# Patient Record
Sex: FEMALE | Race: BLACK OR AFRICAN AMERICAN | NOT HISPANIC OR LATINO | Employment: OTHER | URBAN - METROPOLITAN AREA
[De-identification: names, ages, dates, MRNs, and addresses within clinical notes are randomized per-mention and may not be internally consistent; named-entity substitution may affect disease eponyms.]

---

## 2017-02-19 ENCOUNTER — HOSPITAL ENCOUNTER (EMERGENCY)
Facility: HOSPITAL | Age: 62
Discharge: HOME | End: 2017-02-19
Attending: EMERGENCY MEDICINE
Payer: COMMERCIAL

## 2017-02-19 VITALS
OXYGEN SATURATION: 98 % | DIASTOLIC BLOOD PRESSURE: 81 MMHG | SYSTOLIC BLOOD PRESSURE: 156 MMHG | BODY MASS INDEX: 36.58 KG/M2 | HEART RATE: 102 BPM | TEMPERATURE: 98 F | WEIGHT: 200 LBS | RESPIRATION RATE: 16 BRPM

## 2017-02-19 DIAGNOSIS — R42 VERTIGO: Primary | ICD-10-CM

## 2017-02-19 PROCEDURE — 99283 EMERGENCY DEPT VISIT LOW MDM: CPT | Mod: ,,, | Performed by: EMERGENCY MEDICINE

## 2017-02-19 PROCEDURE — 99283 EMERGENCY DEPT VISIT LOW MDM: CPT

## 2017-02-19 RX ORDER — FAMOTIDINE 20 MG/1
20 TABLET, FILM COATED ORAL 2 TIMES DAILY
Qty: 30 TABLET | Refills: 0 | Status: ON HOLD | OUTPATIENT
Start: 2017-02-19 | End: 2020-03-11 | Stop reason: HOSPADM

## 2017-02-19 NOTE — ED AVS SNAPSHOT
[facility]  [address]  [facility] [address]               Lo Escalera   2017  1:02 AM   ED    Description:  Female : 1955   Department:  [facility]           Your Care was Coordinated By:     Provider Role From To    Raj Lopez MD Attending Provider 17 0146 --    Magdalena Sierra MD Resident 17 0109 --      Reason for Visit     Dizziness           Diagnoses this Visit        Comments    Vertigo    -  Primary       ED Disposition     None           To Do List           [facility]     [facility] -  Assistance  Registered nurses in the [facility] provide clinical advisement, health education, appointment booking, and other advisory services.  Call for this free service at [phone].             Medications           Message regarding Medications     Verify the changes and/or additions to your medication regime listed below are the same as discussed with your clinician today.  If any of these changes or additions are incorrect, please notify your healthcare provider.             Verify that the below list of medications is an accurate representation of the medications you are currently taking.  If none reported, the list may be blank. If incorrect, please contact your healthcare provider. Carry this list with you in case of emergency.           Current Medications     aluminum & magnesium hydroxide-simethicone (MYLANTA MAX STRENGTH) 400-400-40 mg/5 mL suspension Take 30 mLs by mouth 3 (three) times daily with meals.    calcium carbonate (TUMS) 200 mg calcium (500 mg) chewable tablet Take 1 tablet (500 mg total) by mouth as needed for Heartburn.    diclofenac (VOLTAREN) 50 MG EC tablet TAKE 1 TABLET BY MOUTH TWICE A DAY AFTER MEALS    duloxetine (CYMBALTA) 20 MG capsule Take 1 capsule (20 mg total) by mouth 2 (two) times daily.    famotidine (PEPCID) 20 MG tablet Take  1 tablet (20 mg total) by mouth 2 (two) times daily.    gabapentin (NEURONTIN) 100 MG capsule Take 1 capsule (100 mg total) by mouth 3 (three) times daily.    lisinopril (PRINIVIL,ZESTRIL) 20 MG tablet Take 1 tablet (20 mg total) by mouth once daily.    meclizine (ANTIVERT) 25 mg tablet Take 1 tablet (25 mg total) by mouth 3 (three) times daily as needed.    metoprolol succinate (TOPROL-XL) 25 MG 24 hr tablet Take 25 mg by mouth once daily.    pantoprazole (PROTONIX) 40 MG tablet Take 1 tablet (40 mg total) by mouth 2 (two) times daily.           Clinical Reference Information           Your Vitals Were     BP Pulse Temp Resp Weight Last Period    156/81 (BP Location: Left arm, Patient Position: Standing, BP Method: Automatic) 102 98 °F (36.7 °C) 16 90.7 kg (200 lb) (LMP Unknown)    SpO2 BMI             98% 36.58 kg/m2         Allergies as of 2/19/2017        Reactions    Codeine Itching      Immunizations Administered on Date of Encounter - 2/19/2017     None      ED Micro, Lab, POCT     None      ED Imaging Orders     None        Discharge Instructions             Managing Dizziness (Vertigo) with Medicines    Although medicines can't cure your problem, they can help control symptoms. Your doctor may prescribe medicines for a few weeks and then taper them off. Always take your medicine as prescribed. Never share your medicine with others.  Contact your healthcare provider right away if you have side effects from your medicines.   How medicines can help  · Treat infection or inflammation. If you have an infection caused by bacteria, your doctor can prescribe antibiotics.  · Limit conflicting balance signals. These medicines are often in pill form.  · Ease nausea. Suppositories, pills, or shots can reduce vomiting.  · Reduce pressure in the canals. Diuretics can be used to treat Meniere's disease. These medicines help your body get rid of extra fluid.  · Ease other symptoms. Other medicines can help ease depression  and anxiety caused by living with dizziness or fainting.  Date Last Reviewed: 11/1/2016  © 6545-6355 [employer]. [address] [place] [address], [place] [address]. All rights reserved. This information is not intended as a substitute for professional medical care. Always follow your healthcare professional's instructions.           [facility] complies with applicable Federal civil rights laws and does not discriminate on the basis of race, color, national origin, age, disability, or sex.        Language Assistance Services     ATTENTION: Language assistance services are available, free of charge. Please call [phone].      ATENCIÓN: Si habla español, tiene a triplett disposición servicios gratuitos de asistencia lingüística. Llame al [phone].     CHÚ Ý: N?u b?n nói Ti?ng Vi?t, có các d?ch v? h? tr? ngôn ng? mi?n phí dành cho b?n. G?i s? [phone].

## 2017-02-19 NOTE — DISCHARGE INSTRUCTIONS
Managing Dizziness (Vertigo) with Medicines    Although medicines can't cure your problem, they can help control symptoms. Your doctor may prescribe medicines for a few weeks and then taper them off. Always take your medicine as prescribed. Never share your medicine with others.  Contact your healthcare provider right away if you have side effects from your medicines.   How medicines can help  · Treat infection or inflammation. If you have an infection caused by bacteria, your doctor can prescribe antibiotics.  · Limit conflicting balance signals. These medicines are often in pill form.  · Ease nausea. Suppositories, pills, or shots can reduce vomiting.  · Reduce pressure in the canals. Diuretics can be used to treat Meniere's disease. These medicines help your body get rid of extra fluid.  · Ease other symptoms. Other medicines can help ease depression and anxiety caused by living with dizziness or fainting.  Date Last Reviewed: 11/1/2016  © 8323-1612 [employer]. [address] [place] [address], [place] [address]. All rights reserved. This information is not intended as a substitute for professional medical care. Always follow your healthcare professional's instructions.

## 2017-02-19 NOTE — ED TRIAGE NOTES
Pt presents to ED after having an episode of vertigo earlier today. Pt stated that she takes Meclizine at home for chronic vertigo but has recently been trying to decrease her dosage. Pt was told to take it TID but only took it once today. Pt denies vertigo at this time.     LOC: Patient name and date of birth verified.  The patient is awake, alert and aware of environment with an appropriate affect, the patient is oriented x 3 and speaking appropriately.  Pt in NAD.    APPEARANCE: Patient resting comfortably and in no acute distress, patient is clean and well groomed, patient's clothing is properly fastened.  SKIN: The skin is warm and dry, color consistent with ethnicity, patient has normal skin turgor and moist mucus membranes, skin intact, no breakdown or brusing noted.  MUSCULOSKELETAL: Patient moving all extremities well, no obvious swelling or deformities noted.  RESPIRATORY: Airway is open and patent, respirations are spontaneous, patient has a normal effort and rate, no accessory muscle use noted.  CARDIAC: Patient has a normal rate and rhythm, no periphreal edema noted, capillary refill < 3 seconds.  ABDOMEN: Soft and non tender to palpation, no distention noted. Bowel sounds present in all four quadrants.  NEUROLOGIC: Eyes open spontaneously, behavior appropriate to situation, follows commands, facial expression symmetrical, bilateral hand grasp equal and even, purposeful motor response noted, normal sensation in all extremities when touched with a finger.

## 2017-02-19 NOTE — ED PROVIDER NOTES
Encounter Date: 2/19/2017       History     Chief Complaint   Patient presents with    Dizziness     pt reports hx of vertigo took home meds and now feels better. pt also reports      Review of patient's allergies indicates:   Allergen Reactions    Codeine Itching     HPI Comments: 62 yo female with h/o vertigo, on meclizine, with dizziness, and feeling light headed tonight, she had only taken the meclizine once today, as she was trying to wean herself off of it. She denies any CP, SOB, or other symptoms, no motor or neurological deficits    The history is provided by the patient.     Past Medical History   Diagnosis Date    Diverticulosis     Gastric ulcer      old    Hepatic steatosis 9/12/2016    Hypertension     Hypothyroidism 10/24/2016    Peptic ulcer disease 9/12/2016    SOB (shortness of breath) 10/24/2016     Past Medical History Pertinent Negatives   Diagnosis Date Noted    Asthma 10/18/2016    Diabetes mellitus 10/18/2016     Past Surgical History   Procedure Laterality Date    Hysterectomy      Appendectomy      Cholecystectomy      Tonsillectomy       History reviewed. No pertinent family history.  Social History   Substance Use Topics    Smoking status: Never Smoker    Smokeless tobacco: None    Alcohol use No      Comment: 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  However, no LOC colic and taken 20 years.     Review of Systems   Constitutional: Negative for chills and fever.   HENT: Negative for dental problem, ear pain, facial swelling and hearing loss.    Eyes: Negative for pain and visual disturbance.   Respiratory: Negative for cough and shortness of breath.    Cardiovascular: Negative for chest pain and leg swelling.   Gastrointestinal: Negative for abdominal distention and abdominal pain.   Genitourinary: Negative for dysuria.   Musculoskeletal: Negative for arthralgias.   Neurological: Positive for dizziness. Negative for seizures, syncope, speech difficulty, light-headedness, numbness and  headaches.   Hematological: Negative for adenopathy.   Psychiatric/Behavioral: Negative for agitation.       Physical Exam   Initial Vitals   BP Pulse Resp Temp SpO2   02/19/17 0008 02/19/17 0008 02/19/17 0008 02/19/17 0008 02/19/17 0008   170/75 88 16 98 °F (36.7 °C) 98 %     Physical Exam    Constitutional: She appears well-developed and well-nourished.   HENT:   Head: Normocephalic.   Nystagmus present laterally, no vertical nystagmus, nystagmus improved with repeating test.   Hand- eye coordination intact  Rapid motor movement intact  Rapid head movement consistent with peripheral vertigo   Eyes: Pupils are equal, round, and reactive to light.   Cardiovascular: Normal rate and regular rhythm.   Pulmonary/Chest: Breath sounds normal.   Musculoskeletal: Normal range of motion.   Neurological: She is alert.   Skin: Skin is warm.         ED Course   Procedures  Labs Reviewed - No data to display                APC / Resident Notes:   60 yo female with vertigo, and worsening symptoms with not taking her meclizine which improved after taking her medicaiton           Attending Attestation:   Physician Attestation Statement for Resident:  As the supervising MD   Physician Attestation Statement: I have personally seen and examined this patient.   I agree with the above history. -:   As the supervising MD I agree with the above PE.    As the supervising MD I agree with the above treatment, course, plan, and disposition.            Attending ED Notes:   This is an emergent evaluation of a 61 y.o. female patient with presentation of vertigo.  Based upon my evaluation, including the HINTS exam, I believe the patient has peripheral vertigo.  I do not believe that the patient has any acute CVA or cerebellar process. I do not believe head CT or MRI of the brain is indicated at this time.    The pt is stable for discharge. Clinical impression/plan and rx discussed with patient. Return precautions and after care instructions  given. Pt is to follow up with PCP in 3-5  days or return to the ED for any concerns. The patient expressed understanding.            ED Course     Clinical Impression:   The encounter diagnosis was Vertigo.    Disposition:   Disposition: Discharged  Condition: Stable       Raj Lopez MD  02/20/17 0410

## 2017-05-29 ENCOUNTER — HOSPITAL ENCOUNTER (EMERGENCY)
Facility: HOSPITAL | Age: 62
Discharge: HOME | End: 2017-05-29
Attending: EMERGENCY MEDICINE

## 2017-05-29 VITALS
WEIGHT: 185 LBS | TEMPERATURE: 99 F | OXYGEN SATURATION: 96 % | DIASTOLIC BLOOD PRESSURE: 66 MMHG | BODY MASS INDEX: 36.32 KG/M2 | HEIGHT: 60 IN | SYSTOLIC BLOOD PRESSURE: 152 MMHG | RESPIRATION RATE: 11 BRPM | HEART RATE: 94 BPM

## 2017-05-29 DIAGNOSIS — R10.9 ABDOMINAL PAIN, UNSPECIFIED LOCATION: Primary | ICD-10-CM

## 2017-05-29 DIAGNOSIS — K57.90 DIVERTICULOSIS OF INTESTINE, PART UNSPECIFIED, WITHOUT PERFORATION OR ABSCESS WITHOUT BLEEDING: ICD-10-CM

## 2017-05-29 LAB
ALBUMIN SERPL BCP-MCNC: 3.7 G/DL
ALP SERPL-CCNC: 168 U/L
ALT SERPL W/O P-5'-P-CCNC: 50 U/L
ANION GAP SERPL CALC-SCNC: 15 MMOL/L
AST SERPL-CCNC: 171 U/L
BACTERIA #/AREA URNS AUTO: ABNORMAL /HPF
BASOPHILS # BLD AUTO: 0.02 K/UL
BASOPHILS NFR BLD: 0.3 %
BILIRUB SERPL-MCNC: 1.7 MG/DL
BILIRUB UR QL STRIP: ABNORMAL
BUN SERPL-MCNC: 5 MG/DL
CALCIUM SERPL-MCNC: 9.5 MG/DL
CHLORIDE SERPL-SCNC: 96 MMOL/L
CLARITY UR REFRACT.AUTO: ABNORMAL
CO2 SERPL-SCNC: 28 MMOL/L
COLOR UR AUTO: ABNORMAL
CREAT SERPL-MCNC: 0.7 MG/DL
DIFFERENTIAL METHOD: ABNORMAL
EOSINOPHIL # BLD AUTO: 0 K/UL
EOSINOPHIL NFR BLD: 0.2 %
ERYTHROCYTE [DISTWIDTH] IN BLOOD BY AUTOMATED COUNT: 14.6 %
EST. GFR  (AFRICAN AMERICAN): >60 ML/MIN/1.73 M^2
EST. GFR  (NON AFRICAN AMERICAN): >60 ML/MIN/1.73 M^2
GLUCOSE SERPL-MCNC: 107 MG/DL
GLUCOSE UR QL STRIP: NEGATIVE
HCT VFR BLD AUTO: 42 %
HGB BLD-MCNC: 14.5 G/DL
HGB UR QL STRIP: NEGATIVE
HYALINE CASTS UR QL AUTO: 2 /LPF
KETONES UR QL STRIP: NEGATIVE
LEUKOCYTE ESTERASE UR QL STRIP: ABNORMAL
LIPASE SERPL-CCNC: 9 U/L
LYMPHOCYTES # BLD AUTO: 1.6 K/UL
LYMPHOCYTES NFR BLD: 27.1 %
MCH RBC QN AUTO: 35.5 PG
MCHC RBC AUTO-ENTMCNC: 34.5 %
MCV RBC AUTO: 103 FL
MICROSCOPIC COMMENT: ABNORMAL
MONOCYTES # BLD AUTO: 0.5 K/UL
MONOCYTES NFR BLD: 7.6 %
NEUTROPHILS # BLD AUTO: 3.8 K/UL
NEUTROPHILS NFR BLD: 64.6 %
NITRITE UR QL STRIP: NEGATIVE
PH UR STRIP: 7 [PH] (ref 5–8)
PLATELET # BLD AUTO: 140 K/UL
PMV BLD AUTO: 10.6 FL
POTASSIUM SERPL-SCNC: 3.7 MMOL/L
PROT SERPL-MCNC: 8.2 G/DL
PROT UR QL STRIP: ABNORMAL
RBC # BLD AUTO: 4.09 M/UL
RBC #/AREA URNS AUTO: 0 /HPF (ref 0–4)
SODIUM SERPL-SCNC: 139 MMOL/L
SP GR UR STRIP: 1.01 (ref 1–1.03)
SQUAMOUS #/AREA URNS AUTO: 3 /HPF
URN SPEC COLLECT METH UR: ABNORMAL
UROBILINOGEN UR STRIP-ACNC: 4 EU/DL
WBC # BLD AUTO: 5.9 K/UL
WBC #/AREA URNS AUTO: 12 /HPF (ref 0–5)

## 2017-05-29 PROCEDURE — 80053 COMPREHEN METABOLIC PANEL: CPT

## 2017-05-29 PROCEDURE — 25000003 PHARM REV CODE 250: Performed by: STUDENT IN AN ORGANIZED HEALTH CARE EDUCATION/TRAINING PROGRAM

## 2017-05-29 PROCEDURE — 83690 ASSAY OF LIPASE: CPT

## 2017-05-29 PROCEDURE — 81003 URINALYSIS AUTO W/O SCOPE: CPT

## 2017-05-29 PROCEDURE — 25000003 PHARM REV CODE 250: Performed by: EMERGENCY MEDICINE

## 2017-05-29 PROCEDURE — 87186 SC STD MICRODIL/AGAR DIL: CPT

## 2017-05-29 PROCEDURE — 87086 URINE CULTURE/COLONY COUNT: CPT

## 2017-05-29 PROCEDURE — 96374 THER/PROPH/DIAG INJ IV PUSH: CPT

## 2017-05-29 PROCEDURE — 63600175 PHARM REV CODE 636 W HCPCS: Performed by: STUDENT IN AN ORGANIZED HEALTH CARE EDUCATION/TRAINING PROGRAM

## 2017-05-29 PROCEDURE — 96376 TX/PRO/DX INJ SAME DRUG ADON: CPT

## 2017-05-29 PROCEDURE — 99284 EMERGENCY DEPT VISIT MOD MDM: CPT | Mod: 25

## 2017-05-29 PROCEDURE — 99284 EMERGENCY DEPT VISIT MOD MDM: CPT | Mod: ,,, | Performed by: EMERGENCY MEDICINE

## 2017-05-29 PROCEDURE — 96375 TX/PRO/DX INJ NEW DRUG ADDON: CPT

## 2017-05-29 PROCEDURE — 81001 URINALYSIS AUTO W/SCOPE: CPT

## 2017-05-29 PROCEDURE — 85025 COMPLETE CBC W/AUTO DIFF WBC: CPT

## 2017-05-29 PROCEDURE — 63600175 PHARM REV CODE 636 W HCPCS: Performed by: EMERGENCY MEDICINE

## 2017-05-29 PROCEDURE — 87077 CULTURE AEROBIC IDENTIFY: CPT

## 2017-05-29 PROCEDURE — 87088 URINE BACTERIA CULTURE: CPT

## 2017-05-29 PROCEDURE — 25500020 PHARM REV CODE 255: Performed by: EMERGENCY MEDICINE

## 2017-05-29 RX ORDER — HYDROCODONE BITARTRATE AND ACETAMINOPHEN 5; 325 MG/1; MG/1
1 TABLET ORAL EVERY 8 HOURS PRN
Qty: 10 TABLET | Refills: 0 | Status: SHIPPED | OUTPATIENT
Start: 2017-05-29 | End: 2017-06-08

## 2017-05-29 RX ORDER — DIPHENHYDRAMINE HYDROCHLORIDE 50 MG/ML
25 INJECTION INTRAMUSCULAR; INTRAVENOUS
Status: COMPLETED | OUTPATIENT
Start: 2017-05-29 | End: 2017-05-29

## 2017-05-29 RX ORDER — ONDANSETRON 2 MG/ML
4 INJECTION INTRAMUSCULAR; INTRAVENOUS
Status: COMPLETED | OUTPATIENT
Start: 2017-05-29 | End: 2017-05-29

## 2017-05-29 RX ORDER — METOCLOPRAMIDE HYDROCHLORIDE 5 MG/ML
10 INJECTION INTRAMUSCULAR; INTRAVENOUS
Status: COMPLETED | OUTPATIENT
Start: 2017-05-29 | End: 2017-05-29

## 2017-05-29 RX ORDER — HYDROMORPHONE HYDROCHLORIDE 1 MG/ML
0.5 INJECTION, SOLUTION INTRAMUSCULAR; INTRAVENOUS; SUBCUTANEOUS
Status: COMPLETED | OUTPATIENT
Start: 2017-05-29 | End: 2017-05-29

## 2017-05-29 RX ORDER — HYDROMORPHONE HYDROCHLORIDE 1 MG/ML
1 INJECTION, SOLUTION INTRAMUSCULAR; INTRAVENOUS; SUBCUTANEOUS
Status: COMPLETED | OUTPATIENT
Start: 2017-05-29 | End: 2017-05-29

## 2017-05-29 RX ORDER — METRONIDAZOLE 500 MG/1
500 TABLET ORAL 3 TIMES DAILY
Qty: 21 TABLET | Refills: 0 | Status: SHIPPED | OUTPATIENT
Start: 2017-05-29 | End: 2017-06-05

## 2017-05-29 RX ORDER — CIPROFLOXACIN 500 MG/1
500 TABLET ORAL EVERY 12 HOURS
Status: DISCONTINUED | OUTPATIENT
Start: 2017-05-29 | End: 2017-05-30 | Stop reason: HOSPADM

## 2017-05-29 RX ADMIN — ONDANSETRON 4 MG: 2 INJECTION INTRAMUSCULAR; INTRAVENOUS at 06:05

## 2017-05-29 RX ADMIN — SODIUM CHLORIDE 1000 ML: 0.9 INJECTION, SOLUTION INTRAVENOUS at 06:05

## 2017-05-29 RX ADMIN — DIPHENHYDRAMINE HYDROCHLORIDE 25 MG: 50 INJECTION, SOLUTION INTRAMUSCULAR; INTRAVENOUS at 08:05

## 2017-05-29 RX ADMIN — CIPROFLOXACIN HYDROCHLORIDE 500 MG: 500 TABLET, FILM COATED ORAL at 10:05

## 2017-05-29 RX ADMIN — HYDROMORPHONE HYDROCHLORIDE 0.5 MG: 1 INJECTION, SOLUTION INTRAMUSCULAR; INTRAVENOUS; SUBCUTANEOUS at 10:05

## 2017-05-29 RX ADMIN — IOHEXOL 15 ML: 350 INJECTION, SOLUTION INTRAVENOUS at 06:05

## 2017-05-29 RX ADMIN — METOCLOPRAMIDE 10 MG: 5 INJECTION, SOLUTION INTRAMUSCULAR; INTRAVENOUS at 08:05

## 2017-05-29 RX ADMIN — IOHEXOL 15 ML: 350 INJECTION, SOLUTION INTRAVENOUS at 07:05

## 2017-05-29 RX ADMIN — IOHEXOL 100 ML: 350 INJECTION, SOLUTION INTRAVENOUS at 08:05

## 2017-05-29 RX ADMIN — HYDROMORPHONE HYDROCHLORIDE 1 MG: 1 INJECTION, SOLUTION INTRAMUSCULAR; INTRAVENOUS; SUBCUTANEOUS at 06:05

## 2017-05-29 NOTE — ED PROVIDER NOTES
Encounter Date: 5/29/2017       History     Chief Complaint   Patient presents with    Abdominal Pain    Rectal Bleeding     Review of patient's allergies indicates:   Allergen Reactions    Codeine Itching     Pt is a 26 y/o F with PMHx as per below who presents with concern for sudden onset of sharp and severe abdominal pain localized to the upper abdomen.  Pain began suddenly this afternoon just PTA with no clear inciting factors.  Pain is exaggerated with palpation and minimally improved with lying still.  Pt denies any past episodes of this pain. Pt has only been able to eat three fries today as her pain has caused her to avoid food today.          Past Medical History:   Diagnosis Date    Diverticulosis     Gastric ulcer     old    Hepatic steatosis 9/12/2016    Hypertension     Peptic ulcer disease 9/12/2016    SOB (shortness of breath) 10/24/2016     Past Surgical History:   Procedure Laterality Date    APPENDECTOMY      CHOLECYSTECTOMY      HYSTERECTOMY      TONSILLECTOMY       History reviewed. No pertinent family history.  Social History   Substance Use Topics    Smoking status: Never Smoker    Smokeless tobacco: Not on file    Alcohol use No      Comment: 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  However, no LOC colic and taken 20 years.     Review of Systems   Constitutional: Negative for chills and fever.   HENT: Negative for congestion, postnasal drip, rhinorrhea, sinus pressure, sneezing and sore throat.    Eyes: Negative for discharge and redness.   Respiratory: Negative for cough, shortness of breath, wheezing and stridor.    Cardiovascular: Negative for chest pain.   Gastrointestinal: Positive for abdominal pain, blood in stool (several episodes of blood streaked stool today) and nausea. Negative for anal bleeding, constipation, diarrhea, rectal pain and vomiting.   Endocrine: Negative for cold intolerance and heat intolerance.   Genitourinary: Negative for dysuria, hematuria, pelvic pain, vaginal  bleeding, vaginal discharge and vaginal pain.   Musculoskeletal: Negative for arthralgias and myalgias.   Skin: Negative for rash and wound.   Allergic/Immunologic: Negative for immunocompromised state.   Neurological: Negative for weakness, light-headedness and headaches.   Hematological: Does not bruise/bleed easily.   Psychiatric/Behavioral: Negative for agitation and confusion. The patient is not nervous/anxious.        Physical Exam     Initial Vitals [05/29/17 1627]   BP Pulse Resp Temp SpO2   136/66 (!) 118 18 98.9 °F (37.2 °C) 98 %     Physical Exam    Nursing note and vitals reviewed.  Constitutional: She appears well-developed and well-nourished. She is not diaphoretic. No distress.   HENT:   Head: Normocephalic and atraumatic.   Eyes: Conjunctivae are normal. Right eye exhibits no discharge. No scleral icterus.   Neck: No tracheal deviation present. No JVD present.   Cardiovascular: Normal rate, regular rhythm, normal heart sounds and intact distal pulses. Exam reveals no gallop and no friction rub.    No murmur heard.  Pulmonary/Chest: Breath sounds normal. No stridor. No respiratory distress. She has no wheezes. She has no rhonchi. She has no rales.   Abdominal: Soft. She exhibits no distension and no mass. There is tenderness (moderate TTP of the UQ's bilaterally). There is no rebound and no guarding.   Remote surgical scars noted to the abdomen   Musculoskeletal: Normal range of motion. She exhibits no edema or tenderness.   Neurological: She is alert and oriented to person, place, and time. She has normal strength.   GLENN with NGND's   Skin: Skin is warm and dry. Capillary refill takes less than 2 seconds. No rash noted. No erythema.   Psychiatric: She has a normal mood and affect. Her behavior is normal. Judgment and thought content normal.         ED Course   Procedures  Labs Reviewed - No data to display          Medical Decision Making:   Initial Assessment:   61 year old female presented with  abdominal pain.  Differential Diagnosis:   Diverticulitis versus SBO.   Independently Interpreted Test(s):   I have ordered and independently interpreted X-rays - see prior notes.  Clinical Tests:   Lab Tests: Ordered and Reviewed  Radiological Study: Ordered and Reviewed  Medical Tests: Ordered and Reviewed  ED Management:  - CT abdomen   - Routine labs and UA  Other:   I discussed test(s) with the performing physician.  I have discussed this case with another health care provider.  CT abdomen showed diverticulosis without diverticulitis        APC / Resident Notes:   Pt is A/O x 4, afebrile, non-toxic in appearance, in no acute respiratory distress with initial HR in the 110's and systolic BP in the 180's likely secondary to pain.               Discharged with PO Cipro, and Flagyl. Short supply of Norco. PCP appointment requested.    ED Course     Clinical Impression:   There were no encounter diagnoses.    Disposition:   Disposition: Discharged  Condition: Stable  CT abdomen showed diverticulosis without diverticulitis     ATTENDING PHYSICIAN ATTESTATION  I have repeated the key portions of the resident's history and physical, reviewed and agree with the resident medical documentation, and supervised and managed the medical care of the patient.  Additionally, I was present for the critical portion of any procedure(s) performed.    Kimo Guerrier MD, LUDNI, [facility]  Department of Emergency Medicine    All systems were reviewed/examined and were negative except as noted in the HPI.    Medical Decision Making:  I reviewed radiology personally along with interpretations.  I reviewed and interpreted the laboratory results.    Kimo Guerrier MD, LUDIN, CPE, FACEP  Department of Emergency Medicine  , [employer]           Cheng Guerrier MD  05/31/17 0197

## 2017-05-29 NOTE — ED NOTES
Patient identifiers verified and correct for Lo Bernarda Jw.    LOC: The patient is awake, alert and aware of environment with an appropriate affect, the patient is oriented x 3 and speaking appropriately.  APPEARANCE: Patient resting comfortably and in no acute distress, patient is clean and well groomed, patient's clothing is properly fastened.  SKIN: The skin is warm and dry, color consistent with ethnicity, patient has normal skin turgor and moist mucus membranes, skin intact, no breakdown or bruising noted.  MUSCULOSKELETAL: Patient moving all extremities spontaneously, no obvious swelling or deformities noted.  RESPIRATORY: Airway is open and patent, respirations are spontaneous, patient has a normal effort and rate, no accessory muscle use noted, bilateral breath sounds clear and equal.  CARDIAC: Patient has a normal rate and regular rhythm, no peripheral edema noted, capillary refill < 3 seconds.  ABDOMEN: Soft and tender to palpation in RLQ and epigastric region, no distention noted, normoactive bowel sounds present in all four quadrants.  NEUROLOGIC: Eyes open spontaneously, behavior appropriate to situation, follows commands, facial expression symmetrical, bilateral hand grasp equal and even, purposeful motor response noted, normal sensation in all extremities when touched with a finger.

## 2017-05-29 NOTE — ED TRIAGE NOTES
"Pt presents to the ED c/o abdominal pain x2 days. Pt states "I have severe stomach pain." Pt states she had a bowel movement around 1400 today and saw bright red blood in the toilet. +nausea, diarrhea. Denies vomiting. Hx of ulcers, diverticulosis.   "

## 2017-06-01 LAB — BACTERIA UR CULT: NORMAL

## 2019-06-09 PROBLEM — K57.92 ACUTE DIVERTICULITIS: Status: ACTIVE | Noted: 2019-06-09

## 2019-06-10 PROBLEM — R74.01 TRANSAMINITIS: Status: ACTIVE | Noted: 2019-06-10

## 2019-06-10 PROBLEM — K92.1 MELENA: Status: ACTIVE | Noted: 2019-06-10

## 2020-03-06 ENCOUNTER — HOSPITAL ENCOUNTER (INPATIENT)
Facility: OTHER | Age: 65
LOS: 5 days | Discharge: HOME HEALTH | DRG: 378 | End: 2020-03-11
Attending: HOSPITALIST | Admitting: HOSPITALIST
Payer: MEDICARE

## 2020-03-06 DIAGNOSIS — R07.9 CHEST PAIN: ICD-10-CM

## 2020-03-06 DIAGNOSIS — R53.81 DEBILITY: ICD-10-CM

## 2020-03-06 DIAGNOSIS — E66.01 MORBID OBESITY: ICD-10-CM

## 2020-03-06 DIAGNOSIS — K92.2 UGIB (UPPER GASTROINTESTINAL BLEED): ICD-10-CM

## 2020-03-06 DIAGNOSIS — K92.2 UPPER GI BLEED: ICD-10-CM

## 2020-03-06 DIAGNOSIS — R42 VERTIGO: Primary | ICD-10-CM

## 2020-03-06 PROBLEM — F41.1 GAD (GENERALIZED ANXIETY DISORDER): Status: ACTIVE | Noted: 2020-03-06

## 2020-03-06 PROBLEM — D62 ACUTE BLOOD LOSS ANEMIA: Status: ACTIVE | Noted: 2020-03-06

## 2020-03-06 PROBLEM — K57.92 ACUTE DIVERTICULITIS: Status: RESOLVED | Noted: 2019-06-09 | Resolved: 2020-03-06

## 2020-03-06 PROBLEM — D69.6 THROMBOCYTOPENIA: Status: ACTIVE | Noted: 2020-03-06

## 2020-03-06 PROBLEM — E03.9 HYPOTHYROIDISM: Status: ACTIVE | Noted: 2020-03-06

## 2020-03-06 PROBLEM — K92.1 MELENA: Status: RESOLVED | Noted: 2019-06-10 | Resolved: 2020-03-06

## 2020-03-06 LAB
ABO + RH BLD: NORMAL
AMPHET+METHAMPHET UR QL: NEGATIVE
BARBITURATES UR QL SCN>200 NG/ML: NEGATIVE
BASOPHILS # BLD AUTO: 0.03 K/UL (ref 0–0.2)
BASOPHILS NFR BLD: 0.2 % (ref 0–1.9)
BENZODIAZ UR QL SCN>200 NG/ML: NEGATIVE
BLD GP AB SCN CELLS X3 SERPL QL: NORMAL
BZE UR QL SCN: NEGATIVE
CANNABINOIDS UR QL SCN: NEGATIVE
CREAT UR-MCNC: 94.2 MG/DL (ref 15–325)
DIFFERENTIAL METHOD: ABNORMAL
EOSINOPHIL # BLD AUTO: 0 K/UL (ref 0–0.5)
EOSINOPHIL NFR BLD: 0.1 % (ref 0–8)
ERYTHROCYTE [DISTWIDTH] IN BLOOD BY AUTOMATED COUNT: 15.6 % (ref 11.5–14.5)
ETHANOL SERPL-MCNC: <10 MG/DL
ETHANOL UR-MCNC: <10 MG/DL
HCT VFR BLD AUTO: 24.3 % (ref 37–48.5)
HGB BLD-MCNC: 8.2 G/DL (ref 12–16)
IMM GRANULOCYTES # BLD AUTO: 0.05 K/UL (ref 0–0.04)
IMM GRANULOCYTES NFR BLD AUTO: 0.4 % (ref 0–0.5)
LYMPHOCYTES # BLD AUTO: 3.5 K/UL (ref 1–4.8)
LYMPHOCYTES NFR BLD: 29.1 % (ref 18–48)
MCH RBC QN AUTO: 29.5 PG (ref 27–31)
MCHC RBC AUTO-ENTMCNC: 33.7 G/DL (ref 32–36)
MCV RBC AUTO: 87 FL (ref 82–98)
METHADONE UR QL SCN>300 NG/ML: NEGATIVE
MONOCYTES # BLD AUTO: 0.7 K/UL (ref 0.3–1)
MONOCYTES NFR BLD: 5.9 % (ref 4–15)
NEUTROPHILS # BLD AUTO: 7.7 K/UL (ref 1.8–7.7)
NEUTROPHILS NFR BLD: 64.3 % (ref 38–73)
NRBC BLD-RTO: 0 /100 WBC
OPIATES UR QL SCN: NEGATIVE
PCP UR QL SCN>25 NG/ML: NEGATIVE
PLATELET # BLD AUTO: 91 K/UL (ref 150–350)
PMV BLD AUTO: 11.5 FL (ref 9.2–12.9)
RBC # BLD AUTO: 2.78 M/UL (ref 4–5.4)
TOXICOLOGY INFORMATION: NORMAL
TSH SERPL DL<=0.005 MIU/L-ACNC: 1.46 UIU/ML (ref 0.4–4)
WBC # BLD AUTO: 12.04 K/UL (ref 3.9–12.7)

## 2020-03-06 PROCEDURE — 63600175 PHARM REV CODE 636 W HCPCS: Performed by: HOSPITALIST

## 2020-03-06 PROCEDURE — 99223 PR INITIAL HOSPITAL CARE,LEVL III: ICD-10-PCS | Mod: ,,, | Performed by: PHYSICIAN ASSISTANT

## 2020-03-06 PROCEDURE — C9113 INJ PANTOPRAZOLE SODIUM, VIA: HCPCS | Performed by: HOSPITALIST

## 2020-03-06 PROCEDURE — C1751 CATH, INF, PER/CENT/MIDLINE: HCPCS

## 2020-03-06 PROCEDURE — 85025 COMPLETE CBC W/AUTO DIFF WBC: CPT | Mod: 91

## 2020-03-06 PROCEDURE — 84443 ASSAY THYROID STIM HORMONE: CPT

## 2020-03-06 PROCEDURE — 80307 DRUG TEST PRSMV CHEM ANLYZR: CPT

## 2020-03-06 PROCEDURE — 99223 1ST HOSP IP/OBS HIGH 75: CPT | Mod: ,,, | Performed by: PHYSICIAN ASSISTANT

## 2020-03-06 PROCEDURE — 63600175 PHARM REV CODE 636 W HCPCS: Performed by: PHYSICIAN ASSISTANT

## 2020-03-06 PROCEDURE — 20000000 HC ICU ROOM

## 2020-03-06 PROCEDURE — 86900 BLOOD TYPING SEROLOGIC ABO: CPT | Mod: 91

## 2020-03-06 PROCEDURE — 80320 DRUG SCREEN QUANTALCOHOLS: CPT

## 2020-03-06 PROCEDURE — 76937 US GUIDE VASCULAR ACCESS: CPT

## 2020-03-06 PROCEDURE — 36410 VNPNXR 3YR/> PHY/QHP DX/THER: CPT

## 2020-03-06 RX ORDER — IBUPROFEN 200 MG
24 TABLET ORAL
Status: DISCONTINUED | OUTPATIENT
Start: 2020-03-06 | End: 2020-03-11 | Stop reason: HOSPADM

## 2020-03-06 RX ORDER — SODIUM CHLORIDE 0.9 % (FLUSH) 0.9 %
10 SYRINGE (ML) INJECTION
Status: DISCONTINUED | OUTPATIENT
Start: 2020-03-06 | End: 2020-03-11 | Stop reason: HOSPADM

## 2020-03-06 RX ORDER — MECLIZINE HYDROCHLORIDE 25 MG/1
25 TABLET ORAL 3 TIMES DAILY PRN
Status: DISCONTINUED | OUTPATIENT
Start: 2020-03-06 | End: 2020-03-11 | Stop reason: HOSPADM

## 2020-03-06 RX ORDER — PANTOPRAZOLE SODIUM 40 MG/10ML
40 INJECTION, POWDER, LYOPHILIZED, FOR SOLUTION INTRAVENOUS 2 TIMES DAILY
Status: DISCONTINUED | OUTPATIENT
Start: 2020-03-06 | End: 2020-03-09

## 2020-03-06 RX ORDER — ONDANSETRON 4 MG/1
4 TABLET, ORALLY DISINTEGRATING ORAL EVERY 6 HOURS PRN
Status: DISCONTINUED | OUTPATIENT
Start: 2020-03-06 | End: 2020-03-11 | Stop reason: HOSPADM

## 2020-03-06 RX ORDER — SODIUM CHLORIDE 9 MG/ML
INJECTION, SOLUTION INTRAVENOUS CONTINUOUS
Status: DISCONTINUED | OUTPATIENT
Start: 2020-03-06 | End: 2020-03-07

## 2020-03-06 RX ORDER — ACETAMINOPHEN 325 MG/1
650 TABLET ORAL EVERY 4 HOURS PRN
Status: DISCONTINUED | OUTPATIENT
Start: 2020-03-06 | End: 2020-03-08

## 2020-03-06 RX ORDER — ACETAMINOPHEN 325 MG/1
650 TABLET ORAL EVERY 6 HOURS PRN
Status: DISCONTINUED | OUTPATIENT
Start: 2020-03-06 | End: 2020-03-11 | Stop reason: HOSPADM

## 2020-03-06 RX ORDER — IBUPROFEN 200 MG
16 TABLET ORAL
Status: DISCONTINUED | OUTPATIENT
Start: 2020-03-06 | End: 2020-03-11 | Stop reason: HOSPADM

## 2020-03-06 RX ORDER — DULOXETIN HYDROCHLORIDE 20 MG/1
20 CAPSULE, DELAYED RELEASE ORAL 2 TIMES DAILY
Status: DISCONTINUED | OUTPATIENT
Start: 2020-03-06 | End: 2020-03-11 | Stop reason: HOSPADM

## 2020-03-06 RX ORDER — GLUCAGON 1 MG
1 KIT INJECTION
Status: DISCONTINUED | OUTPATIENT
Start: 2020-03-06 | End: 2020-03-11 | Stop reason: HOSPADM

## 2020-03-06 RX ORDER — TALC
6 POWDER (GRAM) TOPICAL ONCE
Status: DISCONTINUED | OUTPATIENT
Start: 2020-03-06 | End: 2020-03-07

## 2020-03-06 RX ADMIN — PANTOPRAZOLE SODIUM 40 MG: 40 INJECTION, POWDER, LYOPHILIZED, FOR SOLUTION INTRAVENOUS at 06:03

## 2020-03-06 RX ADMIN — SODIUM CHLORIDE: 0.9 INJECTION, SOLUTION INTRAVENOUS at 05:03

## 2020-03-06 RX ADMIN — LORAZEPAM 0.5 MG: 2 INJECTION INTRAMUSCULAR; INTRAVENOUS at 11:03

## 2020-03-06 NOTE — ASSESSMENT & PLAN NOTE
-History noted  -Per home med list she takes Valium for this  -Hold sedating medications for now

## 2020-03-06 NOTE — ASSESSMENT & PLAN NOTE
-Mrs Escalera is admitted to inpatient status in our ICU  -She had hematemesis and melena associated with abdominal pain, light headedness, hypotension and tachycardia.  -Hb was 6.4 in ER and she has been transfused 2 units PRBC.  -Suspect this is due to PUD/gastritis.  -Will monitor h/h q8h, continue protonix drip and consult GI for evaluation.  -Transfuse to keep Hb > 7.0  -Maintain NPO status now and monitor closely in ICU.

## 2020-03-06 NOTE — HPI
Mrs. Escalera is a 64 year old woman with history of peptic ulcer disease, gastritis, hypertension, hypothyroidism, vertigo, anxiety and remote alcohol abuse who presented to [facility] today for evaluation of hematemesis. She noted that she had an episode of red emesis last night and this morning as well as dark black bowel movement.  These were associated with abdominal pain that radiated to her back and light headedness.  She states she has been taking alleve at home for pain but denies alcohol consumption, noting she has abstained from alcohol for many years.    In the ER she was hypotensive, tachycardic and had a hemoglobin of 6.4.  She was given fluids, transfused two units PRBC and started on a proton pump inhibitor drip.  Her blood pressure improved and she has transferred to [facility] for GI evaluation.  Of note, she had an EGD performed in June 2019 which showed gastritis and no varices.

## 2020-03-06 NOTE — PLAN OF CARE
"Outside Transfer Acceptance Note        Patients name/MRN:     Lo Escalera MRN: [id number]     Referring Physician or Mid-Level provider giving report:   Jorge Rivera MD     Referral Facility:    [place], [place]     Date/Time of Acceptance:    3/6/20 at 2:35pm     Accepting Physician for admission to hospital: Jorge Quispe MD ()     Accepting facility:    [facility]     Consulting Physicians from [facility] involved in case:   Shelly Huertas MD    Reason for transfer request:    GI services    64-year-old woman with hypertension, arthritis, and vertigo presents with hematemesis and melena with associated epigastric pain radiating to her back.  States she had 1 episode of hematemesis last night and another this morning.  She notes lightheadedness since this.  She is not on any anticoagulant or anti-platelet agents.  Denies any current alcohol use although with history of alcoholism in distant past. No reported NSAID use.    Of note, she had presentation with epigastric pain in June 2019; at that time, EGD revealed gastritis and reflux esophagitis. No varices.    Initially presented with  and BP 65/43. She was given 1 L normal saline bolus and 80 mg IV Protonix-> gtt. BP has improved to 127/45 with HR in low 100s. Receiving 2u PRBC (2nd unit running). Clinically has not had any further hematemesis since presentation to ED.    To Do List upon arrival:    1) Consult GI  2) Continue supportive care and monitor Hb    Vital signs:   BP (!) 89/36 (BP Location: Left arm, Patient Position: Lying)   Pulse (!) 121   Resp 18   Ht 5' 3" (1.6 m)   Wt 68 kg (150 lb)   LMP  (LMP Unknown)   SpO2 100%   BMI 26.57 kg/m²     Past Medical History:   Diagnosis Date    Abdominal pain     Anxiety     Dehydration     Diverticulosis     Fall     Gastric ulcer     old    GERD (gastroesophageal reflux disease)     Hepatic steatosis 9/12/2016    Hyperbilirubinemia     " Hypertension     Hypothyroidism     Orthostasis     Peptic ulcer disease 9/12/2016    Pyuria     SOB (shortness of breath) 10/24/2016    Tachycardia     UPJ (ureteropelvic junction) obstruction     Vertigo     Weakness      Past Surgical History:   Procedure Laterality Date    APPENDECTOMY      CHOLECYSTECTOMY      ESOPHAGOGASTRODUODENOSCOPY N/A 6/12/2019    Procedure: ESOPHAGOGASTRODUODENOSCOPY (EGD);  Surgeon: Arben Brown MD;  [facility];  Service: Endoscopy;  Laterality: N/A;    HYSTERECTOMY      TONSILLECTOMY       Allergies:   Review of patient's allergies indicates:   Allergen Reactions    Codeine Itching     Current Facility-Administered Medications:   Current Facility-Administered Medications:     0.9%  NaCl infusion (for blood administration), , Intravenous, Q24H PRN, Jorge Rivera MD    ondansetron injection 4 mg, 4 mg, Intravenous, ED 1 Time, Jorge Rivera MD    pantoprazole (PROTONIX) 40 mg in dextrose 5 % 100 mL infusion, 8 mg/hr, Intravenous, Continuous, Jorge Rivera MD    Current Outpatient Medications:     aluminum & magnesium hydroxide-simethicone (MYLANTA MAX STRENGTH) 400-400-40 mg/5 mL suspension, Take 30 mLs by mouth 3 (three) times daily with meals., Disp: , Rfl: 0    calcium carbonate (TUMS) 200 mg calcium (500 mg) chewable tablet, Take 1 tablet (500 mg total) by mouth as needed for Heartburn., Disp: 30 tablet, Rfl: 11    clonazePAM (KLONOPIN) 0.5 MG tablet, Take 1 tablet (0.5 mg total) by mouth 3 (three) times daily as needed for Anxiety., Disp: 10 tablet, Rfl: 0    diazePAM (VALIUM) 5 MG tablet, Take 1 tablet (5 mg total) by mouth every 12 (twelve) hours as needed (vertigo)., Disp: 12 tablet, Rfl: 0    diclofenac (VOLTAREN) 50 MG EC tablet, TAKE 1 TABLET BY MOUTH TWICE A DAY AFTER MEALS, Disp: , Rfl: 2    duloxetine (CYMBALTA) 20 MG capsule, Take 1 capsule (20 mg total) by mouth 2 (two) times daily., Disp: 60 capsule, Rfl: 1     famotidine (PEPCID) 20 MG tablet, Take 1 tablet (20 mg total) by mouth 2 (two) times daily., Disp: 30 tablet, Rfl: 0    gabapentin (NEURONTIN) 100 MG capsule, Take 1 capsule (100 mg total) by mouth 3 (three) times daily., Disp: 90 capsule, Rfl: 1    HYDROcodone-acetaminophen (NORCO) 5-325 mg per tablet, Take 1 tablet by mouth every 6 (six) hours as needed for Pain., Disp: 20 tablet, Rfl: 0    lisinopril (PRINIVIL,ZESTRIL) 20 MG tablet, Take 1 tablet (20 mg total) by mouth once daily., Disp: 30 tablet, Rfl: 1    meclizine (ANTIVERT) 25 mg tablet, Take 1 tablet (25 mg total) by mouth 3 (three) times daily as needed. (Patient taking differently: Take 25 mg by mouth 3 (three) times daily as needed. For dizziness (vertigo)), Disp: 20 tablet, Rfl: 0    metoprolol succinate (TOPROL-XL) 25 MG 24 hr tablet, Take 25 mg by mouth once daily., Disp: , Rfl:     pantoprazole (PROTONIX) 40 MG tablet, Take 1 tablet (40 mg total) by mouth 2 (two) times daily., Disp: 60 tablet, Rfl: 11    potassium chloride (KLOR-CON) 10 MEQ TbSR, Take 1 tablet (10 mEq total) by mouth once daily., Disp: 7 tablet, Rfl: 0    LABS: see Epic    Imaging: see Radha Quispe MD  Department of Hospital Medicine  Patient Flow Center/   [phone]

## 2020-03-06 NOTE — ASSESSMENT & PLAN NOTE
-History noted  -Remote history of alcohol abuse noted  -Noted chronic hyperbilirubinemia and mild transaminitis (AST:ALT > 2:1 suggestive of alcohol)  -INR is elevated.  -Saw Hepatology at [facility] in 2016 but no further follow-ups noted  -Monitor CMP daily.

## 2020-03-06 NOTE — ASSESSMENT & PLAN NOTE
-Noted low blood pressure in ER which improved with PRBC and IV fluids  -Hold home lisinopril and metoprolol  -Monitor closely.

## 2020-03-06 NOTE — ASSESSMENT & PLAN NOTE
-Platelets low at times since at least September 2016  -Platelets today were 136 which is within baseline range.  -Questionably due to chronic liver disease from history alcohol abuse?  -Monitor closely

## 2020-03-07 PROBLEM — E83.42 HYPOMAGNESEMIA: Status: ACTIVE | Noted: 2020-03-07

## 2020-03-07 LAB
ALBUMIN SERPL BCP-MCNC: 2 G/DL (ref 3.5–5.2)
ALP SERPL-CCNC: 68 U/L (ref 55–135)
ALT SERPL W/O P-5'-P-CCNC: 12 U/L (ref 10–44)
ANION GAP SERPL CALC-SCNC: 5 MMOL/L (ref 8–16)
AST SERPL-CCNC: 47 U/L (ref 10–40)
BASOPHILS # BLD AUTO: 0.03 K/UL (ref 0–0.2)
BASOPHILS # BLD AUTO: 0.03 K/UL (ref 0–0.2)
BASOPHILS # BLD AUTO: 0.04 K/UL (ref 0–0.2)
BASOPHILS NFR BLD: 0.3 % (ref 0–1.9)
BASOPHILS NFR BLD: 0.4 % (ref 0–1.9)
BASOPHILS NFR BLD: 0.4 % (ref 0–1.9)
BILIRUB SERPL-MCNC: 2.5 MG/DL (ref 0.1–1)
BUN SERPL-MCNC: 27 MG/DL (ref 8–23)
CALCIUM SERPL-MCNC: 7.7 MG/DL (ref 8.7–10.5)
CHLORIDE SERPL-SCNC: 112 MMOL/L (ref 95–110)
CO2 SERPL-SCNC: 26 MMOL/L (ref 23–29)
CREAT SERPL-MCNC: 0.8 MG/DL (ref 0.5–1.4)
DIFFERENTIAL METHOD: ABNORMAL
EOSINOPHIL # BLD AUTO: 0 K/UL (ref 0–0.5)
EOSINOPHIL # BLD AUTO: 0 K/UL (ref 0–0.5)
EOSINOPHIL # BLD AUTO: 0.1 K/UL (ref 0–0.5)
EOSINOPHIL NFR BLD: 0.4 % (ref 0–8)
EOSINOPHIL NFR BLD: 0.5 % (ref 0–8)
EOSINOPHIL NFR BLD: 0.6 % (ref 0–8)
ERYTHROCYTE [DISTWIDTH] IN BLOOD BY AUTOMATED COUNT: 15.7 % (ref 11.5–14.5)
ERYTHROCYTE [DISTWIDTH] IN BLOOD BY AUTOMATED COUNT: 15.9 % (ref 11.5–14.5)
ERYTHROCYTE [DISTWIDTH] IN BLOOD BY AUTOMATED COUNT: 16.2 % (ref 11.5–14.5)
EST. GFR  (AFRICAN AMERICAN): >60 ML/MIN/1.73 M^2
EST. GFR  (NON AFRICAN AMERICAN): >60 ML/MIN/1.73 M^2
GLUCOSE SERPL-MCNC: 81 MG/DL (ref 70–110)
HCT VFR BLD AUTO: 24.2 % (ref 37–48.5)
HCT VFR BLD AUTO: 26.8 % (ref 37–48.5)
HCT VFR BLD AUTO: 27.1 % (ref 37–48.5)
HGB BLD-MCNC: 8.2 G/DL (ref 12–16)
HGB BLD-MCNC: 9 G/DL (ref 12–16)
HGB BLD-MCNC: 9 G/DL (ref 12–16)
IMM GRANULOCYTES # BLD AUTO: 0.03 K/UL (ref 0–0.04)
IMM GRANULOCYTES # BLD AUTO: 0.03 K/UL (ref 0–0.04)
IMM GRANULOCYTES # BLD AUTO: 0.05 K/UL (ref 0–0.04)
IMM GRANULOCYTES NFR BLD AUTO: 0.3 % (ref 0–0.5)
IMM GRANULOCYTES NFR BLD AUTO: 0.4 % (ref 0–0.5)
IMM GRANULOCYTES NFR BLD AUTO: 0.4 % (ref 0–0.5)
INR PPP: 1.5 (ref 0.8–1.2)
LYMPHOCYTES # BLD AUTO: 2 K/UL (ref 1–4.8)
LYMPHOCYTES # BLD AUTO: 2.1 K/UL (ref 1–4.8)
LYMPHOCYTES # BLD AUTO: 4.6 K/UL (ref 1–4.8)
LYMPHOCYTES NFR BLD: 24 % (ref 18–48)
LYMPHOCYTES NFR BLD: 28.1 % (ref 18–48)
LYMPHOCYTES NFR BLD: 40.9 % (ref 18–48)
MAGNESIUM SERPL-MCNC: 1.1 MG/DL (ref 1.6–2.6)
MCH RBC QN AUTO: 29.3 PG (ref 27–31)
MCH RBC QN AUTO: 29.4 PG (ref 27–31)
MCH RBC QN AUTO: 29.6 PG (ref 27–31)
MCHC RBC AUTO-ENTMCNC: 33.2 G/DL (ref 32–36)
MCHC RBC AUTO-ENTMCNC: 33.6 G/DL (ref 32–36)
MCHC RBC AUTO-ENTMCNC: 33.9 G/DL (ref 32–36)
MCV RBC AUTO: 87 FL (ref 82–98)
MCV RBC AUTO: 88 FL (ref 82–98)
MCV RBC AUTO: 88 FL (ref 82–98)
MONOCYTES # BLD AUTO: 0.3 K/UL (ref 0.3–1)
MONOCYTES # BLD AUTO: 0.3 K/UL (ref 0.3–1)
MONOCYTES # BLD AUTO: 0.4 K/UL (ref 0.3–1)
MONOCYTES NFR BLD: 3.9 % (ref 4–15)
NEUTROPHILS # BLD AUTO: 4.8 K/UL (ref 1.8–7.7)
NEUTROPHILS # BLD AUTO: 6 K/UL (ref 1.8–7.7)
NEUTROPHILS # BLD AUTO: 6.2 K/UL (ref 1.8–7.7)
NEUTROPHILS NFR BLD: 54 % (ref 38–73)
NEUTROPHILS NFR BLD: 66.6 % (ref 38–73)
NEUTROPHILS NFR BLD: 71 % (ref 38–73)
NRBC BLD-RTO: 0 /100 WBC
PLATELET # BLD AUTO: 77 K/UL (ref 150–350)
PLATELET # BLD AUTO: 78 K/UL (ref 150–350)
PLATELET # BLD AUTO: 82 K/UL (ref 150–350)
PMV BLD AUTO: 10.6 FL (ref 9.2–12.9)
PMV BLD AUTO: 10.7 FL (ref 9.2–12.9)
PMV BLD AUTO: 11 FL (ref 9.2–12.9)
POTASSIUM SERPL-SCNC: 3.7 MMOL/L (ref 3.5–5.1)
PROT SERPL-MCNC: 5.2 G/DL (ref 6–8.4)
PROTHROMBIN TIME: 16.5 SEC (ref 9–12.5)
RBC # BLD AUTO: 2.79 M/UL (ref 4–5.4)
RBC # BLD AUTO: 3.04 M/UL (ref 4–5.4)
RBC # BLD AUTO: 3.07 M/UL (ref 4–5.4)
SODIUM SERPL-SCNC: 143 MMOL/L (ref 136–145)
WBC # BLD AUTO: 11.16 K/UL (ref 3.9–12.7)
WBC # BLD AUTO: 7.2 K/UL (ref 3.9–12.7)
WBC # BLD AUTO: 8.67 K/UL (ref 3.9–12.7)

## 2020-03-07 PROCEDURE — 25000003 PHARM REV CODE 250: Performed by: HOSPITALIST

## 2020-03-07 PROCEDURE — 25000003 PHARM REV CODE 250: Performed by: PHYSICIAN ASSISTANT

## 2020-03-07 PROCEDURE — 83735 ASSAY OF MAGNESIUM: CPT

## 2020-03-07 PROCEDURE — 63600175 PHARM REV CODE 636 W HCPCS: Performed by: PHYSICIAN ASSISTANT

## 2020-03-07 PROCEDURE — 63600175 PHARM REV CODE 636 W HCPCS: Performed by: HOSPITALIST

## 2020-03-07 PROCEDURE — C9113 INJ PANTOPRAZOLE SODIUM, VIA: HCPCS | Performed by: HOSPITALIST

## 2020-03-07 PROCEDURE — 85610 PROTHROMBIN TIME: CPT

## 2020-03-07 PROCEDURE — C9113 INJ PANTOPRAZOLE SODIUM, VIA: HCPCS | Performed by: PHYSICIAN ASSISTANT

## 2020-03-07 PROCEDURE — 11000001 HC ACUTE MED/SURG PRIVATE ROOM

## 2020-03-07 PROCEDURE — 85025 COMPLETE CBC W/AUTO DIFF WBC: CPT | Mod: 91

## 2020-03-07 PROCEDURE — 99233 PR SUBSEQUENT HOSPITAL CARE,LEVL III: ICD-10-PCS | Mod: ,,, | Performed by: HOSPITALIST

## 2020-03-07 PROCEDURE — 36415 COLL VENOUS BLD VENIPUNCTURE: CPT

## 2020-03-07 PROCEDURE — 80053 COMPREHEN METABOLIC PANEL: CPT

## 2020-03-07 PROCEDURE — 99233 SBSQ HOSP IP/OBS HIGH 50: CPT | Mod: ,,, | Performed by: HOSPITALIST

## 2020-03-07 RX ORDER — MAGNESIUM SULFATE HEPTAHYDRATE 40 MG/ML
2 INJECTION, SOLUTION INTRAVENOUS ONCE
Status: COMPLETED | OUTPATIENT
Start: 2020-03-07 | End: 2020-03-07

## 2020-03-07 RX ADMIN — ALUMINUM HYDROXIDE, MAGNESIUM HYDROXIDE, AND SIMETHICONE: 200; 200; 20 SUSPENSION ORAL at 09:03

## 2020-03-07 RX ADMIN — PANTOPRAZOLE SODIUM 40 MG: 40 INJECTION, POWDER, LYOPHILIZED, FOR SOLUTION INTRAVENOUS at 08:03

## 2020-03-07 RX ADMIN — ACETAMINOPHEN 650 MG: 325 TABLET, FILM COATED ORAL at 06:03

## 2020-03-07 RX ADMIN — ACETAMINOPHEN 650 MG: 325 TABLET ORAL at 11:03

## 2020-03-07 RX ADMIN — MAGNESIUM SULFATE 2 G: 2 INJECTION INTRAVENOUS at 08:03

## 2020-03-07 RX ADMIN — DULOXETINE HYDROCHLORIDE 20 MG: 20 CAPSULE, DELAYED RELEASE ORAL at 09:03

## 2020-03-07 RX ADMIN — DULOXETINE HYDROCHLORIDE 20 MG: 20 CAPSULE, DELAYED RELEASE ORAL at 08:03

## 2020-03-07 RX ADMIN — PANTOPRAZOLE SODIUM 40 MG: 40 INJECTION, POWDER, LYOPHILIZED, FOR SOLUTION INTRAVENOUS at 09:03

## 2020-03-07 RX ADMIN — SODIUM CHLORIDE: 0.9 INJECTION, SOLUTION INTRAVENOUS at 01:03

## 2020-03-07 NOTE — PROGRESS NOTES
[facility] Hospital Medicine  Progress Note    Patient Name: Lo Escalera  MRN: [id number]  Patient Class: IP- Inpatient   Admission Date: 3/6/2020  Length of Stay: 1 days  Attending Physician: Deion Duque MD  Primary Care Provider: Amilcar Dai MD        Subjective:     Principal Problem:UGIB (upper gastrointestinal bleed)        HPI:  Mrs. Escalera is a 64 year old woman with history of peptic ulcer disease, gastritis, hypertension, hypothyroidism, vertigo, anxiety and remote alcohol abuse who presented to [facility] today for evaluation of hematemesis. She noted that she had an episode of red emesis last night and this morning as well as dark black bowel movement.  These were associated with abdominal pain that radiated to her back and light headedness.  She states she has been taking alleve at home for pain but denies alcohol consumption, noting she has abstained from alcohol for many years.    In the ER she was hypotensive, tachycardic and had a hemoglobin of 6.4.  She was given fluids, transfused two units PRBC and started on a proton pump inhibitor drip.  Her blood pressure improved and she has transferred to [facility] for GI evaluation.  Of note, she had an EGD performed in June 2019 which showed gastritis and no varices.    Overview/Hospital Course:  No notes on file    Interval History: No acute events overnight.  No further emesis.  States she has mild abdominal discomfort and that she is hungry.   at bedside and all questions answered.    Review of Systems   Constitutional: Negative for activity change, chills, diaphoresis and fatigue.   HENT: Negative for congestion, drooling and hearing loss.    Eyes: Negative for discharge.   Respiratory: Negative for apnea, chest tightness, shortness of breath and wheezing.    Cardiovascular: Negative for palpitations and leg swelling.   Gastrointestinal: Positive for abdominal pain and blood in stool. Negative for abdominal distention,  constipation, diarrhea, nausea and vomiting.   Endocrine: Negative for cold intolerance and heat intolerance.   Genitourinary: Negative for difficulty urinating.   Musculoskeletal: Negative for arthralgias and gait problem.   Skin: Negative for rash.   Neurological: Positive for dizziness and weakness. Negative for seizures, light-headedness and numbness.   Hematological: Negative for adenopathy.   Psychiatric/Behavioral: Negative for agitation and behavioral problems.     Objective:     Vital Signs (Most Recent):  Temp: 98.8 °F (37.1 °C) (03/07/20 0730)  Pulse: 101 (03/07/20 1200)  Resp: 20 (03/07/20 1200)  BP: 100/68 (03/07/20 1200)  SpO2: 99 % (03/07/20 1200) Vital Signs (24h Range):  Temp:  [98 °F (36.7 °C)-98.9 °F (37.2 °C)] 98.8 °F (37.1 °C)  Pulse:  [] 101  Resp:  [13-39] 20  SpO2:  [94 %-100 %] 99 %  BP: ()/() 100/68     Weight: 68 kg (149 lb 14.6 oz)  Body mass index is 26.56 kg/m².    Intake/Output Summary (Last 24 hours) at 3/7/2020 1244  Last data filed at 3/7/2020 0844  Gross per 24 hour   Intake 1765.25 ml   Output 300 ml   Net 1465.25 ml      Physical Exam   Constitutional: She is oriented to person, place, and time. She appears well-developed and well-nourished.   HENT:   Head: Normocephalic and atraumatic.   Eyes: Pupils are equal, round, and reactive to light. Conjunctivae and EOM are normal. Right eye exhibits no discharge. Left eye exhibits no discharge. No scleral icterus.   Neck: Normal range of motion. Neck supple.   Cardiovascular: Normal rate, regular rhythm and normal heart sounds. Exam reveals no gallop and no friction rub.   No murmur heard.  Pulmonary/Chest: Effort normal and breath sounds normal. No stridor. No respiratory distress. She has no wheezes. She has no rales.   Abdominal: Soft. Bowel sounds are normal. She exhibits no distension. There is no tenderness (epigastric ). There is no rebound and no guarding.   Musculoskeletal: Normal range of motion. She exhibits  no edema or deformity.   Neurological: She is oriented to person, place, and time. No cranial nerve deficit. She exhibits normal muscle tone.   Skin: Skin is warm and dry. Capillary refill takes less than 2 seconds. No pallor.   Psychiatric: She has a normal mood and affect. Her behavior is normal.   Nursing note and vitals reviewed.      Significant Labs: All pertinent labs within the past 24 hours have been reviewed.    Significant Imaging: I have reviewed and interpreted all pertinent imaging results/findings within the past 24 hours.      Assessment/Plan:      * UGIB (upper gastrointestinal bleed)  -Mrs Escalera was admitted to inpatient status in our ICU  -She had hematemesis and melena associated with abdominal pain, light headedness, hypotension and tachycardia.  -Hb was 6.4 in ER and she has been transfused 2 units PRBC with good improvement of Hb.  -Suspect this is due to PUD/gastritis from using aleve at home.  -Will monitor h/h q8h, continue protonix iv bid  -Transfuse to keep Hb > 7.0  -Give GI cocktail  -Seen by GI and ok for clear liquids.  Plan EGD Monday.  -Transfer out of ICU    Acute blood loss anemia  -Treatment as above.      Thrombocytopenia  -Platelets low at times since at least September 2016  -Platelets today were 136 which is within baseline range.  -Questionably due to chronic liver disease from history alcohol abuse?  -Monitor closely      Hepatic steatosis  -History noted  -Remote history of alcohol abuse noted  -EtOH undetectable on admission.  -Noted chronic hyperbilirubinemia and mild transaminitis (AST:ALT > 2:1 suggestive of alcohol)  -INR is mildly elevated.  -Saw Hepatology at [facility] in 2016 but no further follow-ups noted  -Monitor CMP daily.    Essential hypertension  -Noted low blood pressure in ER which improved with PRBC and IV fluids  -BP is normal at this time.  -Hold home lisinopril and metoprolol  -Stop IV fluids.  -Monitor closely.      History of Hypothyroidism  -History  of hypothyroidism noted in chart but levothyroxine not on home med list.  -TSH is normal, she does not have hypothyroidism.      Hypomagnesemia  -Replace magnesium today.  -Repeat labs in AM.    JHOAN (generalized anxiety disorder)  -Per home med list takes clonopin and cymbalta  -Continue cymbalta but hold clonopin for now.      Vertigo  -History noted  -Per home med list she takes Valium for this  -Hold sedating medications for now        VTE Risk Mitigation (From admission, onward)         Ordered     IP VTE LOW RISK PATIENT  Once      03/06/20 1719     Place KARISSA hose  Until discontinued      03/06/20 1719     Place sequential compression device  Until discontinued      03/06/20 1719                      Deion Duque MD  Department of Hospital Medicine   [facility]

## 2020-03-07 NOTE — NURSING TRANSFER
Nursing Transfer Note      3/7/2020     Transfer To: 306    Transfer via wheelchair    Transfer with cardiac monitoring    Transported by RN and transport staff    Medicines sent: yes    Chart send with patient: Yes    Son and  at bedside.    Patient reassessed at: 3/7/20 at 1452    Upon arrival to floor: cardiac monitor applied, patient oriented to room, call bell in reach and bed in lowest position.  Pt AAOx4, in NAD, c/o mild abdominal pain, denies SOB.    Report given to CIARA Zamora.

## 2020-03-07 NOTE — ASSESSMENT & PLAN NOTE
-Noted low blood pressure in ER which improved with PRBC and IV fluids  -BP is normal at this time.  -Hold home lisinopril and metoprolol  -Stop IV fluids.  -Monitor closely.

## 2020-03-07 NOTE — SUBJECTIVE & OBJECTIVE
Interval History: No acute events overnight.  No further emesis.  States she has mild abdominal discomfort and that she is hungry.   at bedside and all questions answered.    Review of Systems   Constitutional: Negative for activity change, chills, diaphoresis and fatigue.   HENT: Negative for congestion, drooling and hearing loss.    Eyes: Negative for discharge.   Respiratory: Negative for apnea, chest tightness, shortness of breath and wheezing.    Cardiovascular: Negative for palpitations and leg swelling.   Gastrointestinal: Positive for abdominal pain and blood in stool. Negative for abdominal distention, constipation, diarrhea, nausea and vomiting.   Endocrine: Negative for cold intolerance and heat intolerance.   Genitourinary: Negative for difficulty urinating.   Musculoskeletal: Negative for arthralgias and gait problem.   Skin: Negative for rash.   Neurological: Positive for dizziness and weakness. Negative for seizures, light-headedness and numbness.   Hematological: Negative for adenopathy.   Psychiatric/Behavioral: Negative for agitation and behavioral problems.     Objective:     Vital Signs (Most Recent):  Temp: 98.8 °F (37.1 °C) (03/07/20 0730)  Pulse: 101 (03/07/20 1200)  Resp: 20 (03/07/20 1200)  BP: 100/68 (03/07/20 1200)  SpO2: 99 % (03/07/20 1200) Vital Signs (24h Range):  Temp:  [98 °F (36.7 °C)-98.9 °F (37.2 °C)] 98.8 °F (37.1 °C)  Pulse:  [] 101  Resp:  [13-39] 20  SpO2:  [94 %-100 %] 99 %  BP: ()/() 100/68     Weight: 68 kg (149 lb 14.6 oz)  Body mass index is 26.56 kg/m².    Intake/Output Summary (Last 24 hours) at 3/7/2020 1244  Last data filed at 3/7/2020 0844  Gross per 24 hour   Intake 1765.25 ml   Output 300 ml   Net 1465.25 ml      Physical Exam   Constitutional: She is oriented to person, place, and time. She appears well-developed and well-nourished.   HENT:   Head: Normocephalic and atraumatic.   Eyes: Pupils are equal, round, and reactive to light.  Conjunctivae and EOM are normal. Right eye exhibits no discharge. Left eye exhibits no discharge. No scleral icterus.   Neck: Normal range of motion. Neck supple.   Cardiovascular: Normal rate, regular rhythm and normal heart sounds. Exam reveals no gallop and no friction rub.   No murmur heard.  Pulmonary/Chest: Effort normal and breath sounds normal. No stridor. No respiratory distress. She has no wheezes. She has no rales.   Abdominal: Soft. Bowel sounds are normal. She exhibits no distension. There is no tenderness (epigastric ). There is no rebound and no guarding.   Musculoskeletal: Normal range of motion. She exhibits no edema or deformity.   Neurological: She is oriented to person, place, and time. No cranial nerve deficit. She exhibits normal muscle tone.   Skin: Skin is warm and dry. Capillary refill takes less than 2 seconds. No pallor.   Psychiatric: She has a normal mood and affect. Her behavior is normal.   Nursing note and vitals reviewed.      Significant Labs: All pertinent labs within the past 24 hours have been reviewed.    Significant Imaging: I have reviewed and interpreted all pertinent imaging results/findings within the past 24 hours.

## 2020-03-07 NOTE — H&P
[facility] Medicine  History & Physical    Patient Name: Lo Escalera  MRN: [id number]  Admission Date: 3/6/2020  Attending Physician: Deion Duque MD   Primary Care Provider: Amilcar Dai MD         Patient information was obtained from patient, past medical records and ER records.     Subjective:     Principal Problem:UGIB (upper gastrointestinal bleed)    Chief Complaint: No chief complaint on file.       HPI: Mrs. Escalera is a 64 year old woman with history of peptic ulcer disease, gastritis, hypertension, hypothyroidism, vertigo, anxiety and remote alcohol abuse who presented to [facility] today for evaluation of hematemesis. She noted that she had an episode of red emesis last night and this morning as well as dark black bowel movement.  These were associated with abdominal pain that radiated to her back and light headedness.  She denies NSAID usage and alcohol consumption, noting she has abstained from alcohol for many years.    In the ER she was hypotensive, tachycardic and had a hemoglobin of 6.4.  She was given fluids, transfused two units PRBC and started on a proton pump inhibitor drip.  Her blood pressure improved and she has transferred to [facility], [place] for GI evaluation.  Of note, she had an EGD performed in June 2019 which showed gastritis and no varices.    Past Medical History:   Diagnosis Date    Anxiety     Diverticulosis     Gastric ulcer     old    GERD (gastroesophageal reflux disease)     Hepatic steatosis 9/12/2016    Hyperbilirubinemia     Hypertension     Hypothyroidism     Peptic ulcer disease 9/12/2016    UPJ (ureteropelvic junction) obstruction     Vertigo        Past Surgical History:   Procedure Laterality Date    APPENDECTOMY      CHOLECYSTECTOMY      ESOPHAGOGASTRODUODENOSCOPY N/A 6/12/2019    Procedure: ESOPHAGOGASTRODUODENOSCOPY (EGD);  Surgeon: Arben Brown MD;  Location: [facility];  Service: Endoscopy;  Laterality: N/A;    HYSTERECTOMY       PANCREAS SURGERY      TONSILLECTOMY         Review of patient's allergies indicates:   Allergen Reactions    Codeine Itching       Current Facility-Administered Medications on File Prior to Encounter   Medication    0.9%  NaCl infusion (for blood administration)    0.9%  NaCl infusion (for blood administration)    [COMPLETED] famotidine (PF) injection 20 mg    [COMPLETED] ondansetron injection 4 mg    pantoprazole (PROTONIX) 40 mg in dextrose 5 % 100 mL infusion    [COMPLETED] pantoprazole injection 80 mg    [COMPLETED] sodium chloride 0.9% bolus 1,000 mL     Current Outpatient Medications on File Prior to Encounter   Medication Sig    aluminum & magnesium hydroxide-simethicone (MYLANTA MAX STRENGTH) 400-400-40 mg/5 mL suspension Take 30 mLs by mouth 3 (three) times daily with meals.    calcium carbonate (TUMS) 200 mg calcium (500 mg) chewable tablet Take 1 tablet (500 mg total) by mouth as needed for Heartburn.    clonazePAM (KLONOPIN) 0.5 MG tablet Take 1 tablet (0.5 mg total) by mouth 3 (three) times daily as needed for Anxiety.    diazePAM (VALIUM) 5 MG tablet Take 1 tablet (5 mg total) by mouth every 12 (twelve) hours as needed (vertigo).    diclofenac (VOLTAREN) 50 MG EC tablet TAKE 1 TABLET BY MOUTH TWICE A DAY AFTER MEALS    duloxetine (CYMBALTA) 20 MG capsule Take 1 capsule (20 mg total) by mouth 2 (two) times daily.    famotidine (PEPCID) 20 MG tablet Take 1 tablet (20 mg total) by mouth 2 (two) times daily.    gabapentin (NEURONTIN) 100 MG capsule Take 1 capsule (100 mg total) by mouth 3 (three) times daily.    HYDROcodone-acetaminophen (NORCO) 5-325 mg per tablet Take 1 tablet by mouth every 6 (six) hours as needed for Pain.    lisinopril (PRINIVIL,ZESTRIL) 20 MG tablet Take 1 tablet (20 mg total) by mouth once daily.    meclizine (ANTIVERT) 25 mg tablet Take 1 tablet (25 mg total) by mouth 3 (three) times daily as needed. (Patient taking differently: Take 25 mg by mouth 3  (three) times daily as needed. For dizziness (vertigo))    metoprolol succinate (TOPROL-XL) 25 MG 24 hr tablet Take 25 mg by mouth once daily.    pantoprazole (PROTONIX) 40 MG tablet Take 1 tablet (40 mg total) by mouth 2 (two) times daily.    potassium chloride (KLOR-CON) 10 MEQ TbSR Take 1 tablet (10 mEq total) by mouth once daily.     Family History     None        Tobacco Use    Smoking status: Never Smoker   Substance and Sexual Activity    Alcohol use: No     Comment: 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  However, no consumption in 20 years.    Drug use: No    Sexual activity: Not on file     Review of Systems   Constitutional: Negative for activity change, chills, diaphoresis and fatigue.   HENT: Negative for congestion, drooling and hearing loss.    Eyes: Negative for discharge.   Respiratory: Negative for apnea, chest tightness, shortness of breath and wheezing.    Cardiovascular: Negative for palpitations and leg swelling.   Gastrointestinal: Positive for blood in stool, nausea and vomiting. Negative for abdominal distention, abdominal pain, constipation and diarrhea.   Endocrine: Negative for cold intolerance and heat intolerance.   Genitourinary: Negative for difficulty urinating.   Musculoskeletal: Negative for arthralgias and gait problem.   Skin: Negative for rash.   Neurological: Positive for dizziness and weakness. Negative for seizures, light-headedness and numbness.   Hematological: Negative for adenopathy.   Psychiatric/Behavioral: Negative for agitation and behavioral problems.     Objective:     Vital Signs (Most Recent):    Vital Signs (24h Range):  Temp:  [98 °F (36.7 °C)-98.9 °F (37.2 °C)] 98.5 °F (36.9 °C)  Pulse:  [109-127] 118  Resp:  [13-36] 20  SpO2:  [96 %-100 %] 100 %  BP: ()/(36-66) 117/54        There is no height or weight on file to calculate BMI.    Physical Exam   Constitutional: She is oriented to person, place, and time. She appears well-developed and well-nourished.   HENT:    Head: Normocephalic and atraumatic.   Eyes: Pupils are equal, round, and reactive to light. Conjunctivae and EOM are normal. Right eye exhibits no discharge. Left eye exhibits no discharge. No scleral icterus.   Neck: Normal range of motion. Neck supple.   Cardiovascular: Normal rate, regular rhythm and normal heart sounds. Exam reveals no gallop and no friction rub.   No murmur heard.  Pulmonary/Chest: Effort normal and breath sounds normal. No stridor. No respiratory distress. She has no wheezes. She has no rales.   Abdominal: Soft. Bowel sounds are normal. She exhibits no distension. There is tenderness (epigastric ). There is no guarding.   Musculoskeletal: Normal range of motion. She exhibits no edema or deformity.   Neurological: She is oriented to person, place, and time. No cranial nerve deficit. She exhibits normal muscle tone.   Skin: Skin is warm and dry. Capillary refill takes less than 2 seconds. No pallor.   Psychiatric: She has a normal mood and affect. Her behavior is normal.   Nursing note and vitals reviewed.        CRANIAL NERVES     CN III, IV, VI   Pupils are equal, round, and reactive to light.  Extraocular motions are normal.        Significant Labs: All pertinent labs within the past 24 hours have been reviewed.    Significant Imaging: I have reviewed and interpreted all pertinent imaging results/findings within the past 24 hours.          Assessment/Plan:     * UGIB (upper gastrointestinal bleed)  -Mrs Escalera is admitted to inpatient status in our ICU  -She had hematemesis and melena associated with abdominal pain, light headedness, hypotension and tachycardia.  -Hb was 6.4 in ER and she has been transfused 2 units PRBC.  -Suspect this is due to PUD/gastritis.  -Will monitor h/h q8h, continue protonix drip and consult GI for evaluation.  -Transfuse to keep Hb > 7.0  -Maintain NPO status now and monitor closely in ICU.      Acute blood loss anemia  -Treatment as above.      Essential  hypertension  -Noted low blood pressure in ER which improved with PRBC and IV fluids  -Hold home lisinopril and metoprolol  -Monitor closely.      Hypothyroidism  -History of hypothyroidism noted in chart but levothyroxine not on home med list.  -Check TSH      Thrombocytopenia  -Platelets low at times since at least September 2016  -Platelets today were 136 which is within baseline range.  -Questionably due to chronic liver disease from history alcohol abuse?  -Monitor closely      JHOAN (generalized anxiety disorder)  -Per home med list takes clonopin and cymbalta  -Continue cymbalta but hold clonopin for now.      Vertigo  -History noted  -Per home med list she takes Valium for this  -Hold sedating medications for now      Hepatic steatosis  -History noted  -Remote history of alcohol abuse noted  -Noted chronic hyperbilirubinemia and mild transaminitis (AST:ALT > 2:1 suggestive of alcohol)  -INR is elevated.  -Saw Hepatology at [facility] in 2016 but no further follow-ups noted  -Monitor CMP daily.    VTE Risk Mitigation (From admission, onward)         Ordered     IP VTE LOW RISK PATIENT  Once      03/06/20 1719     Place KARISSA hose  Until discontinued      03/06/20 1719     Place sequential compression device  Until discontinued      03/06/20 1719                   Gissel Mack PA-C  Department of Hospital Medicine   [facility]

## 2020-03-07 NOTE — PROGRESS NOTES
Unable to draw labs due to poor venous access. Gissel CASANOVA notified. Order currently in for midline.

## 2020-03-07 NOTE — CONSULTS
Gastroenterology Consult    3/7/2020  8:38 AM    Consulting Physician:  Kenneth Lara MD    Primary Care Provider: Amilcar Dai MD    Reason for consultation: GI bleed    HPI:  Lo Escalera is a 64 y.o. female who presents as a transfer from [facility] where she presented with complaints of several episodes of hematemesis that started the night prior to admission.  These episodes continued into the morning of presentation with an associated melanotic bowel movement.  She also complained of midepigastric abdominal pain with radiation to her back and presyncopal feelings.  She states that she had been taking high-dose Aleve secondary to pain. She reports a prior history peptic ulcer disease many years ago.  In the emergency department, she was hypertensive tachycardic with a hemoglobin of 6.4.  She was given IV fluids and transferred 2 units with appropriate rise in her hemoglobin.  She was transferred to [facility] for GI evaluation.  Review of her medical records showed an EGD from June of 2019 where she was noted to have gastritis.  No evidence of peptic ulcer disease.  Since admission she has felt better with no further episodes of hematemesis.    Review of her medical records show a history of alcohol excess and alcohol related pancreatitis requiring surgery.    Past Medical History:  Past Medical History:   Diagnosis Date    Anxiety     Diverticulosis     Gastric ulcer     old    GERD (gastroesophageal reflux disease)     Hepatic steatosis 9/12/2016    Hyperbilirubinemia     Hypertension     Hypothyroidism     Peptic ulcer disease 9/12/2016    UPJ (ureteropelvic junction) obstruction     Vertigo        Allergies:   Review of patient's allergies indicates:   Allergen Reactions    Codeine Itching       Current Medications:  Medications Prior to Admission   Medication Sig Dispense Refill Last Dose    aluminum & magnesium hydroxide-simethicone (MYLANTA MAX STRENGTH) 400-400-40 mg/5 mL suspension  Take 30 mLs by mouth 3 (three) times daily with meals.  0 2/19/2017    calcium carbonate (TUMS) 200 mg calcium (500 mg) chewable tablet Take 1 tablet (500 mg total) by mouth as needed for Heartburn. 30 tablet 11 Past Week    clonazePAM (KLONOPIN) 0.5 MG tablet Take 1 tablet (0.5 mg total) by mouth 3 (three) times daily as needed for Anxiety. 10 tablet 0 Unknown at Unknown time    diazePAM (VALIUM) 5 MG tablet Take 1 tablet (5 mg total) by mouth every 12 (twelve) hours as needed (vertigo). 12 tablet 0     diclofenac (VOLTAREN) 50 MG EC tablet TAKE 1 TABLET BY MOUTH TWICE A DAY AFTER MEALS  2 5/28/2017    duloxetine (CYMBALTA) 20 MG capsule Take 1 capsule (20 mg total) by mouth 2 (two) times daily. 60 capsule 1 2/19/2017    famotidine (PEPCID) 20 MG tablet Take 1 tablet (20 mg total) by mouth 2 (two) times daily. 30 tablet 0     gabapentin (NEURONTIN) 100 MG capsule Take 1 capsule (100 mg total) by mouth 3 (three) times daily. 90 capsule 1 Past Week    HYDROcodone-acetaminophen (NORCO) 5-325 mg per tablet Take 1 tablet by mouth every 6 (six) hours as needed for Pain. 20 tablet 0     lisinopril (PRINIVIL,ZESTRIL) 20 MG tablet Take 1 tablet (20 mg total) by mouth once daily. 30 tablet 1 Past Week    meclizine (ANTIVERT) 25 mg tablet Take 1 tablet (25 mg total) by mouth 3 (three) times daily as needed. (Patient taking differently: Take 25 mg by mouth 3 (three) times daily as needed. For dizziness (vertigo)) 20 tablet 0 6/9/2019 at Unknown time    metoprolol succinate (TOPROL-XL) 25 MG 24 hr tablet Take 25 mg by mouth once daily.   6/9/2019 at Unknown time    pantoprazole (PROTONIX) 40 MG tablet Take 1 tablet (40 mg total) by mouth 2 (two) times daily. 60 tablet 11 2/19/2017    potassium chloride (KLOR-CON) 10 MEQ TbSR Take 1 tablet (10 mEq total) by mouth once daily. 7 tablet 0          Social History:  Social History     Socioeconomic History    Marital status:      Spouse name: Not on file     Number of children: Not on file    Years of education: Not on file    Highest education level: Not on file   Occupational History    Not on file   Social Needs    Financial resource strain: Not on file    Food insecurity:     Worry: Not on file     Inability: Not on file    Transportation needs:     Medical: Not on file     Non-medical: Not on file   Tobacco Use    Smoking status: Never Smoker   Substance and Sexual Activity    Alcohol use: No     Comment: 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  However, no consumption in 20 years.    Drug use: No    Sexual activity: Not on file   Lifestyle    Physical activity:     Days per week: Not on file     Minutes per session: Not on file    Stress: Not on file   Relationships    Social connections:     Talks on phone: Not on file     Gets together: Not on file     Attends Sikh service: Not on file     Active member of club or organization: Not on file     Attends meetings of clubs or organizations: Not on file     Relationship status: Not on file   Other Topics Concern    Not on file   Social History Narrative    Not on file       Surgical History:  Past Surgical History:   Procedure Laterality Date    APPENDECTOMY      CHOLECYSTECTOMY      ESOPHAGOGASTRODUODENOSCOPY N/A 6/12/2019    Procedure: ESOPHAGOGASTRODUODENOSCOPY (EGD);  Surgeon: Arben Brown MD;  Location: [facility];  Service: Endoscopy;  Laterality: N/A;    HYSTERECTOMY      PANCREAS SURGERY      TONSILLECTOMY           Family History:  History reviewed. No pertinent family history.    Review of systems:     CONSTITUTIONAL: Negative for fever, chills, weakness, weight loss, weight gain.  HEENT: Negative for blurred vision, hearing loss, nasal congestion, dry mouth, sore throat.  CARDIOVASCULAR: Negative for chest pain or palpitations.  RESPIRATORY: Negative for SOB or cough.  GASTROINTESTINAL: See HPI  GENITOURINARY: Negative for dysuria or hematuria.  MUSCULOSKELETAL: Negative for osteoarthritis or  muscle pain.  SKIN: Negative for rashes/lesions.  NEUROLOGIC: Negative for headaches, numbness/tingling.  ENDOCRINE: Negative for diabetes or thyroid abnormalities.  HEMATOLOGIC: Negative for anemia or blood dyscrasias.  Aside from above positives, complete 10 point review of systems negative.    Physical Exam:  Vital Signs (Most Recent):  Temp: 98.6 °F (37 °C) (03/07/20 0400)  Pulse: 104 (03/07/20 0600)  Resp: (!) 30 (03/07/20 0600)  BP: 130/60 (03/07/20 0600)  SpO2: 100 % (03/07/20 0600) Vital Signs (24h Range):  Temp:  [98 °F (36.7 °C)-98.9 °F (37.2 °C)] 98.6 °F (37 °C)  Pulse:  [100-136] 104  Resp:  [13-39] 30  SpO2:  [94 %-100 %] 100 %  BP: ()/() 130/60       General: Well developed, well nourished, female in no acute distress.    Eyes:  Anicteric sclera, PERRLA  ENT:  Moist mucous membranes, no drainage from ears or nose, hearing grossly intact  Lymph:  No cervical, supraclavicular or axillary lymphadenopathy  Neck:  Supple, no nodes or masses felt, no thyromegaly  Cardiovascular:  Regular rate and rhythm without murmur  Lungs:  Clear to auscultation with normal effort; no wheezes or rales noted  GI:  Soft, mid epigastric tenderness without rebound or guarding, bowel sounds present  Musculoskeletal:  5/5 strength bilaterally  Extremities: No clubbing, cyanosis, or edema, 2+ dorsalis pedis bilaterally  Neurologic:  No focal deficits, alert and oriented x 3  Psych:  Appropriate mood and affect  Skin:  No rash, no pallor, no lesions       Labs:  Results for KEHINDE MADDEN (MRN [id number]) as of 3/7/2020 08:41   Ref. Range 3/6/2020 12:11 3/6/2020 12:49 3/6/2020 20:34 3/7/2020 04:17   WBC Latest Ref Range: 3.90 - 12.70 K/uL 10.20  12.04 11.16   RBC Latest Ref Range: 4.00 - 5.40 M/uL 2.05 (L)  2.78 (L) 2.79 (L)   Hemoglobin Latest Ref Range: 12.0 - 16.0 g/dL 6.4 (L)  8.2 (L) 8.2 (L)   Hematocrit Latest Ref Range: 37.0 - 48.5 % 19.1 (LL)  24.3 (L) 24.2 (L)   MCV Latest Ref Range: 82 - 98 fL 93  87 87    MCH Latest Ref Range: 27.0 - 31.0 pg 31.1 (H)  29.5 29.4   MCHC Latest Ref Range: 32.0 - 36.0 g/dL 33.2  33.7 33.9   RDW Latest Ref Range: 11.5 - 14.5 % 15.3 (H)  15.6 (H) 15.7 (H)   Platelets Latest Ref Range: 150 - 350 K/uL 136 (L)  91 (L) 82 (L)   Results for KEHINDE MADDEN (MRN [id number]) as of 3/7/2020 08:41   Ref. Range 3/6/2020 12:49 3/6/2020 20:34 3/7/2020 04:17   Protime Latest Ref Range: 9.0 - 12.5 sec 17.8 (H)  16.5 (H)   INR Latest Ref Range: 0.8 - 1.2  1.8 (H)  1.5 (H)   Results for KEHINDE MADDEN (MRN [id number]) as of 3/7/2020 08:41   Ref. Range 3/6/2020 12:11 3/6/2020 20:34 3/7/2020 04:17   Sodium Latest Ref Range: 136 - 145 mmol/L 138  143   Potassium Latest Ref Range: 3.5 - 5.1 mmol/L 3.9  3.7   Chloride Latest Ref Range: 95 - 110 mmol/L 103  112 (H)   CO2 Latest Ref Range: 23 - 29 mmol/L 20 (L)  26   Anion Gap Latest Ref Range: 8 - 16 mmol/L 15  5 (L)   BUN, Bld Latest Ref Range: 8 - 23 mg/dL 22  27 (H)   Creatinine Latest Ref Range: 0.5 - 1.4 mg/dL 1.1  0.8   eGFR if non African American Latest Ref Range: >60 mL/min/1.73 m^2 53.6 (A)  >60   eGFR if  Latest Ref Range: >60 mL/min/1.73 m^2 >60.0  >60   Glucose Latest Ref Range: 70 - 110 mg/dL 96  81   Calcium Latest Ref Range: 8.7 - 10.5 mg/dL 8.5 (L)  7.7 (L)   Magnesium Latest Ref Range: 1.6 - 2.6 mg/dL   1.1 (L)   Alkaline Phosphatase Latest Ref Range: 55 - 135 U/L 77  68   PROTEIN TOTAL Latest Ref Range: 6.0 - 8.4 g/dL 5.7 (L)  5.2 (L)   Albumin Latest Ref Range: 3.5 - 5.2 g/dL 2.3 (L)  2.0 (L)   BILIRUBIN TOTAL Latest Ref Range: 0.1 - 1.0 mg/dL 1.7 (H)  2.5 (H)   AST Latest Ref Range: 10 - 40 U/L 56 (H)  47 (H)   ALT Latest Ref Range: 10 - 44 U/L 13 (L)  12       Imaging and Other Studies: Personally reviewed  Single frontal portable view of the chest was performed.    COMPARISON:  Chest x-ray July 21, 2018    FINDINGS:  The lungs are clear, with normal appearance of pulmonary vasculature and no pleural effusion or  pneumothorax.    The cardiac silhouette is normal in size. The hilar and mediastinal contours are unremarkable.    Bones are intact.  Cardiac monitors overlie both lung fields.      Impression       No acute abnormality.  No significant change         Assessment:  Patient is a 64-year-old female admitted for upper GI bleed with known history of peptic ulcer disease and removed alcohol abuse.  Labs appear to be consistent with possible cirrhosis.  No evidence of esophageal varices on EGD of June 2019.  Hemoglobin responded well to transfusion.  Suspect NSAID induced peptic ulcer disease given recent Aleve use.    Plan:  1.  Clear liquid diet, advance as tolerated  2.  BID Pantoprazole 40 mg IV  3.  Serial Hb/Hct, transfuse as needed per primary service  4.  Plan EGD on Monday.  Sooner if needed.    Discussed with Dr. Dunia Lara

## 2020-03-07 NOTE — PROGRESS NOTES
Pt set off bed alarm and was discovered to have fallen upon arrival by RIGOBERTO Adams.  Found on floor in bathroom.   at bedside.  Vitals taken.  MD informed.  Pt states did not hit head, but left hip and shoulder hurt.  Pt helped to wheelchair, cleaned up and helped back to bed.  Bed alarm reset.  Per Dr. Duque, strict fall precautions ordered.  Signage placed, red socks placed, fall risk band placed.  No cameras available at this time.

## 2020-03-07 NOTE — ASSESSMENT & PLAN NOTE
-History of hypothyroidism noted in chart but levothyroxine not on home med list.  -TSH is normal, she does not have hypothyroidism.

## 2020-03-07 NOTE — ASSESSMENT & PLAN NOTE
-Mrs Escalera was admitted to inpatient status in our ICU  -She had hematemesis and melena associated with abdominal pain, light headedness, hypotension and tachycardia.  -Hb was 6.4 in ER and she has been transfused 2 units PRBC with good improvement of Hb.  -Suspect this is due to PUD/gastritis from using aleve at home.  -Will monitor h/h q8h, continue protonix iv bid  -Transfuse to keep Hb > 7.0  -Give GI cocktail  -Seen by GI and ok for clear liquids.  Plan EGD Monday.  -Transfer out of ICU

## 2020-03-07 NOTE — ASSESSMENT & PLAN NOTE
-History noted  -Remote history of alcohol abuse noted  -EtOH undetectable on admission.  -Noted chronic hyperbilirubinemia and mild transaminitis (AST:ALT > 2:1 suggestive of alcohol)  -INR is mildly elevated.  -Saw Hepatology at [facility] in 2016 but no further follow-ups noted  -Monitor CMP daily.

## 2020-03-08 ENCOUNTER — ANESTHESIA EVENT (OUTPATIENT)
Dept: ENDOSCOPY | Facility: OTHER | Age: 65
DRG: 378 | End: 2020-03-08
Payer: MEDICARE

## 2020-03-08 PROBLEM — R53.81 DEBILITY: Status: ACTIVE | Noted: 2020-03-08

## 2020-03-08 LAB
ALBUMIN SERPL BCP-MCNC: 2.2 G/DL (ref 3.5–5.2)
ALP SERPL-CCNC: 69 U/L (ref 55–135)
ALT SERPL W/O P-5'-P-CCNC: 13 U/L (ref 10–44)
ANION GAP SERPL CALC-SCNC: 9 MMOL/L (ref 8–16)
AST SERPL-CCNC: 53 U/L (ref 10–40)
BASOPHILS # BLD AUTO: 0.02 K/UL (ref 0–0.2)
BASOPHILS # BLD AUTO: 0.02 K/UL (ref 0–0.2)
BASOPHILS # BLD AUTO: 0.03 K/UL (ref 0–0.2)
BASOPHILS NFR BLD: 0.4 % (ref 0–1.9)
BASOPHILS NFR BLD: 0.4 % (ref 0–1.9)
BASOPHILS NFR BLD: 0.6 % (ref 0–1.9)
BILIRUB SERPL-MCNC: 1.3 MG/DL (ref 0.1–1)
BUN SERPL-MCNC: 21 MG/DL (ref 8–23)
CALCIUM SERPL-MCNC: 7.9 MG/DL (ref 8.7–10.5)
CHLORIDE SERPL-SCNC: 112 MMOL/L (ref 95–110)
CO2 SERPL-SCNC: 20 MMOL/L (ref 23–29)
CREAT SERPL-MCNC: 0.7 MG/DL (ref 0.5–1.4)
DIFFERENTIAL METHOD: ABNORMAL
EOSINOPHIL # BLD AUTO: 0 K/UL (ref 0–0.5)
EOSINOPHIL # BLD AUTO: 0 K/UL (ref 0–0.5)
EOSINOPHIL # BLD AUTO: 0.1 K/UL (ref 0–0.5)
EOSINOPHIL NFR BLD: 0.7 % (ref 0–8)
EOSINOPHIL NFR BLD: 0.8 % (ref 0–8)
EOSINOPHIL NFR BLD: 0.9 % (ref 0–8)
ERYTHROCYTE [DISTWIDTH] IN BLOOD BY AUTOMATED COUNT: 15.8 % (ref 11.5–14.5)
ERYTHROCYTE [DISTWIDTH] IN BLOOD BY AUTOMATED COUNT: 15.9 % (ref 11.5–14.5)
ERYTHROCYTE [DISTWIDTH] IN BLOOD BY AUTOMATED COUNT: 16.3 % (ref 11.5–14.5)
EST. GFR  (AFRICAN AMERICAN): >60 ML/MIN/1.73 M^2
EST. GFR  (NON AFRICAN AMERICAN): >60 ML/MIN/1.73 M^2
GLUCOSE SERPL-MCNC: 73 MG/DL (ref 70–110)
HCT VFR BLD AUTO: 22.4 % (ref 37–48.5)
HCT VFR BLD AUTO: 22.7 % (ref 37–48.5)
HCT VFR BLD AUTO: 23.8 % (ref 37–48.5)
HGB BLD-MCNC: 7.3 G/DL (ref 12–16)
HGB BLD-MCNC: 7.5 G/DL (ref 12–16)
HGB BLD-MCNC: 7.6 G/DL (ref 12–16)
IMM GRANULOCYTES # BLD AUTO: 0.02 K/UL (ref 0–0.04)
IMM GRANULOCYTES NFR BLD AUTO: 0.4 % (ref 0–0.5)
LYMPHOCYTES # BLD AUTO: 1.4 K/UL (ref 1–4.8)
LYMPHOCYTES # BLD AUTO: 1.4 K/UL (ref 1–4.8)
LYMPHOCYTES # BLD AUTO: 1.6 K/UL (ref 1–4.8)
LYMPHOCYTES NFR BLD: 26.3 % (ref 18–48)
LYMPHOCYTES NFR BLD: 26.6 % (ref 18–48)
LYMPHOCYTES NFR BLD: 30.9 % (ref 18–48)
MAGNESIUM SERPL-MCNC: 1.4 MG/DL (ref 1.6–2.6)
MCH RBC QN AUTO: 28.8 PG (ref 27–31)
MCH RBC QN AUTO: 29 PG (ref 27–31)
MCH RBC QN AUTO: 29.4 PG (ref 27–31)
MCHC RBC AUTO-ENTMCNC: 31.9 G/DL (ref 32–36)
MCHC RBC AUTO-ENTMCNC: 32.6 G/DL (ref 32–36)
MCHC RBC AUTO-ENTMCNC: 33 G/DL (ref 32–36)
MCV RBC AUTO: 89 FL (ref 82–98)
MCV RBC AUTO: 89 FL (ref 82–98)
MCV RBC AUTO: 90 FL (ref 82–98)
MONOCYTES # BLD AUTO: 0.2 K/UL (ref 0.3–1)
MONOCYTES # BLD AUTO: 0.3 K/UL (ref 0.3–1)
MONOCYTES # BLD AUTO: 0.3 K/UL (ref 0.3–1)
MONOCYTES NFR BLD: 4.6 % (ref 4–15)
MONOCYTES NFR BLD: 4.6 % (ref 4–15)
MONOCYTES NFR BLD: 4.7 % (ref 4–15)
NEUTROPHILS # BLD AUTO: 3.2 K/UL (ref 1.8–7.7)
NEUTROPHILS # BLD AUTO: 3.6 K/UL (ref 1.8–7.7)
NEUTROPHILS # BLD AUTO: 3.6 K/UL (ref 1.8–7.7)
NEUTROPHILS NFR BLD: 62.9 % (ref 38–73)
NEUTROPHILS NFR BLD: 67.1 % (ref 38–73)
NEUTROPHILS NFR BLD: 67.3 % (ref 38–73)
NRBC BLD-RTO: 0 /100 WBC
PLATELET # BLD AUTO: 63 K/UL (ref 150–350)
PLATELET # BLD AUTO: 67 K/UL (ref 150–350)
PLATELET # BLD AUTO: 69 K/UL (ref 150–350)
PMV BLD AUTO: 10.9 FL (ref 9.2–12.9)
PMV BLD AUTO: 11.2 FL (ref 9.2–12.9)
PMV BLD AUTO: 9.9 FL (ref 9.2–12.9)
POTASSIUM SERPL-SCNC: 4.1 MMOL/L (ref 3.5–5.1)
PROT SERPL-MCNC: 5.5 G/DL (ref 6–8.4)
RBC # BLD AUTO: 2.52 M/UL (ref 4–5.4)
RBC # BLD AUTO: 2.55 M/UL (ref 4–5.4)
RBC # BLD AUTO: 2.64 M/UL (ref 4–5.4)
SODIUM SERPL-SCNC: 141 MMOL/L (ref 136–145)
WBC # BLD AUTO: 5.01 K/UL (ref 3.9–12.7)
WBC # BLD AUTO: 5.37 K/UL (ref 3.9–12.7)
WBC # BLD AUTO: 5.41 K/UL (ref 3.9–12.7)

## 2020-03-08 PROCEDURE — 99233 PR SUBSEQUENT HOSPITAL CARE,LEVL III: ICD-10-PCS | Mod: ,,, | Performed by: HOSPITALIST

## 2020-03-08 PROCEDURE — 25000003 PHARM REV CODE 250: Performed by: PHYSICIAN ASSISTANT

## 2020-03-08 PROCEDURE — 99233 SBSQ HOSP IP/OBS HIGH 50: CPT | Mod: ,,, | Performed by: HOSPITALIST

## 2020-03-08 PROCEDURE — 83735 ASSAY OF MAGNESIUM: CPT

## 2020-03-08 PROCEDURE — 63600175 PHARM REV CODE 636 W HCPCS: Performed by: HOSPITALIST

## 2020-03-08 PROCEDURE — 80053 COMPREHEN METABOLIC PANEL: CPT

## 2020-03-08 PROCEDURE — 36415 COLL VENOUS BLD VENIPUNCTURE: CPT

## 2020-03-08 PROCEDURE — 25000003 PHARM REV CODE 250: Performed by: HOSPITALIST

## 2020-03-08 PROCEDURE — 97162 PT EVAL MOD COMPLEX 30 MIN: CPT

## 2020-03-08 PROCEDURE — 85025 COMPLETE CBC W/AUTO DIFF WBC: CPT | Mod: 91

## 2020-03-08 PROCEDURE — 97116 GAIT TRAINING THERAPY: CPT

## 2020-03-08 PROCEDURE — 11000001 HC ACUTE MED/SURG PRIVATE ROOM

## 2020-03-08 PROCEDURE — C9113 INJ PANTOPRAZOLE SODIUM, VIA: HCPCS | Performed by: HOSPITALIST

## 2020-03-08 RX ORDER — MAGNESIUM SULFATE HEPTAHYDRATE 40 MG/ML
2 INJECTION, SOLUTION INTRAVENOUS ONCE
Status: COMPLETED | OUTPATIENT
Start: 2020-03-08 | End: 2020-03-08

## 2020-03-08 RX ADMIN — MAGNESIUM SULFATE IN WATER 2 G: 40 INJECTION, SOLUTION INTRAVENOUS at 10:03

## 2020-03-08 RX ADMIN — DULOXETINE HYDROCHLORIDE 20 MG: 20 CAPSULE, DELAYED RELEASE ORAL at 08:03

## 2020-03-08 RX ADMIN — PANTOPRAZOLE SODIUM 40 MG: 40 INJECTION, POWDER, LYOPHILIZED, FOR SOLUTION INTRAVENOUS at 08:03

## 2020-03-08 RX ADMIN — ACETAMINOPHEN 650 MG: 325 TABLET, FILM COATED ORAL at 05:03

## 2020-03-08 RX ADMIN — PANTOPRAZOLE SODIUM 40 MG: 40 INJECTION, POWDER, LYOPHILIZED, FOR SOLUTION INTRAVENOUS at 09:03

## 2020-03-08 NOTE — PLAN OF CARE
Pt remained free of falls and injuries throughout shift. AAO x3. Purposeful hourly rounding performed. IV flushed and saline locked. Midline flushed with blood return noted and normal saline locked. Managing pain with PRN medication. Ambulates with assistance to bedside commode. VSS on room air. NPO at midnight for EGD in the morning, 3/9. Bed low and locked, call light within reach, side rails up X2, red socks on, bed alarm on. Will continue to monitor.

## 2020-03-08 NOTE — PROGRESS NOTES
Gastroenterology Progress Note    Active Hospital Problems    Hypomagnesemia      Acute blood loss anemia      Thrombocytopenia      History of Hypothyroidism      JHOAN (generalized anxiety disorder)      *UGIB (upper gastrointestinal bleed)      Vertigo      Essential hypertension      Hepatic steatosis        Subjective:  Patient seen and examined.  The patient is doing well.  No further overt GI blood loss.  Hb trended downward.    Reviews of Systems:  General:  Negative for fever or chills  Cardiovascular:  Negative for chest pain, shortness of breath, palpitations    Physical Exam    Vitals:  Vital Signs (Most Recent):  Temp: 97.9 °F (36.6 °C) (03/08/20 0704)  Pulse: 110 (03/08/20 0800)  Resp: 18 (03/08/20 0704)  BP: 135/79 (03/08/20 0704)  SpO2: 100 % (03/08/20 0704) Vital Signs (24h Range):  Temp:  [96.6 °F (35.9 °C)-98.3 °F (36.8 °C)] 97.9 °F (36.6 °C)  Pulse:  [] 110  Resp:  [16-20] 18  SpO2:  [90 %-100 %] 100 %  BP: (100-142)/(65-95) 135/79     GEN: Well developed, well nourished in no apparent distress   HENT: Normocephalic, anicteric sclera   Cardiovascular: Regular rate and rhythm. No murmurs appreciated.   Chest: Non-labored respirations. Breath sounds equal   Abdomen: soft, NTND, no masses  Psych: Appropriate mood and affect.   Extermities: No C/C/E. 2+ dorsalis pedis pulses bilaterally  Skin: No new visible or palpable lesions.      Medications/Infusions:  Current Facility-Administered Medications   Medication Dose Route Frequency Provider Last Rate Last Dose    acetaminophen tablet 650 mg  650 mg Oral Q6H PRN Deion Duque MD   650 mg at 03/08/20 0517    acetaminophen tablet 650 mg  650 mg Oral Q4H PRN Deion Duque MD   650 mg at 03/07/20 2340    dextrose 50% injection 12.5 g  12.5 g Intravenous PRN Deion Duque MD        dextrose 50% injection 25 g  25 g Intravenous PRN Deion Duque MD        DULoxetine DR capsule 20 mg  20 mg Oral BID Gissel Mack PA-C   20 mg at  03/08/20 0842    glucagon (human recombinant) injection 1 mg  1 mg Intramuscular PRN Deion Duque MD        glucose chewable tablet 16 g  16 g Oral PRN Deion Duque MD        glucose chewable tablet 24 g  24 g Oral PRN Deion Duque MD        influenza (QUADRIVALENT PF) vaccine 0.5 mL  0.5 mL Intramuscular Prior to discharge Deion Duque MD        meclizine tablet 25 mg  25 mg Oral TID PRN Deion Duque MD        ondansetron disintegrating tablet 4 mg  4 mg Oral Q6H PRN Deion Duque MD        pantoprazole injection 40 mg  40 mg Intravenous BID Deion Duque MD   40 mg at 03/07/20 2159    pneumoc 13-magdaleno conj-dip cr(PF) (PREVNAR 13 (PF)) 0.5 mL  0.5 mL Intramuscular Prior to discharge Deion Duque MD        sodium chloride 0.9% flush 10 mL  10 mL Intravenous PRN Deion Duque MD           Intake and Output:    Intake/Output Summary (Last 24 hours) at 3/8/2020 0917  Last data filed at 3/8/2020 0017  Gross per 24 hour   Intake --   Output 120 ml   Net -120 ml       Labs:  Results for KEHINDE MADDEN (MRN [id number]) as of 3/8/2020 09:17   Ref. Range 3/7/2020 19:56 3/8/2020 08:06 3/8/2020 08:06   Hemoglobin Latest Ref Range: 12.0 - 16.0 g/dL 9.0 (L) 7.5 (L) 7.3 (L)   Hematocrit Latest Ref Range: 37.0 - 48.5 % 27.1 (L) 22.7 (L) 22.4 (L)         Imaging and other studies:    No new    Assessment:    Patient is a 64-year-old female admitted for upper GI bleed with known history of peptic ulcer disease and removed alcohol abuse.  Labs appear to be consistent with possible cirrhosis.  No evidence of esophageal varices on EGD of June 2019.  Hemoglobin responded well to transfusion.  Suspect NSAID induced peptic ulcer disease given recent Aleve use    Plan:    1.  NPO after midnight for EGD in the AM  2.  BID Pantoprazole  3.  Follow trend of Hb, transfuse 1 unit if falls below 7

## 2020-03-08 NOTE — ASSESSMENT & PLAN NOTE
-Mrs Escalera was admitted to inpatient status in our ICU and transferred to telemetry on 3/7.  -She had hematemesis and melena associated with abdominal pain, light headedness, hypotension and tachycardia.  -Hb was 6.4 in ER and she has been transfused 2 units PRBC with good improvement of Hb.  -Suspect this is due to PUD/gastritis from using aleve at home.  -Will monitor h/h q8h, continue protonix iv bid  -Transfuse to keep Hb > 7.0  -Seen by GI and ok for clear liquids.  Plan EGD Monday.

## 2020-03-08 NOTE — NURSING
Pt continues to get up to the bathroom without assistance with bed alarming. Educated pt again that due to her vertigo and fall yesterday, it is important for her to call for assistance when getting out of bed. Will continue to monitor.

## 2020-03-08 NOTE — PLAN OF CARE
Pt transferred to unit in afternoon.  Telemetry placed.   at bedside.  Pt fell, setting off bed alarm, around 1600.  Proper protocol followed.  Vital signs WNL.  Safety measures in place.  Purposeful hourly rounding completed.  Will continue to monitor.

## 2020-03-08 NOTE — SUBJECTIVE & OBJECTIVE
Interval History: No acute events overnight.  Did have fall yesterday afternoon.  No loc.  Tripped on floor threshold entering bathroom.  Did not hit head.  Landed on hip and shoulder.  Denies further bleeding.  Looks more comfortable today.   at bedside and all questions answered.    Review of Systems   Constitutional: Negative for activity change, chills, diaphoresis and fatigue.   HENT: Negative for congestion, drooling and hearing loss.    Eyes: Negative for discharge.   Respiratory: Negative for apnea, chest tightness, shortness of breath and wheezing.    Cardiovascular: Negative for palpitations and leg swelling.   Gastrointestinal: Negative for abdominal distention, abdominal pain, blood in stool, constipation, diarrhea, nausea and vomiting.   Endocrine: Negative for cold intolerance and heat intolerance.   Genitourinary: Negative for difficulty urinating.   Musculoskeletal: Negative for arthralgias and gait problem.   Skin: Negative for rash.   Neurological: Positive for dizziness and weakness. Negative for seizures, light-headedness and numbness.   Hematological: Negative for adenopathy.   Psychiatric/Behavioral: Negative for agitation and behavioral problems.     Objective:     Vital Signs (Most Recent):  Temp: 97.8 °F (36.6 °C) (03/08/20 1145)  Pulse: 102 (03/08/20 1200)  Resp: 18 (03/08/20 1145)  BP: 137/77 (03/08/20 1145)  SpO2: 100 % (03/08/20 1145) Vital Signs (24h Range):  Temp:  [96.6 °F (35.9 °C)-98.3 °F (36.8 °C)] 97.8 °F (36.6 °C)  Pulse:  [] 102  Resp:  [16-20] 18  SpO2:  [90 %-100 %] 100 %  BP: (130-142)/(65-95) 137/77     Weight: 68 kg (149 lb 14.6 oz)  Body mass index is 26.56 kg/m².    Intake/Output Summary (Last 24 hours) at 3/8/2020 1337  Last data filed at 3/8/2020 0017  Gross per 24 hour   Intake --   Output 120 ml   Net -120 ml      Physical Exam   Constitutional: She is oriented to person, place, and time. She appears well-developed and well-nourished.   HENT:   Head:  Normocephalic and atraumatic.   Eyes: Pupils are equal, round, and reactive to light. Conjunctivae and EOM are normal. Right eye exhibits no discharge. Left eye exhibits no discharge. No scleral icterus.   Neck: Normal range of motion. Neck supple.   Cardiovascular: Normal rate, regular rhythm and normal heart sounds. Exam reveals no gallop and no friction rub.   No murmur heard.  Pulmonary/Chest: Effort normal and breath sounds normal. No stridor. No respiratory distress. She has no wheezes. She has no rales.   Abdominal: Soft. Bowel sounds are normal. She exhibits no distension. There is no tenderness. There is no rebound and no guarding.   Musculoskeletal: Normal range of motion. She exhibits no edema or deformity.   Neurological: She is oriented to person, place, and time. No cranial nerve deficit. She exhibits normal muscle tone.   Skin: Skin is warm and dry. Capillary refill takes less than 2 seconds. No pallor.   Psychiatric: She has a normal mood and affect. Her behavior is normal.   Nursing note and vitals reviewed.      Significant Labs: All pertinent labs within the past 24 hours have been reviewed.    Significant Imaging: I have reviewed and interpreted all pertinent imaging results/findings within the past 24 hours.

## 2020-03-08 NOTE — ASSESSMENT & PLAN NOTE
-History noted  -Per home med list she takes Valium for this  -Hold sedating medications for now  -Strict fall precautions.

## 2020-03-08 NOTE — PROGRESS NOTES
[facility] Medicine  Progress Note    Patient Name: Lo Escalera  MRN: [id number]  Patient Class: IP- Inpatient   Admission Date: 3/6/2020  Length of Stay: 2 days  Attending Physician: Deion Duque MD  Primary Care Provider: Amilcar Dai MD        Subjective:     Principal Problem:UGIB (upper gastrointestinal bleed)        HPI:  Mrs. Escalera is a 64 year old woman with history of peptic ulcer disease, gastritis, hypertension, hypothyroidism, vertigo, anxiety and remote alcohol abuse who presented to [facility] today for evaluation of hematemesis. She noted that she had an episode of red emesis last night and this morning as well as dark black bowel movement.  These were associated with abdominal pain that radiated to her back and light headedness.  She states she has been taking alleve at home for pain but denies alcohol consumption, noting she has abstained from alcohol for many years.    In the ER she was hypotensive, tachycardic and had a hemoglobin of 6.4.  She was given fluids, transfused two units PRBC and started on a proton pump inhibitor drip.  Her blood pressure improved and she has transferred to [facility] for GI evaluation.  Of note, she had an EGD performed in June 2019 which showed gastritis and no varices.    Overview/Hospital Course:  No notes on file    Interval History: No acute events overnight.  Did have fall yesterday afternoon.  No loc.  Tripped on floor threshold entering bathroom.  Did not hit head.  Landed on hip and shoulder.  Denies further bleeding.  Looks more comfortable today.   at bedside and all questions answered.    Review of Systems   Constitutional: Negative for activity change, chills, diaphoresis and fatigue.   HENT: Negative for congestion, drooling and hearing loss.    Eyes: Negative for discharge.   Respiratory: Negative for apnea, chest tightness, shortness of breath and wheezing.    Cardiovascular: Negative for palpitations and leg  swelling.   Gastrointestinal: Negative for abdominal distention, abdominal pain, blood in stool, constipation, diarrhea, nausea and vomiting.   Endocrine: Negative for cold intolerance and heat intolerance.   Genitourinary: Negative for difficulty urinating.   Musculoskeletal: Negative for arthralgias and gait problem.   Skin: Negative for rash.   Neurological: Positive for dizziness and weakness. Negative for seizures, light-headedness and numbness.   Hematological: Negative for adenopathy.   Psychiatric/Behavioral: Negative for agitation and behavioral problems.     Objective:     Vital Signs (Most Recent):  Temp: 97.8 °F (36.6 °C) (03/08/20 1145)  Pulse: 102 (03/08/20 1200)  Resp: 18 (03/08/20 1145)  BP: 137/77 (03/08/20 1145)  SpO2: 100 % (03/08/20 1145) Vital Signs (24h Range):  Temp:  [96.6 °F (35.9 °C)-98.3 °F (36.8 °C)] 97.8 °F (36.6 °C)  Pulse:  [] 102  Resp:  [16-20] 18  SpO2:  [90 %-100 %] 100 %  BP: (130-142)/(65-95) 137/77     Weight: 68 kg (149 lb 14.6 oz)  Body mass index is 26.56 kg/m².    Intake/Output Summary (Last 24 hours) at 3/8/2020 1337  Last data filed at 3/8/2020 0017  Gross per 24 hour   Intake --   Output 120 ml   Net -120 ml      Physical Exam   Constitutional: She is oriented to person, place, and time. She appears well-developed and well-nourished.   HENT:   Head: Normocephalic and atraumatic.   Eyes: Pupils are equal, round, and reactive to light. Conjunctivae and EOM are normal. Right eye exhibits no discharge. Left eye exhibits no discharge. No scleral icterus.   Neck: Normal range of motion. Neck supple.   Cardiovascular: Normal rate, regular rhythm and normal heart sounds. Exam reveals no gallop and no friction rub.   No murmur heard.  Pulmonary/Chest: Effort normal and breath sounds normal. No stridor. No respiratory distress. She has no wheezes. She has no rales.   Abdominal: Soft. Bowel sounds are normal. She exhibits no distension. There is no tenderness. There is no  rebound and no guarding.   Musculoskeletal: Normal range of motion. She exhibits no edema or deformity.   Neurological: She is oriented to person, place, and time. No cranial nerve deficit. She exhibits normal muscle tone.   Skin: Skin is warm and dry. Capillary refill takes less than 2 seconds. No pallor.   Psychiatric: She has a normal mood and affect. Her behavior is normal.   Nursing note and vitals reviewed.      Significant Labs: All pertinent labs within the past 24 hours have been reviewed.    Significant Imaging: I have reviewed and interpreted all pertinent imaging results/findings within the past 24 hours.      Assessment/Plan:      * UGIB (upper gastrointestinal bleed)  -Mrs Escalera was admitted to inpatient status in our ICU and transferred to telemetry on 3/7.  -She had hematemesis and melena associated with abdominal pain, light headedness, hypotension and tachycardia.  -Hb was 6.4 in ER and she has been transfused 2 units PRBC with good improvement of Hb.  -Suspect this is due to PUD/gastritis from using aleve at home.  -Will monitor h/h q8h, continue protonix iv bid  -Transfuse to keep Hb > 7.0  -Seen by GI and ok for clear liquids.  Plan EGD Monday.      Acute blood loss anemia  -Treatment as above.      Thrombocytopenia  -Platelets low at times since at least September 2016  -Platelets within baseline range on admit, but have fallen since.  -Baseline suspected due to chronic liver disease from history alcohol abuse?  -Acute drop likely due to consumption from bleeding  -Monitor closely      Hepatic steatosis  -History noted  -Remote history of alcohol abuse noted  -EtOH undetectable on admission.  -Noted chronic hyperbilirubinemia and mild transaminitis (AST:ALT > 2:1 suggestive of alcohol)  -INR is mildly elevated.  -Saw Hepatology at [facility] in 2016 but no further follow-ups noted  -Monitor CMP daily.    Essential hypertension  -Noted low blood pressure in ER which improved with PRBC and IV  fluids  -BP is normal at this time.  -Hold home lisinopril and metoprolol  -Monitor closely.      History of Hypothyroidism  -History of hypothyroidism noted in chart but levothyroxine not on home med list.  -TSH is normal, she does not have hypothyroidism.      Debility  -Consult PT/OT      Hypomagnesemia  -Replace magnesium today.  -Repeat labs in AM.    JHOAN (generalized anxiety disorder)  -Per home med list takes clonopin and cymbalta  -Continue cymbalta but hold clonopin for now.      Vertigo  -History noted  -Per home med list she takes Valium for this  -Hold sedating medications for now  -Strict fall precautions.          VTE Risk Mitigation (From admission, onward)         Ordered     IP VTE LOW RISK PATIENT  Once      03/06/20 1719     Place KARISSA hose  Until discontinued      03/06/20 1719     Place sequential compression device  Until discontinued      03/06/20 1719                      Deion Duque MD  Department of Hospital Medicine   [facility]

## 2020-03-08 NOTE — PLAN OF CARE
Problem: Physical Therapy Goal  Goal: Physical Therapy Goal  Description  Goals to be met by: 20    Patient will increase functional independence with mobility by performin. Sit<>stand with supervision with LRAD.  2. Gait x 50 feet with CGA with LRAD.  3. Ascend/descend 4 step(s) with least restrictive assistive device and unilateral HR and CGA.  4. Standing x30 sec in narrow stance with appropriate postural reactions and no UE support.      Outcome: Ongoing, Progressing     Patient evaluated by PT and goals established. Patient's  at bedside and engaged in therapy session. Patient most limited in OOB mobility due to dizziness with standing, possibly d/t hx of vertigo vs low Hgb. Requires CGA for bed mobility and sit<>stand and Alexis for limited gait bouts of <5 ft with RW. Patient unable to tolerate further ambulation bouts. PT will continue to follow and progress as tolerated. Rec for d/c pending progress, may need d/c to SNF. Please see progress note for detailed plan of care and recommendations.

## 2020-03-08 NOTE — ASSESSMENT & PLAN NOTE
-Platelets low at times since at least September 2016  -Platelets within baseline range on admit, but have fallen since.  -Baseline suspected due to chronic liver disease from history alcohol abuse?  -Acute drop likely due to consumption from bleeding  -Monitor closely

## 2020-03-08 NOTE — PT/OT/SLP EVAL
Physical Therapy Evaluation and Treatment    Patient Name:  Lo Escalera   MRN:  [id number]    Recommendations:     Discharge Recommendations:  nursing facility, skilled   Discharge Equipment Recommendations: walker, rolling, 3-in-1 commode(possible need for transport w/c)   Barriers to discharge: Inaccessible home and Decreased caregiver support    Assessment:     Lo Escalera is a 64 y.o. female admitted with a medical diagnosis of UGIB (upper gastrointestinal bleed).  She presents with the following impairments/functional limitations:  weakness, impaired endurance, impaired self care skills, impaired functional mobilty, gait instability, impaired balance, impaired cognition, visual deficits, decreased lower extremity function, decreased safety awareness, pain, decreased ROM.    Patient evaluated by PT and goals established. Due to the above impairments, the patient is limited in her ability to independently ambulate, transfer, and participate in her chosen activities.    Patient's  at bedside and engaged in therapy session. Patient most limited in OOB mobility due to dizziness with standing, possibly d/t hx of vertigo vs low Hgb. Requires CGA for bed mobility and sit<>stand and Alexis for limited gait bouts of <5 ft with RW. Patient unable to tolerate further ambulation bouts. PT will continue to follow and progress as tolerated. Rec for d/c pending progress, may need d/c to SNF.    Rehab Prognosis: Good; patient would benefit from acute skilled PT services to address these deficits and reach maximum level of function.    Recent Surgery: Procedure(s) (LRB):  EGD (ESOPHAGOGASTRODUODENOSCOPY) (N/A)      Plan:     During this hospitalization, patient to be seen 5 x/week to address the identified rehab impairments via gait training, therapeutic activities, therapeutic exercises, neuromuscular re-education and progress toward the following goals:    · Plan of Care Expires:  04/05/20    Subjective  "    Chief Complaint: Feeling dizzy with mobility, pain in hip and back/shoulder  Patient/Family Comments/goals: Pt's  expresses wish for pt to receive rollator, BSC, and eat more. Pt expresses goal to get stronger. Patient agreeable to evaluation.  Pain/Comfort:  · Pain Rating 1: (Some pain in posterior R thigh and L shoulder/trunk, increased with bed mobility)  · Pain Rating Post-Intervention 1: (Unchanged)    Patients cultural, spiritual, Church conflicts given the current situation: no    Living Environment:   · Pt lives with her spouse in a single story home with 4 steps to enter and B handrail(s) too wide to use simultaneously.   · Upon discharge, patient will have assistance from her spouse during day, but he works during the evening.  Prior level of function:  · PLOF: Ambulating (I)-mod(I) at home with occasional use of RW, but pt's  reports it "gets away from her" at times  · Has had limited community mobility due to weakness  · Pt's  expresses concern with recent unintended weight loss  ·  Equipment used at home: none.  DME owned (not currently used): rolling walker and single point cane.     Objective:     Communicated with CIARA Muir prior to session.  Patient found HOB elevated with peripheral IV, bed alarm  upon PT entry to room.    General Precautions: Standard, fall(clear liquids)   Orthopedic Precautions:N/A   Braces: N/A     Patient donned red socks and gait belt for OOB mobility.    Exams:  · Cognition:   · Patient is oriented to person, , place, general situation.  · Pt follows approximately 80% of one and two step commands.    · Mood: Pleasant and cooperative.   · Safety Awareness: Impaired, per Rn has been getting up despite bed alarm and continued reminders to wait for assistance  · Musculoskeletal:  · Posture:     · Rounded shoulders  · Forward head  · LE ROM/Strength:   · R ROM: WFL  · L ROM: WFL  · R Strength:   · Hip flexion: 2/5  · Knee extension: " 4/5  · Dorsiflexion: 5/5   · L Strength:   · Hip flexion: 2/5  · Knee extension: 5/5  · Dorsiflexion: 5/5   · Neuromuscular:  · Tone/Reflexes: No impairments identified with functional mobility. No formal testing performed.   · Gross motor coordination: Slow performance but accurate  · Balance:   · Static sitting: Good  · Static standing: Fair, use of BUE support on RW and CGA  · Dynamic standing: Fair-poor, use of BUE support on RW and Alexis  · Visual-vestibular: Pt reports hx of vertigo, reports most consistently occurs when donning shirt, also occurs when eyes closed when washing face  · Integument: Visible skin intact  · Cardiopulmonary:  · Edema: none noted    Functional Mobility:  · Bed Mobility:     · Bridging: contact guard assistance  · Supine to Sit: contact guard assistance  · Sit to Supine: contact guard assistance  · Transfers:     · Sit to Stand:  contact guard assistance with rolling walker  · Onset of dizziness with standing  · Cues for hand placement on RW  · Gait: x2 ft laterally at EOB with RW and Alexis and x3 ft turning to transfer to bedside commode with RW and Alexis.  · Increasing dizziness with longer standing bouts, pt unable to tolerate further gait distances d/t increasing dizziness  · Requires assistance and cues for RW management including maintaining body within RW support      Therapeutic Activities and Exercises:  · Gait training with RW with cues for safe use   · PT educated patient and spouse re:   · PT plan of care/role of PT  · Safety with OOB mobility  · Use of RW vs rollator  · Pt's spouse insisting on pt's need for rollator  · Pt and spouse verbalized understanding but requires reinforcement      AM-PAC 6 CLICK MOBILITY  Total Score:18     Patient left HOB elevated with all lines intact, call button in reach, bed alarm on, RN reba notified and spouse present. .White board updated with GEMs score of 3.    GOALS:   Multidisciplinary Problems     Physical Therapy Goals         Problem: Physical Therapy Goal    Goal Priority Disciplines Outcome Goal Variances Interventions   Physical Therapy Goal     PT, PT/OT Ongoing, Progressing     Description:  Goals to be met by: 20    Patient will increase functional independence with mobility by performin. Sit<>stand with supervision with LRAD.  2. Gait x 50 feet with CGA with LRAD.  3. Ascend/descend 4 step(s) with least restrictive assistive device and unilateral HR and CGA.  4. Standing x30 sec in narrow stance with appropriate postural reactions and no UE support.                       History:     Past Medical History:   Diagnosis Date    Anxiety     Diverticulosis     Gastric ulcer     old    GERD (gastroesophageal reflux disease)     Hepatic steatosis 2016    Hyperbilirubinemia     Hypertension     Hypothyroidism     Peptic ulcer disease 2016    UPJ (ureteropelvic junction) obstruction     Vertigo        Past Surgical History:   Procedure Laterality Date    APPENDECTOMY      CHOLECYSTECTOMY      ESOPHAGOGASTRODUODENOSCOPY N/A 2019    Procedure: ESOPHAGOGASTRODUODENOSCOPY (EGD);  Surgeon: Arben Brown MD;  [facility];  Service: Endoscopy;  Laterality: N/A;    HYSTERECTOMY      PANCREAS SURGERY      TONSILLECTOMY         Time Tracking:     PT Received On: 20  PT Start Time: 1448     PT Stop Time: 1511  PT Total Time (min): 23 min     Billable Minutes: Evaluation 15 and Gait Training 8      Zonia Lockhart, PT  2020

## 2020-03-08 NOTE — ASSESSMENT & PLAN NOTE
-Noted low blood pressure in ER which improved with PRBC and IV fluids  -BP is normal at this time.  -Hold home lisinopril and metoprolol  -Monitor closely.

## 2020-03-08 NOTE — PLAN OF CARE
No significant events overnight. Remains free from fall, injury, and skin breakdown. Voiding spontaneously. VSS on RA throughout the night. Positions self. Pain mildly controlled with PO. Bed alarms active and audible.  Plan of care reviewed with patient and all questions answered. Bed low, locked w/ bed alarm on. Call light within reach. Purposeful rounding performed. Resting comfortably in bed, no other complaints at this time.   at bedside.

## 2020-03-09 ENCOUNTER — ANESTHESIA (OUTPATIENT)
Dept: ENDOSCOPY | Facility: OTHER | Age: 65
DRG: 378 | End: 2020-03-09
Payer: MEDICARE

## 2020-03-09 PROBLEM — E87.6 HYPOKALEMIA: Status: ACTIVE | Noted: 2020-03-09

## 2020-03-09 LAB
ALBUMIN SERPL BCP-MCNC: 2.2 G/DL (ref 3.5–5.2)
ALP SERPL-CCNC: 83 U/L (ref 55–135)
ALT SERPL W/O P-5'-P-CCNC: 13 U/L (ref 10–44)
ANION GAP SERPL CALC-SCNC: 11 MMOL/L (ref 8–16)
ANISOCYTOSIS BLD QL SMEAR: SLIGHT
AST SERPL-CCNC: 51 U/L (ref 10–40)
BASOPHILS # BLD AUTO: 0.01 K/UL (ref 0–0.2)
BASOPHILS # BLD AUTO: 0.01 K/UL (ref 0–0.2)
BASOPHILS # BLD AUTO: 0.02 K/UL (ref 0–0.2)
BASOPHILS NFR BLD: 0.2 % (ref 0–1.9)
BASOPHILS NFR BLD: 0.2 % (ref 0–1.9)
BASOPHILS NFR BLD: 0.3 % (ref 0–1.9)
BILIRUB SERPL-MCNC: 1.3 MG/DL (ref 0.1–1)
BUN SERPL-MCNC: 14 MG/DL (ref 8–23)
CALCIUM SERPL-MCNC: 7.8 MG/DL (ref 8.7–10.5)
CHLORIDE SERPL-SCNC: 109 MMOL/L (ref 95–110)
CO2 SERPL-SCNC: 20 MMOL/L (ref 23–29)
CREAT SERPL-MCNC: 0.6 MG/DL (ref 0.5–1.4)
DIFFERENTIAL METHOD: ABNORMAL
EOSINOPHIL # BLD AUTO: 0 K/UL (ref 0–0.5)
EOSINOPHIL # BLD AUTO: 0.1 K/UL (ref 0–0.5)
EOSINOPHIL # BLD AUTO: 0.1 K/UL (ref 0–0.5)
EOSINOPHIL NFR BLD: 0.7 % (ref 0–8)
EOSINOPHIL NFR BLD: 0.8 % (ref 0–8)
EOSINOPHIL NFR BLD: 1.1 % (ref 0–8)
ERYTHROCYTE [DISTWIDTH] IN BLOOD BY AUTOMATED COUNT: 15.8 % (ref 11.5–14.5)
ERYTHROCYTE [DISTWIDTH] IN BLOOD BY AUTOMATED COUNT: 16.3 % (ref 11.5–14.5)
ERYTHROCYTE [DISTWIDTH] IN BLOOD BY AUTOMATED COUNT: 16.4 % (ref 11.5–14.5)
EST. GFR  (AFRICAN AMERICAN): >60 ML/MIN/1.73 M^2
EST. GFR  (NON AFRICAN AMERICAN): >60 ML/MIN/1.73 M^2
GLUCOSE SERPL-MCNC: 74 MG/DL (ref 70–110)
HCT VFR BLD AUTO: 22.9 % (ref 37–48.5)
HCT VFR BLD AUTO: 24.5 % (ref 37–48.5)
HCT VFR BLD AUTO: 27.9 % (ref 37–48.5)
HGB BLD-MCNC: 7.4 G/DL (ref 12–16)
HGB BLD-MCNC: 7.8 G/DL (ref 12–16)
HGB BLD-MCNC: 8.9 G/DL (ref 12–16)
HYPOCHROMIA BLD QL SMEAR: ABNORMAL
IMM GRANULOCYTES # BLD AUTO: 0.02 K/UL (ref 0–0.04)
IMM GRANULOCYTES NFR BLD AUTO: 0.3 % (ref 0–0.5)
IMM GRANULOCYTES NFR BLD AUTO: 0.4 % (ref 0–0.5)
IMM GRANULOCYTES NFR BLD AUTO: 0.4 % (ref 0–0.5)
LYMPHOCYTES # BLD AUTO: 1 K/UL (ref 1–4.8)
LYMPHOCYTES # BLD AUTO: 1.4 K/UL (ref 1–4.8)
LYMPHOCYTES # BLD AUTO: 2 K/UL (ref 1–4.8)
LYMPHOCYTES NFR BLD: 20.3 % (ref 18–48)
LYMPHOCYTES NFR BLD: 29.7 % (ref 18–48)
LYMPHOCYTES NFR BLD: 30.4 % (ref 18–48)
MAGNESIUM SERPL-MCNC: 1.5 MG/DL (ref 1.6–2.6)
MCH RBC QN AUTO: 28.9 PG (ref 27–31)
MCH RBC QN AUTO: 29.6 PG (ref 27–31)
MCH RBC QN AUTO: 29.7 PG (ref 27–31)
MCHC RBC AUTO-ENTMCNC: 31.8 G/DL (ref 32–36)
MCHC RBC AUTO-ENTMCNC: 31.9 G/DL (ref 32–36)
MCHC RBC AUTO-ENTMCNC: 32.3 G/DL (ref 32–36)
MCV RBC AUTO: 90 FL (ref 82–98)
MCV RBC AUTO: 93 FL (ref 82–98)
MCV RBC AUTO: 93 FL (ref 82–98)
MONOCYTES # BLD AUTO: 0.2 K/UL (ref 0.3–1)
MONOCYTES # BLD AUTO: 0.3 K/UL (ref 0.3–1)
MONOCYTES # BLD AUTO: 0.4 K/UL (ref 0.3–1)
MONOCYTES NFR BLD: 5.1 % (ref 4–15)
NEUTROPHILS # BLD AUTO: 2.8 K/UL (ref 1.8–7.7)
NEUTROPHILS # BLD AUTO: 3.8 K/UL (ref 1.8–7.7)
NEUTROPHILS # BLD AUTO: 4.4 K/UL (ref 1.8–7.7)
NEUTROPHILS NFR BLD: 62.8 % (ref 38–73)
NEUTROPHILS NFR BLD: 63.9 % (ref 38–73)
NEUTROPHILS NFR BLD: 73.2 % (ref 38–73)
NRBC BLD-RTO: 0 /100 WBC
PLATELET # BLD AUTO: 62 K/UL (ref 150–350)
PLATELET # BLD AUTO: 63 K/UL (ref 150–350)
PLATELET # BLD AUTO: 81 K/UL (ref 150–350)
PLATELET BLD QL SMEAR: ABNORMAL
PMV BLD AUTO: 10.3 FL (ref 9.2–12.9)
PMV BLD AUTO: 10.3 FL (ref 9.2–12.9)
PMV BLD AUTO: 11.3 FL (ref 9.2–12.9)
POTASSIUM SERPL-SCNC: 3.2 MMOL/L (ref 3.5–5.1)
PROT SERPL-MCNC: 5.5 G/DL (ref 6–8.4)
RBC # BLD AUTO: 2.56 M/UL (ref 4–5.4)
RBC # BLD AUTO: 2.63 M/UL (ref 4–5.4)
RBC # BLD AUTO: 3.01 M/UL (ref 4–5.4)
SODIUM SERPL-SCNC: 140 MMOL/L (ref 136–145)
WBC # BLD AUTO: 4.51 K/UL (ref 3.9–12.7)
WBC # BLD AUTO: 5.13 K/UL (ref 3.9–12.7)
WBC # BLD AUTO: 6.83 K/UL (ref 3.9–12.7)

## 2020-03-09 PROCEDURE — 25000003 PHARM REV CODE 250: Performed by: HOSPITALIST

## 2020-03-09 PROCEDURE — 99232 PR SUBSEQUENT HOSPITAL CARE,LEVL II: ICD-10-PCS | Mod: ,,, | Performed by: HOSPITALIST

## 2020-03-09 PROCEDURE — 85025 COMPLETE CBC W/AUTO DIFF WBC: CPT

## 2020-03-09 PROCEDURE — 88305 TISSUE EXAM BY PATHOLOGIST: ICD-10-PCS | Mod: 26,,, | Performed by: PATHOLOGY

## 2020-03-09 PROCEDURE — 97116 GAIT TRAINING THERAPY: CPT | Mod: CQ

## 2020-03-09 PROCEDURE — 97530 THERAPEUTIC ACTIVITIES: CPT | Mod: CQ

## 2020-03-09 PROCEDURE — 63600175 PHARM REV CODE 636 W HCPCS: Performed by: NURSE ANESTHETIST, CERTIFIED REGISTERED

## 2020-03-09 PROCEDURE — C9113 INJ PANTOPRAZOLE SODIUM, VIA: HCPCS | Performed by: HOSPITALIST

## 2020-03-09 PROCEDURE — 97165 OT EVAL LOW COMPLEX 30 MIN: CPT

## 2020-03-09 PROCEDURE — 63600175 PHARM REV CODE 636 W HCPCS: Performed by: HOSPITALIST

## 2020-03-09 PROCEDURE — 83735 ASSAY OF MAGNESIUM: CPT

## 2020-03-09 PROCEDURE — 27201012 HC FORCEPS, HOT/COLD, DISP: Performed by: INTERNAL MEDICINE

## 2020-03-09 PROCEDURE — 37000008 HC ANESTHESIA 1ST 15 MINUTES: Performed by: INTERNAL MEDICINE

## 2020-03-09 PROCEDURE — 25000003 PHARM REV CODE 250: Performed by: PHYSICIAN ASSISTANT

## 2020-03-09 PROCEDURE — 37000009 HC ANESTHESIA EA ADD 15 MINS: Performed by: INTERNAL MEDICINE

## 2020-03-09 PROCEDURE — 99232 SBSQ HOSP IP/OBS MODERATE 35: CPT | Mod: ,,, | Performed by: HOSPITALIST

## 2020-03-09 PROCEDURE — 11000001 HC ACUTE MED/SURG PRIVATE ROOM

## 2020-03-09 PROCEDURE — 36415 COLL VENOUS BLD VENIPUNCTURE: CPT

## 2020-03-09 PROCEDURE — 88305 TISSUE EXAM BY PATHOLOGIST: CPT | Mod: 26,,, | Performed by: PATHOLOGY

## 2020-03-09 PROCEDURE — 80053 COMPREHEN METABOLIC PANEL: CPT

## 2020-03-09 PROCEDURE — 43239 EGD BIOPSY SINGLE/MULTIPLE: CPT | Performed by: INTERNAL MEDICINE

## 2020-03-09 PROCEDURE — 88305 TISSUE EXAM BY PATHOLOGIST: CPT | Performed by: PATHOLOGY

## 2020-03-09 RX ORDER — DIPHENHYDRAMINE HCL 25 MG
25 CAPSULE ORAL NIGHTLY PRN
Status: DISCONTINUED | OUTPATIENT
Start: 2020-03-09 | End: 2020-03-11 | Stop reason: HOSPADM

## 2020-03-09 RX ORDER — MAGNESIUM SULFATE HEPTAHYDRATE 40 MG/ML
2 INJECTION, SOLUTION INTRAVENOUS ONCE
Status: COMPLETED | OUTPATIENT
Start: 2020-03-09 | End: 2020-03-09

## 2020-03-09 RX ORDER — POTASSIUM CHLORIDE 20 MEQ/1
60 TABLET, EXTENDED RELEASE ORAL ONCE
Status: COMPLETED | OUTPATIENT
Start: 2020-03-09 | End: 2020-03-09

## 2020-03-09 RX ORDER — PANTOPRAZOLE SODIUM 40 MG/1
40 TABLET, DELAYED RELEASE ORAL DAILY
Status: DISCONTINUED | OUTPATIENT
Start: 2020-03-09 | End: 2020-03-11 | Stop reason: HOSPADM

## 2020-03-09 RX ORDER — SODIUM CHLORIDE 0.9 % (FLUSH) 0.9 %
3 SYRINGE (ML) INJECTION
Status: DISCONTINUED | OUTPATIENT
Start: 2020-03-09 | End: 2020-03-11 | Stop reason: HOSPADM

## 2020-03-09 RX ORDER — PROPOFOL 10 MG/ML
VIAL (ML) INTRAVENOUS
Status: DISCONTINUED | OUTPATIENT
Start: 2020-03-09 | End: 2020-03-09

## 2020-03-09 RX ORDER — SODIUM CHLORIDE, SODIUM LACTATE, POTASSIUM CHLORIDE, CALCIUM CHLORIDE 600; 310; 30; 20 MG/100ML; MG/100ML; MG/100ML; MG/100ML
INJECTION, SOLUTION INTRAVENOUS CONTINUOUS PRN
Status: DISCONTINUED | OUTPATIENT
Start: 2020-03-09 | End: 2020-03-09

## 2020-03-09 RX ORDER — LIDOCAINE HCL/PF 100 MG/5ML
SYRINGE (ML) INTRAVENOUS
Status: DISCONTINUED | OUTPATIENT
Start: 2020-03-09 | End: 2020-03-09

## 2020-03-09 RX ADMIN — ACETAMINOPHEN 650 MG: 325 TABLET, FILM COATED ORAL at 03:03

## 2020-03-09 RX ADMIN — ALUMINUM HYDROXIDE, MAGNESIUM HYDROXIDE, AND SIMETHICONE: 200; 200; 20 SUSPENSION ORAL at 09:03

## 2020-03-09 RX ADMIN — PANTOPRAZOLE SODIUM 40 MG: 40 INJECTION, POWDER, LYOPHILIZED, FOR SOLUTION INTRAVENOUS at 08:03

## 2020-03-09 RX ADMIN — MECLIZINE HYDROCHLORIDE 25 MG: 25 TABLET ORAL at 04:03

## 2020-03-09 RX ADMIN — LIDOCAINE HYDROCHLORIDE 50 MG: 20 INJECTION, SOLUTION INTRAVENOUS at 07:03

## 2020-03-09 RX ADMIN — POTASSIUM CHLORIDE 60 MEQ: 20 TABLET, EXTENDED RELEASE ORAL at 10:03

## 2020-03-09 RX ADMIN — DULOXETINE HYDROCHLORIDE 20 MG: 20 CAPSULE, DELAYED RELEASE ORAL at 09:03

## 2020-03-09 RX ADMIN — MAGNESIUM SULFATE IN WATER 2 G: 40 INJECTION, SOLUTION INTRAVENOUS at 11:03

## 2020-03-09 RX ADMIN — DULOXETINE HYDROCHLORIDE 20 MG: 20 CAPSULE, DELAYED RELEASE ORAL at 08:03

## 2020-03-09 RX ADMIN — SODIUM CHLORIDE, SODIUM LACTATE, POTASSIUM CHLORIDE, AND CALCIUM CHLORIDE: 600; 310; 30; 20 INJECTION, SOLUTION INTRAVENOUS at 07:03

## 2020-03-09 RX ADMIN — PROPOFOL 100 MG: 10 INJECTION, EMULSION INTRAVENOUS at 07:03

## 2020-03-09 NOTE — ASSESSMENT & PLAN NOTE
-Consulted PT/OT who recommended SNF  -Patient not excited about that idea.  Due to multiple recent falls I feel that is safer.  -Continue PT/OT for now.  Continue to discuss with patient and .

## 2020-03-09 NOTE — PT/OT/SLP EVAL
Occupational Therapy   Evaluation    Name: Lo Escalera  MRN: [id number]  Admitting Diagnosis:  UGIB (upper gastrointestinal bleed) Day of Surgery    Recommendations:     Discharge Recommendations: nursing facility, skilled  Discharge Equipment Recommendations:  walker, rolling, 3-in-1 commode (possible need for transport w/c)  Barriers to discharge:   Inaccessible home environment and decreased caregiver support; 4 REGI and  works during night    Assessment:     Lo Escalera is a 64 y.o. female with a medical diagnosis of UGIB (upper gastrointestinal bleed).  She presents with dizziness. Performance deficits affecting function: weakness, impaired endurance, impaired self care skills, impaired functional mobilty, gait instability, impaired balance, decreased safety awareness, decreased ROM, decreased lower extremity function, impaired cognition, decreased coordination.  Pt demonstrates strength and ROM in (B) UE needed for ADLs that is WFL; however, difficulty holding up against gravity and some trembling observed.  Pt able to perform grooming task while seated and adjust socks with SBA.  CGA and RW required to perform sit <> stand transfer and take steps in place.  Pt's blood pressure remained stable during session, but she was unable to ambulate further distance 2* dizziness.      PTA pt reports being Mod I for ADLs requiring increased time, and was Mod I for mobility utilizing a RW as needed.  Pt is not at PLOF and would benefit from skilled OT services to address problems listed above and maximize independence and safety with ADLs.  SNF is recommended upon d/c from acute care to further address deficits and help pt improve overall functional independence.           Rehab Prognosis: Good; patient would benefit from acute skilled OT services to address these deficits and reach maximum level of function.       Plan:     Patient to be seen  5x/week to address the above listed problems via  self-care/home management, therapeutic activities, therapeutic exercises  · Plan of Care Expires: 04/08/20  · Plan of Care Reviewed with: patient, spouse    Subjective     Chief Complaint: Dizziness; pt reported she has vertigo and takes medication for it  Patient/Family Comments/goals: Be able to do daily routine    Occupational Profile:  Living Environment: Pt lives with spouse in [facility], 4 REGI, B HR too wide to use simultaneously.  Bathroom has walk-in shower and tub/shower.  Pt primarily utilizes bathroom with tub/shower with bath bench and grab bars present.  Previous level of function:   -ADLs: Mod I requiring increased time.  Bath bench used during bathing.  -IADLs: Occasionally requires assist for cooking and cleaning  -Functional mobility:  Mod I with RW; pt states she is limited in community mobility 2* weakness   Roles and Routines: Wife, drives, enjoys doing her daily routine  Equipment Used at Home:  none  Assistance upon Discharge:  can provide some assist during day, but works during night    Pain/Comfort:  · Pain Rating 1: (pt reported pain in R hip and posterior R shoulder/back)  · Pain Rating Post-Intervention 1: (8-9 in R hip, and behind R shoulder/back)    Patients cultural, spiritual, Orthodox conflicts given the current situation: no    Objective:     Communicated with: RN (Aruna) prior to session.  Patient found HOB elevated with peripheral IV, bed alarm upon OT entry to room.    General Precautions: Standard, fall   Orthopedic Precautions:N/A   Braces: N/A     Occupational Performance:    Bed Mobility:    · Patient completed Scooting/Bridging with stand by assistance  · Patient completed Supine to Sit with stand by assistance  · Patient completed Sit to Supine with stand by assistance   · With use of side rail    Functional Mobility/Transfers:  · Sit <> Stand: CGA and RW x 1 trial from EOB  · Functional Mobility: Pt took side step to right and marched in place with CGA and RW.     · Other:  Pt reported feeling dizzy when sitting and standing so further distance deferred during evaluation.    Activities of Daily Living:  · Upper Body Dressing: stand by assistance for doffing gown on backside like robe while seated at EOB.   · Grooming:  SBA for washing face with cloth while seated at EOB.  · Lower Body Dressing:  SBA for reaching down and adjusting socks/pulling up while seated at EOB.    Cognitive/Visual Perceptual:  Cognitive/Psychosocial Skills:    -       Oriented to: Person, Place, Time and Situation   -       Follows Commands/attention:Follows multistep  commands  -       Communication: clear/fluent  -       Memory: No Deficits noted  -       Safety awareness/insight to disability: intact   -       Mood/Affect/Coping skills/emotional control: Cooperative    Physical Exam:  Postural examination/scapula alignment: -       Rounded shoulders  Skin integrity: Visible skin intact  Edema:  None noted  Sensation: -       Intact  Motor Planning: -       WFL  Dominant hand: Right  Upper Extremity Range of Motion:  Some trembling observed  -       Right Upper Extremity: WFL  -       Left Upper Extremity: WFL  Upper Extremity Strength: Difficulty holding up against gravity for increased amount of time  -       Right Upper Extremity: WFL; grossly 4/5 all muscle groups  -       Left Upper Extremity: WFL; grossly 4/5 all muscle groups   Strength: 4/5 both hands  Fine Motor Coordination: Intact  Gross motor coordination: Impaired  Balance:  Sitting- SBA; Standing- CGA  Vision: Wears glasses    AMPAC 6 Click ADL:  AMPAC Total Score: 18    Treatment & Education:  *Pt educated on role of OT and POC discussed    Blood pressure:   -Start of session sitting EOB: 127/58  -End of session supine with HOB elevated: 163/75  Education:    Patient left HOB elevated with call button in reach and RN (Aruna) and CM  (Natasha) notified.  Pt declined sitting up in chair after session.    GOALS:   Multidisciplinary  Problems     Occupational Therapy Goals        Problem: Occupational Therapy Goal    Goal Priority Disciplines Outcome Interventions   Occupational Therapy Goal     OT, PT/OT Ongoing, Progressing    Description:  Goals to be met by: 3/16/2020     Patient will increase functional independence with ADLs by performing:    UE Dressing with Supervision.  LE Dressing with Stand-by Assistance.  Grooming while standing at sink with Contact Guard Assistance.  Toileting from toilet with Contact Guard Assistance for hygiene and clothing management.   Toilet transfer to toilet with Contact Guard Assistance.  Pt will perform functional task in standing with no LOB.                      History:     Past Medical History:   Diagnosis Date    Anxiety     Diverticulosis     Gastric ulcer     old    GERD (gastroesophageal reflux disease)     Hepatic steatosis 9/12/2016    Hyperbilirubinemia     Hypertension     Hypothyroidism     Peptic ulcer disease 9/12/2016    UPJ (ureteropelvic junction) obstruction     Vertigo        Past Surgical History:   Procedure Laterality Date    APPENDECTOMY      CHOLECYSTECTOMY      ESOPHAGOGASTRODUODENOSCOPY N/A 6/12/2019    Procedure: ESOPHAGOGASTRODUODENOSCOPY (EGD);  Surgeon: Arben Brown MD;  [facility]: [facility];  Service: Endoscopy;  Laterality: N/A;    HYSTERECTOMY      PANCREAS SURGERY      TONSILLECTOMY         Time Tracking:     OT Date of Treatment: 03/09/20  OT Start Time: 1018  OT Stop Time: 1052  OT Total Time (min): 34 min    Billable Minutes:Evaluation 34    RADHA Ellis  3/9/2020

## 2020-03-09 NOTE — PT/OT/SLP PROGRESS
Physical Therapy Treatment    Patient Name:  Lo Escalera   MRN:  [id number]    Recommendations:     Discharge Recommendations:  nursing facility, skilled   Discharge Equipment Recommendations: 3-in-1 commode(possible need for transport w/c)   Barriers to discharge: Decreased caregiver support    Assessment:     Lo Escalera is a 64 y.o. female admitted with a medical diagnosis of UGIB (upper gastrointestinal bleed).  She presents with the following impairments/functional limitations:  weakness, impaired endurance, impaired self care skills, impaired functional mobilty, gait instability, impaired balance, decreased coordination, decreased lower extremity function, decreased safety awareness ;pt with improved mobility today, able to amb. To bathroom and back, with assistance, very slow pace, wide STEPHENIE.     Rehab Prognosis: Good; patient would benefit from acute skilled PT services to address these deficits and reach maximum level of function.    Recent Surgery: Procedure(s) (LRB):  EGD (ESOPHAGOGASTRODUODENOSCOPY) (N/A) Day of Surgery    Plan:     During this hospitalization, patient to be seen 5 x/week to address the identified rehab impairments via gait training, therapeutic activities, neuromuscular re-education, therapeutic exercises and progress toward the following goals:    · Plan of Care Expires:  04/05/20    Subjective     Chief Complaint: pt did c/o dizziness and SOB after using restroom and standing up at sink.  Patient/Family Comments/goals: pt reports feeling like she is falling forward when she's walking.  Pain/Comfort:  · Pain Rating 1: 0/10  · Pain Rating Post-Intervention 1: 0/10      Objective:     Communicated with nurse prior to session.  Patient found supine with peripheral IV, bed alarm upon PT entry to room.     General Precautions: Standard, fall   Orthopedic Precautions:N/A   Braces: N/A     Functional Mobility:  · Bed Mobility:     · Supine to Sit: contact guard assistance and  minimum assistance  · Transfers:     · Sit to Stand:  contact guard assistance with rolling walker  · Gait: amb'd 10' x 2 to bathroom and back with RW and CGA/min.A      AM-PAC 6 CLICK MOBILITY  Turning over in bed (including adjusting bedclothes, sheets and blankets)?: 4  Sitting down on and standing up from a chair with arms (e.g., wheelchair, bedside commode, etc.): 3  Moving from lying on back to sitting on the side of the bed?: 3  Moving to and from a bed to a chair (including a wheelchair)?: 3  Need to walk in hospital room?: 3  Climbing 3-5 steps with a railing?: 2  Basic Mobility Total Score: 18       Therapeutic Activities and Exercises:   pt perf'd commode t/f's in bathroom with min.A, pt using railing on wall. Pt had BM, sat to perform hygiene with Supervision only. Pt stood at sink for hand hygiene with CGA for safety.   BP:134/81, HR:112, O2:100% on RA    Patient left up in chair with all lines intact, call button in reach, nurse present and dinner tray present..    GOALS:   Multidisciplinary Problems     Physical Therapy Goals        Problem: Physical Therapy Goal    Goal Priority Disciplines Outcome Goal Variances Interventions   Physical Therapy Goal     PT, PT/OT Ongoing, Progressing     Description:  Goals to be met by: 20    Patient will increase functional independence with mobility by performin. Sit<>stand with supervision with LRAD.  2. Gait x 50 feet with CGA with LRAD.  3. Ascend/descend 4 step(s) with least restrictive assistive device and unilateral HR and CGA.  4. Standing x30 sec in narrow stance with appropriate postural reactions and no UE support.                       Time Tracking:     PT Received On: 20  PT Start Time: 1635     PT Stop Time: 1710  PT Total Time (min): 35 min     Billable Minutes: Gait Training 15 and Therapeutic Activity 20    Treatment Type: Treatment  PT/PTA: PTA     PTA Visit Number: 1     Clover Harrell PTA  2020

## 2020-03-09 NOTE — ANESTHESIA POSTPROCEDURE EVALUATION
Anesthesia Post Evaluation    Patient: Lo Escalera    Procedure(s) Performed: Procedure(s) (LRB):  EGD (ESOPHAGOGASTRODUODENOSCOPY) (N/A)    Final Anesthesia Type: general    Patient location during evaluation: PACU  Patient participation: Yes- Able to Participate  Level of consciousness: awake and alert  Post-procedure vital signs: reviewed and stable  Pain management: adequate  Airway patency: patent    PONV status at discharge: No PONV  Anesthetic complications: no      Cardiovascular status: blood pressure returned to baseline and stable  Respiratory status: unassisted, spontaneous ventilation and room air  Hydration status: euvolemic  Follow-up not needed.          Vitals Value Taken Time   /51 3/9/2020  7:50 AM   Temp 36.8 °C (98.3 °F) 3/9/2020  3:54 AM   Pulse 103 3/9/2020  7:52 AM   Resp 16 3/9/2020  3:54 AM   SpO2 100 % 3/9/2020  7:52 AM   Vitals shown include unvalidated device data.      No case tracking events are documented in the log.      Pain/Brie Score: Pain Rating Prior to Med Admin: 7 (3/9/2020  3:43 AM)  Pain Rating Post Med Admin: 6 (3/9/2020  4:43 AM)

## 2020-03-09 NOTE — ANESTHESIA PREPROCEDURE EVALUATION
03/09/2020  Lo Escalera is a 64 y.o., female.    Anesthesia Evaluation    I have reviewed the Patient Summary Reports.    I have reviewed the Nursing Notes.   I have reviewed the Medications.     Review of Systems  Anesthesia Hx:  Denies Family Hx of Anesthesia complications.   Denies Personal Hx of Anesthesia complications.   Social:  Non-Smoker Former ETOH, none for years   Hematology/Oncology:     Oncology Normal    -- Anemia: Hematology Comments: hct 22.9 this am, rec'd 2 units prbc's  Elevated INR, 1.5, and plt 60's    EENT/Dental:EENT/Dental Normal   Cardiovascular:   Hypertension    Pulmonary:  Pulmonary Normal    Renal/:  Renal/ Normal     Hepatic/GI:   PUD, GERD Liver Disease, (hx fatty liver) Upper GI bleed, no vomiting for 2 days   Musculoskeletal:  Musculoskeletal Normal    Neurological:   Neuromuscular Disease, (vertigo)    Endocrine:   Hypothyroidism    Dermatological:  Skin Normal    Psych:   anxiety          Physical Exam  General:  Well nourished      Dental:  Dental Findings: In tactMany missing rear teeth, none loose          Mental Status:  Mental Status Findings:  Cooperative, Alert and Oriented         Anesthesia Plan  Type of Anesthesia, risks & benefits discussed:  Anesthesia Type:  general  Patient's Preference:   Intra-op Monitoring Plan: standard ASA monitors  Intra-op Monitoring Plan Comments:   Post Op Pain Control Plan: per primary service following discharge from PACU  Post Op Pain Control Plan Comments:   Induction:    Beta Blocker:         Informed Consent: Patient understands risks and agrees with Anesthesia plan.  Questions answered. Anesthesia consent signed with patient.  ASA Score: 3     Day of Surgery Review of History & Physical:    H&P update referred to the surgeon.         Ready For Surgery From Anesthesia Perspective.

## 2020-03-09 NOTE — SUBJECTIVE & OBJECTIVE
Interval History: No acute events overnight.  Taken for EGD this morning.  Feeling better but still with nocturnal abdominal discomfort and significant weakness.  All questions answered.    Review of Systems   Constitutional: Negative for activity change, chills, diaphoresis and fatigue.   HENT: Negative for congestion, drooling and hearing loss.    Eyes: Negative for discharge.   Respiratory: Negative for apnea, chest tightness, shortness of breath and wheezing.    Cardiovascular: Negative for palpitations and leg swelling.   Gastrointestinal: Negative for abdominal distention, abdominal pain, blood in stool, constipation, diarrhea, nausea and vomiting.   Endocrine: Negative for cold intolerance and heat intolerance.   Genitourinary: Negative for difficulty urinating.   Musculoskeletal: Negative for arthralgias and gait problem.   Skin: Negative for rash.   Neurological: Positive for dizziness and weakness. Negative for seizures, light-headedness and numbness.   Hematological: Negative for adenopathy.   Psychiatric/Behavioral: Negative for agitation and behavioral problems.     Objective:     Vital Signs (Most Recent):  Temp: 98.4 °F (36.9 °C) (03/09/20 1222)  Pulse: 102 (03/09/20 1222)  Resp: 20 (03/09/20 1222)  BP: 118/69 (03/09/20 1222)  SpO2: 100 % (03/09/20 1222) Vital Signs (24h Range):  Temp:  [97.4 °F (36.3 °C)-98.8 °F (37.1 °C)] 98.4 °F (36.9 °C)  Pulse:  [] 102  Resp:  [16-20] 20  SpO2:  [94 %-100 %] 100 %  BP: (103-164)/(51-93) 118/69     Weight: 68 kg (149 lb 14.6 oz)  Body mass index is 26.56 kg/m².    Intake/Output Summary (Last 24 hours) at 3/9/2020 1247  Last data filed at 3/9/2020 0740  Gross per 24 hour   Intake 300 ml   Output 180 ml   Net 120 ml      Physical Exam   Constitutional: She is oriented to person, place, and time. She appears well-developed and well-nourished.   HENT:   Head: Normocephalic and atraumatic.   Eyes: Pupils are equal, round, and reactive to light. Conjunctivae and  EOM are normal. Right eye exhibits no discharge. Left eye exhibits no discharge. No scleral icterus.   Neck: Normal range of motion. Neck supple.   Cardiovascular: Normal rate, regular rhythm and normal heart sounds. Exam reveals no gallop and no friction rub.   No murmur heard.  Pulmonary/Chest: Effort normal and breath sounds normal. No stridor. No respiratory distress. She has no wheezes. She has no rales.   Abdominal: Soft. Bowel sounds are normal. She exhibits no distension. There is no tenderness. There is no rebound and no guarding.   Musculoskeletal: Normal range of motion. She exhibits no edema or deformity.   Neurological: She is oriented to person, place, and time. No cranial nerve deficit. She exhibits normal muscle tone.   Skin: Skin is warm and dry. Capillary refill takes less than 2 seconds. No pallor.   Psychiatric: She has a normal mood and affect. Her behavior is normal.   Nursing note and vitals reviewed.      Significant Labs: All pertinent labs within the past 24 hours have been reviewed.    Significant Imaging: I have reviewed and interpreted all pertinent imaging results/findings within the past 24 hours.

## 2020-03-09 NOTE — PLAN OF CARE
Plan of care reviewed with patient.  Telemetry monitor in place.  Patient up to bed-side commode with one person assist.  Purposeful rounding completed, call bell / personal items within reach.  Bed alarm in use.  No needs at this time, will continue to monitor.

## 2020-03-09 NOTE — TRANSFER OF CARE
"Anesthesia Transfer of Care Note    Patient: Lo Escalera    Procedure(s) Performed: Procedure(s) (LRB):  EGD (ESOPHAGOGASTRODUODENOSCOPY) (N/A)    Patient location: PACU    Anesthesia Type: general    Transport from OR: Transported from OR on room air with adequate spontaneous ventilation    Post pain: adequate analgesia    Post assessment: no apparent anesthetic complications    Post vital signs: stable    Level of consciousness: awake and alert    Nausea/Vomiting: no nausea/vomiting    Complications: none    Transfer of care protocol was followed      Last vitals:   Visit Vitals  /60   Pulse 94   Temp 36.8 °C (98.3 °F)   Resp 16   Ht 5' 3" (1.6 m)   Wt 68 kg (149 lb 14.6 oz)   LMP  (LMP Unknown)   SpO2 100%   Breastfeeding? No   BMI 26.56 kg/m²     "

## 2020-03-09 NOTE — [employer] PATIENT INSTRUCTIONS
Discharge Summary/Instructions after an Endoscopic Procedure  Patient Name: Lo Escalera  Patient MRN: [id number]  Patient YOB: 1955 Monday, March 09, 2020  Andrea Salomon MD  RESTRICTIONS:  During your procedure today, you received medications for sedation.  These   medications may affect your judgment, balance and coordination.  Therefore,   for 24 hours, you have the following restrictions:   - DO NOT drive a car, operate machinery, make legal/financial decisions,   sign important papers or drink alcohol.    ACTIVITY:  Today: no heavy lifting, straining or running due to procedural   sedation/anesthesia.  The following day: return to full activity including work.  DIET:  Eat and drink normally unless instructed otherwise.     TREATMENT FOR COMMON SIDE EFFECTS:  - Mild abdominal pain, nausea, belching, bloating or excessive gas:  rest,   eat lightly and use a heating pad.  - Sore Throat: treat with throat lozenges and/or gargle with warm salt   water.  - Because air was used during the procedure, expelling large amounts of air   from your rectum or belching is normal.  - If a bowel prep was taken, you may not have a bowel movement for 1-3 days.    This is normal.  SYMPTOMS TO WATCH FOR AND REPORT TO YOUR PHYSICIAN:  1. Abdominal pain or bloating, other than gas cramps.  2. Chest pain.  3. Back pain.  4. Signs of infection such as: chills or fever occurring within 24 hours   after the procedure.  5. Rectal bleeding, which would show as bright red, maroon, or black stools.   (A tablespoon of blood from the rectum is not serious, especially if   hemorrhoids are present.)  6. Vomiting.  7. Weakness or dizziness.  GO DIRECTLY TO THE NEAREST EMERGENCY ROOM IF YOU HAVE ANY OF THE FOLLOWING:      Difficulty breathing              Chills and/or fever over 101 F   Persistent vomiting and/or vomiting blood   Severe abdominal pain   Severe chest pain   Black, tarry stools   Bleeding- more than one  tablespoon   Any other symptom or condition that you feel may need urgent attention  Your doctor recommends these additional instructions:  If any biopsies were taken, your doctors clinic will contact you in 1 to 2   weeks with any results.  - [facility].   - Resume previous diet indefinitely.   - Await pathology results.   - Use Protonix (pantoprazole) 40 mg PO daily.   - Repeat upper endoscopy in 2 months to check healing.   - Perform a colonoscopy.   - F/U Office in 2- 3 weeks ([phone])  For questions, problems or results please call your physician - Andrea Salomon MD at Work:  [phone].  [facility], EMERGENCY ROOM PHONE NUMBER: [phone], [facility], [place], operated by [facility]   [phone].  IF A COMPLICATION OR EMERGENCY SITUATION ARISES AND YOU ARE UNABLE TO REACH   YOUR PHYSICIAN - GO DIRECTLY TO THE EMERGENCY ROOM.  Andrea Salomon MD  3/9/2020 7:46:25 AM  This report has been verified and signed electronically.  [employer]

## 2020-03-09 NOTE — PLAN OF CARE
CM met with pt to discuss SNF choices. Pt states she will not go to SNF, she wants to go home with HH.     informed of pt's choice.

## 2020-03-09 NOTE — PLAN OF CARE
No significant events overnight. Remains free from fall, injury, and skin breakdown. Voiding spontaneously. VSS on RA throughout the night. Positions self. Pain mildly controlled with PO. Bed alarms active and audible.  Plan of care reviewed with patient and all questions answered. Bed low, locked w/ bed alarm on. Call light within reach. Purposeful rounding performed. Resting comfortably in bed, no other complaints at this time.Pt continues to get up to the bathroom without assistance with bed alarming. Educated multiple times. Pt Npo at midnight.

## 2020-03-09 NOTE — PLAN OF CARE
CM met with pt for initial discharge planning assessment. Pt is alert and oriented at time of assessment.  Pt confirmed all demographic information is correct in Epic. Pt lives with her spouse who will provide transportation home when discharged.    Pt states she is independent with her ADL's does use a RW at home.     Pt's PCP is correct in Epic, Dr Dai, and pharmacy of choice is     Pt's primary insurance is [employer].    CM to follow for plans and arrangemetns.     03/09/20 2976   Discharge Assessment   Assessment Type Discharge Planning Assessment   Confirmed/corrected address and phone number on facesheet? Yes   Assessment information obtained from? Patient;Medical Record   Expected Length of Stay (days) 3   Communicated expected length of stay with patient/caregiver yes   Prior to hospitilization cognitive status: Alert/Oriented   Prior to hospitalization functional status: Assistive Equipment;Independent   Current cognitive status: Alert/Oriented   Current Functional Status: Independent;Assistive Equipment   Lives With spouse   Able to Return to Prior Arrangements yes   Is patient able to care for self after discharge? Yes   Patient's perception of discharge disposition home health   Readmission Within the Last 30 Days no previous admission in last 30 days   Patient currently being followed by outpatient case management? No   Patient currently receives any other outside agency services? No   Equipment Currently Used at Home walker, rolling   Do you have any problems affording any of your prescribed medications? No   Is the patient taking medications as prescribed? yes   Does the patient have transportation home? Yes   Transportation Anticipated family or friend will provide   Does the patient receive services at the [facility]? No   Discharge Plan A Skilled Nursing Facility   Discharge Plan B Home Health   DME Needed Upon Discharge  none   Patient/Family in Agreement with Plan yes

## 2020-03-09 NOTE — PLAN OF CARE
Assessed pt for spiritual distress.  Pt reported mild anxiety regarding her bouts with SOB, but reports doing well at present.  Pt reports being grateful for her care received and for her spiritual care visit.  Pt expressed interest in getting information about advanced directives, which the  promised to follow-up on.  Follow-up spiritual care was offered upon request.

## 2020-03-09 NOTE — PROGRESS NOTES
[facility]- Medicine  Progress Note    Patient Name: Lo Escalera  MRN: [id number]  Patient Class: IP- Inpatient   Admission Date: 3/6/2020  Length of Stay: 3 days  Attending Physician: Deion Duque MD  Primary Care Provider: Amilcar Dai MD        Subjective:     Principal Problem:UGIB (upper gastrointestinal bleed)        HPI:  Mrs. Escalera is a 64 year old woman with history of peptic ulcer disease, gastritis, hypertension, hypothyroidism, vertigo, anxiety and remote alcohol abuse who presented to [facility] today for evaluation of hematemesis. She noted that she had an episode of red emesis last night and this morning as well as dark black bowel movement.  These were associated with abdominal pain that radiated to her back and light headedness.  She states she has been taking alleve at home for pain but denies alcohol consumption, noting she has abstained from alcohol for many years.    In the ER she was hypotensive, tachycardic and had a hemoglobin of 6.4.  She was given fluids, transfused two units PRBC and started on a proton pump inhibitor drip.  Her blood pressure improved and she has transferred to [facility] for GI evaluation.  Of note, she had an EGD performed in June 2019 which showed gastritis and no varices.    Overview/Hospital Course:  No notes on file    Interval History: No acute events overnight.  Taken for EGD this morning.  Feeling better but still with nocturnal abdominal discomfort and significant weakness.  All questions answered.    Review of Systems   Constitutional: Negative for activity change, chills, diaphoresis and fatigue.   HENT: Negative for congestion, drooling and hearing loss.    Eyes: Negative for discharge.   Respiratory: Negative for apnea, chest tightness, shortness of breath and wheezing.    Cardiovascular: Negative for palpitations and leg swelling.   Gastrointestinal: Negative for abdominal distention, abdominal pain, blood in stool,  constipation, diarrhea, nausea and vomiting.   Endocrine: Negative for cold intolerance and heat intolerance.   Genitourinary: Negative for difficulty urinating.   Musculoskeletal: Negative for arthralgias and gait problem.   Skin: Negative for rash.   Neurological: Positive for dizziness and weakness. Negative for seizures, light-headedness and numbness.   Hematological: Negative for adenopathy.   Psychiatric/Behavioral: Negative for agitation and behavioral problems.     Objective:     Vital Signs (Most Recent):  Temp: 98.4 °F (36.9 °C) (03/09/20 1222)  Pulse: 102 (03/09/20 1222)  Resp: 20 (03/09/20 1222)  BP: 118/69 (03/09/20 1222)  SpO2: 100 % (03/09/20 1222) Vital Signs (24h Range):  Temp:  [97.4 °F (36.3 °C)-98.8 °F (37.1 °C)] 98.4 °F (36.9 °C)  Pulse:  [] 102  Resp:  [16-20] 20  SpO2:  [94 %-100 %] 100 %  BP: (103-164)/(51-93) 118/69     Weight: 68 kg (149 lb 14.6 oz)  Body mass index is 26.56 kg/m².    Intake/Output Summary (Last 24 hours) at 3/9/2020 1247  Last data filed at 3/9/2020 0740  Gross per 24 hour   Intake 300 ml   Output 180 ml   Net 120 ml      Physical Exam   Constitutional: She is oriented to person, place, and time. She appears well-developed and well-nourished.   HENT:   Head: Normocephalic and atraumatic.   Eyes: Pupils are equal, round, and reactive to light. Conjunctivae and EOM are normal. Right eye exhibits no discharge. Left eye exhibits no discharge. No scleral icterus.   Neck: Normal range of motion. Neck supple.   Cardiovascular: Normal rate, regular rhythm and normal heart sounds. Exam reveals no gallop and no friction rub.   No murmur heard.  Pulmonary/Chest: Effort normal and breath sounds normal. No stridor. No respiratory distress. She has no wheezes. She has no rales.   Abdominal: Soft. Bowel sounds are normal. She exhibits no distension. There is no tenderness. There is no rebound and no guarding.   Musculoskeletal: Normal range of motion. She exhibits no edema or  deformity.   Neurological: She is oriented to person, place, and time. No cranial nerve deficit. She exhibits normal muscle tone.   Skin: Skin is warm and dry. Capillary refill takes less than 2 seconds. No pallor.   Psychiatric: She has a normal mood and affect. Her behavior is normal.   Nursing note and vitals reviewed.      Significant Labs: All pertinent labs within the past 24 hours have been reviewed.    Significant Imaging: I have reviewed and interpreted all pertinent imaging results/findings within the past 24 hours.      Assessment/Plan:      * UGIB (upper gastrointestinal bleed)  -Mrs Escalera was admitted to inpatient status in our ICU and transferred to telemetry on 3/7.  -She had hematemesis and melena associated with abdominal pain, light headedness, hypotension and tachycardia.  -Hb was 6.4 in ER and she has been transfused 2 units PRBC with good improvement of Hb.  -Suspect this is due to PUD/gastritis from using aleve at home.  -She was taken for EGD today which showed esophagitis and gastritis.  -OK for trial of normal diet.  Hb has trended up  -Continue PPI per GI recs  -Transfuse to keep Hb > 7.0  -Possibly medically stable for discharge tomorrow.  SNF vs     Acute blood loss anemia  -Treatment as above.      Thrombocytopenia  -Platelets low at times since at least September 2016  -Platelets within baseline range on admit, but have fallen since.  -Baseline suspected due to chronic liver disease from history alcohol abuse?  -Acute drop likely due to consumption from bleeding and essentially stable today  -Monitor closely      Hepatic steatosis  -History noted  -Remote history of alcohol abuse noted  -EtOH undetectable on admission.  -Noted chronic hyperbilirubinemia and mild transaminitis (AST:ALT > 2:1 suggestive of alcohol)  -INR is mildly elevated.  -Saw Hepatology at [facility] in 2016 but no further follow-ups noted  -Monitor CMP daily.    Essential hypertension  -Noted low blood pressure in ER  which improved with PRBC and IV fluids  -BP is normal at this time.  -Hold home lisinopril and metoprolol  -Monitor closely.    History of Hypothyroidism  -History of hypothyroidism noted in chart but levothyroxine not on home med list.  -TSH is normal, she does not have hypothyroidism.      Hypokalemia  -Replace potassium today.  -Check BMP and Mag in AM.    Debility  -Consulted PT/OT who recommended SNF  -Patient not excited about that idea.  Due to multiple recent falls I feel that is safer.  -Continue PT/OT for now.  Continue to discuss with patient and .      Hypomagnesemia  -Replace magnesium today.  -Repeat labs in AM.    JHOAN (generalized anxiety disorder)  -Per home med list takes clonopin and cymbalta  -Continue cymbalta but hold clonopin for now.      Vertigo  -History noted  -Per home med list she takes Valium for this  -Hold sedating medications for now  -Strict fall precautions.          VTE Risk Mitigation (From admission, onward)         Ordered     IP VTE LOW RISK PATIENT  Once      03/06/20 1719     Place KARISSA hose  Until discontinued      03/06/20 1719     Place sequential compression device  Until discontinued      03/06/20 1719                      Deion Duque MD  Department of Hospital Medicine   [facility]

## 2020-03-09 NOTE — PLAN OF CARE
Problem: Physical Therapy Goal  Goal: Physical Therapy Goal  Description  Goals to be met by: 20    Patient will increase functional independence with mobility by performin. Sit<>stand with supervision with LRAD.  2. Gait x 50 feet with CGA with LRAD.  3. Ascend/descend 4 step(s) with least restrictive assistive device and unilateral HR and CGA.  4. Standing x30 sec in narrow stance with appropriate postural reactions and no UE support.      Outcome: Ongoing, Progressing   Pt sup to sit min.A, sit to stand CGA/Alexis, amb'd 10' x 2 with RW and CGA/min.A. Pt c/o dizziness and SOB, recv'd vertigo meds prior to session, BP:134/81 , HR:112, O2:100% on RA. Recommend cont PT in SNF.

## 2020-03-09 NOTE — ASSESSMENT & PLAN NOTE
-Platelets low at times since at least September 2016  -Platelets within baseline range on admit, but have fallen since.  -Baseline suspected due to chronic liver disease from history alcohol abuse?  -Acute drop likely due to consumption from bleeding and essentially stable today  -Monitor closely

## 2020-03-09 NOTE — PLAN OF CARE
Problem: Occupational Therapy Goal  Goal: Occupational Therapy Goal  Description  Goals to be met by: 3/16/2020     Patient will increase functional independence with ADLs by performing:    UE Dressing with Supervision.  LE Dressing with Stand-by Assistance.  Grooming while standing at sink with Contact Guard Assistance.  Toileting from toilet with Contact Guard Assistance for hygiene and clothing management.   Toilet transfer to toilet with Contact Guard Assistance.  Pt will perform functional task in standing with no LOB.     Outcome: Ongoing, Progressing     OT evaluation complete and POC established.  During evaluation pt reported feeling dizzy as if the room was swimming so functional mobility limited this date.  Pt able to perform sit <> stand transfer, march in place, and take side step with CGA and RW.  While seated at EOB pt able to perform UB dressing with SBA; however, some weakness observed in (B) UE.    PTA pt reports being Mod I with ADLs requiring increased time and utilizing RW as needed.  She was Mod I for mobility utilizing RW.  Pt is not at PLOF and would benefit from skilled OT services to increase independence with ADLs.  SNF is recommended upon d/c from acute care to further address deficits and help pt improve overall functional independence.     RADHA Ellis  3/9/2020

## 2020-03-09 NOTE — ASSESSMENT & PLAN NOTE
-Mrs Escalera was admitted to inpatient status in our ICU and transferred to telemetry on 3/7.  -She had hematemesis and melena associated with abdominal pain, light headedness, hypotension and tachycardia.  -Hb was 6.4 in ER and she has been transfused 2 units PRBC with good improvement of Hb.  -Suspect this is due to PUD/gastritis from using aleve at home.  -She was taken for EGD today which showed esophagitis and gastritis.  -OK for trial of normal diet.  Hb has trended up  -Continue PPI per GI recs  -Transfuse to keep Hb > 7.0  -Possibly medically stable for discharge tomorrow.  SNF vs

## 2020-03-09 NOTE — HOSPITAL COURSE
64 year old woman with history of peptic ulcer disease, gastritis, hypertension, hypothyroidism, vertigo, anxiety and remote alcohol abuse who presented to [facility] on 3/6 for evaluation of hematemesis, significant anemia and hypotension.  She was transferred here to our ICU after receiving 2 units PRBC and spent about 24 hours in icu prior to stabilizing and stepping down.  She is being treated with bid iv protonix and q8h cbc.  BP has normalized and Hb is mildly trending down.  This was suspected due to nsaid gastritis (she had been taking aleve).  She underwent EGD this morning which showed esophagitis and gastritis.  She will need a colonoscopy as outpatient.  She is also noted to have significant thrombocytopenia that is not new, but is worse than baseline.  Baseline likely due to chronic liver disease presumed from alcohol damage.  Acute deterioration in platelets presumed due to consumption from GI bleeding.  She had a fall in house on Sunday with no serious injuries fortunately.  Overall rather debilitated and PT/OT have recommended SNF placement but she has not consented to that yet.  Should be ready to go, either to SNF or  tomorrow if h/h stable.

## 2020-03-10 LAB
ALBUMIN SERPL BCP-MCNC: 2.6 G/DL (ref 3.5–5.2)
ALP SERPL-CCNC: 91 U/L (ref 55–135)
ALT SERPL W/O P-5'-P-CCNC: 14 U/L (ref 10–44)
ANION GAP SERPL CALC-SCNC: 7 MMOL/L (ref 8–16)
AST SERPL-CCNC: 59 U/L (ref 10–40)
BASOPHILS # BLD AUTO: 0.01 K/UL (ref 0–0.2)
BASOPHILS # BLD AUTO: 0.02 K/UL (ref 0–0.2)
BASOPHILS NFR BLD: 0.2 % (ref 0–1.9)
BASOPHILS NFR BLD: 0.4 % (ref 0–1.9)
BASOPHILS NFR BLD: 0.5 % (ref 0–1.9)
BASOPHILS NFR BLD: 0.5 % (ref 0–1.9)
BILIRUB SERPL-MCNC: 1.2 MG/DL (ref 0.1–1)
BUN SERPL-MCNC: 9 MG/DL (ref 8–23)
CALCIUM SERPL-MCNC: 7.9 MG/DL (ref 8.7–10.5)
CHLORIDE SERPL-SCNC: 107 MMOL/L (ref 95–110)
CO2 SERPL-SCNC: 23 MMOL/L (ref 23–29)
CREAT SERPL-MCNC: 0.7 MG/DL (ref 0.5–1.4)
DIFFERENTIAL METHOD: ABNORMAL
EOSINOPHIL # BLD AUTO: 0 K/UL (ref 0–0.5)
EOSINOPHIL # BLD AUTO: 0.1 K/UL (ref 0–0.5)
EOSINOPHIL NFR BLD: 0.6 % (ref 0–8)
EOSINOPHIL NFR BLD: 1 % (ref 0–8)
EOSINOPHIL NFR BLD: 1.1 % (ref 0–8)
EOSINOPHIL NFR BLD: 1.2 % (ref 0–8)
ERYTHROCYTE [DISTWIDTH] IN BLOOD BY AUTOMATED COUNT: 15.9 % (ref 11.5–14.5)
ERYTHROCYTE [DISTWIDTH] IN BLOOD BY AUTOMATED COUNT: 16.4 % (ref 11.5–14.5)
ERYTHROCYTE [DISTWIDTH] IN BLOOD BY AUTOMATED COUNT: 16.5 % (ref 11.5–14.5)
ERYTHROCYTE [DISTWIDTH] IN BLOOD BY AUTOMATED COUNT: 16.7 % (ref 11.5–14.5)
EST. GFR  (AFRICAN AMERICAN): >60 ML/MIN/1.73 M^2
EST. GFR  (NON AFRICAN AMERICAN): >60 ML/MIN/1.73 M^2
GLUCOSE SERPL-MCNC: 88 MG/DL (ref 70–110)
HCT VFR BLD AUTO: 22.6 % (ref 37–48.5)
HCT VFR BLD AUTO: 24.1 % (ref 37–48.5)
HCT VFR BLD AUTO: 25.6 % (ref 37–48.5)
HCT VFR BLD AUTO: 26 % (ref 37–48.5)
HGB BLD-MCNC: 7.1 G/DL (ref 12–16)
HGB BLD-MCNC: 7.7 G/DL (ref 12–16)
HGB BLD-MCNC: 8.1 G/DL (ref 12–16)
HGB BLD-MCNC: 8.4 G/DL (ref 12–16)
IMM GRANULOCYTES # BLD AUTO: 0.01 K/UL (ref 0–0.04)
IMM GRANULOCYTES # BLD AUTO: 0.02 K/UL (ref 0–0.04)
IMM GRANULOCYTES NFR BLD AUTO: 0.2 % (ref 0–0.5)
IMM GRANULOCYTES NFR BLD AUTO: 0.4 % (ref 0–0.5)
IMM GRANULOCYTES NFR BLD AUTO: 0.5 % (ref 0–0.5)
IMM GRANULOCYTES NFR BLD AUTO: 0.5 % (ref 0–0.5)
LYMPHOCYTES # BLD AUTO: 1.3 K/UL (ref 1–4.8)
LYMPHOCYTES # BLD AUTO: 1.4 K/UL (ref 1–4.8)
LYMPHOCYTES # BLD AUTO: 1.7 K/UL (ref 1–4.8)
LYMPHOCYTES # BLD AUTO: 1.7 K/UL (ref 1–4.8)
LYMPHOCYTES NFR BLD: 30.5 % (ref 18–48)
LYMPHOCYTES NFR BLD: 32.9 % (ref 18–48)
LYMPHOCYTES NFR BLD: 34.6 % (ref 18–48)
LYMPHOCYTES NFR BLD: 35 % (ref 18–48)
MAGNESIUM SERPL-MCNC: 1.8 MG/DL (ref 1.6–2.6)
MCH RBC QN AUTO: 28.7 PG (ref 27–31)
MCH RBC QN AUTO: 28.8 PG (ref 27–31)
MCH RBC QN AUTO: 29.2 PG (ref 27–31)
MCH RBC QN AUTO: 29.4 PG (ref 27–31)
MCHC RBC AUTO-ENTMCNC: 31.4 G/DL (ref 32–36)
MCHC RBC AUTO-ENTMCNC: 31.6 G/DL (ref 32–36)
MCHC RBC AUTO-ENTMCNC: 32 G/DL (ref 32–36)
MCHC RBC AUTO-ENTMCNC: 32.3 G/DL (ref 32–36)
MCV RBC AUTO: 91 FL (ref 82–98)
MCV RBC AUTO: 92 FL (ref 82–98)
MONOCYTES # BLD AUTO: 0.3 K/UL (ref 0.3–1)
MONOCYTES # BLD AUTO: 0.4 K/UL (ref 0.3–1)
MONOCYTES NFR BLD: 5.5 % (ref 4–15)
MONOCYTES NFR BLD: 5.6 % (ref 4–15)
MONOCYTES NFR BLD: 6.5 % (ref 4–15)
MONOCYTES NFR BLD: 8 % (ref 4–15)
NEUTROPHILS # BLD AUTO: 2.5 K/UL (ref 1.8–7.7)
NEUTROPHILS # BLD AUTO: 2.6 K/UL (ref 1.8–7.7)
NEUTROPHILS # BLD AUTO: 2.8 K/UL (ref 1.8–7.7)
NEUTROPHILS # BLD AUTO: 2.9 K/UL (ref 1.8–7.7)
NEUTROPHILS NFR BLD: 57 % (ref 38–73)
NEUTROPHILS NFR BLD: 58.2 % (ref 38–73)
NEUTROPHILS NFR BLD: 58.3 % (ref 38–73)
NEUTROPHILS NFR BLD: 60.8 % (ref 38–73)
NRBC BLD-RTO: 0 /100 WBC
PLATELET # BLD AUTO: 65 K/UL (ref 150–350)
PLATELET # BLD AUTO: 66 K/UL (ref 150–350)
PLATELET # BLD AUTO: 72 K/UL (ref 150–350)
PLATELET # BLD AUTO: 78 K/UL (ref 150–350)
PMV BLD AUTO: 10 FL (ref 9.2–12.9)
PMV BLD AUTO: 10.4 FL (ref 9.2–12.9)
PMV BLD AUTO: 10.6 FL (ref 9.2–12.9)
PMV BLD AUTO: 11.2 FL (ref 9.2–12.9)
POTASSIUM SERPL-SCNC: 3.9 MMOL/L (ref 3.5–5.1)
PROT SERPL-MCNC: 6.6 G/DL (ref 6–8.4)
RBC # BLD AUTO: 2.47 M/UL (ref 4–5.4)
RBC # BLD AUTO: 2.64 M/UL (ref 4–5.4)
RBC # BLD AUTO: 2.81 M/UL (ref 4–5.4)
RBC # BLD AUTO: 2.86 M/UL (ref 4–5.4)
SODIUM SERPL-SCNC: 137 MMOL/L (ref 136–145)
WBC # BLD AUTO: 4.3 K/UL (ref 3.9–12.7)
WBC # BLD AUTO: 4.35 K/UL (ref 3.9–12.7)
WBC # BLD AUTO: 4.8 K/UL (ref 3.9–12.7)
WBC # BLD AUTO: 4.89 K/UL (ref 3.9–12.7)

## 2020-03-10 PROCEDURE — 97116 GAIT TRAINING THERAPY: CPT | Mod: CQ

## 2020-03-10 PROCEDURE — 83735 ASSAY OF MAGNESIUM: CPT

## 2020-03-10 PROCEDURE — 99233 PR SUBSEQUENT HOSPITAL CARE,LEVL III: ICD-10-PCS | Mod: ,,, | Performed by: INTERNAL MEDICINE

## 2020-03-10 PROCEDURE — 11000001 HC ACUTE MED/SURG PRIVATE ROOM

## 2020-03-10 PROCEDURE — 80053 COMPREHEN METABOLIC PANEL: CPT

## 2020-03-10 PROCEDURE — 85025 COMPLETE CBC W/AUTO DIFF WBC: CPT | Mod: 91

## 2020-03-10 PROCEDURE — 25000003 PHARM REV CODE 250: Performed by: PHYSICIAN ASSISTANT

## 2020-03-10 PROCEDURE — 36415 COLL VENOUS BLD VENIPUNCTURE: CPT

## 2020-03-10 PROCEDURE — 25000003 PHARM REV CODE 250: Performed by: HOSPITALIST

## 2020-03-10 PROCEDURE — 99233 SBSQ HOSP IP/OBS HIGH 50: CPT | Mod: ,,, | Performed by: INTERNAL MEDICINE

## 2020-03-10 RX ADMIN — PANTOPRAZOLE SODIUM 40 MG: 40 TABLET, DELAYED RELEASE ORAL at 08:03

## 2020-03-10 RX ADMIN — ALUMINUM HYDROXIDE, MAGNESIUM HYDROXIDE, AND SIMETHICONE: 200; 200; 20 SUSPENSION ORAL at 09:03

## 2020-03-10 RX ADMIN — DULOXETINE HYDROCHLORIDE 20 MG: 20 CAPSULE, DELAYED RELEASE ORAL at 09:03

## 2020-03-10 RX ADMIN — DULOXETINE HYDROCHLORIDE 20 MG: 20 CAPSULE, DELAYED RELEASE ORAL at 08:03

## 2020-03-10 RX ADMIN — ACETAMINOPHEN 650 MG: 325 TABLET, FILM COATED ORAL at 09:03

## 2020-03-10 RX ADMIN — ACETAMINOPHEN 650 MG: 325 TABLET, FILM COATED ORAL at 04:03

## 2020-03-10 NOTE — PT/OT/SLP PROGRESS
"Physical Therapy Treatment    Patient Name:  Lo Escalera   MRN:  [id number]    Recommendations:     Discharge Recommendations:  nursing facility, skilled (however, if pt discharges home, will need 24/7 assistance and recommend PT with a vestibular trained therapist.)  Discharge Equipment Recommendations: 3-in-1 commode(possible need for transport w/c)   Barriers to discharge: Decreased caregiver support    Assessment:     Lo Escalera is a 64 y.o. female admitted with a medical diagnosis of UGIB (upper gastrointestinal bleed).  She presents with the following impairments/functional limitations:  weakness, impaired endurance, impaired self care skills, impaired functional mobilty, gait instability, impaired balance, decreased safety awareness, decreased coordination, decreased lower extremity function, pt with improved mobility today, inc. amb distance into hallway, however, pt needs CGA for safety, ambulates with wide STEPHENIE, and makes frequent standing stops 2/2 dizziness.    Rehab Prognosis: Good; patient would benefit from acute skilled PT services to address these deficits and reach maximum level of function.    Recent Surgery: Procedure(s) (LRB):  EGD (ESOPHAGOGASTRODUODENOSCOPY) (N/A) 1 Day Post-Op    Plan:     During this hospitalization, patient to be seen 5 x/week to address the identified rehab impairments via gait training, therapeutic activities, therapeutic exercises, neuromuscular re-education and progress toward the following goals:    · Plan of Care Expires:  04/05/20    Subjective     Chief Complaint: dizziness  Patient/Family Comments/goals: pt reports seeing "circles" on the floor. Pt reports taking her medication for vertigo today, however, at home has to take 3 times a day, some days.  Pain/Comfort:  · Pain Rating 1: 0/10  · Pain Rating Post-Intervention 1: 0/10      Objective:     Communicated with nurse prior to session.  Patient found supine with PICC line,([employer] monitoring " system) upon PT entry to room.     General Precautions: Standard, fall   Orthopedic Precautions:N/A   Braces: N/A     Functional Mobility:  · Bed Mobility:     · Supine to Sit: contact guard assistance and pt using bedrail  · Transfers:     · Sit to Stand:  contact guard assistance with rolling walker  · Gait: amb'd 60' with RW and CGA, occ Alexis for RW mgmt. , very slow pace, wide STEPHENIE, frequent stops      AM-PAC 6 CLICK MOBILITY  Turning over in bed (including adjusting bedclothes, sheets and blankets)?: 4  Sitting down on and standing up from a chair with arms (e.g., wheelchair, bedside commode, etc.): 3  Moving from lying on back to sitting on the side of the bed?: 3  Moving to and from a bed to a chair (including a wheelchair)?: 3  Need to walk in hospital room?: 3  Climbing 3-5 steps with a railing?: 3  Basic Mobility Total Score: 19       Therapeutic Activities and Exercises:       Patient left up in chair with all lines intact, call button in reach, nruse notified and [employer] monitoring present..    GOALS:   Multidisciplinary Problems     Physical Therapy Goals        Problem: Physical Therapy Goal    Goal Priority Disciplines Outcome Goal Variances Interventions   Physical Therapy Goal     PT, PT/OT Ongoing, Progressing     Description:  Goals to be met by: 20    Patient will increase functional independence with mobility by performin. Sit<>stand with supervision with LRAD.  2. Gait x 50 feet with CGA with LRAD.  3. Ascend/descend 4 step(s) with least restrictive assistive device and unilateral HR and CGA.  4. Standing x30 sec in narrow stance with appropriate postural reactions and no UE support.                       Time Tracking:     PT Received On: 03/10/20  PT Start Time: 1404     PT Stop Time: 1430  PT Total Time (min): 26 min     Billable Minutes: Gait Training 26    Treatment Type: Treatment  PT/PTA: PTA     PTA Visit Number: 2     Clover Harrell PTA  03/10/2020

## 2020-03-10 NOTE — PLAN OF CARE
Problem: Physical Therapy Goal  Goal: Physical Therapy Goal  Description  Goals to be met by: 20    Patient will increase functional independence with mobility by performin. Sit<>stand with supervision with LRAD.  2. Gait x 50 feet with CGA with LRAD.  3. Ascend/descend 4 step(s) with least restrictive assistive device and unilateral HR and CGA.  4. Standing x30 sec in narrow stance with appropriate postural reactions and no UE support.      Outcome: Ongoing, Progressing   Pt sup to sit CGA with pt using bedrail, sit to stand CGA/SBA, amb'd 60' with RW and CGA/occ Alexis for RW mgmt., pt ambulates very slowly, wide STEPHENIE, and frequent stops 2/2 c/o's dizziness. Pt reports she did have her medication today for vertigo, but is taking 1 here, sometimes has to take 3 at home. Recommend cont PT in SNF, however, if pt goes home, will need 24/7 assistance and recommend having PT with a vestibular trained therapist.

## 2020-03-10 NOTE — PLAN OF CARE
visited patient to assess for spiritual distress; {[id number]:22599:::1}      {[id number]:85637:::1} was in progress.      Before closing the session,               Regarding follow-up,

## 2020-03-10 NOTE — PLAN OF CARE
Advance Care Planning   Distributed [facility] Start the Conversation booklet, and discussed the need for and difference between POA, Adv directive, and last will and testament.  Pt will discuss with , and will consider completing her directive(s) before discharge.

## 2020-03-10 NOTE — PLAN OF CARE
No significant events overnight. Remains free from fall, injury, and skin breakdown. Voiding spontaneously. VSS on RA throughout the night. Positions self. Pain mildly controlled with PO meds.Bed alarms active and audible.  Plan of care reviewed with patient and all questions answered. Bed low, locked w/ bed alarm on. Call light within reach. Purposeful rounding performed. Resting comfortably in bed, no other complaints at this time.Pt continues to get up to the bathroom without assistance with bed alarming. Educated multiple times.

## 2020-03-10 NOTE — PLAN OF CARE
Plan of care reviewed with patient.  Cardiac monitoring maintained.  Patient reports back/hip pain but declined tylenol.  Patient up in chair this afternoon.  Purposeful rounding completed, call bell within reach.  Bed alarm in use, no falls or injury noted.  No needs at this time, will continue to monitor.

## 2020-03-10 NOTE — PROGRESS NOTES
[facility] Hospital Medicine  Progress Note    Patient Name: Lo Escalera  MRN: [id number]  Patient Class: IP- Inpatient   Admission Date: 3/6/2020  Length of Stay: 4 days  Attending Physician: Buster Preciado MD  Primary Care Provider: Amilcar Dai MD        Subjective:     Principal Problem:UGIB (upper gastrointestinal bleed)        HPI:  Mrs. Escalera is a 64 year old woman with history of peptic ulcer disease, gastritis, hypertension, hypothyroidism, vertigo, anxiety and remote alcohol abuse who presented to [facility] today for evaluation of hematemesis. She noted that she had an episode of red emesis last night and this morning as well as dark black bowel movement.  These were associated with abdominal pain that radiated to her back and light headedness.  She states she has been taking alleve at home for pain but denies alcohol consumption, noting she has abstained from alcohol for many years.    In the ER she was hypotensive, tachycardic and had a hemoglobin of 6.4.  She was given fluids, transfused two units PRBC and started on a proton pump inhibitor drip.  Her blood pressure improved and she has transferred to [facility] for GI evaluation.  Of note, she had an EGD performed in June 2019 which showed gastritis and no varices.    Overview/Hospital Course:  64 year old woman with history of peptic ulcer disease, gastritis, hypertension, hypothyroidism, vertigo, anxiety and remote alcohol abuse who presented to [facility] on 3/6 for evaluation of hematemesis, significant anemia and hypotension.  She was transferred here to our ICU after receiving 2 units PRBC and spent about 24 hours in icu prior to stabilizing and stepping down.  She is being treated with bid iv protonix and q8h cbc.  BP has normalized and Hb is mildly trending down.  This was suspected due to nsaid gastritis (she had been taking aleve).  She underwent EGD this morning which showed esophagitis and gastritis.  She will need a  colonoscopy as outpatient.  She is also noted to have significant thrombocytopenia that is not new, but is worse than baseline.  Baseline likely due to chronic liver disease presumed from alcohol damage.  Acute deterioration in platelets presumed due to consumption from GI bleeding.  She had a fall in house on Sunday with no serious injuries fortunately.  Overall rather debilitated and PT/OT have recommended SNF placement but she has not consented to that yet.  Should be ready to go, either to SNF or  tomorrow if h/h stable.   See Below.    Interval History: See Below.    Review of Systems   Constitutional: Negative for activity change, chills, diaphoresis and fatigue.   HENT: Negative for congestion, drooling and hearing loss.    Eyes: Negative for discharge.   Respiratory: Negative for apnea, chest tightness, shortness of breath and wheezing.    Cardiovascular: Negative for palpitations and leg swelling.   Gastrointestinal: Negative for abdominal distention, abdominal pain, blood in stool, constipation, diarrhea, nausea and vomiting.   Endocrine: Negative for cold intolerance and heat intolerance.   Genitourinary: Negative for difficulty urinating.   Musculoskeletal: Negative for arthralgias and gait problem.   Skin: Negative for rash.   Neurological: Positive for dizziness and weakness. Negative for seizures, light-headedness and numbness.   Hematological: Negative for adenopathy.   Psychiatric/Behavioral: Negative for agitation and behavioral problems.     Objective:     Vital Signs (Most Recent):  Temp: 96.2 °F (35.7 °C) (03/10/20 0425)  Pulse: 101 (03/10/20 0425)  Resp: 16 (03/10/20 0425)  BP: 136/70 (03/10/20 0425)  SpO2: 100 % (03/10/20 0425) Vital Signs (24h Range):  Temp:  [96.2 °F (35.7 °C)-98.6 °F (37 °C)] 96.2 °F (35.7 °C)  Pulse:  [] 101  Resp:  [16-20] 16  SpO2:  [94 %-100 %] 100 %  BP: (103-164)/(51-81) 136/70     Weight: 68 kg (149 lb 14.6 oz)  Body mass index is 26.56  kg/m².    Intake/Output Summary (Last 24 hours) at 3/10/2020 0517  Last data filed at 3/10/2020 0100  Gross per 24 hour   Intake 530 ml   Output 140 ml   Net 390 ml      Physical Exam   Constitutional: She is oriented to person, place, and time. She appears well-developed and well-nourished.   HENT:   Head: Normocephalic and atraumatic.   Eyes: Pupils are equal, round, and reactive to light. Conjunctivae and EOM are normal. Right eye exhibits no discharge. Left eye exhibits no discharge. No scleral icterus.   Neck: Normal range of motion. Neck supple.   Cardiovascular: Normal rate, regular rhythm and normal heart sounds. Exam reveals no gallop and no friction rub.   No murmur heard.  Pulmonary/Chest: Effort normal and breath sounds normal. No stridor. No respiratory distress. She has no wheezes. She has no rales.   Abdominal: Soft. Bowel sounds are normal. She exhibits no distension. There is no tenderness. There is no rebound and no guarding.   Musculoskeletal: Normal range of motion. She exhibits no edema or deformity.   Neurological: She is oriented to person, place, and time. No cranial nerve deficit. She exhibits normal muscle tone.   Skin: Skin is warm and dry. Capillary refill takes less than 2 seconds. No pallor.   Psychiatric: She has a normal mood and affect. Her behavior is normal.   Nursing note and vitals reviewed.      Significant Labs: All pertinent labs within the past 24 hours have been reviewed.    Significant Imaging: I have reviewed and interpreted all pertinent imaging results/findings within the past 24 hours.      Assessment/Plan:   Patient is a 64 year old woman with history of peptic ulcer disease, gastritis, hypertension, hypothyroidism, vertigo, anxiety and remote alcohol abuse who presented to [facility] today for evaluation of hematemesis.     Today, patient mild mild abdominal discomfort and Loose BMs.  Overall improved from admission.    -GI - UGIB - Mrs Escalera was admitted to inpatient  status in our ICU.  She had hematemesis and melena associated with abdominal pain, light headedness, hypotension and tachycardia.  Hgb was 6.4 in ER and she had been transfused 2 units PRBC with good improvement of Hgb. Suspect this is due to PUD/gastritis from using (NSAIDs) Aleve at home.  Monitor hgb q8h. Transfuse to keep Hb > 7.0.  Plan EGD Monday per GI - done 3/9/2020 and showed Esophagitis and Gastritis.  Transferred out of ICU.  Continue PPI.  Hgb this morning is 7.1 from 8.4 earlier this AM.  If Next Hgb stable, can consider discharge.    Hepatic Steatosis - History noted.  Remote history of alcohol abuse noted.  EtOH undetectable on admission.  Noted chronic hyperbilirubinemia and mild transaminitis (AST:ALT > 2:1 suggestive of alcohol).  INR is mildly elevated.  Saw Hepatology at [facility] in 2016 but no further follow-ups noted.  AST/ALT 59/14 and TBili 1.2 today.    -Heme - Anemia - as above.  Thrombocytopenia - Platelets low at times since at least September 2016.  Platelets 3/7/2020 were 136 which is within baseline range.  Questionably due to chronic liver disease from history alcohol abuse.  Monitor closely.  Plts this AM is 65 from 78 and 81.    -Cards - HTN - Noted low blood pressure in ER which improved with PRBC and IV fluids.  BP is normal at this time.  Hold home Lisinopril and Metoprolol.  Stopped IV fluids.  Monitor closely. BP this am is 137/70.    -Endocrine - Hypothyroidism - History of hypothyroidism noted in chart but levothyroxine not on home med list.  TSH is normal,  she does not have hypothyroidism.     -Renal - Hypomagnesemia and Hypokalemia - Replaced.  Monitor.   Mag 1.8 and K+ normal today.    -Psych - JHOAN - Per home med list takes Klonopin and Cymbalta.  Continue Cymbalta but hold Klonopin for now.     -Neuro/ENT - Vertigo - -History noted.  Per home med list she takes Valium for this.  Hold sedating medications for now.     -Dspo - PT recommends SNF, but patient prefers Home  with .    VTE Risk Mitigation (From admission, onward)         Ordered     IP VTE LOW RISK PATIENT  Once      03/06/20 1719     Place KARISSA hose  Until discontinued      03/06/20 1719     Place sequential compression device  Until discontinued      03/06/20 1719                      Buster Preciado MD  Department of Hospital Medicine   [facility]

## 2020-03-10 NOTE — SUBJECTIVE & OBJECTIVE
Interval History: See Below.    Review of Systems   Constitutional: Negative for activity change, chills, diaphoresis and fatigue.   HENT: Negative for congestion, drooling and hearing loss.    Eyes: Negative for discharge.   Respiratory: Negative for apnea, chest tightness, shortness of breath and wheezing.    Cardiovascular: Negative for palpitations and leg swelling.   Gastrointestinal: Negative for abdominal distention, abdominal pain, blood in stool, constipation, diarrhea, nausea and vomiting.   Endocrine: Negative for cold intolerance and heat intolerance.   Genitourinary: Negative for difficulty urinating.   Musculoskeletal: Negative for arthralgias and gait problem.   Skin: Negative for rash.   Neurological: Positive for dizziness and weakness. Negative for seizures, light-headedness and numbness.   Hematological: Negative for adenopathy.   Psychiatric/Behavioral: Negative for agitation and behavioral problems.     Objective:     Vital Signs (Most Recent):  Temp: 96.2 °F (35.7 °C) (03/10/20 0425)  Pulse: 101 (03/10/20 0425)  Resp: 16 (03/10/20 0425)  BP: 136/70 (03/10/20 0425)  SpO2: 100 % (03/10/20 0425) Vital Signs (24h Range):  Temp:  [96.2 °F (35.7 °C)-98.6 °F (37 °C)] 96.2 °F (35.7 °C)  Pulse:  [] 101  Resp:  [16-20] 16  SpO2:  [94 %-100 %] 100 %  BP: (103-164)/(51-81) 136/70     Weight: 68 kg (149 lb 14.6 oz)  Body mass index is 26.56 kg/m².    Intake/Output Summary (Last 24 hours) at 3/10/2020 0517  Last data filed at 3/10/2020 0100  Gross per 24 hour   Intake 530 ml   Output 140 ml   Net 390 ml      Physical Exam   Constitutional: She is oriented to person, place, and time. She appears well-developed and well-nourished.   HENT:   Head: Normocephalic and atraumatic.   Eyes: Pupils are equal, round, and reactive to light. Conjunctivae and EOM are normal. Right eye exhibits no discharge. Left eye exhibits no discharge. No scleral icterus.   Neck: Normal range of motion. Neck supple.    Cardiovascular: Normal rate, regular rhythm and normal heart sounds. Exam reveals no gallop and no friction rub.   No murmur heard.  Pulmonary/Chest: Effort normal and breath sounds normal. No stridor. No respiratory distress. She has no wheezes. She has no rales.   Abdominal: Soft. Bowel sounds are normal. She exhibits no distension. There is no tenderness. There is no rebound and no guarding.   Musculoskeletal: Normal range of motion. She exhibits no edema or deformity.   Neurological: She is oriented to person, place, and time. No cranial nerve deficit. She exhibits normal muscle tone.   Skin: Skin is warm and dry. Capillary refill takes less than 2 seconds. No pallor.   Psychiatric: She has a normal mood and affect. Her behavior is normal.   Nursing note and vitals reviewed.      Significant Labs: All pertinent labs within the past 24 hours have been reviewed.    Significant Imaging: I have reviewed and interpreted all pertinent imaging results/findings within the past 24 hours.

## 2020-03-10 NOTE — PT/OT/SLP PROGRESS
Occupational Therapy      Patient Name:  Lo Escalera   MRN:  [id number]    Attempted to see pt around 13:15, but phlebotomist present in room drawing blood.  Will follow-up as POC allows.    RADHA Ellis  3/10/2020

## 2020-03-10 NOTE — PROGRESS NOTES
This lady is feeling better that is no nausea vomiting or signs of GI blood loss. No suggestion of visceral ischemia or cholangitis. She had an upper endoscopy yesterday and she has ulcerative esophagitis. Historically, this has been an issue in the past.    T<100 HR 80  Anicteric no temporal wasting  Neck supple no mass  Heart RR no gallop  Chest clear no wheeze  Abdomen soft active sounds no masses tenderness ascites or tympany  Extremities no cyanosis or edema  Neuro alert oriented    Labs: Hb: higher at 7.7    Impression: ulcerative esophagitis with blood loss which clinically has ceased.  Will advise Pantoprazole 40 mg daily for the next 3 months and then OP EGD to check healing.   Please call us if we are needed.

## 2020-03-11 VITALS
HEART RATE: 95 BPM | TEMPERATURE: 98 F | BODY MASS INDEX: 26.57 KG/M2 | SYSTOLIC BLOOD PRESSURE: 125 MMHG | DIASTOLIC BLOOD PRESSURE: 58 MMHG | HEIGHT: 63 IN | RESPIRATION RATE: 16 BRPM | OXYGEN SATURATION: 100 % | WEIGHT: 149.94 LBS

## 2020-03-11 PROBLEM — E83.42 HYPOMAGNESEMIA: Status: RESOLVED | Noted: 2020-03-07 | Resolved: 2020-03-11

## 2020-03-11 PROBLEM — E87.6 HYPOKALEMIA: Status: RESOLVED | Noted: 2020-03-09 | Resolved: 2020-03-11

## 2020-03-11 LAB
ALBUMIN SERPL BCP-MCNC: 2.3 G/DL (ref 3.5–5.2)
ALP SERPL-CCNC: 85 U/L (ref 55–135)
ALT SERPL W/O P-5'-P-CCNC: 13 U/L (ref 10–44)
ANION GAP SERPL CALC-SCNC: 7 MMOL/L (ref 8–16)
AST SERPL-CCNC: 50 U/L (ref 10–40)
BASOPHILS # BLD AUTO: 0.01 K/UL (ref 0–0.2)
BASOPHILS NFR BLD: 0.2 % (ref 0–1.9)
BILIRUB SERPL-MCNC: 0.9 MG/DL (ref 0.1–1)
BUN SERPL-MCNC: 8 MG/DL (ref 8–23)
CALCIUM SERPL-MCNC: 7.4 MG/DL (ref 8.7–10.5)
CHLORIDE SERPL-SCNC: 108 MMOL/L (ref 95–110)
CO2 SERPL-SCNC: 21 MMOL/L (ref 23–29)
CREAT SERPL-MCNC: 0.6 MG/DL (ref 0.5–1.4)
DIFFERENTIAL METHOD: ABNORMAL
EOSINOPHIL # BLD AUTO: 0 K/UL (ref 0–0.5)
EOSINOPHIL NFR BLD: 0.9 % (ref 0–8)
ERYTHROCYTE [DISTWIDTH] IN BLOOD BY AUTOMATED COUNT: 16.5 % (ref 11.5–14.5)
EST. GFR  (AFRICAN AMERICAN): >60 ML/MIN/1.73 M^2
EST. GFR  (NON AFRICAN AMERICAN): >60 ML/MIN/1.73 M^2
FINAL PATHOLOGIC DIAGNOSIS: NORMAL
GLUCOSE SERPL-MCNC: 84 MG/DL (ref 70–110)
GROSS: NORMAL
HCT VFR BLD AUTO: 24.1 % (ref 37–48.5)
HGB BLD-MCNC: 7.5 G/DL (ref 12–16)
IMM GRANULOCYTES # BLD AUTO: 0.01 K/UL (ref 0–0.04)
IMM GRANULOCYTES NFR BLD AUTO: 0.2 % (ref 0–0.5)
LYMPHOCYTES # BLD AUTO: 1.5 K/UL (ref 1–4.8)
LYMPHOCYTES NFR BLD: 36.2 % (ref 18–48)
MAGNESIUM SERPL-MCNC: 1.7 MG/DL (ref 1.6–2.6)
MCH RBC QN AUTO: 29 PG (ref 27–31)
MCHC RBC AUTO-ENTMCNC: 31.1 G/DL (ref 32–36)
MCV RBC AUTO: 93 FL (ref 82–98)
MONOCYTES # BLD AUTO: 0.4 K/UL (ref 0.3–1)
MONOCYTES NFR BLD: 8.3 % (ref 4–15)
NEUTROPHILS # BLD AUTO: 2.3 K/UL (ref 1.8–7.7)
NEUTROPHILS NFR BLD: 54.2 % (ref 38–73)
NRBC BLD-RTO: 0 /100 WBC
PLATELET # BLD AUTO: 71 K/UL (ref 150–350)
PMV BLD AUTO: 9.4 FL (ref 9.2–12.9)
POTASSIUM SERPL-SCNC: 3.9 MMOL/L (ref 3.5–5.1)
PROT SERPL-MCNC: 5.8 G/DL (ref 6–8.4)
RBC # BLD AUTO: 2.59 M/UL (ref 4–5.4)
SODIUM SERPL-SCNC: 136 MMOL/L (ref 136–145)
WBC # BLD AUTO: 4.23 K/UL (ref 3.9–12.7)

## 2020-03-11 PROCEDURE — 83735 ASSAY OF MAGNESIUM: CPT

## 2020-03-11 PROCEDURE — 36415 COLL VENOUS BLD VENIPUNCTURE: CPT

## 2020-03-11 PROCEDURE — 80053 COMPREHEN METABOLIC PANEL: CPT

## 2020-03-11 PROCEDURE — 25000003 PHARM REV CODE 250: Performed by: PHYSICIAN ASSISTANT

## 2020-03-11 PROCEDURE — 25000003 PHARM REV CODE 250: Performed by: HOSPITALIST

## 2020-03-11 PROCEDURE — 99239 HOSP IP/OBS DSCHRG MGMT >30: CPT | Mod: ,,, | Performed by: INTERNAL MEDICINE

## 2020-03-11 PROCEDURE — 97116 GAIT TRAINING THERAPY: CPT | Mod: CQ

## 2020-03-11 PROCEDURE — 25000003 PHARM REV CODE 250: Performed by: INTERNAL MEDICINE

## 2020-03-11 PROCEDURE — 99239 PR HOSPITAL DISCHARGE DAY,>30 MIN: ICD-10-PCS | Mod: ,,, | Performed by: INTERNAL MEDICINE

## 2020-03-11 PROCEDURE — 85025 COMPLETE CBC W/AUTO DIFF WBC: CPT

## 2020-03-11 RX ORDER — ONDANSETRON 4 MG/1
4 TABLET, FILM COATED ORAL ONCE
Status: COMPLETED | OUTPATIENT
Start: 2020-03-11 | End: 2020-03-11

## 2020-03-11 RX ADMIN — PANTOPRAZOLE SODIUM 40 MG: 40 TABLET, DELAYED RELEASE ORAL at 08:03

## 2020-03-11 RX ADMIN — ONDANSETRON HYDROCHLORIDE 4 MG: 4 TABLET, FILM COATED ORAL at 11:03

## 2020-03-11 RX ADMIN — DULOXETINE HYDROCHLORIDE 20 MG: 20 CAPSULE, DELAYED RELEASE ORAL at 08:03

## 2020-03-11 NOTE — PLAN OF CARE
HH referral sent to pt choice, [facility].   03/11/20 3993   Post-Acute Status   Post-Acute Authorization Home Health/Hospice   Home Health/Hospice Status Pending Payor Review

## 2020-03-11 NOTE — PLAN OF CARE
[facility]    HOME HEALTH ORDERS  FACE TO FACE ENCOUNTER    Patient Name: Lo Escalera  YOB: 1955    PCP: Amilcar Dai MD   PCP Address: [address] [place], [place] [address] [address]  PCP Phone Number: [phone]  PCP Fax: [phone]    Encounter Date: 03/11/2020    Admit to Home Health    Diagnoses:  Active Hospital Problems    Diagnosis  POA    *UGIB (upper gastrointestinal bleed) [K92.2]  Yes    Debility [R53.81]  Yes    Acute blood loss anemia [D62]  Yes    Thrombocytopenia [D69.6]  Yes    History of Hypothyroidism [E03.9]  Yes    JHOAN (generalized anxiety disorder) [F41.1]  Yes    Vertigo [R42]  Yes    Essential hypertension [I10]  Yes    Hepatic steatosis [K76.0]  Yes      Resolved Hospital Problems    Diagnosis Date Resolved POA    Hypokalemia [E87.6] 03/11/2020 No    Hypomagnesemia [E83.42] 03/11/2020 No       No future appointments.        I have seen and examined this patient face to face today. My clinical findings that support the need for the home health skilled services and home bound status are the following:  Weakness/numbness causing balance and gait disturbance due to Weakness/Debility and Anemia making it taxing to leave home.    Allergies:  Review of patient's allergies indicates:   Allergen Reactions    Codeine Itching       Diet: cardiac diet    Activities: activity as tolerated    Nursing:   SN to complete comprehensive assessment including routine vital signs. Instruct on disease process and s/s of complications to report to MD. Review/verify medication list sent home with the patient at time of discharge  and instruct patient/caregiver as needed. Frequency may be adjusted depending on start of care date.    Notify MD if SBP > 160 or < 90; DBP > 90 or < 50; HR > 120 or < 50; Temp > 101.      CONSULTS:    Physical Therapy to evaluate and treat. Evaluate for home safety and equipment needs; Establish/upgrade home exercise program.  Perform / instruct on therapeutic exercises, gait training, transfer training, and Range of Motion.  Occupational Therapy to evaluate and treat. Evaluate home environment for safety and equipment needs. Perform/Instruct on transfers, ADL training, ROM, and therapeutic exercises.    MISCELLANEOUS CARE:  Routine Skin for Bedridden Patients: Instruct patient/caregiver to apply moisture barrier cream to all skin folds and wet areas in perineal area daily and after baths and all bowel movements.  monitor blood pressure since off medications at discharge    WOUND CARE ORDERS  n/a      Medications: Review discharge medications with patient and family and provide education.      Current Discharge Medication List      CONTINUE these medications which have NOT CHANGED    Details   aluminum & magnesium hydroxide-simethicone (MYLANTA MAX STRENGTH) 400-400-40 mg/5 mL suspension Take 30 mLs by mouth 3 (three) times daily with meals.  Refills: 0      calcium carbonate (TUMS) 200 mg calcium (500 mg) chewable tablet Take 1 tablet (500 mg total) by mouth as needed for Heartburn.  Qty: 30 tablet, Refills: 11      duloxetine (CYMBALTA) 20 MG capsule Take 1 capsule (20 mg total) by mouth 2 (two) times daily.  Qty: 60 capsule, Refills: 1      gabapentin (NEURONTIN) 100 MG capsule Take 1 capsule (100 mg total) by mouth 3 (three) times daily.  Qty: 90 capsule, Refills: 1      HYDROcodone-acetaminophen (NORCO) 5-325 mg per tablet Take 1 tablet by mouth every 6 (six) hours as needed for Pain.  Qty: 20 tablet, Refills: 0      meclizine (ANTIVERT) 25 mg tablet Take 1 tablet (25 mg total) by mouth 3 (three) times daily as needed.  Qty: 20 tablet, Refills: 0      pantoprazole (PROTONIX) 40 MG tablet Take 1 tablet (40 mg total) by mouth 2 (two) times daily.  Qty: 60 tablet, Refills: 11         STOP taking these medications       clonazePAM (KLONOPIN) 0.5 MG tablet Comments:   Reason for Stopping:         diazePAM (VALIUM) 5 MG tablet Comments:    Reason for Stopping:         diclofenac (VOLTAREN) 50 MG EC tablet Comments:   Reason for Stopping:         famotidine (PEPCID) 20 MG tablet Comments:   Reason for Stopping:         lisinopril (PRINIVIL,ZESTRIL) 20 MG tablet Comments:   Reason for Stopping:         metoprolol succinate (TOPROL-XL) 25 MG 24 hr tablet Comments:   Reason for Stopping:         potassium chloride (KLOR-CON) 10 MEQ TbSR Comments:   Reason for Stopping:               I certify that this patient is confined to her home and needs intermittent skilled nursing care, physical therapy and occupational therapy.

## 2020-03-11 NOTE — PLAN OF CARE
Pt unable to get RW or Bsc as she got both in 2019. Pt and spouse informed and pt is ready for discharge with [facility]

## 2020-03-11 NOTE — PT/OT/SLP DISCHARGE
Occupational Therapy Discharge Summary    Lo Escalera  MRN: [id number]   Principal Problem: UGIB (upper gastrointestinal bleed)      Patient Discharged from acute Occupational Therapy on 3/11/20.  Please refer to prior OT note dated 3/9/20 for functional status.    Assessment:      Patient appropriate for care in another setting. Patient has not met goals.    Objective:     GOALS:   Multidisciplinary Problems     Occupational Therapy Goals        Problem: Occupational Therapy Goal    Goal Priority Disciplines Outcome Interventions   Occupational Therapy Goal     OT, PT/OT Ongoing, Progressing    Description:  Goals to be met by: 3/16/2020     Patient will increase functional independence with ADLs by performing:    UE Dressing with Supervision.  LE Dressing with Stand-by Assistance.  Grooming while standing at sink with Contact Guard Assistance.  Toileting from toilet with Contact Guard Assistance for hygiene and clothing management.   Toilet transfer to toilet with Contact Guard Assistance.  Pt will perform functional task in standing with no LOB.                      Reasons for Discontinuation of Therapy Services  Transfer to alternate level of care.      Plan:     Patient Discharged to: Home with Home Health Service    Sindhu Mcelroy, Zachariah, LOTR  3/11/2020

## 2020-03-11 NOTE — PROGRESS NOTES
[facility] Hospital Medicine  Progress Note    Patient Name: Lo Escalera  MRN: [id number]  Patient Class: IP- Inpatient   Admission Date: 3/6/2020  Length of Stay: 5 days  Attending Physician: Buster Preciado MD  Primary Care Provider: Amilcar Dai MD        Subjective:     Principal Problem:UGIB (upper gastrointestinal bleed)        HPI:  Mrs. Escalera is a 64 year old woman with history of peptic ulcer disease, gastritis, hypertension, hypothyroidism, vertigo, anxiety and remote alcohol abuse who presented to [facility] today for evaluation of hematemesis. She noted that she had an episode of red emesis last night and this morning as well as dark black bowel movement.  These were associated with abdominal pain that radiated to her back and light headedness.  She states she has been taking alleve at home for pain but denies alcohol consumption, noting she has abstained from alcohol for many years.    In the ER she was hypotensive, tachycardic and had a hemoglobin of 6.4.  She was given fluids, transfused two units PRBC and started on a proton pump inhibitor drip.  Her blood pressure improved and she has transferred to [facility] for GI evaluation.  Of note, she had an EGD performed in June 2019 which showed gastritis and no varices.    Overview/Hospital Course:  64 year old woman with history of peptic ulcer disease, gastritis, hypertension, hypothyroidism, vertigo, anxiety and remote alcohol abuse who presented to [facility] on 3/6 for evaluation of hematemesis, significant anemia and hypotension.  She was transferred here to our ICU after receiving 2 units PRBC and spent about 24 hours in icu prior to stabilizing and stepping down.  She is being treated with bid iv protonix and q8h cbc.  BP has normalized and Hb is mildly trending down.  This was suspected due to nsaid gastritis (she had been taking aleve).  She underwent EGD this morning which showed esophagitis and gastritis.  She will need a  colonoscopy as outpatient.  She is also noted to have significant thrombocytopenia that is not new, but is worse than baseline.  Baseline likely due to chronic liver disease presumed from alcohol damage.  Acute deterioration in platelets presumed due to consumption from GI bleeding.  She had a fall in house on Sunday with no serious injuries fortunately.  Overall rather debilitated and PT/OT have recommended SNF placement but she has not consented to that yet.  Should be ready to go, either to SNF or  tomorrow if h/h stable.  See below.    Interval History: Patient continues to improve with less abdominal pain.  Some loose BMs.  Mild chronic lower back pain.    Review of Systems   Constitutional: Negative for activity change, chills, diaphoresis and fatigue.   HENT: Negative for congestion, drooling and hearing loss.    Eyes: Negative for discharge.   Respiratory: Negative for apnea, chest tightness, shortness of breath and wheezing.    Cardiovascular: Negative for palpitations and leg swelling.   Gastrointestinal: Negative for abdominal distention, abdominal pain, blood in stool, constipation, diarrhea, nausea and vomiting.   Endocrine: Negative for cold intolerance and heat intolerance.   Genitourinary: Negative for difficulty urinating.   Musculoskeletal: Negative for arthralgias and gait problem.   Skin: Negative for rash.   Neurological: Positive for weakness. Negative for dizziness, seizures, light-headedness and numbness.   Hematological: Negative for adenopathy.   Psychiatric/Behavioral: Negative for agitation and behavioral problems.     Objective:     Vital Signs (Most Recent):  Temp: 98 °F (36.7 °C) (03/11/20 0443)  Pulse: 89 (03/11/20 0443)  Resp: 20 (03/11/20 0443)  BP: (!) 119/58 (03/11/20 0443)  SpO2: 98 % (03/11/20 0443) Vital Signs (24h Range):  Temp:  [97.8 °F (36.6 °C)-98.3 °F (36.8 °C)] 98 °F (36.7 °C)  Pulse:  [] 89  Resp:  [16-20] 20  SpO2:  [93 %-100 %] 98 %  BP: (100-147)/(53-67)  119/58     Weight: 68 kg (149 lb 14.6 oz)  Body mass index is 26.56 kg/m².    Intake/Output Summary (Last 24 hours) at 3/11/2020 0510  Last data filed at 3/10/2020 1200  Gross per 24 hour   Intake 445 ml   Output 320 ml   Net 125 ml      Physical Exam   Constitutional: She is oriented to person, place, and time. She appears well-developed and well-nourished.   HENT:   Head: Normocephalic and atraumatic.   Eyes: Pupils are equal, round, and reactive to light. Conjunctivae and EOM are normal. Right eye exhibits no discharge. Left eye exhibits no discharge. No scleral icterus.   Neck: Normal range of motion. Neck supple.   Cardiovascular: Normal rate, regular rhythm and normal heart sounds. Exam reveals no gallop and no friction rub.   No murmur heard.  Pulmonary/Chest: Effort normal and breath sounds normal. No stridor. No respiratory distress. She has no wheezes. She has no rales.   Abdominal: Soft. Bowel sounds are normal. She exhibits no distension. There is no tenderness. There is no rebound and no guarding.   Musculoskeletal: Normal range of motion. She exhibits no edema or deformity.   Neurological: She is oriented to person, place, and time. No cranial nerve deficit. She exhibits normal muscle tone.   Skin: Skin is warm and dry. Capillary refill takes less than 2 seconds. No pallor.   Psychiatric: She has a normal mood and affect. Her behavior is normal.   Nursing note and vitals reviewed.      Significant Labs: All pertinent labs within the past 24 hours have been reviewed.    Significant Imaging: I have reviewed and interpreted all pertinent imaging results/findings within the past 24 hours.      Assessment/Plan:   Patient is a 64 year old woman with history of peptic ulcer disease, gastritis, hypertension, hypothyroidism, vertigo, anxiety and remote alcohol abuse who presented to [facility] today for evaluation of hematemesis.       -GI - UGIB - Mrs Escalera was admitted to inpatient status in our ICU.  She had  hematemesis and melena associated with abdominal pain, light headedness, hypotension and tachycardia.  Hgb was 6.4 in ER and she had been transfused 2 units PRBC with good improvement of Hgb. Suspect this is due to PUD/gastritis from using (NSAIDs) Aleve at home.  Monitor hgb q8h. Transfuse to keep Hb > 7.0.  Plan EGD Monday per GI - done 3/9/2020 and showed Esophagitis and Gastritis.  Transferred out of ICU.  Continue PPI.  Hgb yesterday was 7.1 from 8.4 earlier this AM.  Hgb this morning is stable at 7.5.     Hepatic Steatosis - History noted.  Remote history of alcohol abuse noted.  EtOH undetectable on admission.  Noted chronic hyperbilirubinemia and mild transaminitis (AST:ALT > 2:1 suggestive of alcohol).  INR is mildly elevated.  Saw Hepatology at [facility] in 2016 but no further follow-ups noted.  AST/ALT 59/14 and TBili 1.2 yesterday.  AST/ALT today is 50/13 and TBili is 0.9.     -Heme - Anemia - as above.  Thrombocytopenia - Platelets low at times since at least September 2016.  Platelets 3/7/2020 were 136 which is within baseline range.  Questionably due to chronic liver disease from history alcohol abuse.  Monitor closely.  Plts this AM is 65 from 78 and 81.  Plts stable at 7.5 today.     -Cards - HTN - Noted low blood pressure in ER which improved with PRBC and IV fluids.  BP is normal at this time.  Hold home Lisinopril and Metoprolol.  Stopped IV fluids.  Monitor closely. BP this am is 119/58.     -Endocrine - Hypothyroidism - History of hypothyroidism noted in chart but levothyroxine not on home med list.  TSH is normal,  she does not have hypothyroidism.      -Renal - Hypomagnesemia and Hypokalemia - Replaced.  Monitor.   Mag 1.7 and K+ normal today.     -Psych - JHOAN - Per home med list takes Klonopin and Cymbalta.  Continue Cymbalta but hold Klonopin for now.      -Neuro/ENT - Vertigo - -History noted.  Per home med list she takes Valium for this.  Hold sedating medications for now.     -MSK - Fall  on 3/7/2020 - X-ray of right hip with No acute fracture or dislocation is seen.  Degenerative changes of the lumbosacral spine and pubic symphysis are noted.  Mild LBP pain, which is chronic.     -Dspo - PT recommends SNF, but patient prefers Home with HH.  Plan discharge home today.    VTE Risk Mitigation (From admission, onward)         Ordered     IP VTE LOW RISK PATIENT  Once      03/06/20 1719     Place KARISSA hose  Until discontinued      03/06/20 1719     Place sequential compression device  Until discontinued      03/06/20 1719                      Buster Preciado MD  Department of Hospital Medicine   [facility]

## 2020-03-11 NOTE — PT/OT/SLP PROGRESS
"Physical Therapy Treatment    Patient Name:  Lo Escalera   MRN:  [id number]    Recommendations:     Discharge Recommendations:  nursing facility, skilled   Discharge Equipment Recommendations: 3-in-1 commode(possible transport w/c)   Barriers to discharge: Decreased caregiver support (it pt discharges home, will need assistance with amb.)    Assessment:     Lo Escalera is a 64 y.o. female admitted with a medical diagnosis of UGIB (upper gastrointestinal bleed).  She presents with the following impairments/functional limitations:  weakness, impaired endurance, impaired self care skills, impaired functional mobilty, gait instability, impaired balance, decreased coordination, decreased lower extremity function ;pt with fair mobility today, c/o dizziness with amb., however NO LOB noted..    Rehab Prognosis: Good; patient would benefit from acute skilled PT services to address these deficits and reach maximum level of function.    Recent Surgery: Procedure(s) (LRB):  EGD (ESOPHAGOGASTRODUODENOSCOPY) (N/A) 2 Days Post-Op    Plan:     During this hospitalization, patient to be seen 5 x/week to address the identified rehab impairments via gait training, therapeutic activities, therapeutic exercises, neuromuscular re-education and progress toward the following goals:    · Plan of Care Expires:  04/05/20    Subjective     Chief Complaint: dizziness  Patient/Family Comments/goals: "the room is spinning". Pt's  present as well.  Pain/Comfort:  · Pain Rating 1: 0/10(though pt c/o dizziness, room "spinning")  · Pain Rating Post-Intervention 1: 0/10      Objective:     Communicated with nurse prior to session.  Patient found sitting up EOB with peripheral IV([employer] monitoring system) upon PT entry to room.     General Precautions: Standard, fall   Orthopedic Precautions:N/A   Braces: N/A     Functional Mobility:  · Transfers:     · Sit to Stand:  stand by assistance and contact guard assistance with rolling " walker  · Gait: amb'd 60' with RW and CGA/occ Alexis for balance      AM-PAC 6 CLICK MOBILITY  Turning over in bed (including adjusting bedclothes, sheets and blankets)?: 4  Sitting down on and standing up from a chair with arms (e.g., wheelchair, bedside commode, etc.): 3  Moving from lying on back to sitting on the side of the bed?: 3  Moving to and from a bed to a chair (including a wheelchair)?: 3  Need to walk in hospital room?: 3  Climbing 3-5 steps with a railing?: 3  Basic Mobility Total Score: 19       Therapeutic Activities and Exercises:   spoke with pt and  about safety at home, and that pt will need someone to walk with her when vertigo symptoms are present.    Patient left sitting up EOB with nruse notified and  present..    GOALS:   Multidisciplinary Problems     Physical Therapy Goals        Problem: Physical Therapy Goal    Goal Priority Disciplines Outcome Goal Variances Interventions   Physical Therapy Goal     PT, PT/OT Ongoing, Progressing     Description:  Goals to be met by: 20    Patient will increase functional independence with mobility by performin. Sit<>stand with supervision with LRAD.  2. Gait x 50 feet with CGA with LRAD.  3. Ascend/descend 4 step(s) with least restrictive assistive device and unilateral HR and CGA.  4. Standing x30 sec in narrow stance with appropriate postural reactions and no UE support.                       Time Tracking:     PT Received On: 20  PT Start Time: 1258     PT Stop Time: 1310  PT Total Time (min): 12 min     Billable Minutes: Gait Training 12    Treatment Type: Treatment  PT/PTA: PTA     PTA Visit Number: 3     Clover Harrell PTA  2020

## 2020-03-11 NOTE — PLAN OF CARE
Pt discharging home today, family to provide transportation home.    Home health arrangemetns are in progress and will be added to AVS.    Pt states she has all needed DME.    No additional CM needs or barriers for discharge.     03/11/20 0933   Final Note   Assessment Type Final Discharge Note   Anticipated Discharge Disposition Home-Health   What phone number can be called within the next 1-3 days to see how you are doing after discharge? [phone]   Hospital Follow Up  Appt(s) scheduled? Yes   Discharge plans and expectations educations in teach back method with documentation complete? Yes   Right Care Referral Info   Post Acute Recommendation No Care

## 2020-03-11 NOTE — PLAN OF CARE
Problem: Physical Therapy Goal  Goal: Physical Therapy Goal  Description  Goals to be met by: 20    Patient will increase functional independence with mobility by performin. Sit<>stand with supervision with LRAD.  2. Gait x 50 feet with CGA with LRAD.  3. Ascend/descend 4 step(s) with least restrictive assistive device and unilateral HR and CGA.  4. Standing x30 sec in narrow stance with appropriate postural reactions and no UE support.      Outcome: Ongoing, Progressing   Pt sit to stand SBA, amb'd 60' with RW and CGA/occ min.A , recommend cont PT in SNF, however if pt goes home, will need assistance for ambulation.

## 2020-03-11 NOTE — DISCHARGE SUMMARY
[facility] Hospital Medicine  Discharge Summary      Patient Name: Lo Escalera  MRN: [id number]  Admission Date: 3/6/2020  Hospital Length of Stay: 5 days  Discharge Date and Time:  03/11/2020 8:28 AM  Attending Physician: Buster Preciado MD   Discharging Provider: Buster Preciado MD  Primary Care Provider: Amilcar Dai MD      HPI:   Mrs. Escalera is a 64 year old woman with history of peptic ulcer disease, gastritis, hypertension, hypothyroidism, vertigo, anxiety and remote alcohol abuse who presented to [facility] today for evaluation of hematemesis. She noted that she had an episode of red emesis last night and this morning as well as dark black bowel movement.  These were associated with abdominal pain that radiated to her back and light headedness.  She states she has been taking alleve at home for pain but denies alcohol consumption, noting she has abstained from alcohol for many years.    In the ER she was hypotensive, tachycardic and had a hemoglobin of 6.4.  She was given fluids, transfused two units PRBC and started on a proton pump inhibitor drip.  Her blood pressure improved and she has transferred to [facility] for GI evaluation.  Of note, she had an EGD performed in June 2019 which showed gastritis and no varices.    Procedure(s) (LRB):  EGD (ESOPHAGOGASTRODUODENOSCOPY) (N/A)      Hospital Course:   64 year old woman with history of peptic ulcer disease, gastritis, hypertension, hypothyroidism, vertigo, anxiety and remote alcohol abuse who presented to [facility] on 3/6 for evaluation of hematemesis, significant anemia and hypotension.  She was transferred here to our ICU after receiving 2 units PRBC and spent about 24 hours in icu prior to stabilizing and stepping down.  She is being treated with bid iv protonix and q8h cbc.  BP has normalized and Hb is mildly trending down.  This was suspected due to nsaid gastritis (she had been taking aleve).  She underwent EGD this morning which  showed esophagitis and gastritis.  She will need a colonoscopy as outpatient.  She is also noted to have significant thrombocytopenia that is not new, but is worse than baseline.  Baseline likely due to chronic liver disease presumed from alcohol damage.  Acute deterioration in platelets presumed due to consumption from GI bleeding.  She had a fall in house on Sunday with no serious injuries fortunately.  Overall rather debilitated and PT/OT have recommended SNF placement but she has not consented to that yet.  Should be ready to go, either to SNF or  tomorrow if h/h stable.     Consults:   Consults (From admission, onward)        Status Ordering Provider     Inpatient consult to Gastroenterology  Once     Provider:  Kenneth Lara MD    Completed ELVA DYE     Inpatient consult to Midline team  Once     Provider:  (Not yet assigned)    Completed ELVA DYE     Inpatient consult to Spiritual Care  Once     Provider:  (Not yet assigned)    Completed KWAME ALEXANDER        -GI - UGIB - Mrs Escalera was admitted to inpatient status in our ICU.  She had hematemesis and melena associated with abdominal pain, light headedness, hypotension and tachycardia.  Hgb was 6.4 in ER and she had been transfused 2 units PRBC with good improvement of Hgb. Suspect this is due to PUD/gastritis from using (NSAIDs) Aleve at home.  Monitor hgb q8h. Transfuse to keep Hb > 7.0.  Plan EGD Monday per GI - done 3/9/2020 and showed Esophagitis and Gastritis.  Transferred out of ICU.  Continue PPI.  Hgb yesterday was 7.1 from 8.4 earlier this AM.  Hgb this morning is stable at 7.5.     Hepatic Steatosis - History noted.  Remote history of alcohol abuse noted.  EtOH undetectable on admission.  Noted chronic hyperbilirubinemia and mild transaminitis (AST:ALT > 2:1 suggestive of alcohol).  INR is mildly elevated.  Saw Hepatology at [facility] in 2016 but no further follow-ups noted.  AST/ALT 59/14 and TBili 1.2 yesterday.   AST/ALT today is 50/13 and TBili is 0.9.     -Heme - Anemia - as above.  Thrombocytopenia - Platelets low at times since at least September 2016.  Platelets 3/7/2020 were 136 which is within baseline range.  Questionably due to chronic liver disease from history alcohol abuse.  Monitor closely.  Plts this AM is 65 from 78 and 81.  Plts stable at 7.5 today.     -Cards - HTN - Noted low blood pressure in ER which improved with PRBC and IV fluids.  BP is normal at this time.  Hold home Lisinopril and Metoprolol.  Stopped IV fluids.  Monitor closely. BP this am is 119/58.  Will need close follow up with PCP to resume medications if needed.     -Endocrine - Hypothyroidism - History of hypothyroidism noted in chart but levothyroxine not on home med list.  TSH is normal,  she does not have hypothyroidism.      -Renal - Hypomagnesemia and Hypokalemia - Replaced.  Monitor.   Mag 1.7 and K+ normal today.     -Psych - JHOAN - Per home med list takes Klonopin and Cymbalta.  Continue Cymbalta but hold Klonopin for now.      -Neuro/ENT - Vertigo - -History noted.  Per home med list she takes Valium for this.  Hold sedating medications for now.      -MSK - Fall on 3/7/2020 - X-ray of right hip with No acute fracture or dislocation is seen.  Degenerative changes of the lumbosacral spine and pubic symphysis are noted.  Mild LBP pain, which is chronic.     -Dspo - PT recommends SNF, but patient prefers Home with HH.  Plan discharge home today.  Final Active Diagnoses:    Diagnosis Date Noted POA    PRINCIPAL PROBLEM:  UGIB (upper gastrointestinal bleed) [K92.2] 03/06/2020 Yes    Debility [R53.81] 03/08/2020 Yes    Acute blood loss anemia [D62] 03/06/2020 Yes    Thrombocytopenia [D69.6] 03/06/2020 Yes    History of Hypothyroidism [E03.9] 03/06/2020 Yes    JHOAN (generalized anxiety disorder) [F41.1] 03/06/2020 Yes    Vertigo [R42] 03/06/2020 Yes    Essential hypertension [I10] 09/12/2016 Yes    Hepatic steatosis [K76.0]  09/12/2016 Yes      Problems Resolved During this Admission:    Diagnosis Date Noted Date Resolved POA    Hypokalemia [E87.6] 03/09/2020 03/11/2020 No    Hypomagnesemia [E83.42] 03/07/2020 03/11/2020 No       Discharged Condition: fair    Disposition: Home with Services    Follow Up: PCP in one week    Patient Instructions: Hold anti-inflammatory medications given your recent gastritis and esophagitis.  Holding blood pressure medications until your PCP follow up.    Significant Diagnostic Studies: Labs:   BMP:   Recent Labs   Lab 03/10/20  0438 03/11/20  0410   GLU 88 84    136   K 3.9 3.9    108   CO2 23 21*   BUN 9 8   CREATININE 0.7 0.6   CALCIUM 7.9* 7.4*   MG 1.8 1.7    and CBC   Recent Labs   Lab 03/10/20  0800 03/10/20  1320 03/11/20  0410   WBC 4.35 4.30 4.23   HGB 7.1* 7.7* 7.5*   HCT 22.6* 24.1* 24.1*   PLT 65* 66* 71*       Pending Diagnostic Studies:     Procedure Component Value Units Date/Time    Specimen to Pathology, Surgery Gastrointestinal tract [[id number]] Collected:  03/09/20 0756    Order Status:  Sent Lab Status:  In process Updated:  03/09/20 1340         Medications:  Reconciled Home Medications:      Medication List      CHANGE how you take these medications    meclizine 25 mg tablet  Commonly known as:  ANTIVERT  Take 1 tablet (25 mg total) by mouth 3 (three) times daily as needed.  What changed:  additional instructions        CONTINUE taking these medications    aluminum & magnesium hydroxide-simethicone 400-400-40 mg/5 mL suspension  Commonly known as:  MYLANTA MAX STRENGTH  Take 30 mLs by mouth 3 (three) times daily with meals.     calcium carbonate 200 mg calcium (500 mg) chewable tablet  Commonly known as:  TUMS  Take 1 tablet (500 mg total) by mouth as needed for Heartburn.     DULoxetine 20 MG capsule  Commonly known as:  CYMBALTA  Take 1 capsule (20 mg total) by mouth 2 (two) times daily.     gabapentin 100 MG capsule  Commonly known as:  NEURONTIN  Take 1 capsule  (100 mg total) by mouth 3 (three) times daily.     HYDROcodone-acetaminophen 5-325 mg per tablet  Commonly known as:  NORCO  Take 1 tablet by mouth every 6 (six) hours as needed for Pain.     pantoprazole 40 MG tablet  Commonly known as:  PROTONIX  Take 1 tablet (40 mg total) by mouth 2 (two) times daily.        STOP taking these medications    clonazePAM 0.5 MG tablet  Commonly known as:  KLONOPIN     diazePAM 5 MG tablet  Commonly known as:  VALIUM     diclofenac 50 MG EC tablet  Commonly known as:  VOLTAREN     famotidine 20 MG tablet  Commonly known as:  PEPCID     lisinopriL 20 MG tablet  Commonly known as:  PRINIVIL,ZESTRIL     metoprolol succinate 25 MG 24 hr tablet  Commonly known as:  TOPROL-XL     potassium chloride 10 MEQ Tbsr  Commonly known as:  KLOR-CON            Indwelling Lines/Drains at time of discharge:   Lines/Drains/Airways     None                 Time spent on the discharge of patient: 35 minutes  Patient was seen and examined on the date of discharge and determined to be suitable for discharge.         Buster Preciado MD  Department of Hospital Medicine  [facility]

## 2020-03-11 NOTE — PLAN OF CARE
No significant events overnight. Remains free from fall, injury, and skin breakdown. Voiding spontaneously. VSS on RA throughout the night. Positions self. Pain mildly controlled with PO. Bed alarms active and audible.  Plan of care reviewed with patient and all questions answered. Bed low, locked w/ bed alarm on. Call light within reach.  Telesitter in use. Purposeful rounding performed. Resting comfortably in bed, no other complaints at this time.

## 2020-03-11 NOTE — PLAN OF CARE
Pt's pcp has retired. CM called office to attempt to schedule an apt with another MD in the same clinic and was informed that pt will need to call [employer] to see what MD they assigned for this pt. Pt informed and is in agreement with plan.    JAYY to inform MD.    JAYY attempted to schedule apt with  for follow-up and was informed that she is in collections and they are unable to schedule at this time.

## 2020-03-12 NOTE — PHYSICIAN QUERY
PT Name: Lo Escalera  MR #: [id number]     PHYSICIAN QUERY -  ACUITY OF CONDITION CLARIFICATION      CDS Sahara Sanchez RN, BSN        Contact Information:    [email or web address]         This form is a permanent document in the medical record.     Query Date: March 12, 2020    By submitting this query, we are merely seeking further clarification of documentation to reflect the severity of illness of your patient. Please utilize your independent clinical judgment when addressing the question(s) below.    The Medical record reflects the following:     Indicators   Supporting Clinical Findings Location in Medical Record   X   Documentation of condition Principal Problem:   UGIB (upper gastrointestinal bleed)  64 year old woman with history of peptic ulcer disease, gastritis, hypertension, hypothyroidism, vertigo, anxiety and remote alcohol abuse who presented for evaluation of hematemesis. She noted that she had an episode of red emesis last night and this morning as well as dark black bowel movement.  These were associated with abdominal pain that radiated to her back and light headedness. Suspect this is due to PUD/gastritis.     Review of her medical records showed an EGD from June of 2019 where she was noted to have gastritis.  No evidence of peptic ulcer disease;  Suspect this is due to PUD/gastritis from using aleve at home. 3/6 H&P                3/7 GI consult    Lab Value(s)      Radiology Findings     X   Treatment                                 Medication Findings:       LA Grade C (one or more mucosal breaks continuous between tops of 2        or more mucosal folds, less than 75% circumference) esophagitis with        no bleeding was found in the lower third of the esophagus.       A small hiatal hernia was present.       Patchy mild inflammation characterized by erythema was found in the        gastric antrum. Biopsies were taken with a cold forceps for        histology.       The examined  duodenum was normal.  Impression:             - LA Grade C esophagitis.   - Small hiatal hernia.   - Gastritis. Biopsied.   - Normal examined duodenum. 3/9 EGD    X   Other SPECIMEN  Antrum R/O H Pylori  FINAL PATHOLOGIC DIAGNOSIS  Stomach, antrum (biopsy):  Oxyntic type mucosa with minimal reactive/degenerative changes  No active inflammation  No Helicobacter organisms    She is being treated with bid iv protonix and q8h cbc.  BP has normalized and Hb is mildly trending down.  This was suspected due to nsaid gastritis (she had been taking aleve).  She underwent EGD this morning which showed esophagitis and gastritis.  She will need a colonoscopy as outpatient.   Pathology report final result 3/11            Discharge summary     Provider, please specify the acuity/chronicity of    Gastritis     [ x  ] Acute gastritis   [   ] Chronic atrophic gastritis   [   ] Chronic superficial gastritis    [   ] Ruled Out   [   ]  Clinically Undetermined       Please document in your progress notes daily for the duration of treatment until resolved, and include in your discharge summary.

## 2020-03-15 PROCEDURE — G0180 MD CERTIFICATION HHA PATIENT: HCPCS | Mod: ,,, | Performed by: INTERNAL MEDICINE

## 2020-03-15 PROCEDURE — G0180 PR HOME HEALTH MD CERTIFICATION: ICD-10-PCS | Mod: ,,, | Performed by: INTERNAL MEDICINE

## 2020-03-19 ENCOUNTER — OFFICE VISIT (OUTPATIENT)
Dept: PRIMARY CARE CLINIC | Facility: CLINIC | Age: 65
End: 2020-03-19
Payer: MEDICARE

## 2020-03-19 VITALS
SYSTOLIC BLOOD PRESSURE: 86 MMHG | HEIGHT: 60 IN | HEART RATE: 104 BPM | TEMPERATURE: 98 F | OXYGEN SATURATION: 99 % | RESPIRATION RATE: 18 BRPM | DIASTOLIC BLOOD PRESSURE: 60 MMHG | BODY MASS INDEX: 34.63 KG/M2 | WEIGHT: 176.38 LBS

## 2020-03-19 DIAGNOSIS — K20.90 ESOPHAGITIS WITH GASTRITIS: ICD-10-CM

## 2020-03-19 DIAGNOSIS — E86.1 HYPOTENSION DUE TO HYPOVOLEMIA: ICD-10-CM

## 2020-03-19 DIAGNOSIS — K29.70 ESOPHAGITIS WITH GASTRITIS: ICD-10-CM

## 2020-03-19 DIAGNOSIS — D69.6 THROMBOCYTOPENIA: ICD-10-CM

## 2020-03-19 DIAGNOSIS — Z87.898 HISTORY OF HEAVY ALCOHOL CONSUMPTION: ICD-10-CM

## 2020-03-19 DIAGNOSIS — K92.2 UGIB (UPPER GASTROINTESTINAL BLEED): Primary | ICD-10-CM

## 2020-03-19 DIAGNOSIS — D62 ACUTE BLOOD LOSS ANEMIA: ICD-10-CM

## 2020-03-19 PROCEDURE — 3008F PR BODY MASS INDEX (BMI) DOCUMENTED: ICD-10-PCS | Mod: CPTII,S$GLB,, | Performed by: FAMILY MEDICINE

## 2020-03-19 PROCEDURE — 99999 PR PBB SHADOW E&M-EST. PATIENT-LVL IV: CPT | Mod: PBBFAC,,, | Performed by: FAMILY MEDICINE

## 2020-03-19 PROCEDURE — 3008F BODY MASS INDEX DOCD: CPT | Mod: CPTII,S$GLB,, | Performed by: FAMILY MEDICINE

## 2020-03-19 PROCEDURE — 3074F PR MOST RECENT SYSTOLIC BLOOD PRESSURE < 130 MM HG: ICD-10-PCS | Mod: CPTII,S$GLB,, | Performed by: FAMILY MEDICINE

## 2020-03-19 PROCEDURE — 93005 EKG 12-LEAD: ICD-10-PCS | Mod: S$GLB,,, | Performed by: FAMILY MEDICINE

## 2020-03-19 PROCEDURE — 99203 PR OFFICE/OUTPT VISIT, NEW, LEVL III, 30-44 MIN: ICD-10-PCS | Mod: S$GLB,,, | Performed by: FAMILY MEDICINE

## 2020-03-19 PROCEDURE — 93005 ELECTROCARDIOGRAM TRACING: CPT | Mod: S$GLB,,, | Performed by: FAMILY MEDICINE

## 2020-03-19 PROCEDURE — 99999 PR PBB SHADOW E&M-EST. PATIENT-LVL IV: ICD-10-PCS | Mod: PBBFAC,,, | Performed by: FAMILY MEDICINE

## 2020-03-19 PROCEDURE — 3078F PR MOST RECENT DIASTOLIC BLOOD PRESSURE < 80 MM HG: ICD-10-PCS | Mod: CPTII,S$GLB,, | Performed by: FAMILY MEDICINE

## 2020-03-19 PROCEDURE — 3074F SYST BP LT 130 MM HG: CPT | Mod: CPTII,S$GLB,, | Performed by: FAMILY MEDICINE

## 2020-03-19 PROCEDURE — 93010 EKG 12-LEAD: ICD-10-PCS | Mod: S$GLB,,, | Performed by: INTERNAL MEDICINE

## 2020-03-19 PROCEDURE — 3078F DIAST BP <80 MM HG: CPT | Mod: CPTII,S$GLB,, | Performed by: FAMILY MEDICINE

## 2020-03-19 PROCEDURE — 93010 ELECTROCARDIOGRAM REPORT: CPT | Mod: S$GLB,,, | Performed by: INTERNAL MEDICINE

## 2020-03-19 PROCEDURE — 99203 OFFICE O/P NEW LOW 30 MIN: CPT | Mod: S$GLB,,, | Performed by: FAMILY MEDICINE

## 2020-03-19 RX ORDER — LISINOPRIL AND HYDROCHLOROTHIAZIDE 10; 12.5 MG/1; MG/1
1 TABLET ORAL DAILY
COMMUNITY
Start: 2019-12-21 | End: 2020-06-29

## 2020-03-19 RX ORDER — PANTOPRAZOLE SODIUM 40 MG/1
40 TABLET, DELAYED RELEASE ORAL 2 TIMES DAILY
Qty: 60 TABLET | Refills: 5 | Status: SHIPPED | OUTPATIENT
Start: 2020-03-19 | End: 2020-07-01 | Stop reason: SDUPTHER

## 2020-03-19 RX ORDER — FERROUS SULFATE 325(65) MG
325 TABLET ORAL DAILY
Qty: 90 TABLET | Refills: 0 | Status: SHIPPED | OUTPATIENT
Start: 2020-03-19 | End: 2020-06-12

## 2020-03-19 NOTE — PROGRESS NOTES
Subjective:       Patient ID: Lo Escalera is a 64 y.o. female.    Chief Complaint: Hospital Follow Up (here to follow up after being admitted due to a GI bleed ); Establish Care; Dizziness; and Hypertension (was told to hold BP meds for now )    Patient with a history of peptic ulcer disease and a remote history of alcohol abuse presented to the emergency department earlier in the month with hematemesis and melena, generalized weakness.  Did to ICU with hypovolemia and hypotension and severe acute blood loss anemia, transfuse 2 units of packed red blood cells.  Treated with twice daily IV ppi, underwent EGD which showed extensive gastritis and esophagitis, biopsy negative.  Apparently she had been taking over-the-counter NSAIDs regularly. Was prescribed Protonix BID at time of discharge, but hasn't been taking, says she hasn't been able to  because she was waiting on $, but now say she has finances to  her medication.  No further hematemesis since discharge, though she still has occasional melenic stools.  Minimal abdominal discomfort.  Chronic dizziness due to vertigo, but this has been worse since discharge.  Blood pressure consistently low, also being monitored via home health, says her systolic pressure has been as low as the 60s, so she has not been taking her antihypertensive since discharge.  Reports occasional palpitations, says this is a chronic issue going on for over a year, but says that her home health nurse has noted some irregularity with her pulse, as well.  Denies chest pain or shortness of breath.    Review of Systems   Constitutional: Positive for fatigue. Negative for chills and fever.   HENT: Negative for trouble swallowing.    Eyes: Negative for visual disturbance.   Respiratory: Negative for cough, shortness of breath and wheezing.    Cardiovascular: Positive for palpitations. Negative for chest pain.   Gastrointestinal: Positive for abdominal pain and blood in stool.  Negative for vomiting.   Genitourinary: Negative for difficulty urinating.   Musculoskeletal: Negative for joint swelling.   Skin: Negative for rash and wound.   Allergic/Immunologic: Negative for immunocompromised state.   Neurological: Positive for dizziness and light-headedness. Negative for seizures and syncope.   Hematological: Does not bruise/bleed easily.   Psychiatric/Behavioral: Negative for agitation and confusion.       Objective:      Vitals:    03/19/20 1214 03/19/20 1224   BP: (!) 90/54 (!) 86/60   BP Location: Right arm Left arm   Patient Position: Sitting Sitting   BP Method: Medium (Automatic) Medium (Manual)   Pulse: 104    Resp: 18    Temp: 97.9 °F (36.6 °C)    TempSrc: Oral    SpO2: 99%    Weight: 80 kg (176 lb 5.9 oz)    Height: 5' (1.524 m)      Physical Exam   Constitutional: She is oriented to person, place, and time. She appears well-developed and well-nourished.   HENT:   Head: Normocephalic and atraumatic.   Eyes: Pupils are equal, round, and reactive to light. Conjunctivae and EOM are normal.   Cardiovascular: Normal rate, regular rhythm and normal heart sounds.   Pulmonary/Chest: Effort normal and breath sounds normal.   Abdominal: Soft. Bowel sounds are normal. She exhibits no distension. There is tenderness in the epigastric area. There is no rigidity and no guarding.   Musculoskeletal: She exhibits no edema.   Neurological: She is alert and oriented to person, place, and time.   Skin: Skin is warm and dry.   Nursing note and vitals reviewed.      Lab Results   Component Value Date    WBC 4.90 03/12/2020    HGB 8.1 (L) 03/12/2020    HCT 24.0 (L) 03/12/2020     (L) 03/12/2020    CHOL 76 (L) 06/12/2019    TRIG 62 06/12/2019    HDL 29 (L) 06/12/2019    ALT 13 (L) 03/12/2020    AST 51 (H) 03/12/2020     (L) 03/12/2020    K 3.2 (L) 03/12/2020     03/12/2020    CREATININE 0.8 03/12/2020    BUN 10 03/12/2020    CO2 24 03/12/2020    TSH 1.460 03/06/2020    INR 1.3 (H)  03/12/2020      Assessment:       1. UGIB (upper gastrointestinal bleed)    2. Esophagitis with gastritis    3. Acute blood loss anemia    4. Thrombocytopenia    5. History of heavy alcohol consumption    6. Hypotension due to hypovolemia        Plan:       UGIB (upper gastrointestinal bleed)  -     Ambulatory referral/consult to Gastroenterology; Future; Expected date: 03/26/2020  -     pantoprazole (PROTONIX) 40 MG tablet; Take 1 tablet (40 mg total) by mouth 2 (two) times daily.  Dispense: 60 tablet; Refill: 5  -     Comprehensive metabolic panel; Future; Expected date: 03/19/2020    Esophagitis with gastritis  -     Ambulatory referral/consult to Gastroenterology; Future; Expected date: 03/26/2020  -     pantoprazole (PROTONIX) 40 MG tablet; Take 1 tablet (40 mg total) by mouth 2 (two) times daily.  Dispense: 60 tablet; Refill: 5  -     Comprehensive metabolic panel; Future; Expected date: 03/19/2020    Acute blood loss anemia  -     CBC auto differential; Future; Expected date: 03/19/2020  -     ferrous sulfate (FEOSOL) 325 mg (65 mg iron) Tab tablet; Take 1 tablet (325 mg total) by mouth once daily.  Dispense: 90 tablet; Refill: 0    Thrombocytopenia    History of heavy alcohol consumption    Hypotension due to hypovolemia  -     EKG 12-lead    No acute findings on EKG.  Continue to hold blood pressure medications.  Recheck labs today.  Add iron supplement.  Needs to follow-up with GI, will need a colonoscopy at some point in the future.  Continue to monitor blood pressure via home health.  Return to the emergency room for chest pain, shortness of breath, hematemesis, or any other concerns.  Medication List with Changes/Refills   New Medications    FERROUS SULFATE (FEOSOL) 325 MG (65 MG IRON) TAB TABLET    Take 1 tablet (325 mg total) by mouth once daily.    PANTOPRAZOLE (PROTONIX) 40 MG TABLET    Take 1 tablet (40 mg total) by mouth 2 (two) times daily.   Current Medications    LISINOPRIL-HYDROCHLOROTHIAZIDE  (PRINZIDE,ZESTORETIC) 10-12.5 MG PER TABLET    Take 1 tablet by mouth once daily.    MECLIZINE (ANTIVERT) 25 MG TABLET    Take 1 tablet (25 mg total) by mouth 3 (three) times daily as needed.    POTASSIUM 99 MG TAB    Take by mouth once daily.   Discontinued Medications    ALUMINUM & MAGNESIUM HYDROXIDE-SIMETHICONE (MYLANTA MAX STRENGTH) 400-400-40 MG/5 ML SUSPENSION    Take 30 mLs by mouth 3 (three) times daily with meals.    CALCIUM CARBONATE (TUMS) 200 MG CALCIUM (500 MG) CHEWABLE TABLET    Take 1 tablet (500 mg total) by mouth as needed for Heartburn.    DULOXETINE (CYMBALTA) 20 MG CAPSULE    Take 1 capsule (20 mg total) by mouth 2 (two) times daily.    GABAPENTIN (NEURONTIN) 100 MG CAPSULE    Take 1 capsule (100 mg total) by mouth 3 (three) times daily.    HYDROCODONE-ACETAMINOPHEN (NORCO) 5-325 MG PER TABLET    Take 1 tablet by mouth every 6 (six) hours as needed for Pain.    PANTOPRAZOLE (PROTONIX) 40 MG TABLET    Take 1 tablet (40 mg total) by mouth 2 (two) times daily.

## 2020-03-23 DIAGNOSIS — E87.6 HYPOKALEMIA: ICD-10-CM

## 2020-03-23 DIAGNOSIS — D62 ACUTE BLOOD LOSS ANEMIA: Primary | ICD-10-CM

## 2020-03-25 ENCOUNTER — EXTERNAL HOME HEALTH (OUTPATIENT)
Dept: HOME HEALTH SERVICES | Facility: HOSPITAL | Age: 65
End: 2020-03-25
Payer: MEDICARE

## 2020-03-27 ENCOUNTER — TELEPHONE (OUTPATIENT)
Dept: PRIMARY CARE CLINIC | Facility: CLINIC | Age: 65
End: 2020-03-27

## 2020-03-27 NOTE — TELEPHONE ENCOUNTER
Patient called and states that she had Hydrocodone-Acetaminophen rx at home but did know if she should be taking it. I informed her that was pain medication and we do not have this listed in her chart.

## 2020-03-27 NOTE — TELEPHONE ENCOUNTER
----- Message from Opal Craven sent at 3/27/2020  1:38 PM CDT -----  Patient needs to talk with MA about her  meds.

## 2020-04-07 ENCOUNTER — TELEPHONE (OUTPATIENT)
Dept: PRIMARY CARE CLINIC | Facility: CLINIC | Age: 65
End: 2020-04-07

## 2020-04-07 NOTE — TELEPHONE ENCOUNTER
----- Message from Marlee Cooper sent at 4/7/2020  4:41 PM CDT -----  Type:  Patient Returning Call    Who Called Lo  Who Left Message for Patient:pt  Does the patient know what this is regarding?:call from the nurse  Would the patient rather a call back or a response via [facility]? call  Best Call Back Number [phone]  Additional Information:  Call from the nurse

## 2020-04-07 NOTE — TELEPHONE ENCOUNTER
Pt states she has been having a lot of stomach aches. States it is everyday. She wants to know if she can have the colonoscopy. Let her know there are no elective surgeries being done at this time, patient wants to know if she can take something for pain? Please advise

## 2020-04-15 ENCOUNTER — OFFICE VISIT (OUTPATIENT)
Dept: PRIMARY CARE CLINIC | Facility: CLINIC | Age: 65
End: 2020-04-15
Payer: MEDICARE

## 2020-04-15 VITALS
SYSTOLIC BLOOD PRESSURE: 122 MMHG | TEMPERATURE: 98 F | WEIGHT: 191.25 LBS | HEART RATE: 104 BPM | RESPIRATION RATE: 18 BRPM | OXYGEN SATURATION: 98 % | HEIGHT: 60 IN | BODY MASS INDEX: 37.55 KG/M2 | DIASTOLIC BLOOD PRESSURE: 58 MMHG

## 2020-04-15 DIAGNOSIS — K92.2 UGIB (UPPER GASTROINTESTINAL BLEED): Primary | ICD-10-CM

## 2020-04-15 DIAGNOSIS — M25.562 PAIN IN BOTH KNEES, UNSPECIFIED CHRONICITY: ICD-10-CM

## 2020-04-15 DIAGNOSIS — R55 SYNCOPE AND COLLAPSE: ICD-10-CM

## 2020-04-15 DIAGNOSIS — F41.1 GAD (GENERALIZED ANXIETY DISORDER): ICD-10-CM

## 2020-04-15 DIAGNOSIS — M25.561 PAIN IN BOTH KNEES, UNSPECIFIED CHRONICITY: ICD-10-CM

## 2020-04-15 DIAGNOSIS — E03.9 HYPOTHYROIDISM, UNSPECIFIED TYPE: ICD-10-CM

## 2020-04-15 DIAGNOSIS — R42 VERTIGO: ICD-10-CM

## 2020-04-15 DIAGNOSIS — R10.13 EPIGASTRIC PAIN: ICD-10-CM

## 2020-04-15 DIAGNOSIS — I10 ESSENTIAL HYPERTENSION: ICD-10-CM

## 2020-04-15 DIAGNOSIS — E66.01 MORBID OBESITY: ICD-10-CM

## 2020-04-15 PROCEDURE — 3078F PR MOST RECENT DIASTOLIC BLOOD PRESSURE < 80 MM HG: ICD-10-PCS | Mod: CPTII,S$GLB,, | Performed by: FAMILY MEDICINE

## 2020-04-15 PROCEDURE — 3074F PR MOST RECENT SYSTOLIC BLOOD PRESSURE < 130 MM HG: ICD-10-PCS | Mod: CPTII,S$GLB,, | Performed by: FAMILY MEDICINE

## 2020-04-15 PROCEDURE — 99214 PR OFFICE/OUTPT VISIT, EST, LEVL IV, 30-39 MIN: ICD-10-PCS | Mod: S$GLB,,, | Performed by: FAMILY MEDICINE

## 2020-04-15 PROCEDURE — 99999 PR PBB SHADOW E&M-EST. PATIENT-LVL IV: CPT | Mod: PBBFAC,,, | Performed by: FAMILY MEDICINE

## 2020-04-15 PROCEDURE — 3008F PR BODY MASS INDEX (BMI) DOCUMENTED: ICD-10-PCS | Mod: CPTII,S$GLB,, | Performed by: FAMILY MEDICINE

## 2020-04-15 PROCEDURE — 99999 PR PBB SHADOW E&M-EST. PATIENT-LVL IV: ICD-10-PCS | Mod: PBBFAC,,, | Performed by: FAMILY MEDICINE

## 2020-04-15 PROCEDURE — 3078F DIAST BP <80 MM HG: CPT | Mod: CPTII,S$GLB,, | Performed by: FAMILY MEDICINE

## 2020-04-15 PROCEDURE — 3008F BODY MASS INDEX DOCD: CPT | Mod: CPTII,S$GLB,, | Performed by: FAMILY MEDICINE

## 2020-04-15 PROCEDURE — 99214 OFFICE O/P EST MOD 30 MIN: CPT | Mod: S$GLB,,, | Performed by: FAMILY MEDICINE

## 2020-04-15 PROCEDURE — 3074F SYST BP LT 130 MM HG: CPT | Mod: CPTII,S$GLB,, | Performed by: FAMILY MEDICINE

## 2020-04-15 RX ORDER — TRAMADOL HYDROCHLORIDE 50 MG/1
50 TABLET ORAL EVERY 6 HOURS PRN
Qty: 30 TABLET | Refills: 0 | Status: SHIPPED | OUTPATIENT
Start: 2020-04-15 | End: 2020-04-25

## 2020-04-15 RX ORDER — SUCRALFATE 1 G/1
TABLET ORAL
Qty: 21 TABLET | Refills: 0 | Status: SHIPPED | OUTPATIENT
Start: 2020-04-15 | End: 2020-05-29 | Stop reason: SDUPTHER

## 2020-04-15 NOTE — PROGRESS NOTES
Subjective:       Patient ID: Lo Escalera is a 64 y.o. female.    Chief Complaint: Abdominal Pain and Hospital Follow Up    HPI:  64-year-old female in for abdominal pain and vertigo---patient was seen on 03/12/2020 in the emergency room for dizziness orthostatic hypotension anemia patient was seen in the emergency room on 03/06/2020 for vertigo and upper GI bleed.  Patient has had vertigo for over a year--has seen PCP Dr Shipman--patient was having tilt test--and positional chest for vertigo--and was diagnosed with vertigo. --had MRI CT scan --did not see ENT.. Txed in ER with meclizine 25 mg t.i.d.--does help--times where still light headed--mainly has a problem with bending down and then standing up suggestive of orthostatic hypotension.  No carotid ultrasound      Abd pain---epigastric area---history of ulcers in the past---GERD--diverticulosis--history hepatic steatosis--history hyperbilirubinemia, ---pain is now in the epigastric area---quality sharp stabbing--severity 7-a/10---no nausea vomiting diarrhea constipation--cholecystectomy---no hepatitis history of an ulcer---patient threw up 2 glasses of blood so was admitted--did EGD --patient states that not tell patient anything--patient was given 2 units of blood.  One iron pills and Protonix  ROS:  Skin: no psoriasis, eczema, skin cancer  HEENT: No headache, ocular pain, blurred vision, diplopia, epistaxis, hoarseness change in voice, +hypothyroidism  Lung: No pneumonia, asthma, Tb, wheezing, SOB, no smoking  Heart: No chest pain, ankle edema, palpitations, MI, eva murmur, +hypertension, hyperlipidemia--no stent bypass arrhythmia   Abdomen: No nausea, vomiting, diarrhea, constipation, ulcers, hepatitis, status post cholecystectomy, melena, hematochezia, hematemesis  : no UTI, renal disease, stones  MS: no fractures, O/A, lupus, rheumatoid, gout--has 2 sisters with lupus and her father has gout  Neuro: No dizziness, LOC, seizures +vertigo  No  diabetes, + anemia had to be transfused blood had upper GI bleed, + anxiety, + depression upset had to quit working because of vertigo was working at [employer]   3 children unemployed, lives with      Objective:   Physical Exam:  General: Well nourished, well developed, no acute distress+ Obesity   Skin: No lesions  HEENT: Eyes PERRLA, EOM intact, nose patent, throat non-erythematous +arcus senilis no pallor to the conjunctiva  NECK: Supple, no bruits, No JVD, no nodes  Lungs: Clear, no rales, rhonchi, wheezing  Heart: Regular rate and rhythm, no murmurs, gallops, or rubs  Abdomen: flat, bowel sounds positive, no tenderness, or organomegaly  MS: Range of motion and muscle strength intact problem with the knees bilaterally walks extremely stiff leg it states needs to see an orthopedist  Neuro: Alert, CN intact, oriented X 3  Extremities: No cyanosis, clubbing, or edema negative Romberg        Assessment:       1. UGIB (upper gastrointestinal bleed)    2. Epigastric pain    3. Morbid obesity    4. Hypothyroidism, unspecified type    5. Essential hypertension    6. Vertigo    7. JHOAN (generalized anxiety disorder)    8. Syncope and collapse     9. Pain in both knees, unspecified chronicity        Plan:       UGIB (upper gastrointestinal bleed)  -     Ambulatory referral/consult to Gastroenterology; Future; Expected date: 04/22/2020  -     Ambulatory referral/consult to Cardiology; Future; Expected date: 04/22/2020  -     US Carotid Bilateral; Future; Expected date: 04/15/2020  -     Amylase; Future; Expected date: 04/15/2020  -     CT Abdomen Pelvis W Wo Contrast; Future; Expected date: 04/15/2020  -     Ambulatory referral/consult to Gastroenterology; Future; Expected date: 04/22/2020    Epigastric pain  -     Ambulatory referral/consult to Gastroenterology; Future; Expected date: 04/22/2020  -     Ambulatory referral/consult to Cardiology; Future; Expected date: 04/22/2020  -     US Carotid Bilateral;  Future; Expected date: 04/15/2020  -     Amylase; Future; Expected date: 04/15/2020  -     CT Abdomen Pelvis W Wo Contrast; Future; Expected date: 04/15/2020  -     Ambulatory referral/consult to Gastroenterology; Future; Expected date: 04/22/2020    Morbid obesity  -     US Carotid Bilateral; Future; Expected date: 04/15/2020    Hypothyroidism, unspecified type  -     US Carotid Bilateral; Future; Expected date: 04/15/2020    Essential hypertension  -     Echo Color Flow Doppler? Yes  -     US Carotid Bilateral; Future; Expected date: 04/15/2020  -     CBC auto differential; Future; Expected date: 04/15/2020  -     Comprehensive metabolic panel; Future; Expected date: 04/15/2020  -     Fecal Immunochemical Test (iFOBT); Future; Expected date: 04/15/2020  -     Lipid panel; Future; Expected date: 04/15/2020  -     T4, free; Future; Expected date: 04/15/2020  -     TSH; Future; Expected date: 04/15/2020  -     Amylase; Future; Expected date: 04/15/2020  -     Ambulatory referral/consult to Cardiology; Future; Expected date: 04/22/2020    Vertigo  -     Ambulatory referral/consult to Cardiology; Future; Expected date: 04/22/2020  -     Echo Color Flow Doppler? Yes  -     US Carotid Bilateral; Future; Expected date: 04/15/2020  -     Ambulatory referral/consult to Cardiology; Future; Expected date: 04/22/2020    JHOAN (generalized anxiety disorder)  -     US Carotid Bilateral; Future; Expected date: 04/15/2020    Syncope and collapse   -     US Carotid Bilateral; Future; Expected date: 04/15/2020  -     Ambulatory referral/consult to Cardiology; Future; Expected date: 04/22/2020    Pain in both knees, unspecified chronicity  -     Ambulatory referral/consult to Orthopedics; Future; Expected date: 04/22/2020    Other orders  -     sucralfate (CARAFATE) 1 gram tablet; One p.o. 1 hr a.c. breakfast 1 hr a.c. lunch 1 hr a.c. supper by Tums over-the-counter 2 p.o. 1 hr p.c. breakfast 1 hr p.c. lunch 1 hr p.c. supper  Dispense:  21 tablet; Refill: 0  -     traMADoL (ULTRAM) 50 mg tablet; Take 1 tablet (50 mg total) by mouth every 6 (six) hours as needed.  Dispense: 30 tablet; Refill: 0        Abdominal pain epigastric area and right upper quadrant--history GI bleed--transfuse blood 03/12/2020---EGD done but no CT scan of the abdomen and pelvis---on Protonix--will start on Carafate 1 g 1 hr a.c. breakfast lunch supper Tums 2 p.o. 1 hr p.c. breakfast lunch supper take Protonix at bedtime See Dr Brown  To help with gastritis and GERD--avoid alcohol smoking NSAIDs caffeine stress carbonated drinks--can raise the head of the bed on a block of wood to prevent GERD--eat small meals--lay down for 1-2 hours after eating  Needs to redo lab CBCs CMP lipid T4 TSH serum amylase stool guaiac now due CBCs CMP in 6 weeks and in 3 months to monitor hematocrit  Appointment  --for possible colonoscopy and evaluate abdominal pain and recent GI bleed  Appointment Dr. Buchanan---do carotid ultrasound echocardiogram consider stress test---evaluate vertigo and check for orthostatic hypotension  Orthopedic consult knees bilaterally Dr. Stout   May need appointment with neurology--or ENT--needs MRI of the brain with and without gadolinium--did recently have CT scan of the brain some may wait until the corona virus has subsided before MRI done continue Antivert 25 mg t.i.d.  Health maintenance was HIV tetanus pneumococcal vaccine shingles:  Mammogram flu  Patient was able take codeine with Benadryl in did better with hydrocodone thin Ultram  Give patient a dizzy handout

## 2020-04-17 ENCOUNTER — TELEPHONE (OUTPATIENT)
Dept: PRIMARY CARE CLINIC | Facility: CLINIC | Age: 65
End: 2020-04-17

## 2020-04-17 NOTE — TELEPHONE ENCOUNTER
----- Message from Roseanna Chaidez sent at 4/17/2020  9:04 AM CDT -----  Contact: Des/[facility] [phone]  Requesting verbal order to continue PT.    Please call and advise.    Thank You

## 2020-04-22 ENCOUNTER — PATIENT OUTREACH (OUTPATIENT)
Dept: ADMINISTRATIVE | Facility: OTHER | Age: 65
End: 2020-04-22

## 2020-04-23 ENCOUNTER — DOCUMENT SCAN (OUTPATIENT)
Dept: HOME HEALTH SERVICES | Facility: HOSPITAL | Age: 65
End: 2020-04-23
Payer: MEDICARE

## 2020-04-23 ENCOUNTER — OFFICE VISIT (OUTPATIENT)
Dept: CARDIOLOGY | Facility: CLINIC | Age: 65
End: 2020-04-23
Payer: MEDICARE

## 2020-04-23 DIAGNOSIS — R55 SYNCOPE AND COLLAPSE: ICD-10-CM

## 2020-04-23 DIAGNOSIS — I10 ESSENTIAL HYPERTENSION: ICD-10-CM

## 2020-04-23 DIAGNOSIS — R42 VERTIGO: ICD-10-CM

## 2020-04-23 PROCEDURE — 99441 PR PHYSICIAN TELEPHONE EVALUATION 5-10 MIN: ICD-10-PCS | Mod: 95,,, | Performed by: INTERNAL MEDICINE

## 2020-04-23 PROCEDURE — 99441 PR PHYSICIAN TELEPHONE EVALUATION 5-10 MIN: CPT | Mod: 95,,, | Performed by: INTERNAL MEDICINE

## 2020-04-23 NOTE — LETTER
April 23, 2020      Joes Hernández MD  [address] [place] [place] [address]           [facility] - Cardiology  [address]  [place] [address]  Phone: [phone]          Patient: Lo Escalera   MR Number: [id number]   YOB: 1955   Date of Visit: 4/23/2020       Dear Dr. Jose Hernández:    Thank you for referring Lo Escalera to me for evaluation. Attached you will find relevant portions of my assessment and plan of care.    If you have questions, please do not hesitate to call me. I look forward to following Lo Escalera along with you.    Sincerely,    Sherry Mcgarry MD    Enclosure  CC:  No Recipients    If you would like to receive this communication electronically, please contact [email or web address] or [phone] to request more information on [employer] access.    For providers and/or their staff who would like to refer a patient to [facility], please contact us through our one-stop-shop provider referral line, [facility] , at [phone].    If you feel you have received this communication in error or would no longer like to receive these types of communications, please e-mail [email or web address]

## 2020-04-23 NOTE — PROGRESS NOTES
Established Patient - Audio Only Telehealth Visit     The patient location is: home  The chief complaint leading to consultation is: vertigo  Visit type: Virtual visit with audio only (telephone)     The reason for the audio only service rather than synchronous audio and video virtual visit was related to technical difficulties or patient preference/necessity.     Each patient to whom I provide medical services by telemedicine is:  (1) informed of the relationship between the physician and patient and the respective role of any other health care provider with respect to management of the patient; and (2) notified that they may decline to receive medical services by telemedicine and may withdraw from such care at any time. Patient verbally consented to receive this service via voice-only telephone call.       HPI: She has had chronic vertigo symptoms for over a year. She reports feeling lightheaded and dizzy when she closes her eyes or bends her head forward. She needs to hold on to things so she doesn't fall. She did have one fall in the past. She takes meclizine but has not seen ENT. She was seen in the ED last month for the same symptoms following a recent UGIB which required blood transfusion. A chest CTA, head CT and ECG were normal. She had mild hypokalemia and anemia. She remains on iron. Her PCP, Dr. Hernández had ordered an echo and carotid US which have not been scheduled. She denies any symptoms consistent with subclavian steal syndrome. She has no cardiac history other than HTN. Her lisinopril/hctz was held after her recent GI bleed.    ECG 19-MAR-2020 13:20:13: NSR 80 bpm with short MI interval     Assessment and plan:  1. Vertigo: Will follow up results echo and US of vertebral arteries. If normal, recommend evaluation by ENT.                        This service was not originating from a related E/M service provided within the previous 7 days nor will  to an E/M service or procedure within the  next 24 hours or my soonest available appointment.  Prevailing standard of care was able to be met in this audio-only visit.

## 2020-05-01 ENCOUNTER — CLINICAL SUPPORT (OUTPATIENT)
Dept: CARDIOLOGY | Facility: CLINIC | Age: 65
End: 2020-05-01
Attending: INTERNAL MEDICINE
Payer: MEDICARE

## 2020-05-01 ENCOUNTER — HOSPITAL ENCOUNTER (OUTPATIENT)
Dept: CARDIOLOGY | Facility: CLINIC | Age: 65
Discharge: HOME | End: 2020-05-01
Attending: INTERNAL MEDICINE
Payer: MEDICARE

## 2020-05-01 VITALS
DIASTOLIC BLOOD PRESSURE: 65 MMHG | SYSTOLIC BLOOD PRESSURE: 145 MMHG | HEIGHT: 60 IN | HEART RATE: 90 BPM | BODY MASS INDEX: 37.5 KG/M2 | WEIGHT: 191 LBS

## 2020-05-01 DIAGNOSIS — R55 SYNCOPE AND COLLAPSE: ICD-10-CM

## 2020-05-01 DIAGNOSIS — R42 VERTIGO: ICD-10-CM

## 2020-05-01 DIAGNOSIS — I10 ESSENTIAL HYPERTENSION: ICD-10-CM

## 2020-05-01 LAB
ASCENDING AORTA: 2.95 CM
AV INDEX (PROSTH): 0.85
AV MEAN GRADIENT: 5 MMHG
AV PEAK GRADIENT: 9 MMHG
AV VALVE AREA: 2.88 CM2
AV VELOCITY RATIO: 0.79
BSA FOR ECHO PROCEDURE: 1.91 M2
CV ECHO LV RWT: 0.36 CM
DOP CALC AO PEAK VEL: 1.49 M/S
DOP CALC AO VTI: 28.36 CM
DOP CALC LVOT AREA: 3.4 CM2
DOP CALC LVOT DIAMETER: 2.08 CM
DOP CALC LVOT PEAK VEL: 1.18 M/S
DOP CALC LVOT STROKE VOLUME: 81.68 CM3
DOP CALCLVOT PEAK VEL VTI: 24.05 CM
E WAVE DECELERATION TIME: 199.5 MSEC
E/A RATIO: 0.87
E/E' RATIO: 10.47 M/S
ECHO LV POSTERIOR WALL: 0.8 CM (ref 0.6–1.1)
FRACTIONAL SHORTENING: 34 % (ref 28–44)
INTERVENTRICULAR SEPTUM: 1 CM (ref 0.6–1.1)
IVRT: 74.22 MSEC
LA MAJOR: 5.23 CM
LA MINOR: 5.11 CM
LA WIDTH: 4.11 CM
LEFT ARM DIASTOLIC BLOOD PRESSURE: 58 MMHG
LEFT ARM SYSTOLIC BLOOD PRESSURE: 125 MMHG
LEFT ATRIUM SIZE: 3.7 CM
LEFT ATRIUM VOLUME INDEX: 36.5 ML/M2
LEFT ATRIUM VOLUME: 66.82 CM3
LEFT CBA DIAS: 19 CM/S
LEFT CBA SYS: 58 CM/S
LEFT CCA DIST DIAS: 17 CM/S
LEFT CCA DIST SYS: 65 CM/S
LEFT CCA MID DIAS: 21 CM/S
LEFT CCA MID SYS: 74 CM/S
LEFT CCA PROX DIAS: 17 CM/S
LEFT CCA PROX SYS: 81 CM/S
LEFT ECA DIAS: 12 CM/S
LEFT ECA SYS: 84 CM/S
LEFT ICA DIST DIAS: 29 CM/S
LEFT ICA DIST SYS: 94 CM/S
LEFT ICA MID DIAS: 28 CM/S
LEFT ICA MID SYS: 101 CM/S
LEFT ICA PROX DIAS: 22 CM/S
LEFT ICA PROX SYS: 62 CM/S
LEFT INTERNAL DIMENSION IN SYSTOLE: 2.95 CM (ref 2.1–4)
LEFT VENTRICLE DIASTOLIC VOLUME INDEX: 49.39 ML/M2
LEFT VENTRICLE DIASTOLIC VOLUME: 90.4 ML
LEFT VENTRICLE MASS INDEX: 72 G/M2
LEFT VENTRICLE SYSTOLIC VOLUME INDEX: 18.4 ML/M2
LEFT VENTRICLE SYSTOLIC VOLUME: 33.71 ML
LEFT VENTRICULAR INTERNAL DIMENSION IN DIASTOLE: 4.46 CM (ref 3.5–6)
LEFT VENTRICULAR MASS: 130.89 G
LEFT VERTEBRAL DIAS: 17 CM/S
LEFT VERTEBRAL SYS: 74 CM/S
LV LATERAL E/E' RATIO: 9.89 M/S
LV SEPTAL E/E' RATIO: 11.13 M/S
MV PEAK A VEL: 1.02 M/S
MV PEAK E VEL: 0.89 M/S
OHS CV CAROTID RIGHT ICA EDV HIGHEST: 20
OHS CV CAROTID ULTRASOUND LEFT ICA/CCA RATIO: 1.25
OHS CV CAROTID ULTRASOUND RIGHT ICA/CCA RATIO: 0.79
OHS CV PV CAROTID LEFT HIGHEST CCA: 81
OHS CV PV CAROTID LEFT HIGHEST ICA: 101
OHS CV PV CAROTID RIGHT HIGHEST CCA: 94
OHS CV PV CAROTID RIGHT HIGHEST ICA: 74
OHS CV US CAROTID LEFT HIGHEST EDV: 29
PISA TR MAX VEL: 2.59 M/S
PULM VEIN S/D RATIO: 1.7
PV PEAK D VEL: 0.53 M/S
PV PEAK S VEL: 0.9 M/S
RA MAJOR: 4.4 CM
RA PRESSURE: 3 MMHG
RA WIDTH: 3.55 CM
RIGHT ARM DIASTOLIC BLOOD PRESSURE: 58 MMHG
RIGHT ARM SYSTOLIC BLOOD PRESSURE: 122 MMHG
RIGHT CBA DIAS: 14 CM/S
RIGHT CBA SYS: 48 CM/S
RIGHT CCA DIST DIAS: 11 CM/S
RIGHT CCA DIST SYS: 63 CM/S
RIGHT CCA MID DIAS: 18 CM/S
RIGHT CCA MID SYS: 85 CM/S
RIGHT CCA PROX DIAS: 16 CM/S
RIGHT CCA PROX SYS: 94 CM/S
RIGHT ECA DIAS: 10 CM/S
RIGHT ECA SYS: 67 CM/S
RIGHT ICA DIST DIAS: 20 CM/S
RIGHT ICA DIST SYS: 74 CM/S
RIGHT ICA MID DIAS: 20 CM/S
RIGHT ICA MID SYS: 62 CM/S
RIGHT ICA PROX DIAS: 18 CM/S
RIGHT ICA PROX SYS: 54 CM/S
RIGHT VENTRICULAR END-DIASTOLIC DIMENSION: 3.13 CM
RIGHT VERTEBRAL DIAS: 13 CM/S
RIGHT VERTEBRAL SYS: 74 CM/S
RV TISSUE DOPPLER FREE WALL SYSTOLIC VELOCITY 1 (APICAL 4 CHAMBER VIEW): 15.69 CM/S
SINUS: 3.03 CM
STJ: 2.5 CM
TDI LATERAL: 0.09 M/S
TDI SEPTAL: 0.08 M/S
TDI: 0.09 M/S
TR MAX PG: 27 MMHG
TRICUSPID ANNULAR PLANE SYSTOLIC EXCURSION: 1.62 CM
TV REST PULMONARY ARTERY PRESSURE: 30 MMHG

## 2020-05-01 PROCEDURE — 93306 ECHO (CUPID ONLY): ICD-10-PCS | Mod: S$GLB,,, | Performed by: INTERNAL MEDICINE

## 2020-05-01 PROCEDURE — 93880 EXTRACRANIAL BILAT STUDY: CPT | Mod: S$GLB,,, | Performed by: INTERNAL MEDICINE

## 2020-05-01 PROCEDURE — 93880 CV US DOPPLER CAROTID (CUPID ONLY): ICD-10-PCS | Mod: S$GLB,,, | Performed by: INTERNAL MEDICINE

## 2020-05-01 PROCEDURE — 93306 TTE W/DOPPLER COMPLETE: CPT | Mod: S$GLB,,, | Performed by: INTERNAL MEDICINE

## 2020-05-05 ENCOUNTER — TELEPHONE (OUTPATIENT)
Dept: CARDIOLOGY | Facility: CLINIC | Age: 65
End: 2020-05-05

## 2020-05-05 NOTE — TELEPHONE ENCOUNTER
----- Message from Sherry Mcgarry MD sent at 5/5/2020 12:23 PM CDT -----  The carotid US was normal. I suspect her symptoms are due to vertigo. I recommend she see ENT.

## 2020-05-05 NOTE — TELEPHONE ENCOUNTER
----- Message from Sherry Mcgarry MD sent at 5/5/2020 12:23 PM CDT -----  The echo showed the heart function is normal.

## 2020-05-13 ENCOUNTER — TELEPHONE (OUTPATIENT)
Dept: PRIMARY CARE CLINIC | Facility: CLINIC | Age: 65
End: 2020-05-13

## 2020-05-13 NOTE — TELEPHONE ENCOUNTER
----- Message from Lo Ruth sent at 5/13/2020 12:15 PM CDT -----  Contact: patient [phone]  Patient has stomach pains for 2 weeks.

## 2020-05-13 NOTE — TELEPHONE ENCOUNTER
Spoke with patient to schedule an appointment for stomach pain. Requested to see Dr. Hernández on Friday. Scheduled appt for patient as requested.

## 2020-05-15 ENCOUNTER — OFFICE VISIT (OUTPATIENT)
Dept: PRIMARY CARE CLINIC | Facility: CLINIC | Age: 65
End: 2020-05-15
Payer: MEDICARE

## 2020-05-15 VITALS
BODY MASS INDEX: 37.49 KG/M2 | TEMPERATURE: 98 F | DIASTOLIC BLOOD PRESSURE: 62 MMHG | HEIGHT: 60 IN | OXYGEN SATURATION: 96 % | WEIGHT: 190.94 LBS | HEART RATE: 102 BPM | SYSTOLIC BLOOD PRESSURE: 110 MMHG | RESPIRATION RATE: 18 BRPM

## 2020-05-15 DIAGNOSIS — K92.2 UGIB (UPPER GASTROINTESTINAL BLEED): Primary | ICD-10-CM

## 2020-05-15 PROCEDURE — 3078F DIAST BP <80 MM HG: CPT | Mod: CPTII,S$GLB,, | Performed by: FAMILY MEDICINE

## 2020-05-15 PROCEDURE — 3008F BODY MASS INDEX DOCD: CPT | Mod: CPTII,S$GLB,, | Performed by: FAMILY MEDICINE

## 2020-05-15 PROCEDURE — 3074F SYST BP LT 130 MM HG: CPT | Mod: CPTII,S$GLB,, | Performed by: FAMILY MEDICINE

## 2020-05-15 PROCEDURE — 99999 PR PBB SHADOW E&M-EST. PATIENT-LVL III: ICD-10-PCS | Mod: PBBFAC,,, | Performed by: FAMILY MEDICINE

## 2020-05-15 PROCEDURE — 3078F PR MOST RECENT DIASTOLIC BLOOD PRESSURE < 80 MM HG: ICD-10-PCS | Mod: CPTII,S$GLB,, | Performed by: FAMILY MEDICINE

## 2020-05-15 PROCEDURE — 3074F PR MOST RECENT SYSTOLIC BLOOD PRESSURE < 130 MM HG: ICD-10-PCS | Mod: CPTII,S$GLB,, | Performed by: FAMILY MEDICINE

## 2020-05-15 PROCEDURE — 99999 PR PBB SHADOW E&M-EST. PATIENT-LVL III: CPT | Mod: PBBFAC,,, | Performed by: FAMILY MEDICINE

## 2020-05-15 PROCEDURE — 3008F PR BODY MASS INDEX (BMI) DOCUMENTED: ICD-10-PCS | Mod: CPTII,S$GLB,, | Performed by: FAMILY MEDICINE

## 2020-05-15 PROCEDURE — 99214 OFFICE O/P EST MOD 30 MIN: CPT | Mod: S$GLB,,, | Performed by: FAMILY MEDICINE

## 2020-05-15 PROCEDURE — 99214 PR OFFICE/OUTPT VISIT, EST, LEVL IV, 30-39 MIN: ICD-10-PCS | Mod: S$GLB,,, | Performed by: FAMILY MEDICINE

## 2020-05-15 RX ORDER — FAMOTIDINE 40 MG/1
40 TABLET, FILM COATED ORAL DAILY
Qty: 30 TABLET | Refills: 11 | Status: SHIPPED | OUTPATIENT
Start: 2020-05-15 | End: 2020-07-31

## 2020-05-15 RX ORDER — HYDROCODONE BITARTRATE AND ACETAMINOPHEN 5; 325 MG/1; MG/1
1 TABLET ORAL EVERY 6 HOURS PRN
Qty: 30 TABLET | Refills: 0 | Status: SHIPPED | OUTPATIENT
Start: 2020-05-15 | End: 2020-05-29 | Stop reason: SDUPTHER

## 2020-05-15 NOTE — PROGRESS NOTES
Subjective:       Patient ID: Lo Escalera is a 64 y.o. female.    Chief Complaint: Abdominal Pain    HPI:  64-year-old black female complaining of abdominal pain--in the epigastric area---radiates to the left costal angle around to the left flank---does occur in the daytime but much worse at night--gets patient up.  Patient has CT scan of the abdomen schedule today---patient had EGD done at [facility]--states was not told if anything significant was found--patient needs a colonoscopy.  Had some loose bowel moved no vomiting nausea--+history of GERD +history of gastric ulcer +history diverticulosis---admitted 04/15/2020--patient did vomit blood thinks about 16 oz--pt went to the emergency room was transfused 2 units of blood--patient was transferred to [facility] in the EGD was done.       Echocardiogram was evaluated has ejection fraction 65% only minimal valvular changes carotid ultrasound shows stenosis of the right car coronary artery at 2:19 a.m.% left at 20-39% both her not hemodynamically significant       --abdominal pain quality sharp---severity 7-8/10  at night 10/10---last about half an hour.        Patient is on Protonix 40 mg 1 q.h.s.--needs to add Pepcid 40 mg q.a.m.--has Carafate1 gm 1 hr before each meal Tums 2 p.o. after breakfast and supper       Getting CT scan today needs to have colonoscopy--and may need a repeat EGD.       Office visit 04/15/2020  64-year-old female in for abdominal pain and vertigo---patient was seen on 03/12/2020 in the emergency room for dizziness orthostatic hypotension anemia patient was seen in the emergency room on 03/06/2020 for vertigo and upper GI bleed.  Patient has had vertigo for over a year--has seen PCP Dr Shipman--patient was having tilt test--and positional chest for vertigo--and was diagnosed with vertigo. --had MRI CT scan --did not see ENT.. Txed in ER with meclizine 25 mg t.i.d.--does help--times where still light headed--mainly has a problem with  bending down and then standing up suggestive of orthostatic hypotension.  No carotid ultrasound      Abd pain---epigastric area---history of ulcers in the past---GERD--diverticulosis--history hepatic steatosis--history hyperbilirubinemia, ---pain is now in the epigastric area---quality sharp stabbing--severity 7-a/10---no nausea vomiting diarrhea constipation--cholecystectomy---no hepatitis history of an ulcer---patient threw up 2 glasses of blood so was admitted--did EGD --patient states that not tell patient anything--patient was given 2 units of blood.  One iron pills and Protonix  ROS:  No significant to  Skin: no psoriasis, eczema, skin cancer  HEENT: No headache, ocular pain, blurred vision, diplopia, epistaxis, hoarseness change in voice, +hypothyroidism  Lung: No pneumonia, asthma, Tb, wheezing, SOB, no smoking  Heart: No chest pain, ankle edema, palpitations, MI, eva murmur, +hypertension, hyperlipidemia--no stent bypass arrhythmia   Abdomen: No nausea, vomiting, diarrhea, constipation, ulcers, hepatitis, status post cholecystectomy, melena, hematochezia, hematemesis  : no UTI, renal disease, stones  MS: no fractures, O/A, lupus, rheumatoid, gout--has 2 sisters with lupus and her father has gout  Neuro: No dizziness, LOC, seizures +vertigo  No diabetes, + anemia had to be transfused blood had upper GI bleed, + anxiety, + depression upset had to quit working because of vertigo was working at [employer]   3 children unemployed, lives with      Objective:   Physical Exam:  General: Well nourished, well developed, no acute distress+ Obesity   Skin: No lesions  HEENT: Eyes PERRLA, EOM intact, nose patent, throat non-erythematous +arcus senilis no pallor to the conjunctiva  NECK: Supple, no bruits, No JVD, no nodes  Lungs: Clear, no rales, rhonchi, wheezing  Heart: Regular rate and rhythm, no murmurs, gallops, or rubs  Abdomen: flat, bowel sounds positive, no tenderness, or organomegaly  MS:  Range of motion and muscle strength intact problem with the knees bilaterally walks extremely stiff leg it states needs to see an orthopedist  Neuro: Alert, CN intact, oriented X 3  Extremities: No cyanosis, clubbing, or edema negative Romberg        Assessment:       1. UGIB (upper gastrointestinal bleed)        Plan:       UGIB (upper gastrointestinal bleed)  -     GASTRIN; Future; Expected date: 05/15/2020    Other orders  -     famotidine (PEPCID) 40 MG tablet; Take 1 tablet (40 mg total) by mouth once daily.  Dispense: 30 tablet; Refill: 11  -     HYDROcodone-acetaminophen (NORCO) 5-325 mg per tablet; Take 1 tablet by mouth every 6 (six) hours as needed.  Dispense: 30 tablet; Refill: 0        Abdominal pain epigastric area and right upper quadrant--history GI bleed--transfused 2 units of blood 03/12/2020--still with epigastric pain sharp stabbing during the day x7 8/10 at night 10/10---need patient to take Protonix 40 q.h.s.--Pepcid 40 q.a.m.--Carafate 1 g 1 hr prior to breakfast 1 hr prior to lunch--1 hr prior to supper Tums 2 p.o. 1 hr p.c. breakfast 1 hr p.c. lunch 1 hr p.c. Supper---getting CT scan abdomen pelvis today---needs to see Dr. Brown or Dr Elizalde for colonoscopy--plus minus repeat EGD due to persistent pain---okay refill pain medication  To help with gastritis and GERD--avoid alcohol smoking NSAIDs caffeine stress carbonated drinks--can raise the head of the bed on a block of wood to prevent GERD--eat small meals--lay down for 1-2 hours after eating  Do lab as in computer CBCs CMP stool guaiac lipids thyroid today a fasting needs to repeat test in 3 months CBCs CMP mainly to check hematocrit liver function Appointment  --for possible colonoscopy and evaluate abdominal pain and recent GI bleed  Vertigo--sounds like orthostatic hypotension--Dr chris Muller--had echocardiogram showing ejection fraction 65% otherwise norm---carotid ultrasound with no hemodynamically significant lesions  right carotid 0 a 19% blockage left carotid 20-39% blockage--may need tilt-table test  Orthopedic consult knees bilaterally Dr. Stout   May need appointment with neurology--or ENT--needs MRI of the brain with and without gadolinium--did recently have CT scan of the brain some may wait until the corona virus has subsided before MRI done continue Antivert 25 mg t.i.d.  Health maintenance was HIV tetanus pneumococcal vaccine shingles:  Mammogram flu

## 2020-05-22 ENCOUNTER — PATIENT OUTREACH (OUTPATIENT)
Dept: ADMINISTRATIVE | Facility: OTHER | Age: 65
End: 2020-05-22

## 2020-05-23 PROBLEM — K57.90 DIVERTICULOSIS: Status: ACTIVE | Noted: 2020-05-23

## 2020-05-26 ENCOUNTER — TELEPHONE (OUTPATIENT)
Dept: PRIMARY CARE CLINIC | Facility: CLINIC | Age: 65
End: 2020-05-26

## 2020-05-26 ENCOUNTER — TELEPHONE (OUTPATIENT)
Dept: ORTHOPEDICS | Facility: CLINIC | Age: 65
End: 2020-05-26

## 2020-05-26 DIAGNOSIS — K92.2 UGIB (UPPER GASTROINTESTINAL BLEED): Primary | ICD-10-CM

## 2020-05-26 NOTE — TELEPHONE ENCOUNTER
called patient to confirm this mornings appointment, patient requested to have appointment rescheduled, appointment has russel scheduled for 06/01

## 2020-05-26 NOTE — TELEPHONE ENCOUNTER
Needs to f/u with GI as previously advised. I have only seen this patient once at her initial visit, and all of her subsequent clinic visits have been with Dr. Hernández, including an upcoming appointment on 5/29. Please clarify who she identifies as her PCP and update chart.

## 2020-05-26 NOTE — TELEPHONE ENCOUNTER
----- Message from Jose Hernández MD sent at 5/23/2020  9:14 AM CDT -----  Call tell pt lab normal except Hct 25.5 was 29.7 but before that was 24.0 On iron Diagnosis acute bloof loss anemia -- Needs redo Cbc and get stool guaiac and serum lipaise   Rest lab WNL Had CT scan suggestive of portal hypertension mild ascites see report Thus is Dr Felder'spatient tell pt redo CB -- do stool gauaic and seru lipsa and see him see what anemia workup he wants done

## 2020-05-29 ENCOUNTER — TELEPHONE (OUTPATIENT)
Dept: ORTHOPEDICS | Facility: CLINIC | Age: 65
End: 2020-05-29

## 2020-05-29 ENCOUNTER — PATIENT OUTREACH (OUTPATIENT)
Dept: ADMINISTRATIVE | Facility: OTHER | Age: 65
End: 2020-05-29

## 2020-05-29 ENCOUNTER — OFFICE VISIT (OUTPATIENT)
Dept: PRIMARY CARE CLINIC | Facility: CLINIC | Age: 65
End: 2020-05-29
Payer: MEDICARE

## 2020-05-29 VITALS
HEIGHT: 60 IN | RESPIRATION RATE: 18 BRPM | DIASTOLIC BLOOD PRESSURE: 64 MMHG | SYSTOLIC BLOOD PRESSURE: 118 MMHG | TEMPERATURE: 98 F | WEIGHT: 193.69 LBS | BODY MASS INDEX: 38.03 KG/M2

## 2020-05-29 DIAGNOSIS — K76.6 PORTAL HYPERTENSION: ICD-10-CM

## 2020-05-29 DIAGNOSIS — E66.01 MORBID OBESITY: ICD-10-CM

## 2020-05-29 DIAGNOSIS — F41.1 GAD (GENERALIZED ANXIETY DISORDER): ICD-10-CM

## 2020-05-29 DIAGNOSIS — K57.90 DIVERTICULOSIS: ICD-10-CM

## 2020-05-29 DIAGNOSIS — K20.90 ESOPHAGITIS WITH GASTRITIS: Primary | ICD-10-CM

## 2020-05-29 DIAGNOSIS — K29.70 ESOPHAGITIS WITH GASTRITIS: Primary | ICD-10-CM

## 2020-05-29 DIAGNOSIS — E03.9 HYPOTHYROIDISM, UNSPECIFIED TYPE: ICD-10-CM

## 2020-05-29 DIAGNOSIS — I10 ESSENTIAL HYPERTENSION: ICD-10-CM

## 2020-05-29 DIAGNOSIS — R10.13 EPIGASTRIC PAIN: ICD-10-CM

## 2020-05-29 DIAGNOSIS — Z87.898 HISTORY OF HEAVY ALCOHOL CONSUMPTION: ICD-10-CM

## 2020-05-29 DIAGNOSIS — D64.9 ANEMIA, UNSPECIFIED TYPE: ICD-10-CM

## 2020-05-29 DIAGNOSIS — R42 VERTIGO: ICD-10-CM

## 2020-05-29 DIAGNOSIS — K76.0 HEPATIC STEATOSIS: ICD-10-CM

## 2020-05-29 PROCEDURE — 3074F SYST BP LT 130 MM HG: CPT | Mod: CPTII,S$GLB,, | Performed by: FAMILY MEDICINE

## 2020-05-29 PROCEDURE — 99999 PR PBB SHADOW E&M-EST. PATIENT-LVL IV: CPT | Mod: PBBFAC,,, | Performed by: FAMILY MEDICINE

## 2020-05-29 PROCEDURE — 99999 PR PBB SHADOW E&M-EST. PATIENT-LVL IV: ICD-10-PCS | Mod: PBBFAC,,, | Performed by: FAMILY MEDICINE

## 2020-05-29 PROCEDURE — 3008F BODY MASS INDEX DOCD: CPT | Mod: CPTII,S$GLB,, | Performed by: FAMILY MEDICINE

## 2020-05-29 PROCEDURE — 99214 PR OFFICE/OUTPT VISIT, EST, LEVL IV, 30-39 MIN: ICD-10-PCS | Mod: S$GLB,,, | Performed by: FAMILY MEDICINE

## 2020-05-29 PROCEDURE — 3074F PR MOST RECENT SYSTOLIC BLOOD PRESSURE < 130 MM HG: ICD-10-PCS | Mod: CPTII,S$GLB,, | Performed by: FAMILY MEDICINE

## 2020-05-29 PROCEDURE — 99214 OFFICE O/P EST MOD 30 MIN: CPT | Mod: S$GLB,,, | Performed by: FAMILY MEDICINE

## 2020-05-29 PROCEDURE — 3008F PR BODY MASS INDEX (BMI) DOCUMENTED: ICD-10-PCS | Mod: CPTII,S$GLB,, | Performed by: FAMILY MEDICINE

## 2020-05-29 PROCEDURE — 3078F DIAST BP <80 MM HG: CPT | Mod: CPTII,S$GLB,, | Performed by: FAMILY MEDICINE

## 2020-05-29 PROCEDURE — 3078F PR MOST RECENT DIASTOLIC BLOOD PRESSURE < 80 MM HG: ICD-10-PCS | Mod: CPTII,S$GLB,, | Performed by: FAMILY MEDICINE

## 2020-05-29 RX ORDER — HYDROCODONE BITARTRATE AND ACETAMINOPHEN 5; 325 MG/1; MG/1
1 TABLET ORAL EVERY 6 HOURS PRN
Qty: 30 TABLET | Refills: 0 | Status: SHIPPED | OUTPATIENT
Start: 2020-05-29 | End: 2020-06-29

## 2020-05-29 RX ORDER — MECLIZINE HYDROCHLORIDE 25 MG/1
25 TABLET ORAL 3 TIMES DAILY PRN
Qty: 60 TABLET | Refills: 5 | Status: SHIPPED | OUTPATIENT
Start: 2020-05-29 | End: 2020-07-01 | Stop reason: SDUPTHER

## 2020-05-29 RX ORDER — OMEPRAZOLE 40 MG/1
40 CAPSULE, DELAYED RELEASE ORAL DAILY
Qty: 30 CAPSULE | Refills: 5 | Status: SHIPPED | OUTPATIENT
Start: 2020-05-29 | End: 2021-04-13

## 2020-05-29 RX ORDER — SUCRALFATE 1 G/1
TABLET ORAL
Qty: 21 TABLET | Refills: 2 | Status: SHIPPED | OUTPATIENT
Start: 2020-05-29 | End: 2021-07-22

## 2020-05-29 NOTE — PROGRESS NOTES
Subjective:       Patient ID: Lo Escalera is a 64 y.o. female.    Chief Complaint: Results    HPI:  64-year-old black female --patient needs lab results and wants colonoscopy-      Lab hematocrit 25.5on iron pills Stool occas really dark but on iron .  CT scan of the abdomen nodular hepatic contour suggestive of hepatic steatosis-small volume of ascites in spleen in the upper limits of normal suggesting portal hypertension mesenteric stranding jeancarlos hepatocytes area and pancreatic head and central mesenteric soft jeancarlos hepatocytes lymphadenopathy no pathologically enlarged lymph nodes again suggesting of portal hypertension colonic diverticulosis without diverticulitis        1-2 months ago patient was admitted was transfused 2 units of blood--patient had hematemesis at that time.  History esophageal itis and gastritis      ROS:   Skin: no psoriasis, eczema, skin cancer--no bruising  HEENT: No headache, ocular pain, blurred vision, diplopia,  hoarseness change in voice, +hypothyroidism no epistaxis  Lung: No pneumonia, asthma, Tb, wheezing, SOB, no smoking  Heart: No chest pain, ankle edema, palpitations, MI, eva murmur, hypertension, hyperlipidemia--no stent bypass arrhythmia history of hypertension but patient has been off of blood pressure medications and blood pressure has remained normal to low normal   Abdomen: No nausea, vomiting, diarrhea, constipation, ulcers, hepatitis, status post cholecystectomy, melena, hematochezia, hematemesis recent esophagitis gastritis with transfusion 2 units of blood 1-2 months ago  : no UTI, renal disease, stones  MS: no fractures, O/A, lupus, rheumatoid, gout--has 2 sisters with lupus and her father has gout  Neuro: No dizziness, LOC, seizures +vertigo  No diabetes, + anemia had to be transfused blood had upper GI bleed, + anxiety, + depression upset had to quit working because of vertigo was working at [employer]   3 children unemployed, lives with       Objective:   Physical Exam:  General: Well nourished, well developed, no acute distress+ Obesity   Skin: No lesions  HEENT: Eyes PERRLA, EOM intact, nose patent, throat non-erythematous +arcus senilis no pallor to the conjunctiva  NECK: Supple, no bruits, No JVD, no nodes  Lungs: Clear, no rales, rhonchi, wheezing  Heart: Regular rate and rhythm, no murmurs, gallops, or rubs  Abdomen: flat, bowel sounds positive, no tenderness, or organomegaly--tenderness especially in the epigastric area and left upper quadrant--slight guarding no rebound no organomegaly  MS: Range of motion and muscle strength intact problem with the knees bilaterally walks extremely stiff leg it states needs to see an orthopedist  Neuro: Alert, CN intact, oriented X 3  Extremities: No cyanosis, clubbing, or edema negative Romberg        Assessment:       1. Esophagitis with gastritis    2. Portal hypertension    3. Anemia, unspecified type    4. Hepatic steatosis    5. Epigastric pain    6. Diverticulosis    7. Morbid obesity    8. Hypothyroidism, unspecified type    9. Essential hypertension    10. Vertigo    11. History of heavy alcohol consumption    12. JHOAN (generalized anxiety disorder)        Plan:       Esophagitis with gastritis    Portal hypertension    Anemia, unspecified type  -     Ambulatory referral/consult to Hematology / Oncology; Future; Expected date: 06/05/2020  -     CBC auto differential; Future; Expected date: 06/01/2020  -     Ambulatory referral/consult to Colorectal Surgery; Future; Expected date: 06/05/2020    Hepatic steatosis    Epigastric pain    Diverticulosis    Morbid obesity    Hypothyroidism, unspecified type    Essential hypertension    Vertigo    History of heavy alcohol consumption    JHOAN (generalized anxiety disorder)    Other orders  -     HYDROcodone-acetaminophen (NORCO) 5-325 mg per tablet; Take 1 tablet by mouth every 6 (six) hours as needed.  Dispense: 30 tablet; Refill: 0  -     meclizine  (ANTIVERT) 25 mg tablet; Take 1 tablet (25 mg total) by mouth 3 (three) times daily as needed.  Dispense: 60 tablet; Refill: 5  -     sucralfate (CARAFATE) 1 gram tablet; One p.o. 1 hr a.c. breakfast 1 hr a.c. lunch 1 hr a.c. supper by Tums over-the-counter 2 p.o. 1 hr p.c. breakfast 1 hr p.c. lunch 1 hr p.c. supper  Dispense: 21 tablet; Refill: 2  -     omeprazole (PRILOSEC) 40 MG capsule; Take 1 capsule (40 mg total) by mouth once daily.  Dispense: 30 capsule; Refill: 5        Abdominal pain epigastric area and left upper quadrant--history GI bleed--transfused 2 units of blood 03/12/2020--still with epigastric pain sharp stabbing during the day--Needs taks Pepcid 40 mg q AM ?Omeprazole 40 PM Carafate 1 gm 1 hr a.c. breakfast, 1 hr a.c. lunch, 1 hr a.c. Supper--Tums 2 p.o. 1 hr p.c. breakfast 1 hr p.c. lunch 1 hr p.c. Supper----getting CT scan abdomen pelvis today---needs to see Dr. Brown or Dr Elizalde for colonoscopy--plus minus repeat EGD due to persistent pain---okay refill pain medication  --anemia hematocrit 25--history iron deficiency--GI bleed--needs see DR Barrett evaluate IM or IV iron   To help with gastritis esophagitis and GERD--avoid alcohol smoking NSAIDs caffeine stress carbonated drinks--can raise the head of the bed on a block of wood to prevent GERD--eat small meals--lay down for 1-2 hours after eating  CT scan resolved--hepatic steatosis/--portal hypertension/--mild ascites/--diverticulosis  CBC in 2 weeks ---routine lab in 3 months CBCs CMP lipids  Vertigo--sounds like orthostatic hypotension--see Dr Muller--had echocardiogram showing ejection fraction 65% otherwise norm---carotid ultrasound with no hemodynamically significant lesions right carotid 0 a 19% blockage left carotid 20-39% blockage--may need tilt-table test  Orthopedic consult knees bilaterally Dr. Stout   May need appointment with neurology--or ENT--needs MRI of the brain with and without gadolinium--did recently have CT  scan of the brain some may wait until the corona virus has subsided before MRI done continue Antivert 25 mg t.i.d.  Health maintenance was HIV tetanus pneumococcal vaccine shingles:  Mammogram

## 2020-05-29 NOTE — PROGRESS NOTES
LINKS immunization registry triggered, Care Everywhere and Health Maintenance updated.  Chart reviewed for overdue Proactive [facility] health maintenance testing.  [employer] order entered by Dr Hernández.

## 2020-06-01 ENCOUNTER — TELEPHONE (OUTPATIENT)
Dept: SURGERY | Facility: CLINIC | Age: 65
End: 2020-06-01

## 2020-06-01 DIAGNOSIS — Z12.11 SCREENING FOR COLON CANCER: Primary | ICD-10-CM

## 2020-06-01 RX ORDER — SODIUM CHLORIDE 0.9 % (FLUSH) 0.9 %
3 SYRINGE (ML) INJECTION
Status: CANCELLED | OUTPATIENT
Start: 2020-06-01

## 2020-06-01 RX ORDER — SODIUM CHLORIDE, SODIUM LACTATE, POTASSIUM CHLORIDE, CALCIUM CHLORIDE 600; 310; 30; 20 MG/100ML; MG/100ML; MG/100ML; MG/100ML
INJECTION, SOLUTION INTRAVENOUS CONTINUOUS
Status: CANCELLED | OUTPATIENT
Start: 2020-06-01

## 2020-06-01 NOTE — TELEPHONE ENCOUNTER
Called patient in reference to a referral to Colorectal Surgery for colon cancer screening. Patient verbally consented to a Colonoscopy and requested to be scheduled for a Colonoscopy on 07/06/2020. Patient was advised a designated  is required on the day of the Colonoscopy to drive the patient home and the  must be at least. 18 years old.Colonoscopy Prep instructions were thoroughly explained and discussed with the patient. Patient acknowledges understanding Prep instructions. Patient was advised the Colonoscopy Prep instructions discussed and explained on the phone , are being mailed out to the patient's address on file. Patient's address on file was verified with the patient for accuracy of mailing. Patient's medications on file was reviewed with the patient for accuracy of information. Patient denies taking  any other medications other than those listed and verified on medication profile.Patient was explained the Colonoscopy will be performed here at [facility]. Patient was further explained the Pre-Op will call one day prior to the procedure to discuss Pre-Op instructions and what time to report for the Colonoscopy. The patient was given the opportunity to ask any questions about the Colonoscopy. No further issues were discussed.

## 2020-06-01 NOTE — TELEPHONE ENCOUNTER
The patient has been advised the Colonoscopy Prep Kit will be ordered from the patient preferred verified pharmacy on file, and will be picked up by the patient, or the patient's designated representative. The patient was further explained Colonoscopy Prep instructions will be mailed to the patient verified address of accuracy on file, and the patient is to follow the Colonoscopy Prep instructions being mailed as meticulously as possible Patient will receive a follow up phone call to summarize the Colonoscopy Prep instructions, prior to the scheduled Colonoscopy procedure date, at which time the patient will be given an opportunity to ask any questions regarding the Colonoscopy Prep and procedure.

## 2020-06-02 RX ORDER — POLYETHYLENE GLYCOL 3350, SODIUM SULFATE ANHYDROUS, SODIUM BICARBONATE, SODIUM CHLORIDE, POTASSIUM CHLORIDE 236; 22.74; 6.74; 5.86; 2.97 G/4L; G/4L; G/4L; G/4L; G/4L
4 POWDER, FOR SOLUTION ORAL ONCE
Qty: 4000 ML | Refills: 0 | Status: SHIPPED | OUTPATIENT
Start: 2020-06-02 | End: 2020-06-02

## 2020-06-26 ENCOUNTER — TELEPHONE (OUTPATIENT)
Dept: HEMATOLOGY/ONCOLOGY | Facility: CLINIC | Age: 65
End: 2020-06-26

## 2020-06-26 NOTE — TELEPHONE ENCOUNTER
Patient was referred by Dr. Hernández for eval with Dr. Barrett for Anemia, I called the patient of three separate occasions with no success and no call backs. A letter was mailed to the patient as a last attempt to reach her. AE       Patient Called 06/04/2020 15:43 Prema Reyes Left Message: Details  L/V ASKING PT TO CALL THE OFFICE BACK FOR NP APT. AE       Workqueue Transferred 06/15/2020 12:30 Kimberly Gould RN WQ: [facility] CLI..., Transferred to ALL [facility] AN...      Patient Called 06/24/2020 10:32 Prema Reyes Left Message: Details  L/V # 2 TO ASK PT TO GIVE THE OFFICE A CALL BACK FOR NP APT. AE       Patient Called 06/26/2020 14:52 Prema Reyes Left Message: Details  L/V # 3 TO ATTEMPT AND SCHEDULE NP APT FOR THE LAST TIME- LETTER WILL BE SENT IN THE MAIL. AE

## 2020-06-29 ENCOUNTER — TELEPHONE (OUTPATIENT)
Dept: HEMATOLOGY/ONCOLOGY | Facility: CLINIC | Age: 65
End: 2020-06-29

## 2020-06-29 NOTE — TELEPHONE ENCOUNTER
----- Message from Inga Patel, Patient Care Assistant sent at 6/26/2020  3:15 PM CDT -----  Patient called in stating she was returning your call. She can be reached at [phone]

## 2020-07-01 DIAGNOSIS — K20.90 ESOPHAGITIS WITH GASTRITIS: ICD-10-CM

## 2020-07-01 DIAGNOSIS — K29.70 ESOPHAGITIS WITH GASTRITIS: ICD-10-CM

## 2020-07-01 DIAGNOSIS — K92.2 UGIB (UPPER GASTROINTESTINAL BLEED): ICD-10-CM

## 2020-07-01 RX ORDER — PANTOPRAZOLE SODIUM 40 MG/1
40 TABLET, DELAYED RELEASE ORAL 2 TIMES DAILY
Qty: 60 TABLET | Refills: 5 | Status: SHIPPED | OUTPATIENT
Start: 2020-07-01 | End: 2020-11-25 | Stop reason: SDUPTHER

## 2020-07-01 RX ORDER — MECLIZINE HYDROCHLORIDE 25 MG/1
25 TABLET ORAL 3 TIMES DAILY PRN
Qty: 60 TABLET | Refills: 5 | Status: SHIPPED | OUTPATIENT
Start: 2020-07-01 | End: 2021-07-21 | Stop reason: SDUPTHER

## 2020-07-01 RX ORDER — MECLIZINE HYDROCHLORIDE 25 MG/1
25 TABLET ORAL 3 TIMES DAILY PRN
Qty: 60 TABLET | Refills: 5 | Status: SHIPPED | OUTPATIENT
Start: 2020-07-01 | End: 2020-07-01 | Stop reason: SDUPTHER

## 2020-07-01 NOTE — TELEPHONE ENCOUNTER
"----- Message from Roland Royal sent at 7/1/2020 12:17 PM CDT -----  Regarding: Rx refill  Contact: [facility] [phone]  RX request - refill or new RX.  Is this a refill or new RX:  Refill   RX name and strength: meclizine (ANTIVERT) 25 mg tablet  Directions:   Is this a 30 day or 90 day RX:    Pharmacy name and phone # [facility] fax [phone] Contact  [phone]    Comments:  4 Rx's requesting     RX request - refill or new RX.  Is this a refill or new RX:    RX name and strength: Peg [address]  Directions:   Is this a 30 day or 90 day RX:    Pharmacy name and phone # (DON'T enter "on file" or "in chart"):   Comments:      RX request - refill or new RX.  Is this a refill or new RX:  Refill  RX name and strength: pantoprazole (PROTONIX) 40 MG tablet  Directions:   Is this a 30 day or 90 day RX:    Pharmacy name and phone # (DON'T enter "on file" or "in chart"):   Comments:      RX request - refill or new RX.  Is this a refill or new RX:  Refill   RX name and strength:  sucralfate (CARAFATE) 1 gram tablet   Directions:   Is this a 30 day or 90 day RX:    Pharmacy name and phone # (DON'T enter "on file" or "in chart"):   Comments:      Please call an advise  Thank you      "

## 2020-07-09 ENCOUNTER — TELEPHONE (OUTPATIENT)
Dept: PRIMARY CARE CLINIC | Facility: CLINIC | Age: 65
End: 2020-07-09

## 2020-07-09 NOTE — TELEPHONE ENCOUNTER
Patient called in asking for the results of her colonoscopy results and to say she was having abdominal pain since. I instructed her to reach out to her GI, she states understanding

## 2020-07-09 NOTE — TELEPHONE ENCOUNTER
----- Message from Red Banda sent at 7/9/2020  2:06 PM CDT -----  Contact: Self [phone]  Patient would like an call back form the nurse in regards to Test results from Monday and medication, please advise.

## 2020-07-13 ENCOUNTER — TELEPHONE (OUTPATIENT)
Dept: GASTROENTEROLOGY | Facility: CLINIC | Age: 65
End: 2020-07-13

## 2020-07-13 NOTE — TELEPHONE ENCOUNTER
----- Message from Mariluz Bradley sent at 7/13/2020  1:02 PM CDT -----  Type:  Needs Medical Advice    Who Called: self  Reason:concerning procedure she had last Monday and she is having pain in her stomach  Would the patient rather a call back or a response via [facility]? callback  Best Call Back Number: [phone]  Additional Information: none

## 2020-07-13 NOTE — TELEPHONE ENCOUNTER
Patient stated that she has abd pain. She stated that this pain started prior to the colonoscopy. Patient stated that she would like pain medication for it. I informed pt that she would have to follow up with Dr. Cornejo. Appt scheduled on 7/21/20 at 3:00pm. Patient has been advised to proceed to the ED if pain worsens.

## 2020-07-16 ENCOUNTER — PATIENT OUTREACH (OUTPATIENT)
Dept: ADMINISTRATIVE | Facility: OTHER | Age: 65
End: 2020-07-16

## 2020-07-16 NOTE — PROGRESS NOTES
Requested updates within Care Everywhere.  Patient's chart was reviewed for overdue ABHI topics.  Immunizations reconciled.    Orders placed:  Tasked appts:  Labs Linked:

## 2020-07-16 NOTE — PROGRESS NOTES
"[facility] Hematolgy/Oncology  History & Physical    Subjective:      Patient ID:   NAME: Lo Escalera : 1955     64 y.o. female    Referring Doc: Jose Hernández MD  Other Physicians: Clemente (GI); Dunia (IM)        Chief Complaint: anemia      HPI:  64 y.o. female with diagnosis of chronic anemia who has been referred by Jose Hernández MD for evaluation by medical hematology/oncology. She is here by herself. She has diverticulitis and has been having some stomach/abdominal issues for about 2-3 months. She had colonoscopy last week on 2020 with Dr Leander Cornejo with GI. She had three "benign" polyps removed. She has had anemia issues for some time. She is on oral iron and tolerates it ok. She has some occasional dark stools but no BRBPR. She had some hematemesis a couple of months ago. She had an EGD couple months ago on 3/9/2020 under Dr Deion Duque.              ROS:   GEN: normal without any fever, night sweats or weight loss; back pain  HEENT: normal with no HA's, sore throat, stiff neck, changes in vision  CV: normal with no CP, SOB, PND, MAURER or orthopnea  PULM: normal with no SOB, cough, hemoptysis, sputum or pleuritic pain  GI:  Hematemesis couple of months ago and dark stools; abdominal/stomach pains  : normal with no hematuria, dysuria  BREAST: normal with no mass, discharge, pain  SKIN: normal with no rash, erythema, bruising, or swelling       Past Medical/Surgical History:  Past Medical History:   Diagnosis Date    Anxiety     Diverticulosis     Gastric ulcer     old    GERD (gastroesophageal reflux disease)     Hepatic steatosis 2016    Hyperbilirubinemia     Hypertension     Hypothyroidism     Peptic ulcer disease 2016    UPJ (ureteropelvic junction) obstruction     Vertigo      Past Surgical History:   Procedure Laterality Date    APPENDECTOMY      CHOLECYSTECTOMY      COLONOSCOPY N/A 2020    Procedure: COLONOSCOPY;  Surgeon: Leander Cornejo MD;  " Location: [facility];  Service: Colon and Rectal;  Laterality: N/A;    ESOPHAGOGASTRODUODENOSCOPY N/A 6/12/2019    Procedure: ESOPHAGOGASTRODUODENOSCOPY (EGD);  Surgeon: Arben Brown MD;  Location: [facility];  Service: Endoscopy;  Laterality: N/A;    ESOPHAGOGASTRODUODENOSCOPY N/A 3/9/2020    Procedure: EGD (ESOPHAGOGASTRODUODENOSCOPY);  Surgeon: Andrea Salomon MD;  Location: [facility];  Service: Endoscopy;  Laterality: N/A;    HYSTERECTOMY      PANCREAS SURGERY      TONSILLECTOMY           Allergies:  Review of patient's allergies indicates:   Allergen Reactions    Codeine Itching    Iodine and iodide containing products Itching       Social/Family History:  Social History     Socioeconomic History    Marital status:      Spouse name: Not on file    Number of children: Not on file    Years of education: Not on file    Highest education level: Not on file   Occupational History    Not on file   Social Needs    Financial resource strain: Not on file    Food insecurity     Worry: Not on file     Inability: Not on file    Transportation needs     Medical: Not on file     Non-medical: Not on file   Tobacco Use    Smoking status: Never Smoker    Smokeless tobacco: Never Used   Substance and Sexual Activity    Alcohol use: No     Comment: 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  However, no consumption in 20 years.    Drug use: No    Sexual activity: Not on file   Lifestyle    Physical activity     Days per week: Not on file     Minutes per session: Not on file    Stress: Not on file   Relationships    Social connections     Talks on phone: Not on file     Gets together: Not on file     Attends Latter day service: Not on file     Active member of club or organization: Not on file     Attends meetings of clubs or organizations: Not on file     Relationship status: Not on file   Other Topics Concern    Not on file   Social History Narrative    Not on file     History reviewed. No pertinent family  "history.      Medications:    Current Outpatient Medications:     famotidine (PEPCID) 40 MG tablet, Take 1 tablet (40 mg total) by mouth once daily., Disp: 30 tablet, Rfl: 11    ferrous sulfate (FEOSOL) 325 mg (65 mg iron) Tab tablet, TAKE 1 TABLET (325 MG TOTAL) BY MOUTH ONCE DAILY., Disp: 90 tablet, Rfl: 0    meclizine (ANTIVERT) 25 mg tablet, Take 1 tablet (25 mg total) by mouth 3 (three) times daily as needed., Disp: 60 tablet, Rfl: 5    omeprazole (PRILOSEC) 40 MG capsule, Take 1 capsule (40 mg total) by mouth once daily., Disp: 30 capsule, Rfl: 5    pantoprazole (PROTONIX) 40 MG tablet, Take 1 tablet (40 mg total) by mouth 2 (two) times daily., Disp: 60 tablet, Rfl: 5    potassium 99 mg Tab, Take by mouth once daily., Disp: , Rfl:     sucralfate (CARAFATE) 1 gram tablet, One p.o. 1 hr a.c. breakfast 1 hr a.c. lunch 1 hr a.c. supper by Tums over-the-counter 2 p.o. 1 hr p.c. breakfast 1 hr p.c. lunch 1 hr p.c. supper, Disp: 21 tablet, Rfl: 2      Pathology:  Cancer Staging  No matching staging information was found for the patient.      Objective:   Vitals:  Blood pressure (!) 175/75, pulse (!) 116, temperature 97.3 °F (36.3 °C), resp. rate 20, height 5' 1.5" (1.562 m), weight 87.7 kg (193 lb 4.8 oz).    Physical Examination:   GEN: no apparent distress, comfortable; AAOx3  HEAD: atraumatic and normocephalic  EYES: no pallor, no icterus, PERRLA  ENT: OMM, no pharyngeal erythema, external ears WNL; no nasal discharge; no thrush  NECK: no masses, thyroid normal, trachea midline, no LAD/LN's, supple  CV: RRR with no murmur; normal pulse; normal S1 and S2; no pedal edema  CHEST: Normal respiratory effort; CTAB; normal breath sounds; no wheeze or crackles  ABDOM: nontender and nondistended; soft; normal bowel sounds; no rebound/guarding  MUSC/Skeletal: ROM normal; no crepitus; joints normal; no deformities or arthropathy  EXTREM: no clubbing, cyanosis, inflammation or swelling  SKIN: no rashes, lesions, " ulcers, petechiae or subcutaneous nodules  : no snow  NEURO: grossly intact; motor/sensory WNL; AAOx3; no tremors  PSYCH: normal mood, affect and behavior  LYMPH: normal cervical, supraclavicular, axillary and groin LN's      Labs:   Lab Results   Component Value Date    WBC 5.70 06/04/2020    HGB 12.4 06/04/2020    HCT 37.8 06/04/2020    MCV 78 (L) 06/04/2020     06/04/2020    CMP  Sodium   Date Value Ref Range Status   05/15/2020 140 136 - 145 mmol/L Final     Potassium   Date Value Ref Range Status   05/15/2020 3.5 3.5 - 5.1 mmol/L Final     Chloride   Date Value Ref Range Status   05/15/2020 107 101 - 111 mmol/L Final     CO2   Date Value Ref Range Status   05/15/2020 20 (L) 23 - 29 mmol/L Final     Glucose   Date Value Ref Range Status   05/15/2020 94 74 - 118 mg/dL Final     BUN, Bld   Date Value Ref Range Status   05/15/2020 11 8 - 23 mg/dL Final     Creatinine   Date Value Ref Range Status   05/15/2020 0.8 0.5 - 1.4 mg/dL Final     Calcium   Date Value Ref Range Status   05/15/2020 9.4 8.6 - 10.0 mg/dL Final     Total Protein   Date Value Ref Range Status   05/15/2020 7.1 6.0 - 8.4 g/dL Final     Albumin   Date Value Ref Range Status   05/15/2020 3.2 (L) 3.5 - 5.2 g/dL Final     Total Bilirubin   Date Value Ref Range Status   05/15/2020 1.1 0.3 - 1.2 mg/dL Final     Comment:     For infants and newborns, interpretation of results should be based  on gestational age, weight and in agreement with clinical  observations.  Premature Infant recommended reference ranges:  Up to 24 hours.............<8.0 mg/dL  Up to 48 hours............<12.0 mg/dL  3-5 days..................<15.0 mg/dL  6-29 days.................<15.0 mg/dL       Alkaline Phosphatase   Date Value Ref Range Status   05/15/2020 71 38 - 126 U/L Final     AST   Date Value Ref Range Status   05/15/2020 33 15 - 41 U/L Final     ALT   Date Value Ref Range Status   05/15/2020 10 (L) 14 - 54 U/L Final     Anion Gap   Date Value Ref Range Status  "  05/15/2020 13 8 - 16 mmol/L Final     eGFR if    Date Value Ref Range Status   05/15/2020 >60.0 >60 mL/min/1.73 m^2 Final     eGFR if non    Date Value Ref Range Status   05/15/2020 >60.0 >60 mL/min/1.73 m^2 Final     Comment:     Calculation used to obtain the estimated glomerular filtration  rate (eGFR) is the CKD-EPI equation.            Radiology/Diagnostic Studies:    Ct abdom/pelvis  5/15/2020  Impression:     Nodular hepatic contour, suggesting steatosis.  Small volume ascites and spleen at upper limits of normal, suggesting portal hypertension.     Mesenteric stranding in the region of the jeancarlos hepatis and pancreatic head and central mesenteric soft jeancarlos hepatis lymphadenopathy.  No pathologically enlarged lymph nodes.  Findings are nonspecific and may be seen in the setting of portal hypertension.  Recommend correlation with pancreatic enzymes to evaluate for pancreatitis.     Colonic diverticulosis without diverticulitis.      All lab results and imaging results have been reviewed and discussed with the patient    Assessment:   (1) 64 y.o. female with diagnosis of chronic anemia referred by Dr Hernández  - suspect multifactorial anemia with underlying iron defic due to chronic GI blood loss  - she is on iron pills  - latest Hgb is wnl at 12.4  - MCV was low at 78    - diverticulitis and has been having some stomach/abdominal issues for about 2-3 months.   - She had colonoscopy last week on 7/6/2020 with Dr Leander Cornejo with GI. She had three "benign" polyps removed.   - She has had anemia issues for some time.   - She is on oral iron and tolerates it ok. She has some occasional dark stools but no BRBPR. She had some hematemesis a couple of months ago.  -  She had an EGD couple months ago on 3/9/2020 under direction Dr Deion Duque.      (2) GERD and ulcers with prior hx/of upper GIB, hepatic steatosis; portal HTN; diverticulosis; esophagitis  -nodular liver ans mild " splenomegaly  - ? Pancreatic  -     (3) HTN    (4) Hx/of alcoholism - she stopped several years ago    (5) Hypothyroidism    (6) JHOAN        VISIT DIAGNOSES:              Thrombocytopenia    Anemia, unspecified type  -     Ambulatory referral/consult to Hematology / Oncology    History of heavy alcohol consumption            Plan:     PLAN:  1. check iron panel, vitamin panel, retic, stool OBS  2. Check amylase and lipase, /AFP  3. F/u with GI - does she need to see a hepatologist?  4. F/u with PCP  RTC in  3-4 weeks   Fax note to Jose Hernández MD; Dunia Cornejo        I have explained and the patient understands all of  the current recommendation(s). I have answered all of their questions to the best of my ability and to their complete satisfaction.     COVID-19 Discussion:    I had long discussion with patient and any applicable family about the COVID-19 coronavirus epidemic and the recommended precautions with regard to cancer and/or hematology patients. I have re-iterated the CDC recommendations for adequate hand washing, use of hand -like products, and coughing into elbow, etc. In addition, especially for our patients who are on chemotherapy and/or our otherwise immunocompromised patients, I have recommended avoidance of crowds, including movie theaters, restaurants, churches, etc. I have recommended avoidance of any sick or symptomatic family members and/or friends. I have also recommended avoidance of any raw and unwashed food products, and general avoidance of food items that have not been prepared by themselves. The patient has been asked to call us immediately with any symptom developments, issues, questions or other general concerns.           Thank you for allowing me to participate in this patient's care. Please call with any questions or concerns.    Electronically signed Ck Barrett MD

## 2020-07-17 ENCOUNTER — OFFICE VISIT (OUTPATIENT)
Dept: HEMATOLOGY/ONCOLOGY | Facility: CLINIC | Age: 65
End: 2020-07-17
Payer: MEDICARE

## 2020-07-17 VITALS
TEMPERATURE: 97 F | SYSTOLIC BLOOD PRESSURE: 175 MMHG | DIASTOLIC BLOOD PRESSURE: 75 MMHG | WEIGHT: 193.31 LBS | HEART RATE: 116 BPM | BODY MASS INDEX: 35.57 KG/M2 | RESPIRATION RATE: 20 BRPM | HEIGHT: 62 IN

## 2020-07-17 DIAGNOSIS — D69.6 THROMBOCYTOPENIA: Primary | ICD-10-CM

## 2020-07-17 DIAGNOSIS — Z87.898 HISTORY OF HEAVY ALCOHOL CONSUMPTION: ICD-10-CM

## 2020-07-17 DIAGNOSIS — R97.8 OTHER ABNORMAL TUMOR MARKERS: ICD-10-CM

## 2020-07-17 DIAGNOSIS — R10.10 PAIN OF UPPER ABDOMEN: ICD-10-CM

## 2020-07-17 DIAGNOSIS — K70.31 ALCOHOLIC CIRRHOSIS OF LIVER WITH ASCITES: ICD-10-CM

## 2020-07-17 DIAGNOSIS — D64.9 ANEMIA, UNSPECIFIED TYPE: ICD-10-CM

## 2020-07-17 PROCEDURE — 3008F PR BODY MASS INDEX (BMI) DOCUMENTED: ICD-10-PCS | Mod: S$GLB,,, | Performed by: INTERNAL MEDICINE

## 2020-07-17 PROCEDURE — 3077F SYST BP >= 140 MM HG: CPT | Mod: S$GLB,,, | Performed by: INTERNAL MEDICINE

## 2020-07-17 PROCEDURE — 3078F DIAST BP <80 MM HG: CPT | Mod: S$GLB,,, | Performed by: INTERNAL MEDICINE

## 2020-07-17 PROCEDURE — 3077F PR MOST RECENT SYSTOLIC BLOOD PRESSURE >= 140 MM HG: ICD-10-PCS | Mod: S$GLB,,, | Performed by: INTERNAL MEDICINE

## 2020-07-17 PROCEDURE — 99203 PR OFFICE/OUTPT VISIT, NEW, LEVL III, 30-44 MIN: ICD-10-PCS | Mod: S$GLB,,, | Performed by: INTERNAL MEDICINE

## 2020-07-17 PROCEDURE — 3008F BODY MASS INDEX DOCD: CPT | Mod: S$GLB,,, | Performed by: INTERNAL MEDICINE

## 2020-07-17 PROCEDURE — 99203 OFFICE O/P NEW LOW 30 MIN: CPT | Mod: S$GLB,,, | Performed by: INTERNAL MEDICINE

## 2020-07-17 PROCEDURE — 3078F PR MOST RECENT DIASTOLIC BLOOD PRESSURE < 80 MM HG: ICD-10-PCS | Mod: S$GLB,,, | Performed by: INTERNAL MEDICINE

## 2020-07-21 ENCOUNTER — TELEPHONE (OUTPATIENT)
Dept: GASTROENTEROLOGY | Facility: CLINIC | Age: 65
End: 2020-07-21

## 2020-07-21 NOTE — TELEPHONE ENCOUNTER
Patient would like to reschedule appt. Appointment rescheduled to 7/31/20 at 11:30am. Verbal Understanding.

## 2020-07-21 NOTE — TELEPHONE ENCOUNTER
----- Message from Kam Levy sent at 7/21/2020 12:29 PM CDT -----  Contact: Patient  Pt called and would like to reschedule her appt    (system will not allow me to reschedule)    Pt can be reached at [phone]

## 2020-07-25 ENCOUNTER — TELEPHONE ENCOUNTER (OUTPATIENT)
Dept: URBAN - METROPOLITAN AREA CLINIC 13 | Facility: CLINIC | Age: 65
End: 2020-07-25

## 2020-07-26 ENCOUNTER — TELEPHONE ENCOUNTER (OUTPATIENT)
Dept: URBAN - METROPOLITAN AREA CLINIC 13 | Facility: CLINIC | Age: 65
End: 2020-07-26

## 2020-07-29 ENCOUNTER — PATIENT OUTREACH (OUTPATIENT)
Dept: ADMINISTRATIVE | Facility: OTHER | Age: 65
End: 2020-07-29

## 2020-07-31 ENCOUNTER — OFFICE VISIT (OUTPATIENT)
Dept: GASTROENTEROLOGY | Facility: CLINIC | Age: 65
End: 2020-07-31
Payer: MEDICARE

## 2020-07-31 ENCOUNTER — OFFICE VISIT (OUTPATIENT)
Dept: PRIMARY CARE CLINIC | Facility: CLINIC | Age: 65
End: 2020-07-31
Payer: MEDICARE

## 2020-07-31 ENCOUNTER — TELEPHONE (OUTPATIENT)
Dept: PRIMARY CARE CLINIC | Facility: CLINIC | Age: 65
End: 2020-07-31

## 2020-07-31 ENCOUNTER — LAB VISIT (OUTPATIENT)
Dept: LAB | Facility: HOSPITAL | Age: 65
End: 2020-07-31
Attending: INTERNAL MEDICINE
Payer: MEDICARE

## 2020-07-31 DIAGNOSIS — K20.80 LOS ANGELES GRADE C ESOPHAGITIS: Primary | ICD-10-CM

## 2020-07-31 DIAGNOSIS — R10.13 EPIGASTRIC PAIN: ICD-10-CM

## 2020-07-31 DIAGNOSIS — K74.60 HEPATIC CIRRHOSIS, UNSPECIFIED HEPATIC CIRRHOSIS TYPE, UNSPECIFIED WHETHER ASCITES PRESENT: ICD-10-CM

## 2020-07-31 DIAGNOSIS — K70.31 ALCOHOLIC CIRRHOSIS OF LIVER WITH ASCITES: ICD-10-CM

## 2020-07-31 DIAGNOSIS — I10 ESSENTIAL HYPERTENSION: ICD-10-CM

## 2020-07-31 DIAGNOSIS — R97.8 OTHER ABNORMAL TUMOR MARKERS: ICD-10-CM

## 2020-07-31 DIAGNOSIS — F41.1 GAD (GENERALIZED ANXIETY DISORDER): ICD-10-CM

## 2020-07-31 DIAGNOSIS — D64.9 ANEMIA, UNSPECIFIED TYPE: ICD-10-CM

## 2020-07-31 DIAGNOSIS — Z87.898 HISTORY OF HEAVY ALCOHOL CONSUMPTION: ICD-10-CM

## 2020-07-31 DIAGNOSIS — R10.10 PAIN OF UPPER ABDOMEN: ICD-10-CM

## 2020-07-31 DIAGNOSIS — D69.6 THROMBOCYTOPENIA: ICD-10-CM

## 2020-07-31 DIAGNOSIS — J01.01 ACUTE RECURRENT MAXILLARY SINUSITIS: Primary | ICD-10-CM

## 2020-07-31 DIAGNOSIS — H92.01 RIGHT EAR PAIN: ICD-10-CM

## 2020-07-31 DIAGNOSIS — R10.9 ABDOMINAL WALL PAIN: ICD-10-CM

## 2020-07-31 DIAGNOSIS — R42 VERTIGO: ICD-10-CM

## 2020-07-31 LAB
AFP SERPL-MCNC: 5.1 NG/ML (ref 0–8.4)
ALBUMIN SERPL BCP-MCNC: 3.3 G/DL (ref 3.5–5.2)
ALP SERPL-CCNC: 99 U/L (ref 55–135)
ALT SERPL W/O P-5'-P-CCNC: 10 U/L (ref 10–44)
AMYLASE SERPL-CCNC: 31 U/L (ref 20–110)
ANION GAP SERPL CALC-SCNC: 8 MMOL/L (ref 8–16)
ANISOCYTOSIS BLD QL SMEAR: ABNORMAL
AST SERPL-CCNC: 33 U/L (ref 10–40)
BASOPHILS # BLD AUTO: 0.04 K/UL (ref 0–0.2)
BASOPHILS NFR BLD: 0.8 % (ref 0–1.9)
BILIRUB SERPL-MCNC: 1.3 MG/DL (ref 0.1–1)
BUN SERPL-MCNC: 7 MG/DL (ref 8–23)
CALCIUM SERPL-MCNC: 8.7 MG/DL (ref 8.7–10.5)
CANCER AG19-9 SERPL-ACNC: 7 U/ML (ref 2–40)
CHLORIDE SERPL-SCNC: 105 MMOL/L (ref 95–110)
CO2 SERPL-SCNC: 24 MMOL/L (ref 23–29)
CREAT SERPL-MCNC: 0.9 MG/DL (ref 0.5–1.4)
DIFFERENTIAL METHOD: ABNORMAL
EOSINOPHIL # BLD AUTO: 0 K/UL (ref 0–0.5)
EOSINOPHIL NFR BLD: 0.2 % (ref 0–8)
ERYTHROCYTE [DISTWIDTH] IN BLOOD BY AUTOMATED COUNT: 19 % (ref 11.5–14.5)
EST. GFR  (AFRICAN AMERICAN): >60 ML/MIN/1.73 M^2
EST. GFR  (NON AFRICAN AMERICAN): >60 ML/MIN/1.73 M^2
FERRITIN SERPL-MCNC: 10 NG/ML (ref 20–300)
FOLATE SERPL-MCNC: 11.5 NG/ML (ref 4–24)
GIANT PLATELETS BLD QL SMEAR: PRESENT
GLUCOSE SERPL-MCNC: 100 MG/DL (ref 70–110)
HCT VFR BLD AUTO: 21.1 % (ref 37–48.5)
HGB BLD-MCNC: 6.2 G/DL (ref 12–16)
IMM GRANULOCYTES # BLD AUTO: 0.02 K/UL (ref 0–0.04)
IMM GRANULOCYTES NFR BLD AUTO: 0.4 % (ref 0–0.5)
IRON SERPL-MCNC: 11 UG/DL (ref 30–160)
LIPASE SERPL-CCNC: 6 U/L (ref 4–60)
LYMPHOCYTES # BLD AUTO: 0.9 K/UL (ref 1–4.8)
LYMPHOCYTES NFR BLD: 19.7 % (ref 18–48)
MCH RBC QN AUTO: 21.6 PG (ref 27–31)
MCHC RBC AUTO-ENTMCNC: 29.4 G/DL (ref 32–36)
MCV RBC AUTO: 74 FL (ref 82–98)
MONOCYTES # BLD AUTO: 0.4 K/UL (ref 0.3–1)
MONOCYTES NFR BLD: 8.6 % (ref 4–15)
NEUTROPHILS # BLD AUTO: 3.3 K/UL (ref 1.8–7.7)
NEUTROPHILS NFR BLD: 70.3 % (ref 38–73)
NRBC BLD-RTO: 0 /100 WBC
OVALOCYTES BLD QL SMEAR: ABNORMAL
PLATELET # BLD AUTO: 76 K/UL (ref 150–350)
PLATELET BLD QL SMEAR: ABNORMAL
PMV BLD AUTO: ABNORMAL FL (ref 9.2–12.9)
POIKILOCYTOSIS BLD QL SMEAR: SLIGHT
POLYCHROMASIA BLD QL SMEAR: ABNORMAL
POTASSIUM SERPL-SCNC: 2.9 MMOL/L (ref 3.5–5.1)
PROT SERPL-MCNC: 7.4 G/DL (ref 6–8.4)
RBC # BLD AUTO: 2.87 M/UL (ref 4–5.4)
RETICS/RBC NFR AUTO: 1.8 % (ref 0.5–2.5)
SATURATED IRON: 2 % (ref 20–50)
SCHISTOCYTES BLD QL SMEAR: ABNORMAL
SODIUM SERPL-SCNC: 137 MMOL/L (ref 136–145)
TOTAL IRON BINDING CAPACITY: 444 UG/DL (ref 250–450)
TRANSFERRIN SERPL-MCNC: 300 MG/DL (ref 200–375)
VIT B12 SERPL-MCNC: 855 PG/ML (ref 210–950)
WBC # BLD AUTO: 4.76 K/UL (ref 3.9–12.7)

## 2020-07-31 PROCEDURE — 80053 COMPREHEN METABOLIC PANEL: CPT | Mod: HCNC

## 2020-07-31 PROCEDURE — 84165 PROTEIN E-PHORESIS SERUM: CPT | Mod: 26,HCNC,, | Performed by: PATHOLOGY

## 2020-07-31 PROCEDURE — 82105 ALPHA-FETOPROTEIN SERUM: CPT | Mod: HCNC

## 2020-07-31 PROCEDURE — 99999 PR PBB SHADOW E&M-EST. PATIENT-LVL III: CPT | Mod: PBBFAC,HCNC,, | Performed by: INTERNAL MEDICINE

## 2020-07-31 PROCEDURE — 82150 ASSAY OF AMYLASE: CPT | Mod: HCNC

## 2020-07-31 PROCEDURE — 82607 VITAMIN B-12: CPT | Mod: HCNC

## 2020-07-31 PROCEDURE — 83690 ASSAY OF LIPASE: CPT | Mod: HCNC

## 2020-07-31 PROCEDURE — 82728 ASSAY OF FERRITIN: CPT | Mod: HCNC

## 2020-07-31 PROCEDURE — 84165 PROTEIN E-PHORESIS SERUM: CPT | Mod: HCNC

## 2020-07-31 PROCEDURE — 99214 PR OFFICE/OUTPT VISIT, EST, LEVL IV, 30-39 MIN: ICD-10-PCS | Mod: HCNC,S$GLB,, | Performed by: INTERNAL MEDICINE

## 2020-07-31 PROCEDURE — 85025 COMPLETE CBC W/AUTO DIFF WBC: CPT | Mod: HCNC

## 2020-07-31 PROCEDURE — 83540 ASSAY OF IRON: CPT | Mod: HCNC

## 2020-07-31 PROCEDURE — 99442 PR PHYSICIAN TELEPHONE EVALUATION 11-20 MIN: ICD-10-PCS | Mod: HCNC,95,, | Performed by: FAMILY MEDICINE

## 2020-07-31 PROCEDURE — 80321 ALCOHOLS BIOMARKERS 1OR 2: CPT | Mod: HCNC

## 2020-07-31 PROCEDURE — 99499 UNLISTED E&M SERVICE: CPT | Mod: S$GLB,,, | Performed by: INTERNAL MEDICINE

## 2020-07-31 PROCEDURE — 84165 PATHOLOGIST INTERPRETATION SPE: ICD-10-PCS | Mod: 26,HCNC,, | Performed by: PATHOLOGY

## 2020-07-31 PROCEDURE — 99442 PR PHYSICIAN TELEPHONE EVALUATION 11-20 MIN: CPT | Mod: HCNC,95,, | Performed by: FAMILY MEDICINE

## 2020-07-31 PROCEDURE — 83020 HEMOGLOBIN ELECTROPHORESIS: CPT | Mod: HCNC

## 2020-07-31 PROCEDURE — 99214 OFFICE O/P EST MOD 30 MIN: CPT | Mod: HCNC,S$GLB,, | Performed by: INTERNAL MEDICINE

## 2020-07-31 PROCEDURE — 36415 COLL VENOUS BLD VENIPUNCTURE: CPT | Mod: HCNC

## 2020-07-31 PROCEDURE — 82746 ASSAY OF FOLIC ACID SERUM: CPT | Mod: HCNC

## 2020-07-31 PROCEDURE — 99499 RISK ADDL DX/OHS AUDIT: ICD-10-PCS | Mod: S$GLB,,, | Performed by: INTERNAL MEDICINE

## 2020-07-31 PROCEDURE — 99999 PR PBB SHADOW E&M-EST. PATIENT-LVL III: ICD-10-PCS | Mod: PBBFAC,HCNC,, | Performed by: INTERNAL MEDICINE

## 2020-07-31 PROCEDURE — 86301 IMMUNOASSAY TUMOR CA 19-9: CPT | Mod: HCNC

## 2020-07-31 PROCEDURE — 85045 AUTOMATED RETICULOCYTE COUNT: CPT | Mod: HCNC

## 2020-07-31 RX ORDER — METHYLPREDNISOLONE 4 MG/1
TABLET ORAL
Qty: 1 PACKAGE | Refills: 0 | Status: SHIPPED | OUTPATIENT
Start: 2020-07-31 | End: 2021-03-02

## 2020-07-31 RX ORDER — DIPHENHYDRAMINE HCL 50 MG
50 CAPSULE ORAL ONCE
Qty: 1 CAPSULE | Refills: 0 | Status: SHIPPED | OUTPATIENT
Start: 2020-07-31 | End: 2020-07-31

## 2020-07-31 RX ORDER — AZITHROMYCIN 250 MG/1
TABLET, FILM COATED ORAL
Qty: 6 TABLET | Refills: 0 | Status: SHIPPED | OUTPATIENT
Start: 2020-07-31 | End: 2020-08-04

## 2020-07-31 RX ORDER — PREDNISONE 50 MG/1
50 TABLET ORAL 3 TIMES DAILY
Qty: 3 TABLET | Refills: 0 | Status: SHIPPED | OUTPATIENT
Start: 2020-07-31 | End: 2020-07-31

## 2020-07-31 RX ORDER — LORATADINE 10 MG/1
10 TABLET ORAL DAILY
Qty: 30 TABLET | Refills: 5 | Status: SHIPPED | OUTPATIENT
Start: 2020-07-31 | End: 2021-07-28

## 2020-07-31 RX ORDER — TRAMADOL HYDROCHLORIDE 50 MG/1
50 TABLET ORAL EVERY 6 HOURS PRN
Qty: 30 TABLET | Refills: 0 | Status: SHIPPED | OUTPATIENT
Start: 2020-07-31 | End: 2020-08-10

## 2020-07-31 NOTE — PROGRESS NOTES
GASTROENTEROLOGY CLINIC NOTE    Reason for visit: The primary encounter diagnosis was [place] grade C esophagitis. Diagnoses of Epigastric pain and Hepatic cirrhosis, unspecified hepatic cirrhosis type, unspecified whether ascites present were also pertinent to this visit.  Referring provider/PCP: Des Felder MD    HPI:  Lo Escalera is a 65 y.o. female here today for abd pain, hx of reflux as well.    Patient has had longstanding upper abdominal pain.  She has been taking Protonix but has not been taking it as prescribed.  She states she ran out and just recently got a new supply.  She states she just recently started taking it twice a day.  This is somewhat helping the pain.  Although the pain is not completely relieved.  She has tenderness over the epigastric area on light to palpation.  There is no vomiting.  She sometimes has pain over her left flank as well.  She has a very large scar in that area.  She is unsure about a diagnosis of cirrhosis as she has never heard of this before.  She is not sure that she was told about her severe reflux esophagitis either.  We did just go over her colonoscopy that I did about a month ago with some small adenomatous polyps.  She states no further etoh but was prior heavy etoh user.      Prior Endoscopy:  EGD:  3/20 adriel  Severe grade C esophagitis    Colon:  7/20 with me  3 TAs  Repeat 3 years    (Portions of this note were dictated using voice recognition software and may contain dictation related errors in spelling/grammar/syntax not found on text review)    Review of Systems   Constitutional: Negative for fever and weight loss.   HENT: Negative for nosebleeds and sore throat.    Eyes: Negative for double vision and photophobia.   Respiratory: Negative for cough and shortness of breath.    Cardiovascular: Negative for chest pain and leg swelling.   Gastrointestinal: Positive for abdominal pain and heartburn. Negative for blood in stool.    Genitourinary: Negative for dysuria and hematuria.   Musculoskeletal: Negative for joint pain and neck pain.   Skin: Negative for itching and rash.   Neurological: Negative for dizziness and headaches.   Psychiatric/Behavioral: Negative for hallucinations. The patient does not have insomnia.        Past Medical History: has a past medical history of Anxiety, Diverticulosis, Gastric ulcer, GERD (gastroesophageal reflux disease), Hepatic steatosis, Hyperbilirubinemia, Hypertension, Hypothyroidism, Peptic ulcer disease, UPJ (ureteropelvic junction) obstruction, and Vertigo.    Past Surgical History: has a past surgical history that includes Hysterectomy; Appendectomy; Cholecystectomy; Tonsillectomy; Esophagogastroduodenoscopy (N/A, 6/12/2019); Pancreas surgery; Esophagogastroduodenoscopy (N/A, 3/9/2020); and Colonoscopy (N/A, 7/6/2020).    Family History:family history is not on file.    Allergies:   Review of patient's allergies indicates:   Allergen Reactions    Codeine Itching    Iodine and iodide containing products Itching       Social History: reports that she has never smoked. She has never used smokeless tobacco. She reports that she does not drink alcohol or use drugs.    Home medications:   Current Outpatient Medications on File Prior to Visit   Medication Sig Dispense Refill    ferrous sulfate (FEOSOL) 325 mg (65 mg iron) Tab tablet TAKE 1 TABLET (325 MG TOTAL) BY MOUTH ONCE DAILY. 90 tablet 0    meclizine (ANTIVERT) 25 mg tablet Take 1 tablet (25 mg total) by mouth 3 (three) times daily as needed. 60 tablet 5    omeprazole (PRILOSEC) 40 MG capsule Take 1 capsule (40 mg total) by mouth once daily. 30 capsule 5    pantoprazole (PROTONIX) 40 MG tablet Take 1 tablet (40 mg total) by mouth 2 (two) times daily. 60 tablet 5    potassium 99 mg Tab Take by mouth once daily.      sucralfate (CARAFATE) 1 gram tablet One p.o. 1 hr a.c. breakfast 1 hr a.c. lunch 1 hr a.c. supper by Tums over-the-counter 2 p.o.  1 hr p.c. breakfast 1 hr p.c. lunch 1 hr p.c. supper 21 tablet 2    famotidine (PEPCID) 40 MG tablet Take 1 tablet (40 mg total) by mouth once daily. (Patient not taking: Reported on 7/31/2020) 30 tablet 11     No current facility-administered medications on file prior to visit.        Vital signs:  LMP  (LMP Unknown)     Physical Exam  Vitals signs reviewed.   Constitutional:       General: She is not in acute distress.  HENT:      Head: Normocephalic and atraumatic.   Eyes:      General: No scleral icterus.     Conjunctiva/sclera: Conjunctivae normal.   Abdominal:      General: There is no distension.      Palpations: There is no mass.      Tenderness: There is no rebound.      Comments: Large epigatric scar, there is tenderness directly over the large scar with some chronic scar tissue that is tender.     Skin:     General: Skin is warm.      Coloration: Skin is not pale.      Findings: No rash.   Neurological:      Mental Status: She is oriented to person, place, and time.      Gait: Gait normal.   Psychiatric:         Mood and Affect: Mood normal.         Behavior: Behavior normal.         Routine labs:  Lab Results   Component Value Date    WBC 5.70 06/04/2020    HGB 12.4 06/04/2020    HCT 37.8 06/04/2020    MCV 78 (L) 06/04/2020     06/04/2020     Lab Results   Component Value Date    INR 1.3 (H) 03/12/2020     Lab Results   Component Value Date    IRON 216 (H) 09/12/2016    FERRITIN 661 (H) 09/12/2016    TIBC 234 (L) 09/12/2016    FESATURATED 92 (H) 09/12/2016     Lab Results   Component Value Date     05/15/2020    K 3.5 05/15/2020     05/15/2020    CO2 20 (L) 05/15/2020    BUN 11 05/15/2020    CREATININE 0.8 05/15/2020     Lab Results   Component Value Date    ALBUMIN 3.2 (L) 05/15/2020    ALT 10 (L) 05/15/2020    AST 33 05/15/2020    GGT 1195 (H) 11/16/2016    ALKPHOS 71 05/15/2020    BILITOT 1.1 05/15/2020     No results found for: GLUCOSE    I have reviewed prior labs, imaging, notes  from last month      Assessment:  1. [place] grade C esophagitis    2. Epigastric pain    3. Hepatic cirrhosis, unspecified hepatic cirrhosis type, unspecified whether ascites present      Prior CT with indistinctness near jeancarlos hepatis.  I think she would benefit from CT triple phase with contrast.  Will give steroid prep (allergy only itching)    She needs to see hepatology for cirrhosis.    I believe her pain is in part likely related to severe reflux esophagitis and she has not faithfully been taking the PPI BID but she is now back on it.  I also believe her pain is related to abd wall / scar tissue pain as she is strictly tender to light palpation over the scar in that area  She needs to see her PCP to discuss possible trigger injection into the scar tissue or I can send her to pain mngmt for that if desired.    Plan:  Orders Placed This Encounter    CT Abdomen Pelvis With Contrast    Phosphatidylethanol (PETH)    Ambulatory referral/consult to Hepatology    predniSONE (DELTASONE) 50 MG Tab    diphenhydrAMINE (BENADRYL) 50 MG capsule    Case request GI: EGD (ESOPHAGOGASTRODUODENOSCOPY)     CT triple, will give steroid prep  Continue PPI BID    Needs repeat EGD for severe esophagitis back 3/20 and needs to be on PPI BID    Check labs (most already ordered)    Refer to hepatology for cirrhosis.    Return to PCP for consideration of scar tissue injection.      RTC after above      Leander Cornejo MD  [facility] [place]

## 2020-07-31 NOTE — PROGRESS NOTES
Subjective:       Patient ID: Lo Escalera is a 65 y.o. female.    Chief Complaint: Sinusitis and Otalgia    HPI:  65-year-old black female complaining of sinus congestion and right ear pain--pain is been really bad for the past 2 days--no fever--+runny stuffy nose---no sore throat---no cough.  Severe right ear pain---pressure around the eyes and under her nose.  Went to a gastroenterologist today--he said he was not a PCP in could not treat her cold.  No pneumonia asthma TB--no smoking      ROS:   Skin: no psoriasis, eczema, skin cancer--no bruising  HEENT: No headache, ocular pain, blurred vision, diplopia,  hoarseness change in voice, +hypothyroidism no epistaxis  Lung: No pneumonia, asthma, Tb, wheezing, SOB, no smoking  Heart: No chest pain, ankle edema, palpitations, MI, eva murmur, hypertension, hyperlipidemia--no stent bypass arrhythmia history of hypertension but patient has been off of blood pressure medications and blood pressure has remained normal to low normal   Abdomen: No nausea, vomiting, diarrhea, constipation, ulcers, hepatitis, status post cholecystectomy, melena, hematochezia, hematemesis recent esophagitis gastritis with transfusion 2 units of blood   : no UTI, renal disease, stones  MS: no fractures, O/A, lupus, rheumatoid, gout--has 2 sisters with lupus and her father has gout  Neuro: No dizziness, LOC, seizures +vertigo--help with meclizine  No diabetes, + anemia had to be transfused blood had upper GI bleed, + anxiety, + depression upset had to quit working because of vertigo was working at [employer]-doing pretty good with   3 children unemployed, lives with      Objective:   Physical Exam:  No physical exam was done this was a telephone conference  General: Well nourished, well developed, no acute distress+ Obesity   Skin: No lesions  HEENT: Eyes PERRLA, EOM intact, nose patent, throat non-erythematous +arcus senilis no pallor to the conjunctiva  NECK: Supple,  no bruits, No JVD, no nodes  Lungs: Clear, no rales, rhonchi, wheezing  Heart: Regular rate and rhythm, no murmurs, gallops, or rubs  Abdomen: flat, bowel sounds positive, no tenderness, or organomegaly--tenderness especially in the epigastric area and left upper quadrant--slight guarding no rebound no organomegaly  MS: Range of motion and muscle strength intact problem with the knees bilaterally walks extremely stiff leg it states needs to see an orthopedist  Neuro: Alert, CN intact, oriented X 3  Extremities: No cyanosis, clubbing, or edema negative Romberg        Assessment:       1. Acute recurrent maxillary sinusitis    2. Right ear pain    3. Essential hypertension    4. Vertigo    5. JHOAN (generalized anxiety disorder)        Plan:       Acute recurrent maxillary sinusitis    Right ear pain    Essential hypertension    Vertigo    JHOAN (generalized anxiety disorder)    Other orders  -     azithromycin (Z-BIRD) 250 MG tablet; 2 tabs by mouth day 1, then 1 tab by mouth daily x 4 days  Dispense: 6 tablet; Refill: 0  -     methylPREDNISolone (MEDROL DOSEPACK) 4 mg tablet; use as directed  Dispense: 1 Package; Refill: 0  -     traMADoL (ULTRAM) 50 mg tablet; Take 1 tablet (50 mg total) by mouth every 6 (six) hours as needed.  Dispense: 30 tablet; Refill: 0  -     loratadine (CLARITIN) 10 mg tablet; Take 1 tablet (10 mg total) by mouth once daily.  Dispense: 30 tablet; Refill: 5        Abdominal pain epigastric area and left upper quadrant--history GI bleed--transfused 2 units of blood 03/12/2020--still with epigastric pain sharp stabbing during the day--Needs taks Pepcid 40 mg q AM ?Omeprazole 40 PM Carafate 1 gm 1 hr a.c. breakfast, 1 hr a.c. lunch, 1 hr a.c. Supper--Tums 2 p.o. 1 hr p.c. breakfast 1 hr p.c. lunch 1 hr p.c. Supper----getting CT scan abdomen pelvis today---needs to see Dr. Brown or Dr Elizalde for colonoscopy--plus minus repeat EGD due to persistent pain---okay refill pain medication  --anemia  hematocrit 25--history iron deficiency--GI bleed--needs see DR Barrett evaluate IM or IV iron   To help with gastritis esophagitis and GERD--avoid alcohol smoking NSAIDs caffeine stress carbonated drinks--can raise the head of the bed on a block of wood to prevent GERD--eat small meals--lay down for 1-2 hours after eating  CT scan resolved--hepatic steatosis/--portal hypertension/--mild ascites/--diverticulosis  CBC in 2 weeks ---routine lab in 3 months CBCs CMP lipids  Vertigo--sounds like orthostatic hypotension--see Dr Muller--had echocardiogram showing ejection fraction 65% otherwise norm---carotid ultrasound with no hemodynamically significant lesions right carotid 0 a 19% blockage left carotid 20-39% blockage--may need tilt-table test  Orthopedic consult knees bilaterally Dr. Stout   May need appointment with neurology--or ENT--needs MRI of the brain with and without gadolinium--did recently have CT scan of the brain some may wait until the corona virus has subsided before MRI done continue Antivert 25 mg t.i.d.  Health maintenance was HIV tetanus pneumococcal vaccine shingles:  Mammogram   Established Patient - Audio Only Telehealth Visit     The patient location is:  Home  The chief complaint leading to consultation is:  Sinusitis right ear pain  Visit type: Virtual visit with audio only (telephone)  Total time spent with patient:  20 min       The reason for the audio only service rather than synchronous audio and video virtual visit was related to technical difficulties or patient preference/necessity.     Each patient to whom I provide medical services by telemedicine is:  (1) informed of the relationship between the physician and patient and the respective role of any other health care provider with respect to management of the patient; and (2) notified that they may decline to receive medical services by telemedicine and may withdraw from such care at any time. Patient verbally consented to receive  this service via voice-only telephone call.       HPI:  See history of prednisone as above     Assessment and plan:  See plan above                        This service was not originating from a related E/M service provided within the previous 7 days nor will  to an E/M service or procedure within the next 24 hours or my soonest available appointment.  Prevailing standard of care was able to be met in this audio-only visit.

## 2020-07-31 NOTE — PATIENT INSTRUCTIONS
Lets get a CT scan.  These are the instructions before the scan. You need to take these medications before the CT Scan....    Take Prednisone one  50mg by mouth 13 hours, and 6 hours, and 1 hour prior to your CT scan.  2  Take Benadryl 50mg by mouth 1 hour prior to your CT scan.  You should not drive on Benadryl and should have a ride to and home from the CT scan.    Refer to hepatology at [facility]    Schedule upper endoscopy    Do the blood work    Talk to your primary care doctor about injection into scar tissue for the pain.

## 2020-07-31 NOTE — TELEPHONE ENCOUNTER
Spoke with patient, c/o right ear pain, sinus congestion. Scheduled patient for an audio appointment today with PCP. Patient verbalized understanding.

## 2020-07-31 NOTE — Clinical Note
Rickie,  Orders placed for EGD at [facility]. Please schedule with me in 1 -2 mnths or with any of the other guys.

## 2020-07-31 NOTE — TELEPHONE ENCOUNTER
----- Message from Socorro Sharma sent at 7/31/2020  3:23 PM CDT -----  Contact: Patient  Type: Needs Medical Advice    Who Called:  Lo, patient  Symptoms (please be specific):  Bilateral eye pain and right ear pain  How long has patient had these symptoms:  3 to 4 days  Pharmacy name and phone #:    [facility] [place] - [address]  [address] [place] [address]  Phone: [phone] Fax: [phone]  Best Call Back Number:  [phone]  Additional Information: Calling to talk to a nurse. Please call her. Thanks.

## 2020-08-03 LAB
ALBUMIN SERPL ELPH-MCNC: 3.34 G/DL (ref 3.35–5.55)
ALPHA1 GLOB SERPL ELPH-MCNC: 0.32 G/DL (ref 0.17–0.41)
ALPHA2 GLOB SERPL ELPH-MCNC: 0.61 G/DL (ref 0.43–0.99)
B-GLOBULIN SERPL ELPH-MCNC: 0.96 G/DL (ref 0.5–1.1)
GAMMA GLOB SERPL ELPH-MCNC: 1.97 G/DL (ref 0.67–1.58)
HGB A2 MFR BLD HPLC: 1.8 % (ref 2.2–3.2)
HGB FRACT BLD ELPH-IMP: ABNORMAL
HGB FRACT BLD ELPH-IMP: NORMAL
PROT SERPL-MCNC: 7.2 G/DL (ref 6–8.4)

## 2020-08-04 ENCOUNTER — DOCUMENTATION ONLY (OUTPATIENT)
Dept: SURGERY | Facility: CLINIC | Age: 65
End: 2020-08-04

## 2020-08-04 ENCOUNTER — TELEPHONE (OUTPATIENT)
Dept: SURGERY | Facility: CLINIC | Age: 65
End: 2020-08-04

## 2020-08-04 DIAGNOSIS — R19.8 ABNORMAL FINDINGS ON ESOPHAGOGASTRODUODENOSCOPY (EGD): Primary | ICD-10-CM

## 2020-08-04 LAB — PATHOLOGIST INTERPRETATION SPE: NORMAL

## 2020-08-04 RX ORDER — SODIUM CHLORIDE, SODIUM LACTATE, POTASSIUM CHLORIDE, CALCIUM CHLORIDE 600; 310; 30; 20 MG/100ML; MG/100ML; MG/100ML; MG/100ML
INJECTION, SOLUTION INTRAVENOUS CONTINUOUS
Status: CANCELLED | OUTPATIENT
Start: 2020-08-04

## 2020-08-04 RX ORDER — SODIUM CHLORIDE 0.9 % (FLUSH) 0.9 %
3 SYRINGE (ML) INJECTION
Status: CANCELLED | OUTPATIENT
Start: 2020-08-04

## 2020-08-04 NOTE — PROGRESS NOTES
Message  Received: 4 days ago  Message Contents   MD Rickie Grover, RIGOBERTO Damian,   Orders placed for EGD at [facility]. Please schedule with me in 1 -2 mnths or with any of the other guys

## 2020-08-04 NOTE — TELEPHONE ENCOUNTER
Called patient in reference to an Ambulatory referral to Colorectal Surgery for an EGD. The patient verbally consented to be scheduled for an EGD on 09/21/2020 It was explained to the patient an EGD( Esophagogastroduodenoscopy) is the procedure that uses a thin scope with a light and camera at its tip to examine the inside the upper digestive track.- the esophagus, stomach , ,and the first part of the small intestine called the duodenum,idiopathic thrombocytopenic purpura. It was further explained the EGD is an outpatient procedure, meaning you can go home the same day.Patient was advised to stop eating at least eight hours prior to the procedure, and to remain NPO after midnight on the day before the procedure. Patient states I've had an EGD before, i'm familiar with the procedure.

## 2020-08-05 ENCOUNTER — TELEPHONE (OUTPATIENT)
Dept: HEMATOLOGY/ONCOLOGY | Facility: CLINIC | Age: 65
End: 2020-08-05

## 2020-08-05 DIAGNOSIS — D64.9 ANEMIA, UNSPECIFIED TYPE: ICD-10-CM

## 2020-08-05 DIAGNOSIS — K70.31 ALCOHOLIC CIRRHOSIS OF LIVER WITH ASCITES: ICD-10-CM

## 2020-08-05 DIAGNOSIS — D69.6 THROMBOCYTOPENIA: Primary | ICD-10-CM

## 2020-08-05 RX ORDER — METHYLPREDNISOLONE SOD SUCC 125 MG
125 VIAL (EA) INJECTION ONCE AS NEEDED
Status: CANCELLED | OUTPATIENT
Start: 2020-08-05

## 2020-08-05 RX ORDER — EPINEPHRINE 0.3 MG/.3ML
0.3 INJECTION SUBCUTANEOUS ONCE AS NEEDED
Status: CANCELLED | OUTPATIENT
Start: 2020-08-05

## 2020-08-05 RX ORDER — SODIUM CHLORIDE 0.9 % (FLUSH) 0.9 %
10 SYRINGE (ML) INJECTION
Status: CANCELLED | OUTPATIENT
Start: 2020-08-05

## 2020-08-05 RX ORDER — HYDROCODONE BITARTRATE AND ACETAMINOPHEN 500; 5 MG/1; MG/1
TABLET ORAL ONCE
Status: CANCELLED | OUTPATIENT
Start: 2020-08-05 | End: 2020-08-05

## 2020-08-05 RX ORDER — SODIUM CHLORIDE 9 MG/ML
INJECTION, SOLUTION INTRAVENOUS CONTINUOUS
Status: CANCELLED | OUTPATIENT
Start: 2020-08-05

## 2020-08-05 RX ORDER — HEPARIN 100 UNIT/ML
5 SYRINGE INTRAVENOUS
Status: CANCELLED | OUTPATIENT
Start: 2020-08-05

## 2020-08-05 RX ORDER — FUROSEMIDE 10 MG/ML
10 INJECTION INTRAMUSCULAR; INTRAVENOUS ONCE
Status: CANCELLED | OUTPATIENT
Start: 2020-08-05

## 2020-08-05 RX ORDER — ACETAMINOPHEN 325 MG/1
325 TABLET ORAL ONCE
Status: CANCELLED | OUTPATIENT
Start: 2020-08-05

## 2020-08-05 RX ORDER — DIPHENHYDRAMINE HYDROCHLORIDE 50 MG/ML
25 INJECTION INTRAMUSCULAR; INTRAVENOUS ONCE
Status: CANCELLED | OUTPATIENT
Start: 2020-08-05

## 2020-08-05 RX ORDER — DIPHENHYDRAMINE HYDROCHLORIDE 50 MG/ML
50 INJECTION INTRAMUSCULAR; INTRAVENOUS ONCE AS NEEDED
Status: CANCELLED | OUTPATIENT
Start: 2020-08-05

## 2020-08-05 NOTE — TELEPHONE ENCOUNTER
Called the patient and instructed her that her hgb is 6.2. I told her that Dr. Barrett is recommending 2 units of blood.  She requests that she receive her blood on Monday.  I called scheduling and spoke with Brigitte.    I also told her that he is recommending her to get IV iron.

## 2020-08-05 NOTE — TELEPHONE ENCOUNTER
----- Message from Ck Barrett MD sent at 8/3/2020  5:00 PM CDT -----  See if she wants two units of blood and also set her up with IV iron x2

## 2020-08-06 LAB — PHOSPHATIDYLETHANOL (PETH): 911 NG/ML

## 2020-08-12 ENCOUNTER — TELEPHONE (OUTPATIENT)
Dept: HEMATOLOGY/ONCOLOGY | Facility: CLINIC | Age: 65
End: 2020-08-12

## 2020-08-17 ENCOUNTER — TELEPHONE (OUTPATIENT)
Dept: INFUSION THERAPY | Facility: HOSPITAL | Age: 65
End: 2020-08-17

## 2020-08-21 ENCOUNTER — TELEPHONE (OUTPATIENT)
Dept: GASTROENTEROLOGY | Facility: CLINIC | Age: 65
End: 2020-08-21

## 2020-08-21 DIAGNOSIS — R10.13 EPIGASTRIC PAIN: ICD-10-CM

## 2020-08-21 NOTE — TELEPHONE ENCOUNTER
Patient stated that she did the CT scan, but she had a reaction to the dye. She said that her hands started itching and she broke out in hives. She said that they said that she would have to repeat this. Pt stated that she took Prednisone 13 hours, and 6 hours, and 1 hour prior. She also took 2 Benadryl 1 hour prior, but she still had a reaction. She said that she had to take more benadryl when she went home. She also said that they told her that she would have to repeat the CT scan.

## 2020-08-21 NOTE — TELEPHONE ENCOUNTER
----- Message from Leander Cornejo MD sent at 8/21/2020 12:44 PM CDT -----  Regarding: cancelled CT  Her ct was cancelled, I am not sure why nor was ever notified.  Can we find out why?

## 2020-08-21 NOTE — TELEPHONE ENCOUNTER
[1:33 PM] Daniela Church it looks like we have a patient MRN [id number]. Her CT scan was canceled and I'm just trying to see why.     ?[1:42 PM] Monika Suarez.     ?[1:42 PM] Monika Cummings      Her is the note in the ticket the study was completed, but the technologist never sent the images to [facility] to be read. The weekend tech came along and deleted study from the modality. The study was never read by a radiologist. They were unable to retrieve the images to be sent. A SOS was filled out and they are trying to get the patient back to be scanned again under a new order. Please cancel this order    ?[1:43 PM] Daniela Infante      ok I will let Dr. Cornejo know. Thanks

## 2020-08-24 ENCOUNTER — DOCUMENTATION ONLY (OUTPATIENT)
Dept: TRANSPLANT | Facility: CLINIC | Age: 65
End: 2020-08-24

## 2020-08-24 PROBLEM — D50.0 IRON DEFICIENCY ANEMIA DUE TO CHRONIC BLOOD LOSS: Status: ACTIVE | Noted: 2020-08-24

## 2020-08-24 PROBLEM — D50.0 ANEMIA DUE TO CHRONIC BLOOD LOSS: Status: ACTIVE | Noted: 2020-03-06

## 2020-08-24 PROBLEM — D63.8 ANEMIA OF CHRONIC DISORDER: Status: ACTIVE | Noted: 2020-08-24

## 2020-08-24 PROBLEM — D50.9 MICROCYTIC ANEMIA: Status: ACTIVE | Noted: 2020-08-24

## 2020-08-24 NOTE — LETTER
August 24, 2020    Lo Escalera  [address] [address]      Dear Lo Escalera:    Your doctor has referred you to the [facility]. We are sending this letter to remind you to make an appointment with us to complete the referral process.     Please call us at [phone] to schedule an appointment. We look forward to seeing you soon.     If you received a call and have been scheduled, please disregard this letter.       Sincerely,        [facility]   [address] [place] [address]  [phone]

## 2020-08-25 ENCOUNTER — TELEPHONE (OUTPATIENT)
Dept: INFUSION THERAPY | Facility: HOSPITAL | Age: 65
End: 2020-08-25

## 2020-08-25 RX ORDER — PREDNISONE 50 MG/1
TABLET ORAL
Qty: 3 TABLET | Refills: 0 | Status: ON HOLD | OUTPATIENT
Start: 2020-08-25 | End: 2020-09-21

## 2020-08-25 NOTE — TELEPHONE ENCOUNTER
These are the instructions before the scan. You need to take these medications before the CT Scan....     Take Prednisone one  50mg by mouth 13 hours, and 6 hours, and 1 hour prior to your CT scan.  2    Take Benadryl 50mg by mouth 1 hour prior to your CT scan.  You should not drive on Benadryl and should have a ride to and home from the CT scan.

## 2020-08-25 NOTE — TELEPHONE ENCOUNTER
"Contacted patient in regards to Injectafer appointment scheduled for today. Patient stated she "canceled yesterday because of weather". Notified Dr. Barrett staff. Patient wanted to reschedule for first dose on 9/1/2020.   "

## 2020-08-26 NOTE — TELEPHONE ENCOUNTER
Spoke with pt and she stated that she only got the medication from her previous doctor and did not take the Benadryl and Prednisone that Dr. Cornejo Prescribed. CT scan rescheduled to 8/31/20 [facility]. Patient has been told to arrive at 2:45.  Take 1 prednisone at 1:45am.   Take 1 prednisone at 8:45am  Take 1 prednisone and 1 Benadryl at 1:45pm.   Patient stated that she Verbally Understood.

## 2020-09-01 ENCOUNTER — TELEPHONE (OUTPATIENT)
Dept: INFUSION THERAPY | Facility: HOSPITAL | Age: 65
End: 2020-09-01

## 2020-09-01 NOTE — TELEPHONE ENCOUNTER
Spoke with patient directly regarding her iron infusion appt. Patient scheduled to received infusion today 9/1. Patient states she needs to schedule. Appointment changed according to patient preference. Dr. Meza's office notified of schedule change.

## 2020-09-03 ENCOUNTER — PATIENT OUTREACH (OUTPATIENT)
Dept: ADMINISTRATIVE | Facility: OTHER | Age: 65
End: 2020-09-03

## 2020-09-03 NOTE — PROGRESS NOTES
Care Everywhere: updated  Immunization: updated  Health Maintenance: updated  Media Review: review for outside mammogram report  Legacy Review:   Order placed:   Upcoming appts

## 2020-09-11 ENCOUNTER — TELEPHONE (OUTPATIENT)
Dept: INFUSION THERAPY | Facility: HOSPITAL | Age: 65
End: 2020-09-11

## 2020-09-18 ENCOUNTER — TELEPHONE (OUTPATIENT)
Dept: HEMATOLOGY/ONCOLOGY | Facility: CLINIC | Age: 65
End: 2020-09-18

## 2020-09-18 NOTE — TELEPHONE ENCOUNTER
Called the patient and instructed her that Dr. Barrett does not have privileges at [facility].  I instructed her that her appt time is 9/23 @ 1100.

## 2020-09-18 NOTE — TELEPHONE ENCOUNTER
----- Message from Milly Reilly MA sent at 9/18/2020  3:23 PM CDT -----  Patient would like to speak to someone regarding her infusion therapy.  She is asking to verify the date and why it is scheduled in [place] and not the [place] where she lives.  Please call to discuss    [phone]

## 2020-09-28 ENCOUNTER — INFUSION (OUTPATIENT)
Dept: INFUSION THERAPY | Facility: HOSPITAL | Age: 65
End: 2020-09-28
Attending: INTERNAL MEDICINE
Payer: MEDICARE

## 2020-09-28 VITALS
SYSTOLIC BLOOD PRESSURE: 132 MMHG | TEMPERATURE: 97 F | HEART RATE: 100 BPM | OXYGEN SATURATION: 98 % | DIASTOLIC BLOOD PRESSURE: 41 MMHG | BODY MASS INDEX: 32.9 KG/M2 | RESPIRATION RATE: 18 BRPM | WEIGHT: 174.13 LBS

## 2020-09-28 DIAGNOSIS — D64.9 ANEMIA, UNSPECIFIED TYPE: Primary | ICD-10-CM

## 2020-09-28 PROCEDURE — 25000003 PHARM REV CODE 250: Performed by: INTERNAL MEDICINE

## 2020-09-28 PROCEDURE — 96365 THER/PROPH/DIAG IV INF INIT: CPT

## 2020-09-28 PROCEDURE — 63600175 PHARM REV CODE 636 W HCPCS: Mod: JG | Performed by: INTERNAL MEDICINE

## 2020-09-28 RX ORDER — DIPHENHYDRAMINE HYDROCHLORIDE 50 MG/ML
25 INJECTION INTRAMUSCULAR; INTRAVENOUS
Status: CANCELLED
Start: 2020-09-28

## 2020-09-28 RX ORDER — HEPARIN 100 UNIT/ML
5 SYRINGE INTRAVENOUS
Status: CANCELLED | OUTPATIENT
Start: 2020-10-05

## 2020-09-28 RX ORDER — DIPHENHYDRAMINE HYDROCHLORIDE 50 MG/ML
50 INJECTION INTRAMUSCULAR; INTRAVENOUS ONCE AS NEEDED
Status: DISCONTINUED | OUTPATIENT
Start: 2020-09-28 | End: 2020-09-28 | Stop reason: HOSPADM

## 2020-09-28 RX ORDER — DIPHENHYDRAMINE HYDROCHLORIDE 50 MG/ML
25 INJECTION INTRAMUSCULAR; INTRAVENOUS
Status: CANCELLED
Start: 2020-10-05

## 2020-09-28 RX ORDER — EPINEPHRINE 0.3 MG/.3ML
0.3 INJECTION SUBCUTANEOUS ONCE AS NEEDED
Status: CANCELLED | OUTPATIENT
Start: 2020-10-05

## 2020-09-28 RX ORDER — SODIUM CHLORIDE 9 MG/ML
INJECTION, SOLUTION INTRAVENOUS CONTINUOUS
Status: DISCONTINUED | OUTPATIENT
Start: 2020-09-28 | End: 2020-09-28 | Stop reason: HOSPADM

## 2020-09-28 RX ORDER — DIPHENHYDRAMINE HYDROCHLORIDE 50 MG/ML
INJECTION INTRAMUSCULAR; INTRAVENOUS
Status: DISCONTINUED
Start: 2020-09-28 | End: 2020-09-28 | Stop reason: HOSPADM

## 2020-09-28 RX ORDER — METHYLPREDNISOLONE SOD SUCC 125 MG
125 VIAL (EA) INJECTION ONCE AS NEEDED
Status: CANCELLED | OUTPATIENT
Start: 2020-10-05

## 2020-09-28 RX ORDER — DIPHENHYDRAMINE HYDROCHLORIDE 50 MG/ML
50 INJECTION INTRAMUSCULAR; INTRAVENOUS ONCE AS NEEDED
Status: CANCELLED | OUTPATIENT
Start: 2020-10-05

## 2020-09-28 RX ORDER — SODIUM CHLORIDE 0.9 % (FLUSH) 0.9 %
10 SYRINGE (ML) INJECTION
Status: CANCELLED | OUTPATIENT
Start: 2020-10-05

## 2020-09-28 RX ORDER — SODIUM CHLORIDE 9 MG/ML
INJECTION, SOLUTION INTRAVENOUS CONTINUOUS
Status: CANCELLED | OUTPATIENT
Start: 2020-10-05

## 2020-09-28 RX ADMIN — FERRIC CARBOXYMALTOSE INJECTION 750 MG: 50 INJECTION, SOLUTION INTRAVENOUS at 03:09

## 2020-09-28 RX ADMIN — SODIUM CHLORIDE: 0.9 INJECTION, SOLUTION INTRAVENOUS at 03:09

## 2020-09-28 NOTE — PLAN OF CARE
Problem: Anemia  Goal: Anemia Symptom Improvement  Outcome: Ongoing, Progressing  Intervention: Monitor and Manage Anemia  Flowsheets (Taken 9/28/2020 1518)  Oral Nutrition Promotion: rest periods promoted  Fatigue Management: frequent rest breaks encouraged

## 2020-09-28 NOTE — NURSING
Once the Injectafer infusion was complete, Ms Escalera stated that she was experiencing an itching sensation in her hands  NS flush bag was increased to bolus rate  Charge nurse Alison was informed and IV push benadryl 50 mg was given to patient  Patient stated that she was starting to feel better a few minutes after the IV benadryl was administered  Patient was given instructions to go straight to the nearest ER should her symptoms worsen and she agreed  VS stable    Juany Hua RN

## 2020-09-28 NOTE — PROGRESS NOTES
Patient developed itching after her infusion and was given Benadryl. She denies any sob. Will add Benadryl prior to her next Injectafer treatment.

## 2020-10-09 ENCOUNTER — TELEPHONE (OUTPATIENT)
Dept: INFUSION THERAPY | Facility: HOSPITAL | Age: 65
End: 2020-10-09

## 2020-10-22 ENCOUNTER — TELEPHONE (OUTPATIENT)
Dept: HEMATOLOGY/ONCOLOGY | Facility: CLINIC | Age: 65
End: 2020-10-22

## 2020-10-22 DIAGNOSIS — D69.6 THROMBOCYTOPENIA: Primary | ICD-10-CM

## 2020-10-22 DIAGNOSIS — K70.31 ALCOHOLIC CIRRHOSIS OF LIVER WITH ASCITES: ICD-10-CM

## 2020-10-22 DIAGNOSIS — D50.0 IRON DEFICIENCY ANEMIA DUE TO CHRONIC BLOOD LOSS: ICD-10-CM

## 2020-10-22 DIAGNOSIS — D50.9 MICROCYTIC ANEMIA: ICD-10-CM

## 2020-10-22 NOTE — TELEPHONE ENCOUNTER
I called the patient and instructed her that Dr. Barrett [facility]  I can place a referral for her to see a MD on the [place].  She requests this.

## 2020-10-22 NOTE — TELEPHONE ENCOUNTER
----- Message from Francisca Abreu sent at 10/22/2020 12:27 PM CDT -----  Patient had cancelled and rescheduled the 2nd dose of Injectafer multiple times, and missed her appt yesterday.      Her first dose was on 9/28. Please advise if still ok for us to schedule the 2nd dose for next week.

## 2020-11-10 ENCOUNTER — TELEPHONE (OUTPATIENT)
Dept: PRIMARY CARE CLINIC | Facility: CLINIC | Age: 65
End: 2020-11-10

## 2020-11-10 NOTE — TELEPHONE ENCOUNTER
Spoke with patient she is currently at the hospital due to a low K+ level, but she is requesting a referral to an inpatient facility for alcohol abuse. Will call patient back with [facility]'s information. Patient would like a call back this afternoon.

## 2020-11-10 NOTE — TELEPHONE ENCOUNTER
----- Message from Steph Hilario sent at 11/10/2020 10:59 AM CST -----  Regarding: Pt called to request a referral to an inpatient facilty for alcohol abuse  Patient Advice:    Pt called to speak to the nurse to request a referral to an inpatient facility for alcohol abuse and would like a call back this morning asap.    Pt can be reached at [phone]

## 2020-11-10 NOTE — TELEPHONE ENCOUNTER
----- Message from Holden Kapadia sent at 11/10/2020 12:52 PM CST -----  Regarding: call back  Pt called in to speak with someone at the office regarding a personal matter. She would like a call back from the office and can be reached at    [phone]

## 2020-11-11 ENCOUNTER — TELEPHONE (OUTPATIENT)
Dept: PRIMARY CARE CLINIC | Facility: CLINIC | Age: 65
End: 2020-11-11

## 2020-11-11 NOTE — TELEPHONE ENCOUNTER
----- Message from Socorro Sharma sent at 11/11/2020  1:28 PM CST -----  Contact: Patient, [phone]  Patient is calling because she is trying to get into a alcoholic rehab facility. Please call her. Thanks.

## 2020-11-11 NOTE — TELEPHONE ENCOUNTER
Patient requesting information for an inpatient facility for alcohol abuse. [facility] given to patient. [phone]. Patient is currently admitted to [facility]. Patient states understanding.

## 2020-11-13 ENCOUNTER — TELEPHONE (OUTPATIENT)
Dept: PRIMARY CARE CLINIC | Facility: CLINIC | Age: 65
End: 2020-11-13

## 2020-11-13 NOTE — TELEPHONE ENCOUNTER
----- Message from Joaquina Garcia sent at 11/13/2020  1:18 PM CST -----  Contact: [phone]  Please call patient back again please.

## 2020-11-13 NOTE — TELEPHONE ENCOUNTER
----- Message from Socorro Sharma sent at 11/13/2020  8:29 AM CST -----  Contact: Patient, [phone]  Calling again to talk to someone about being placed in a Inpatient Rehab. Please call her. Thanks.

## 2020-11-13 NOTE — TELEPHONE ENCOUNTER
Called jone with [facility], gave patients information to reach out to patient, spoke with patient notified that jone would reach out states understanding, states currently in [facility] for low K+

## 2020-11-13 NOTE — TELEPHONE ENCOUNTER
----- Message from Louise Dumont sent at 11/13/2020 10:03 AM CST -----  Contact: Patient @ [phone]  Good Morning  Patient is calling because she is trying to get into a alcoholic rehab facility today.  Please call her. T  Thanks.

## 2020-11-20 ENCOUNTER — TELEPHONE (OUTPATIENT)
Dept: PRIMARY CARE CLINIC | Facility: CLINIC | Age: 65
End: 2020-11-20

## 2020-11-20 NOTE — TELEPHONE ENCOUNTER
----- Message from Socorro Sharma sent at 11/20/2020  3:02 PM CST -----  Contact: Patient, [phone]  Calling to inquire if you found a facility for her, in or out patient. She will take either. Please call her. Thanks.

## 2020-11-23 ENCOUNTER — TELEPHONE (OUTPATIENT)
Dept: HEMATOLOGY/ONCOLOGY | Facility: CLINIC | Age: 65
End: 2020-11-23

## 2020-11-23 NOTE — TELEPHONE ENCOUNTER
Tc to Dr gaspar office  Spoke with Georgina   Advised that after numerous attempts per telephone and following attempt to contact patient by letter we have been unable to do so.  Patient has not responded to any attempts. Made office aware that she will be removed from our referral system  nd referred back to Dr hubert Erickson spoke with Scarlett gaspar's  and she stated pt is non compliant       She thanked nurse for advising them on being unable to reach patient.

## 2020-11-23 NOTE — TELEPHONE ENCOUNTER
----- Message from Georgina Sanabria sent at 11/23/2020 10:52 AM CST -----  Gwyn Guerra from [facility] called and said she has tried to reach this patient maria de jesus many times by phone and also by letter and has never been able to reach the patient. She said since they are unable to contact the patient that they will have to remove her from their workque and will not attempt anymore contact with the patient. She wanted Dr. Barrett to know. Her call back number is [phone].

## 2021-01-21 ENCOUNTER — PES CALL (OUTPATIENT)
Dept: ADMINISTRATIVE | Facility: CLINIC | Age: 66
End: 2021-01-21

## 2021-02-24 ENCOUNTER — TELEPHONE (OUTPATIENT)
Dept: INTERNAL MEDICINE | Facility: CLINIC | Age: 66
End: 2021-02-24

## 2021-02-25 ENCOUNTER — TELEPHONE (OUTPATIENT)
Dept: INTERNAL MEDICINE | Facility: CLINIC | Age: 66
End: 2021-02-25

## 2021-03-02 ENCOUNTER — OFFICE VISIT (OUTPATIENT)
Dept: PRIMARY CARE CLINIC | Facility: CLINIC | Age: 66
End: 2021-03-02
Payer: MEDICARE

## 2021-03-02 VITALS
HEIGHT: 61 IN | TEMPERATURE: 98 F | OXYGEN SATURATION: 99 % | RESPIRATION RATE: 19 BRPM | HEART RATE: 94 BPM | DIASTOLIC BLOOD PRESSURE: 84 MMHG | SYSTOLIC BLOOD PRESSURE: 118 MMHG | BODY MASS INDEX: 37.09 KG/M2 | WEIGHT: 196.44 LBS

## 2021-03-02 DIAGNOSIS — R10.13 EPIGASTRIC PAIN: ICD-10-CM

## 2021-03-02 DIAGNOSIS — J32.9 SINUSITIS, UNSPECIFIED CHRONICITY, UNSPECIFIED LOCATION: ICD-10-CM

## 2021-03-02 DIAGNOSIS — K92.2 UGIB (UPPER GASTROINTESTINAL BLEED): ICD-10-CM

## 2021-03-02 DIAGNOSIS — F41.1 GAD (GENERALIZED ANXIETY DISORDER): ICD-10-CM

## 2021-03-02 DIAGNOSIS — Z12.31 VISIT FOR SCREENING MAMMOGRAM: ICD-10-CM

## 2021-03-02 DIAGNOSIS — I10 ESSENTIAL HYPERTENSION: ICD-10-CM

## 2021-03-02 DIAGNOSIS — Z11.4 ENCOUNTER FOR SCREENING FOR HIV: ICD-10-CM

## 2021-03-02 DIAGNOSIS — E66.01 MORBID OBESITY: ICD-10-CM

## 2021-03-02 DIAGNOSIS — R10.84 GENERALIZED ABDOMINAL PAIN: ICD-10-CM

## 2021-03-02 DIAGNOSIS — D64.9 ANEMIA, UNSPECIFIED TYPE: ICD-10-CM

## 2021-03-02 DIAGNOSIS — R42 VERTIGO: ICD-10-CM

## 2021-03-02 PROCEDURE — 99499 RISK ADDL DX/OHS AUDIT: ICD-10-PCS | Mod: S$GLB,,, | Performed by: FAMILY MEDICINE

## 2021-03-02 PROCEDURE — 3079F PR MOST RECENT DIASTOLIC BLOOD PRESSURE 80-89 MM HG: ICD-10-PCS | Mod: CPTII,S$GLB,, | Performed by: FAMILY MEDICINE

## 2021-03-02 PROCEDURE — 96372 PR INJECTION,THERAP/PROPH/DIAG2ST, IM OR SUBCUT: ICD-10-PCS | Mod: S$GLB,,, | Performed by: FAMILY MEDICINE

## 2021-03-02 PROCEDURE — 3074F SYST BP LT 130 MM HG: CPT | Mod: CPTII,S$GLB,, | Performed by: FAMILY MEDICINE

## 2021-03-02 PROCEDURE — 99214 OFFICE O/P EST MOD 30 MIN: CPT | Mod: 25,S$GLB,, | Performed by: FAMILY MEDICINE

## 2021-03-02 PROCEDURE — 3008F PR BODY MASS INDEX (BMI) DOCUMENTED: ICD-10-PCS | Mod: CPTII,S$GLB,, | Performed by: FAMILY MEDICINE

## 2021-03-02 PROCEDURE — 3079F DIAST BP 80-89 MM HG: CPT | Mod: CPTII,S$GLB,, | Performed by: FAMILY MEDICINE

## 2021-03-02 PROCEDURE — 99214 PR OFFICE/OUTPT VISIT, EST, LEVL IV, 30-39 MIN: ICD-10-PCS | Mod: 25,S$GLB,, | Performed by: FAMILY MEDICINE

## 2021-03-02 PROCEDURE — 1125F AMNT PAIN NOTED PAIN PRSNT: CPT | Mod: S$GLB,,, | Performed by: FAMILY MEDICINE

## 2021-03-02 PROCEDURE — 99999 PR PBB SHADOW E&M-EST. PATIENT-LVL IV: ICD-10-PCS | Mod: PBBFAC,,, | Performed by: FAMILY MEDICINE

## 2021-03-02 PROCEDURE — 99999 PR PBB SHADOW E&M-EST. PATIENT-LVL IV: CPT | Mod: PBBFAC,,, | Performed by: FAMILY MEDICINE

## 2021-03-02 PROCEDURE — 99499 UNLISTED E&M SERVICE: CPT | Mod: S$GLB,,, | Performed by: FAMILY MEDICINE

## 2021-03-02 PROCEDURE — 1125F PR PAIN SEVERITY QUANTIFIED, PAIN PRESENT: ICD-10-PCS | Mod: S$GLB,,, | Performed by: FAMILY MEDICINE

## 2021-03-02 PROCEDURE — 3008F BODY MASS INDEX DOCD: CPT | Mod: CPTII,S$GLB,, | Performed by: FAMILY MEDICINE

## 2021-03-02 PROCEDURE — 3074F PR MOST RECENT SYSTOLIC BLOOD PRESSURE < 130 MM HG: ICD-10-PCS | Mod: CPTII,S$GLB,, | Performed by: FAMILY MEDICINE

## 2021-03-02 PROCEDURE — 96372 THER/PROPH/DIAG INJ SC/IM: CPT | Mod: S$GLB,,, | Performed by: FAMILY MEDICINE

## 2021-03-02 RX ORDER — ZOSTER VACCINE RECOMBINANT, ADJUVANTED 50 MCG/0.5
0.5 KIT INTRAMUSCULAR ONCE
Qty: 1 EACH | Refills: 0 | Status: SHIPPED | OUTPATIENT
Start: 2021-03-02 | End: 2021-03-02

## 2021-03-02 RX ORDER — FOLIC ACID 1 MG/1
1 TABLET ORAL DAILY
COMMUNITY
End: 2021-07-22

## 2021-03-02 RX ORDER — CEFDINIR 300 MG/1
300 CAPSULE ORAL 2 TIMES DAILY
Qty: 20 CAPSULE | Refills: 0 | Status: SHIPPED | OUTPATIENT
Start: 2021-03-02 | End: 2021-03-12

## 2021-03-02 RX ORDER — TRIAMCINOLONE ACETONIDE 40 MG/ML
40 INJECTION, SUSPENSION INTRA-ARTICULAR; INTRAMUSCULAR ONCE
Status: COMPLETED | OUTPATIENT
Start: 2021-03-02 | End: 2021-03-02

## 2021-03-02 RX ORDER — METHYLPREDNISOLONE 4 MG/1
TABLET ORAL
Qty: 1 PACKAGE | Refills: 0 | Status: SHIPPED | OUTPATIENT
Start: 2021-03-02 | End: 2021-04-13

## 2021-03-02 RX ORDER — PROMETHAZINE HYDROCHLORIDE AND CODEINE PHOSPHATE 6.25; 1 MG/5ML; MG/5ML
5 SOLUTION ORAL EVERY 6 HOURS PRN
Qty: 180 ML | Refills: 0 | Status: SHIPPED | OUTPATIENT
Start: 2021-03-02 | End: 2021-04-13

## 2021-03-02 RX ADMIN — TRIAMCINOLONE ACETONIDE 40 MG: 40 INJECTION, SUSPENSION INTRA-ARTICULAR; INTRAMUSCULAR at 05:03

## 2021-03-03 ENCOUNTER — TELEPHONE (OUTPATIENT)
Dept: PRIMARY CARE CLINIC | Facility: CLINIC | Age: 66
End: 2021-03-03

## 2021-03-15 ENCOUNTER — CLINICAL SUPPORT (OUTPATIENT)
Dept: PRIMARY CARE CLINIC | Facility: CLINIC | Age: 66
End: 2021-03-15
Payer: MEDICARE

## 2021-03-15 ENCOUNTER — HOSPITAL ENCOUNTER (OUTPATIENT)
Dept: RADIOLOGY | Facility: OTHER | Age: 66
Discharge: HOME | End: 2021-03-15
Attending: FAMILY MEDICINE
Payer: MEDICARE

## 2021-03-15 DIAGNOSIS — Z12.31 VISIT FOR SCREENING MAMMOGRAM: ICD-10-CM

## 2021-03-15 DIAGNOSIS — Z23 NEED FOR PROPHYLACTIC VACCINATION AND INOCULATION AGAINST INFLUENZA: Primary | ICD-10-CM

## 2021-03-15 PROCEDURE — 77067 SCR MAMMO BI INCL CAD: CPT | Mod: 26,,, | Performed by: RADIOLOGY

## 2021-03-15 PROCEDURE — 77067 MAMMO DIGITAL SCREENING BILAT WITH TOMO: ICD-10-PCS | Mod: 26,,, | Performed by: RADIOLOGY

## 2021-03-15 PROCEDURE — 90732 PNEUMOCOCCAL POLYSACCHARIDE VACCINE 23-VALENT =>2YO SQ IM: ICD-10-PCS | Mod: S$GLB,,, | Performed by: FAMILY MEDICINE

## 2021-03-15 PROCEDURE — G0009 TD VACCINE GREATER THAN OR EQUAL TO 7YO PRESERVATIVE FREE IM: ICD-10-PCS | Mod: S$GLB,,, | Performed by: FAMILY MEDICINE

## 2021-03-15 PROCEDURE — 90732 PPSV23 VACC 2 YRS+ SUBQ/IM: CPT | Mod: S$GLB,,, | Performed by: FAMILY MEDICINE

## 2021-03-15 PROCEDURE — 77063 MAMMO DIGITAL SCREENING BILAT WITH TOMO: ICD-10-PCS | Mod: 26,,, | Performed by: RADIOLOGY

## 2021-03-15 PROCEDURE — G0009 ADMIN PNEUMOCOCCAL VACCINE: HCPCS | Mod: S$GLB,,, | Performed by: FAMILY MEDICINE

## 2021-03-15 PROCEDURE — 77067 SCR MAMMO BI INCL CAD: CPT | Mod: TC

## 2021-03-15 PROCEDURE — 90472 IMMUNIZATION ADMIN EACH ADD: CPT | Mod: S$GLB,,, | Performed by: FAMILY MEDICINE

## 2021-03-15 PROCEDURE — 77063 BREAST TOMOSYNTHESIS BI: CPT | Mod: 26,,, | Performed by: RADIOLOGY

## 2021-03-15 PROCEDURE — G0008 ADMIN INFLUENZA VIRUS VAC: HCPCS | Mod: S$GLB,,, | Performed by: FAMILY MEDICINE

## 2021-03-15 PROCEDURE — G0008 FLU VACCINE (QUAD) GREATER THAN OR EQUAL TO 3YO PRESERVATIVE FREE IM: ICD-10-PCS | Mod: S$GLB,,, | Performed by: FAMILY MEDICINE

## 2021-03-15 PROCEDURE — 90686 IIV4 VACC NO PRSV 0.5 ML IM: CPT | Mod: S$GLB,,, | Performed by: FAMILY MEDICINE

## 2021-03-15 PROCEDURE — 90714 TD VACCINE GREATER THAN OR EQUAL TO 7YO PRESERVATIVE FREE IM: ICD-10-PCS | Mod: S$GLB,,, | Performed by: FAMILY MEDICINE

## 2021-03-15 PROCEDURE — 90686 FLU VACCINE (QUAD) GREATER THAN OR EQUAL TO 3YO PRESERVATIVE FREE IM: ICD-10-PCS | Mod: S$GLB,,, | Performed by: FAMILY MEDICINE

## 2021-03-15 PROCEDURE — 90472 PNEUMOCOCCAL POLYSACCHARIDE VACCINE 23-VALENT =>2YO SQ IM: ICD-10-PCS | Mod: S$GLB,,, | Performed by: FAMILY MEDICINE

## 2021-03-15 PROCEDURE — 90714 TD VACC NO PRESV 7 YRS+ IM: CPT | Mod: S$GLB,,, | Performed by: FAMILY MEDICINE

## 2021-03-26 ENCOUNTER — TELEPHONE (OUTPATIENT)
Dept: ADMINISTRATIVE | Facility: CLINIC | Age: 66
End: 2021-03-26

## 2021-04-08 ENCOUNTER — IMMUNIZATION (OUTPATIENT)
Dept: PRIMARY CARE CLINIC | Facility: CLINIC | Age: 66
End: 2021-04-08
Payer: MEDICARE

## 2021-04-08 DIAGNOSIS — Z23 NEED FOR VACCINATION: Primary | ICD-10-CM

## 2021-04-08 PROCEDURE — 91300 COVID-19, MRNA, LNP-S, PF, 30 MCG/0.3 ML DOSE VACCINE: CPT | Mod: HCNC,PBBFAC | Performed by: EMERGENCY MEDICINE

## 2021-04-13 ENCOUNTER — TELEPHONE (OUTPATIENT)
Dept: PRIMARY CARE CLINIC | Facility: CLINIC | Age: 66
End: 2021-04-13

## 2021-04-13 ENCOUNTER — OFFICE VISIT (OUTPATIENT)
Dept: PRIMARY CARE CLINIC | Facility: CLINIC | Age: 66
End: 2021-04-13
Payer: MEDICARE

## 2021-04-13 VITALS
RESPIRATION RATE: 18 BRPM | HEART RATE: 89 BPM | SYSTOLIC BLOOD PRESSURE: 112 MMHG | WEIGHT: 194.56 LBS | BODY MASS INDEX: 36.73 KG/M2 | TEMPERATURE: 98 F | OXYGEN SATURATION: 100 % | DIASTOLIC BLOOD PRESSURE: 56 MMHG | HEIGHT: 61 IN

## 2021-04-13 DIAGNOSIS — D63.8 ANEMIA OF CHRONIC DISORDER: ICD-10-CM

## 2021-04-13 DIAGNOSIS — D64.9 ANEMIA, UNSPECIFIED TYPE: ICD-10-CM

## 2021-04-13 DIAGNOSIS — R42 VERTIGO: ICD-10-CM

## 2021-04-13 DIAGNOSIS — Z87.898 HISTORY OF HEAVY ALCOHOL CONSUMPTION: ICD-10-CM

## 2021-04-13 DIAGNOSIS — E66.01 MORBID OBESITY: ICD-10-CM

## 2021-04-13 DIAGNOSIS — E03.9 HYPOTHYROIDISM, UNSPECIFIED TYPE: Primary | ICD-10-CM

## 2021-04-13 DIAGNOSIS — I10 ESSENTIAL HYPERTENSION: ICD-10-CM

## 2021-04-13 DIAGNOSIS — M17.2 POST-TRAUMATIC OSTEOARTHRITIS OF BOTH KNEES: ICD-10-CM

## 2021-04-13 DIAGNOSIS — S39.012D LUMBOSACRAL STRAIN, SUBSEQUENT ENCOUNTER: ICD-10-CM

## 2021-04-13 PROBLEM — S39.012A LUMBOSACRAL STRAIN: Status: ACTIVE | Noted: 2021-04-13

## 2021-04-13 PROCEDURE — 3008F PR BODY MASS INDEX (BMI) DOCUMENTED: ICD-10-PCS | Mod: CPTII,S$GLB,, | Performed by: FAMILY MEDICINE

## 2021-04-13 PROCEDURE — 99214 OFFICE O/P EST MOD 30 MIN: CPT | Mod: S$GLB,,, | Performed by: FAMILY MEDICINE

## 2021-04-13 PROCEDURE — 99999 PR PBB SHADOW E&M-EST. PATIENT-LVL V: CPT | Mod: PBBFAC,,, | Performed by: FAMILY MEDICINE

## 2021-04-13 PROCEDURE — 1126F AMNT PAIN NOTED NONE PRSNT: CPT | Mod: S$GLB,,, | Performed by: FAMILY MEDICINE

## 2021-04-13 PROCEDURE — 3008F BODY MASS INDEX DOCD: CPT | Mod: CPTII,S$GLB,, | Performed by: FAMILY MEDICINE

## 2021-04-13 PROCEDURE — 99214 PR OFFICE/OUTPT VISIT, EST, LEVL IV, 30-39 MIN: ICD-10-PCS | Mod: S$GLB,,, | Performed by: FAMILY MEDICINE

## 2021-04-13 PROCEDURE — 1126F PR PAIN SEVERITY QUANTIFIED, NO PAIN PRESENT: ICD-10-PCS | Mod: S$GLB,,, | Performed by: FAMILY MEDICINE

## 2021-04-13 PROCEDURE — 99999 PR PBB SHADOW E&M-EST. PATIENT-LVL V: ICD-10-PCS | Mod: PBBFAC,,, | Performed by: FAMILY MEDICINE

## 2021-04-13 RX ORDER — TRAMADOL HYDROCHLORIDE 50 MG/1
50 TABLET ORAL EVERY 6 HOURS PRN
Qty: 30 TABLET | Refills: 0 | Status: SHIPPED | OUTPATIENT
Start: 2021-04-13 | End: 2021-04-23

## 2021-04-22 ENCOUNTER — TELEPHONE (OUTPATIENT)
Dept: ORTHOPEDICS | Facility: CLINIC | Age: 66
End: 2021-04-22

## 2021-04-27 ENCOUNTER — TELEPHONE (OUTPATIENT)
Dept: ORTHOPEDICS | Facility: CLINIC | Age: 66
End: 2021-04-27

## 2021-04-27 DIAGNOSIS — M25.561 PAIN IN BOTH KNEES, UNSPECIFIED CHRONICITY: Primary | ICD-10-CM

## 2021-04-27 DIAGNOSIS — M25.562 PAIN IN BOTH KNEES, UNSPECIFIED CHRONICITY: Primary | ICD-10-CM

## 2021-05-06 ENCOUNTER — IMMUNIZATION (OUTPATIENT)
Dept: PRIMARY CARE CLINIC | Facility: CLINIC | Age: 66
End: 2021-05-06
Payer: MEDICARE

## 2021-05-06 DIAGNOSIS — Z23 NEED FOR VACCINATION: Primary | ICD-10-CM

## 2021-05-08 DIAGNOSIS — D64.9 ANEMIA, UNSPECIFIED TYPE: Primary | ICD-10-CM

## 2021-05-19 ENCOUNTER — OFFICE VISIT (OUTPATIENT)
Dept: ORTHOPEDICS | Facility: CLINIC | Age: 66
End: 2021-05-19
Payer: MEDICARE

## 2021-05-19 VITALS
DIASTOLIC BLOOD PRESSURE: 61 MMHG | HEIGHT: 61 IN | WEIGHT: 195.75 LBS | HEART RATE: 111 BPM | BODY MASS INDEX: 36.96 KG/M2 | SYSTOLIC BLOOD PRESSURE: 119 MMHG | RESPIRATION RATE: 18 BRPM

## 2021-05-19 DIAGNOSIS — M54.50 BILATERAL LOW BACK PAIN WITHOUT SCIATICA, UNSPECIFIED CHRONICITY: ICD-10-CM

## 2021-05-19 DIAGNOSIS — M17.0 PRIMARY OSTEOARTHRITIS OF BOTH KNEES: Primary | ICD-10-CM

## 2021-05-19 PROCEDURE — 99204 PR OFFICE/OUTPT VISIT, NEW, LEVL IV, 45-59 MIN: ICD-10-PCS | Mod: S$GLB,,, | Performed by: ORTHOPAEDIC SURGERY

## 2021-05-19 PROCEDURE — 99999 PR PBB SHADOW E&M-EST. PATIENT-LVL V: CPT | Mod: PBBFAC,,, | Performed by: ORTHOPAEDIC SURGERY

## 2021-05-19 PROCEDURE — 99204 OFFICE O/P NEW MOD 45 MIN: CPT | Mod: S$GLB,,, | Performed by: ORTHOPAEDIC SURGERY

## 2021-05-19 PROCEDURE — 3008F BODY MASS INDEX DOCD: CPT | Mod: CPTII,S$GLB,, | Performed by: ORTHOPAEDIC SURGERY

## 2021-05-19 PROCEDURE — 1125F AMNT PAIN NOTED PAIN PRSNT: CPT | Mod: S$GLB,,, | Performed by: ORTHOPAEDIC SURGERY

## 2021-05-19 PROCEDURE — 3008F PR BODY MASS INDEX (BMI) DOCUMENTED: ICD-10-PCS | Mod: CPTII,S$GLB,, | Performed by: ORTHOPAEDIC SURGERY

## 2021-05-19 PROCEDURE — 1125F PR PAIN SEVERITY QUANTIFIED, PAIN PRESENT: ICD-10-PCS | Mod: S$GLB,,, | Performed by: ORTHOPAEDIC SURGERY

## 2021-05-19 PROCEDURE — 99999 PR PBB SHADOW E&M-EST. PATIENT-LVL V: ICD-10-PCS | Mod: PBBFAC,,, | Performed by: ORTHOPAEDIC SURGERY

## 2021-05-19 RX ORDER — TRIAMCINOLONE ACETONIDE 40 MG/ML
INJECTION, SUSPENSION INTRA-ARTICULAR; INTRAMUSCULAR
COMMUNITY
Start: 2021-03-02 | End: 2021-06-28 | Stop reason: ALTCHOICE

## 2021-05-19 RX ORDER — INFLUENZA VIRUS VACCINE 15; 15; 15; 15 UG/.5ML; UG/.5ML; UG/.5ML; UG/.5ML
SUSPENSION INTRAMUSCULAR
COMMUNITY
Start: 2021-03-15 | End: 2021-06-28

## 2021-05-28 ENCOUNTER — TELEPHONE (OUTPATIENT)
Dept: ADMINISTRATIVE | Facility: CLINIC | Age: 66
End: 2021-05-28

## 2021-06-11 ENCOUNTER — TELEPHONE (OUTPATIENT)
Dept: ADMINISTRATIVE | Facility: CLINIC | Age: 66
End: 2021-06-11

## 2021-06-15 ENCOUNTER — OFFICE VISIT (OUTPATIENT)
Dept: PAIN MEDICINE | Facility: CLINIC | Age: 66
End: 2021-06-15
Payer: MEDICARE

## 2021-06-15 ENCOUNTER — TELEPHONE (OUTPATIENT)
Dept: PRIMARY CARE CLINIC | Facility: CLINIC | Age: 66
End: 2021-06-15

## 2021-06-15 VITALS
OXYGEN SATURATION: 100 % | DIASTOLIC BLOOD PRESSURE: 70 MMHG | SYSTOLIC BLOOD PRESSURE: 119 MMHG | RESPIRATION RATE: 17 BRPM | WEIGHT: 191.81 LBS | HEIGHT: 61 IN | BODY MASS INDEX: 36.21 KG/M2 | HEART RATE: 119 BPM

## 2021-06-15 DIAGNOSIS — M47.816 LUMBAR SPONDYLOSIS: ICD-10-CM

## 2021-06-15 DIAGNOSIS — M51.36 DDD (DEGENERATIVE DISC DISEASE), LUMBAR: Primary | ICD-10-CM

## 2021-06-15 DIAGNOSIS — M54.50 BILATERAL LOW BACK PAIN WITHOUT SCIATICA, UNSPECIFIED CHRONICITY: ICD-10-CM

## 2021-06-15 DIAGNOSIS — M54.16 LUMBAR RADICULOPATHY: ICD-10-CM

## 2021-06-15 PROBLEM — M51.369 DDD (DEGENERATIVE DISC DISEASE), LUMBAR: Status: ACTIVE | Noted: 2021-06-15

## 2021-06-15 PROCEDURE — 1125F AMNT PAIN NOTED PAIN PRSNT: CPT | Mod: S$GLB,,, | Performed by: PAIN MEDICINE

## 2021-06-15 PROCEDURE — 99999 PR PBB SHADOW E&M-EST. PATIENT-LVL V: CPT | Mod: PBBFAC,,, | Performed by: PAIN MEDICINE

## 2021-06-15 PROCEDURE — 99204 OFFICE O/P NEW MOD 45 MIN: CPT | Mod: S$GLB,,, | Performed by: PAIN MEDICINE

## 2021-06-15 PROCEDURE — 3008F PR BODY MASS INDEX (BMI) DOCUMENTED: ICD-10-PCS | Mod: CPTII,S$GLB,, | Performed by: PAIN MEDICINE

## 2021-06-15 PROCEDURE — 3008F BODY MASS INDEX DOCD: CPT | Mod: CPTII,S$GLB,, | Performed by: PAIN MEDICINE

## 2021-06-15 PROCEDURE — 1125F PR PAIN SEVERITY QUANTIFIED, PAIN PRESENT: ICD-10-PCS | Mod: S$GLB,,, | Performed by: PAIN MEDICINE

## 2021-06-15 PROCEDURE — 99999 PR PBB SHADOW E&M-EST. PATIENT-LVL V: ICD-10-PCS | Mod: PBBFAC,,, | Performed by: PAIN MEDICINE

## 2021-06-15 PROCEDURE — 99204 PR OFFICE/OUTPT VISIT, NEW, LEVL IV, 45-59 MIN: ICD-10-PCS | Mod: S$GLB,,, | Performed by: PAIN MEDICINE

## 2021-06-17 ENCOUNTER — OFFICE VISIT (OUTPATIENT)
Dept: PRIMARY CARE CLINIC | Facility: CLINIC | Age: 66
End: 2021-06-17
Payer: MEDICARE

## 2021-06-17 DIAGNOSIS — E03.9 HYPOTHYROIDISM, UNSPECIFIED TYPE: ICD-10-CM

## 2021-06-17 DIAGNOSIS — E66.01 MORBID OBESITY: ICD-10-CM

## 2021-06-17 DIAGNOSIS — I10 ESSENTIAL HYPERTENSION: ICD-10-CM

## 2021-06-17 DIAGNOSIS — D69.6 THROMBOCYTOPENIA: ICD-10-CM

## 2021-06-17 DIAGNOSIS — D64.9 ANEMIA, UNSPECIFIED TYPE: ICD-10-CM

## 2021-06-17 DIAGNOSIS — F41.1 GAD (GENERALIZED ANXIETY DISORDER): ICD-10-CM

## 2021-06-17 DIAGNOSIS — R06.02 SHORTNESS OF BREATH: ICD-10-CM

## 2021-06-17 DIAGNOSIS — R53.81 DEBILITY: ICD-10-CM

## 2021-06-17 DIAGNOSIS — R42 DIZZINESS: ICD-10-CM

## 2021-06-17 PROCEDURE — 99443 PR PHYSICIAN TELEPHONE EVALUATION 21-30 MIN: CPT | Mod: 95,,, | Performed by: FAMILY MEDICINE

## 2021-06-17 PROCEDURE — 99443 PR PHYSICIAN TELEPHONE EVALUATION 21-30 MIN: ICD-10-PCS | Mod: 95,,, | Performed by: FAMILY MEDICINE

## 2021-06-22 ENCOUNTER — TELEPHONE (OUTPATIENT)
Dept: ADMINISTRATIVE | Facility: CLINIC | Age: 66
End: 2021-06-22

## 2021-06-28 ENCOUNTER — OFFICE VISIT (OUTPATIENT)
Dept: PRIMARY CARE CLINIC | Facility: CLINIC | Age: 66
End: 2021-06-28
Payer: MEDICARE

## 2021-06-28 ENCOUNTER — TELEPHONE (OUTPATIENT)
Dept: HOME HEALTH SERVICES | Facility: CLINIC | Age: 66
End: 2021-06-28

## 2021-06-28 VITALS
HEIGHT: 61 IN | DIASTOLIC BLOOD PRESSURE: 64 MMHG | HEART RATE: 109 BPM | BODY MASS INDEX: 36.8 KG/M2 | OXYGEN SATURATION: 99 % | SYSTOLIC BLOOD PRESSURE: 122 MMHG | WEIGHT: 194.88 LBS

## 2021-06-28 DIAGNOSIS — F34.1 DYSTHYMIC: ICD-10-CM

## 2021-06-28 DIAGNOSIS — D64.9 ANEMIA, UNSPECIFIED TYPE: ICD-10-CM

## 2021-06-28 DIAGNOSIS — D69.6 THROMBOCYTOPENIA: ICD-10-CM

## 2021-06-28 DIAGNOSIS — H53.9 VISION CHANGES: ICD-10-CM

## 2021-06-28 DIAGNOSIS — F41.1 GAD (GENERALIZED ANXIETY DISORDER): ICD-10-CM

## 2021-06-28 DIAGNOSIS — Z00.00 ENCOUNTER FOR PREVENTIVE HEALTH EXAMINATION: Primary | ICD-10-CM

## 2021-06-28 DIAGNOSIS — F10.11 ALCOHOL ABUSE, IN REMISSION: ICD-10-CM

## 2021-06-28 DIAGNOSIS — K76.6 PORTAL HYPERTENSION: ICD-10-CM

## 2021-06-28 DIAGNOSIS — Z74.09 OTHER REDUCED MOBILITY: ICD-10-CM

## 2021-06-28 DIAGNOSIS — E66.01 SEVERE OBESITY WITH BODY MASS INDEX (BMI) OF 35.0 TO 39.9 WITH COMORBIDITY: ICD-10-CM

## 2021-06-28 PROBLEM — J01.01 ACUTE RECURRENT MAXILLARY SINUSITIS: Status: RESOLVED | Noted: 2020-07-31 | Resolved: 2021-06-28

## 2021-06-28 PROCEDURE — G0439 PR MEDICARE ANNUAL WELLNESS SUBSEQUENT VISIT: ICD-10-PCS | Mod: S$GLB,,, | Performed by: NURSE PRACTITIONER

## 2021-06-28 PROCEDURE — 1125F PR PAIN SEVERITY QUANTIFIED, PAIN PRESENT: ICD-10-PCS | Mod: S$GLB,,, | Performed by: NURSE PRACTITIONER

## 2021-06-28 PROCEDURE — 3008F BODY MASS INDEX DOCD: CPT | Mod: CPTII,S$GLB,, | Performed by: NURSE PRACTITIONER

## 2021-06-28 PROCEDURE — 99499 RISK ADDL DX/OHS AUDIT: ICD-10-PCS | Mod: S$GLB,,, | Performed by: NURSE PRACTITIONER

## 2021-06-28 PROCEDURE — 99499 UNLISTED E&M SERVICE: CPT | Mod: S$GLB,,, | Performed by: NURSE PRACTITIONER

## 2021-06-28 PROCEDURE — 3008F PR BODY MASS INDEX (BMI) DOCUMENTED: ICD-10-PCS | Mod: CPTII,S$GLB,, | Performed by: NURSE PRACTITIONER

## 2021-06-28 PROCEDURE — 1125F AMNT PAIN NOTED PAIN PRSNT: CPT | Mod: S$GLB,,, | Performed by: NURSE PRACTITIONER

## 2021-06-28 PROCEDURE — G0439 PPPS, SUBSEQ VISIT: HCPCS | Mod: S$GLB,,, | Performed by: NURSE PRACTITIONER

## 2021-06-28 PROCEDURE — 99999 PR PBB SHADOW E&M-EST. PATIENT-LVL V: CPT | Mod: PBBFAC,,, | Performed by: NURSE PRACTITIONER

## 2021-06-28 PROCEDURE — 99999 PR PBB SHADOW E&M-EST. PATIENT-LVL V: ICD-10-PCS | Mod: PBBFAC,,, | Performed by: NURSE PRACTITIONER

## 2021-07-03 DIAGNOSIS — D50.0 ANEMIA DUE TO CHRONIC BLOOD LOSS: Primary | ICD-10-CM

## 2021-07-07 ENCOUNTER — TELEPHONE (OUTPATIENT)
Dept: PRIMARY CARE CLINIC | Facility: CLINIC | Age: 66
End: 2021-07-07

## 2021-07-22 ENCOUNTER — OFFICE VISIT (OUTPATIENT)
Dept: GASTROENTEROLOGY | Facility: CLINIC | Age: 66
End: 2021-07-22
Payer: MEDICARE

## 2021-07-22 ENCOUNTER — OFFICE VISIT (OUTPATIENT)
Dept: CARDIOLOGY | Facility: CLINIC | Age: 66
End: 2021-07-22
Payer: MEDICARE

## 2021-07-22 VITALS
BODY MASS INDEX: 38.04 KG/M2 | DIASTOLIC BLOOD PRESSURE: 59 MMHG | WEIGHT: 201.5 LBS | HEIGHT: 61 IN | OXYGEN SATURATION: 100 % | SYSTOLIC BLOOD PRESSURE: 133 MMHG | HEART RATE: 100 BPM

## 2021-07-22 VITALS
OXYGEN SATURATION: 99 % | HEART RATE: 108 BPM | DIASTOLIC BLOOD PRESSURE: 60 MMHG | HEIGHT: 61 IN | BODY MASS INDEX: 38.04 KG/M2 | WEIGHT: 201.5 LBS | SYSTOLIC BLOOD PRESSURE: 130 MMHG | RESPIRATION RATE: 20 BRPM

## 2021-07-22 DIAGNOSIS — R06.02 SHORTNESS OF BREATH: ICD-10-CM

## 2021-07-22 DIAGNOSIS — D50.9 MICROCYTIC ANEMIA: Primary | ICD-10-CM

## 2021-07-22 DIAGNOSIS — R55 SYNCOPE AND COLLAPSE: ICD-10-CM

## 2021-07-22 DIAGNOSIS — R07.9 CHEST PAIN OF UNCERTAIN ETIOLOGY: Primary | ICD-10-CM

## 2021-07-22 DIAGNOSIS — I65.23 CAROTID ATHEROSCLEROSIS, BILATERAL: ICD-10-CM

## 2021-07-22 DIAGNOSIS — H81.10 BENIGN PAROXYSMAL POSITIONAL VERTIGO, UNSPECIFIED LATERALITY: ICD-10-CM

## 2021-07-22 DIAGNOSIS — D50.0 IRON DEFICIENCY ANEMIA DUE TO CHRONIC BLOOD LOSS: ICD-10-CM

## 2021-07-22 DIAGNOSIS — D64.9 ANEMIA, UNSPECIFIED TYPE: ICD-10-CM

## 2021-07-22 PROCEDURE — 99999 PR PBB SHADOW E&M-EST. PATIENT-LVL III: CPT | Mod: PBBFAC,,, | Performed by: INTERNAL MEDICINE

## 2021-07-22 PROCEDURE — 3078F DIAST BP <80 MM HG: CPT | Mod: CPTII,S$GLB,, | Performed by: INTERNAL MEDICINE

## 2021-07-22 PROCEDURE — 3078F PR MOST RECENT DIASTOLIC BLOOD PRESSURE < 80 MM HG: ICD-10-PCS | Mod: CPTII,S$GLB,, | Performed by: NURSE PRACTITIONER

## 2021-07-22 PROCEDURE — 93000 ELECTROCARDIOGRAM COMPLETE: CPT | Mod: S$GLB,,, | Performed by: INTERNAL MEDICINE

## 2021-07-22 PROCEDURE — 99214 OFFICE O/P EST MOD 30 MIN: CPT | Mod: 25,S$GLB,, | Performed by: INTERNAL MEDICINE

## 2021-07-22 PROCEDURE — 3078F DIAST BP <80 MM HG: CPT | Mod: CPTII,S$GLB,, | Performed by: NURSE PRACTITIONER

## 2021-07-22 PROCEDURE — 99999 PR PBB SHADOW E&M-EST. PATIENT-LVL V: ICD-10-PCS | Mod: PBBFAC,,, | Performed by: NURSE PRACTITIONER

## 2021-07-22 PROCEDURE — 99214 PR OFFICE/OUTPT VISIT, EST, LEVL IV, 30-39 MIN: ICD-10-PCS | Mod: S$GLB,,, | Performed by: NURSE PRACTITIONER

## 2021-07-22 PROCEDURE — 1125F AMNT PAIN NOTED PAIN PRSNT: CPT | Mod: CPTII,S$GLB,, | Performed by: NURSE PRACTITIONER

## 2021-07-22 PROCEDURE — 99214 OFFICE O/P EST MOD 30 MIN: CPT | Mod: S$GLB,,, | Performed by: NURSE PRACTITIONER

## 2021-07-22 PROCEDURE — 3075F SYST BP GE 130 - 139MM HG: CPT | Mod: CPTII,S$GLB,, | Performed by: NURSE PRACTITIONER

## 2021-07-22 PROCEDURE — 3078F PR MOST RECENT DIASTOLIC BLOOD PRESSURE < 80 MM HG: ICD-10-PCS | Mod: CPTII,S$GLB,, | Performed by: INTERNAL MEDICINE

## 2021-07-22 PROCEDURE — 99999 PR PBB SHADOW E&M-EST. PATIENT-LVL III: ICD-10-PCS | Mod: PBBFAC,,, | Performed by: INTERNAL MEDICINE

## 2021-07-22 PROCEDURE — 1125F PR PAIN SEVERITY QUANTIFIED, PAIN PRESENT: ICD-10-PCS | Mod: CPTII,S$GLB,, | Performed by: INTERNAL MEDICINE

## 2021-07-22 PROCEDURE — 99214 PR OFFICE/OUTPT VISIT, EST, LEVL IV, 30-39 MIN: ICD-10-PCS | Mod: 25,S$GLB,, | Performed by: INTERNAL MEDICINE

## 2021-07-22 PROCEDURE — 3008F PR BODY MASS INDEX (BMI) DOCUMENTED: ICD-10-PCS | Mod: CPTII,S$GLB,, | Performed by: NURSE PRACTITIONER

## 2021-07-22 PROCEDURE — 3075F PR MOST RECENT SYSTOLIC BLOOD PRESS GE 130-139MM HG: ICD-10-PCS | Mod: CPTII,S$GLB,, | Performed by: INTERNAL MEDICINE

## 2021-07-22 PROCEDURE — 3008F BODY MASS INDEX DOCD: CPT | Mod: CPTII,S$GLB,, | Performed by: NURSE PRACTITIONER

## 2021-07-22 PROCEDURE — 99999 PR PBB SHADOW E&M-EST. PATIENT-LVL V: CPT | Mod: PBBFAC,,, | Performed by: NURSE PRACTITIONER

## 2021-07-22 PROCEDURE — 1125F AMNT PAIN NOTED PAIN PRSNT: CPT | Mod: CPTII,S$GLB,, | Performed by: INTERNAL MEDICINE

## 2021-07-22 PROCEDURE — 3075F PR MOST RECENT SYSTOLIC BLOOD PRESS GE 130-139MM HG: ICD-10-PCS | Mod: CPTII,S$GLB,, | Performed by: NURSE PRACTITIONER

## 2021-07-22 PROCEDURE — 3008F PR BODY MASS INDEX (BMI) DOCUMENTED: ICD-10-PCS | Mod: CPTII,S$GLB,, | Performed by: INTERNAL MEDICINE

## 2021-07-22 PROCEDURE — 3075F SYST BP GE 130 - 139MM HG: CPT | Mod: CPTII,S$GLB,, | Performed by: INTERNAL MEDICINE

## 2021-07-22 PROCEDURE — 3008F BODY MASS INDEX DOCD: CPT | Mod: CPTII,S$GLB,, | Performed by: INTERNAL MEDICINE

## 2021-07-22 PROCEDURE — 1125F PR PAIN SEVERITY QUANTIFIED, PAIN PRESENT: ICD-10-PCS | Mod: CPTII,S$GLB,, | Performed by: NURSE PRACTITIONER

## 2021-07-22 PROCEDURE — 93000 EKG 12-LEAD: ICD-10-PCS | Mod: S$GLB,,, | Performed by: INTERNAL MEDICINE

## 2021-07-22 RX ORDER — CALCIUM CARBONATE 200(500)MG
1 TABLET,CHEWABLE ORAL DAILY PRN
COMMUNITY

## 2021-07-22 RX ORDER — MECLIZINE HYDROCHLORIDE 25 MG/1
25 TABLET ORAL 3 TIMES DAILY PRN
Qty: 60 TABLET | Refills: 5 | Status: SHIPPED | OUTPATIENT
Start: 2021-07-22 | End: 2021-11-04 | Stop reason: SDUPTHER

## 2021-07-23 ENCOUNTER — LAB VISIT (OUTPATIENT)
Dept: LAB | Facility: HOSPITAL | Age: 66
End: 2021-07-23
Payer: MEDICARE

## 2021-07-23 ENCOUNTER — OFFICE VISIT (OUTPATIENT)
Dept: HEMATOLOGY/ONCOLOGY | Facility: CLINIC | Age: 66
End: 2021-07-23
Payer: MEDICARE

## 2021-07-23 VITALS
HEART RATE: 98 BPM | DIASTOLIC BLOOD PRESSURE: 66 MMHG | WEIGHT: 201.5 LBS | SYSTOLIC BLOOD PRESSURE: 146 MMHG | OXYGEN SATURATION: 100 % | RESPIRATION RATE: 20 BRPM | TEMPERATURE: 98 F | BODY MASS INDEX: 37.08 KG/M2 | HEIGHT: 62 IN

## 2021-07-23 DIAGNOSIS — D69.6 THROMBOCYTOPENIA: ICD-10-CM

## 2021-07-23 DIAGNOSIS — R06.02 SHORTNESS OF BREATH: ICD-10-CM

## 2021-07-23 DIAGNOSIS — D50.9 MICROCYTIC ANEMIA: Primary | ICD-10-CM

## 2021-07-23 DIAGNOSIS — D50.0 IRON DEFICIENCY ANEMIA DUE TO CHRONIC BLOOD LOSS: ICD-10-CM

## 2021-07-23 DIAGNOSIS — D50.9 MICROCYTIC ANEMIA: ICD-10-CM

## 2021-07-23 DIAGNOSIS — D64.9 ANEMIA, UNSPECIFIED TYPE: ICD-10-CM

## 2021-07-23 LAB
ABO + RH BLD: NORMAL
ALBUMIN SERPL BCP-MCNC: 2.9 G/DL (ref 3.5–5.2)
ALP SERPL-CCNC: 110 U/L (ref 55–135)
ALT SERPL W/O P-5'-P-CCNC: 13 U/L (ref 10–44)
ANION GAP SERPL CALC-SCNC: 11 MMOL/L (ref 8–16)
AST SERPL-CCNC: 36 U/L (ref 10–40)
BASOPHILS # BLD AUTO: 0.02 K/UL (ref 0–0.2)
BASOPHILS NFR BLD: 0.4 % (ref 0–1.9)
BILIRUB SERPL-MCNC: 1 MG/DL (ref 0.1–1)
BLD GP AB SCN CELLS X3 SERPL QL: NORMAL
BUN SERPL-MCNC: 8 MG/DL (ref 8–23)
CALCIUM SERPL-MCNC: 9.3 MG/DL (ref 8.7–10.5)
CHLORIDE SERPL-SCNC: 113 MMOL/L (ref 95–110)
CO2 SERPL-SCNC: 18 MMOL/L (ref 23–29)
CREAT SERPL-MCNC: 0.8 MG/DL (ref 0.5–1.4)
DIFFERENTIAL METHOD: ABNORMAL
EOSINOPHIL # BLD AUTO: 0 K/UL (ref 0–0.5)
EOSINOPHIL NFR BLD: 0.4 % (ref 0–8)
ERYTHROCYTE [DISTWIDTH] IN BLOOD BY AUTOMATED COUNT: 29 % (ref 11.5–14.5)
EST. GFR  (AFRICAN AMERICAN): >60 ML/MIN/1.73 M^2
EST. GFR  (NON AFRICAN AMERICAN): >60 ML/MIN/1.73 M^2
FERRITIN SERPL-MCNC: 96 NG/ML (ref 20–300)
GLUCOSE SERPL-MCNC: 90 MG/DL (ref 70–110)
HCT VFR BLD AUTO: 32.7 % (ref 37–48.5)
HGB BLD-MCNC: 9.7 G/DL (ref 12–16)
IMM GRANULOCYTES # BLD AUTO: 0.02 K/UL (ref 0–0.04)
IMM GRANULOCYTES NFR BLD AUTO: 0.4 % (ref 0–0.5)
IRON SERPL-MCNC: 55 UG/DL (ref 30–160)
LYMPHOCYTES # BLD AUTO: 1.8 K/UL (ref 1–4.8)
LYMPHOCYTES NFR BLD: 36.9 % (ref 18–48)
MCH RBC QN AUTO: 24.4 PG (ref 27–31)
MCHC RBC AUTO-ENTMCNC: 29.7 G/DL (ref 32–36)
MCV RBC AUTO: 82 FL (ref 82–98)
MONOCYTES # BLD AUTO: 0.4 K/UL (ref 0.3–1)
MONOCYTES NFR BLD: 8.4 % (ref 4–15)
NEUTROPHILS # BLD AUTO: 2.6 K/UL (ref 1.8–7.7)
NEUTROPHILS NFR BLD: 53.5 % (ref 38–73)
NRBC BLD-RTO: 0 /100 WBC
PLATELET # BLD AUTO: 100 K/UL (ref 150–450)
PMV BLD AUTO: 12.9 FL (ref 9.2–12.9)
POTASSIUM SERPL-SCNC: 4.2 MMOL/L (ref 3.5–5.1)
PROT SERPL-MCNC: 7 G/DL (ref 6–8.4)
RBC # BLD AUTO: 3.98 M/UL (ref 4–5.4)
RETICS/RBC NFR AUTO: 2.1 % (ref 0.5–2.5)
SATURATED IRON: 16 % (ref 20–50)
SODIUM SERPL-SCNC: 142 MMOL/L (ref 136–145)
TOTAL IRON BINDING CAPACITY: 354 UG/DL (ref 250–450)
TRANSFERRIN SERPL-MCNC: 239 MG/DL (ref 200–375)
WBC # BLD AUTO: 4.9 K/UL (ref 3.9–12.7)

## 2021-07-23 PROCEDURE — 36415 COLL VENOUS BLD VENIPUNCTURE: CPT | Performed by: INTERNAL MEDICINE

## 2021-07-23 PROCEDURE — 1125F AMNT PAIN NOTED PAIN PRSNT: CPT | Mod: CPTII,S$GLB,, | Performed by: INTERNAL MEDICINE

## 2021-07-23 PROCEDURE — 3078F DIAST BP <80 MM HG: CPT | Mod: CPTII,S$GLB,, | Performed by: INTERNAL MEDICINE

## 2021-07-23 PROCEDURE — 82728 ASSAY OF FERRITIN: CPT | Performed by: INTERNAL MEDICINE

## 2021-07-23 PROCEDURE — 3077F PR MOST RECENT SYSTOLIC BLOOD PRESSURE >= 140 MM HG: ICD-10-PCS | Mod: CPTII,S$GLB,, | Performed by: INTERNAL MEDICINE

## 2021-07-23 PROCEDURE — 3077F SYST BP >= 140 MM HG: CPT | Mod: CPTII,S$GLB,, | Performed by: INTERNAL MEDICINE

## 2021-07-23 PROCEDURE — 80053 COMPREHEN METABOLIC PANEL: CPT | Performed by: INTERNAL MEDICINE

## 2021-07-23 PROCEDURE — 85045 AUTOMATED RETICULOCYTE COUNT: CPT | Performed by: INTERNAL MEDICINE

## 2021-07-23 PROCEDURE — 3008F PR BODY MASS INDEX (BMI) DOCUMENTED: ICD-10-PCS | Mod: CPTII,S$GLB,, | Performed by: INTERNAL MEDICINE

## 2021-07-23 PROCEDURE — 1160F RVW MEDS BY RX/DR IN RCRD: CPT | Mod: CPTII,S$GLB,, | Performed by: INTERNAL MEDICINE

## 2021-07-23 PROCEDURE — 99204 PR OFFICE/OUTPT VISIT, NEW, LEVL IV, 45-59 MIN: ICD-10-PCS | Mod: GC,S$GLB,, | Performed by: INTERNAL MEDICINE

## 2021-07-23 PROCEDURE — 1159F MED LIST DOCD IN RCRD: CPT | Mod: CPTII,S$GLB,, | Performed by: INTERNAL MEDICINE

## 2021-07-23 PROCEDURE — 1125F PR PAIN SEVERITY QUANTIFIED, PAIN PRESENT: ICD-10-PCS | Mod: CPTII,S$GLB,, | Performed by: INTERNAL MEDICINE

## 2021-07-23 PROCEDURE — 83540 ASSAY OF IRON: CPT | Performed by: INTERNAL MEDICINE

## 2021-07-23 PROCEDURE — 3078F PR MOST RECENT DIASTOLIC BLOOD PRESSURE < 80 MM HG: ICD-10-PCS | Mod: CPTII,S$GLB,, | Performed by: INTERNAL MEDICINE

## 2021-07-23 PROCEDURE — 1159F PR MEDICATION LIST DOCUMENTED IN MEDICAL RECORD: ICD-10-PCS | Mod: CPTII,S$GLB,, | Performed by: INTERNAL MEDICINE

## 2021-07-23 PROCEDURE — 99999 PR PBB SHADOW E&M-EST. PATIENT-LVL IV: ICD-10-PCS | Mod: PBBFAC,,, | Performed by: INTERNAL MEDICINE

## 2021-07-23 PROCEDURE — 99999 PR PBB SHADOW E&M-EST. PATIENT-LVL IV: CPT | Mod: PBBFAC,,, | Performed by: INTERNAL MEDICINE

## 2021-07-23 PROCEDURE — 86900 BLOOD TYPING SEROLOGIC ABO: CPT | Performed by: INTERNAL MEDICINE

## 2021-07-23 PROCEDURE — 85025 COMPLETE CBC W/AUTO DIFF WBC: CPT | Performed by: INTERNAL MEDICINE

## 2021-07-23 PROCEDURE — 99204 OFFICE O/P NEW MOD 45 MIN: CPT | Mod: GC,S$GLB,, | Performed by: INTERNAL MEDICINE

## 2021-07-23 PROCEDURE — 3008F BODY MASS INDEX DOCD: CPT | Mod: CPTII,S$GLB,, | Performed by: INTERNAL MEDICINE

## 2021-07-23 PROCEDURE — 1160F PR REVIEW ALL MEDS BY PRESCRIBER/CLIN PHARMACIST DOCUMENTED: ICD-10-PCS | Mod: CPTII,S$GLB,, | Performed by: INTERNAL MEDICINE

## 2021-07-23 RX ORDER — HEPARIN 100 UNIT/ML
500 SYRINGE INTRAVENOUS
Status: CANCELLED | OUTPATIENT
Start: 2021-07-30

## 2021-07-23 RX ORDER — SODIUM CHLORIDE 0.9 % (FLUSH) 0.9 %
10 SYRINGE (ML) INJECTION
Status: CANCELLED | OUTPATIENT
Start: 2021-08-19

## 2021-07-23 RX ORDER — SODIUM CHLORIDE 0.9 % (FLUSH) 0.9 %
10 SYRINGE (ML) INJECTION
Status: CANCELLED | OUTPATIENT
Start: 2021-07-30

## 2021-07-23 RX ORDER — HEPARIN 100 UNIT/ML
500 SYRINGE INTRAVENOUS
Status: CANCELLED | OUTPATIENT
Start: 2021-08-19

## 2021-07-26 ENCOUNTER — TELEPHONE (OUTPATIENT)
Dept: NEUROSURGERY | Facility: CLINIC | Age: 66
End: 2021-07-26

## 2021-07-26 ENCOUNTER — OFFICE VISIT (OUTPATIENT)
Dept: NEUROSURGERY | Facility: CLINIC | Age: 66
End: 2021-07-26
Payer: MEDICARE

## 2021-07-26 ENCOUNTER — TELEPHONE (OUTPATIENT)
Dept: ENDOSCOPY | Facility: HOSPITAL | Age: 66
End: 2021-07-26

## 2021-07-26 VITALS — SYSTOLIC BLOOD PRESSURE: 120 MMHG | HEART RATE: 102 BPM | DIASTOLIC BLOOD PRESSURE: 77 MMHG

## 2021-07-26 DIAGNOSIS — M17.2 POST-TRAUMATIC OSTEOARTHRITIS OF BOTH KNEES: ICD-10-CM

## 2021-07-26 DIAGNOSIS — M54.16 LUMBAR RADICULOPATHY: ICD-10-CM

## 2021-07-26 DIAGNOSIS — S39.012D LUMBOSACRAL STRAIN, SUBSEQUENT ENCOUNTER: ICD-10-CM

## 2021-07-26 DIAGNOSIS — M51.36 DDD (DEGENERATIVE DISC DISEASE), LUMBAR: Primary | ICD-10-CM

## 2021-07-26 PROCEDURE — 99204 OFFICE O/P NEW MOD 45 MIN: CPT | Mod: S$GLB,,, | Performed by: NURSE PRACTITIONER

## 2021-07-26 PROCEDURE — 1160F PR REVIEW ALL MEDS BY PRESCRIBER/CLIN PHARMACIST DOCUMENTED: ICD-10-PCS | Mod: CPTII,S$GLB,, | Performed by: NURSE PRACTITIONER

## 2021-07-26 PROCEDURE — 3078F PR MOST RECENT DIASTOLIC BLOOD PRESSURE < 80 MM HG: ICD-10-PCS | Mod: CPTII,S$GLB,, | Performed by: NURSE PRACTITIONER

## 2021-07-26 PROCEDURE — 3074F SYST BP LT 130 MM HG: CPT | Mod: CPTII,S$GLB,, | Performed by: NURSE PRACTITIONER

## 2021-07-26 PROCEDURE — 3078F DIAST BP <80 MM HG: CPT | Mod: CPTII,S$GLB,, | Performed by: NURSE PRACTITIONER

## 2021-07-26 PROCEDURE — 3074F PR MOST RECENT SYSTOLIC BLOOD PRESSURE < 130 MM HG: ICD-10-PCS | Mod: CPTII,S$GLB,, | Performed by: NURSE PRACTITIONER

## 2021-07-26 PROCEDURE — 99204 PR OFFICE/OUTPT VISIT, NEW, LEVL IV, 45-59 MIN: ICD-10-PCS | Mod: S$GLB,,, | Performed by: NURSE PRACTITIONER

## 2021-07-26 PROCEDURE — 99999 PR PBB SHADOW E&M-EST. PATIENT-LVL III: ICD-10-PCS | Mod: PBBFAC,,, | Performed by: NURSE PRACTITIONER

## 2021-07-26 PROCEDURE — 99999 PR PBB SHADOW E&M-EST. PATIENT-LVL III: CPT | Mod: PBBFAC,,, | Performed by: NURSE PRACTITIONER

## 2021-07-26 PROCEDURE — 1125F AMNT PAIN NOTED PAIN PRSNT: CPT | Mod: CPTII,S$GLB,, | Performed by: NURSE PRACTITIONER

## 2021-07-26 PROCEDURE — 1160F RVW MEDS BY RX/DR IN RCRD: CPT | Mod: CPTII,S$GLB,, | Performed by: NURSE PRACTITIONER

## 2021-07-26 PROCEDURE — 1125F PR PAIN SEVERITY QUANTIFIED, PAIN PRESENT: ICD-10-PCS | Mod: CPTII,S$GLB,, | Performed by: NURSE PRACTITIONER

## 2021-07-26 PROCEDURE — 1159F MED LIST DOCD IN RCRD: CPT | Mod: CPTII,S$GLB,, | Performed by: NURSE PRACTITIONER

## 2021-07-26 PROCEDURE — 1159F PR MEDICATION LIST DOCUMENTED IN MEDICAL RECORD: ICD-10-PCS | Mod: CPTII,S$GLB,, | Performed by: NURSE PRACTITIONER

## 2021-07-28 ENCOUNTER — OFFICE VISIT (OUTPATIENT)
Dept: PRIMARY CARE CLINIC | Facility: CLINIC | Age: 66
End: 2021-07-28
Payer: MEDICARE

## 2021-07-28 VITALS
RESPIRATION RATE: 18 BRPM | BODY MASS INDEX: 37.14 KG/M2 | OXYGEN SATURATION: 95 % | WEIGHT: 201.81 LBS | HEART RATE: 102 BPM | HEIGHT: 62 IN | DIASTOLIC BLOOD PRESSURE: 60 MMHG | SYSTOLIC BLOOD PRESSURE: 124 MMHG | TEMPERATURE: 98 F

## 2021-07-28 DIAGNOSIS — R10.13 EPIGASTRIC PAIN: ICD-10-CM

## 2021-07-28 DIAGNOSIS — M17.2 POST-TRAUMATIC OSTEOARTHRITIS OF BOTH KNEES: ICD-10-CM

## 2021-07-28 DIAGNOSIS — K57.90 DIVERTICULOSIS: ICD-10-CM

## 2021-07-28 DIAGNOSIS — K76.0 HEPATIC STEATOSIS: ICD-10-CM

## 2021-07-28 DIAGNOSIS — F41.1 GAD (GENERALIZED ANXIETY DISORDER): ICD-10-CM

## 2021-07-28 DIAGNOSIS — E66.01 SEVERE OBESITY WITH BODY MASS INDEX (BMI) OF 35.0 TO 39.9 WITH COMORBIDITY: ICD-10-CM

## 2021-07-28 DIAGNOSIS — E03.9 HYPOTHYROIDISM, UNSPECIFIED TYPE: ICD-10-CM

## 2021-07-28 DIAGNOSIS — I10 ESSENTIAL HYPERTENSION: ICD-10-CM

## 2021-07-28 DIAGNOSIS — M51.36 DDD (DEGENERATIVE DISC DISEASE), LUMBAR: ICD-10-CM

## 2021-07-28 DIAGNOSIS — K76.6 PORTAL HYPERTENSION: ICD-10-CM

## 2021-07-28 DIAGNOSIS — M47.816 LUMBAR SPONDYLOSIS: Primary | ICD-10-CM

## 2021-07-28 PROCEDURE — 99499 RISK ADDL DX/OHS AUDIT: ICD-10-PCS | Mod: HCNC,S$GLB,, | Performed by: FAMILY MEDICINE

## 2021-07-28 PROCEDURE — 3078F PR MOST RECENT DIASTOLIC BLOOD PRESSURE < 80 MM HG: ICD-10-PCS | Mod: CPTII,S$GLB,, | Performed by: FAMILY MEDICINE

## 2021-07-28 PROCEDURE — 3074F SYST BP LT 130 MM HG: CPT | Mod: CPTII,S$GLB,, | Performed by: FAMILY MEDICINE

## 2021-07-28 PROCEDURE — 3008F PR BODY MASS INDEX (BMI) DOCUMENTED: ICD-10-PCS | Mod: CPTII,S$GLB,, | Performed by: FAMILY MEDICINE

## 2021-07-28 PROCEDURE — 1126F PR PAIN SEVERITY QUANTIFIED, NO PAIN PRESENT: ICD-10-PCS | Mod: CPTII,S$GLB,, | Performed by: FAMILY MEDICINE

## 2021-07-28 PROCEDURE — 3074F PR MOST RECENT SYSTOLIC BLOOD PRESSURE < 130 MM HG: ICD-10-PCS | Mod: CPTII,S$GLB,, | Performed by: FAMILY MEDICINE

## 2021-07-28 PROCEDURE — 99499 UNLISTED E&M SERVICE: CPT | Mod: HCNC,S$GLB,, | Performed by: FAMILY MEDICINE

## 2021-07-28 PROCEDURE — 99214 PR OFFICE/OUTPT VISIT, EST, LEVL IV, 30-39 MIN: ICD-10-PCS | Mod: S$GLB,,, | Performed by: FAMILY MEDICINE

## 2021-07-28 PROCEDURE — 99214 OFFICE O/P EST MOD 30 MIN: CPT | Mod: S$GLB,,, | Performed by: FAMILY MEDICINE

## 2021-07-28 PROCEDURE — 3078F DIAST BP <80 MM HG: CPT | Mod: CPTII,S$GLB,, | Performed by: FAMILY MEDICINE

## 2021-07-28 PROCEDURE — 3008F BODY MASS INDEX DOCD: CPT | Mod: CPTII,S$GLB,, | Performed by: FAMILY MEDICINE

## 2021-07-28 PROCEDURE — 99999 PR PBB SHADOW E&M-EST. PATIENT-LVL IV: ICD-10-PCS | Mod: PBBFAC,,, | Performed by: FAMILY MEDICINE

## 2021-07-28 PROCEDURE — 1126F AMNT PAIN NOTED NONE PRSNT: CPT | Mod: CPTII,S$GLB,, | Performed by: FAMILY MEDICINE

## 2021-07-28 PROCEDURE — 99999 PR PBB SHADOW E&M-EST. PATIENT-LVL IV: CPT | Mod: PBBFAC,,, | Performed by: FAMILY MEDICINE

## 2021-07-28 RX ORDER — TRAMADOL HYDROCHLORIDE 50 MG/1
50 TABLET ORAL EVERY 6 HOURS PRN
Qty: 30 TABLET | Refills: 0 | Status: SHIPPED | OUTPATIENT
Start: 2021-07-28 | End: 2021-08-07

## 2021-07-28 RX ORDER — GABAPENTIN 300 MG/1
300 CAPSULE ORAL 3 TIMES DAILY
Qty: 90 CAPSULE | Refills: 11 | Status: SHIPPED | OUTPATIENT
Start: 2021-07-28 | End: 2021-08-27 | Stop reason: SDUPTHER

## 2021-07-28 RX ORDER — CYCLOBENZAPRINE HCL 10 MG
10 TABLET ORAL 3 TIMES DAILY PRN
Qty: 30 TABLET | Refills: 5 | Status: SHIPPED | OUTPATIENT
Start: 2021-07-28 | End: 2021-08-07

## 2021-08-09 ENCOUNTER — TELEPHONE (OUTPATIENT)
Dept: PAIN MEDICINE | Facility: CLINIC | Age: 66
End: 2021-08-09

## 2021-08-10 ENCOUNTER — OFFICE VISIT (OUTPATIENT)
Dept: PAIN MEDICINE | Facility: CLINIC | Age: 66
End: 2021-08-10
Payer: MEDICARE

## 2021-08-10 DIAGNOSIS — M54.16 LUMBAR RADICULOPATHY: ICD-10-CM

## 2021-08-10 DIAGNOSIS — M47.816 LUMBAR SPONDYLOSIS: ICD-10-CM

## 2021-08-10 DIAGNOSIS — M43.16 SPONDYLOLISTHESIS OF LUMBAR REGION: ICD-10-CM

## 2021-08-10 DIAGNOSIS — M51.36 DDD (DEGENERATIVE DISC DISEASE), LUMBAR: Primary | ICD-10-CM

## 2021-08-10 PROCEDURE — 1159F PR MEDICATION LIST DOCUMENTED IN MEDICAL RECORD: ICD-10-PCS | Mod: CPTII,95,, | Performed by: PAIN MEDICINE

## 2021-08-10 PROCEDURE — 99442 PR PHYSICIAN TELEPHONE EVALUATION 11-20 MIN: CPT | Mod: 95,,, | Performed by: PAIN MEDICINE

## 2021-08-10 PROCEDURE — 99442 PR PHYSICIAN TELEPHONE EVALUATION 11-20 MIN: ICD-10-PCS | Mod: 95,,, | Performed by: PAIN MEDICINE

## 2021-08-10 PROCEDURE — 1159F MED LIST DOCD IN RCRD: CPT | Mod: CPTII,95,, | Performed by: PAIN MEDICINE

## 2021-08-10 PROCEDURE — 1160F PR REVIEW ALL MEDS BY PRESCRIBER/CLIN PHARMACIST DOCUMENTED: ICD-10-PCS | Mod: CPTII,95,, | Performed by: PAIN MEDICINE

## 2021-08-10 PROCEDURE — 1160F RVW MEDS BY RX/DR IN RCRD: CPT | Mod: CPTII,95,, | Performed by: PAIN MEDICINE

## 2021-08-16 ENCOUNTER — TELEPHONE (OUTPATIENT)
Dept: HEMATOLOGY/ONCOLOGY | Facility: CLINIC | Age: 66
End: 2021-08-16

## 2021-08-16 ENCOUNTER — PATIENT OUTREACH (OUTPATIENT)
Dept: ADMINISTRATIVE | Facility: OTHER | Age: 66
End: 2021-08-16

## 2021-08-18 ENCOUNTER — TELEPHONE (OUTPATIENT)
Dept: ORTHOPEDICS | Facility: CLINIC | Age: 66
End: 2021-08-18

## 2021-08-18 ENCOUNTER — INFUSION (OUTPATIENT)
Dept: INFUSION THERAPY | Facility: HOSPITAL | Age: 66
End: 2021-08-18
Payer: MEDICARE

## 2021-08-18 VITALS
WEIGHT: 198.94 LBS | TEMPERATURE: 98 F | HEIGHT: 62 IN | DIASTOLIC BLOOD PRESSURE: 58 MMHG | SYSTOLIC BLOOD PRESSURE: 121 MMHG | BODY MASS INDEX: 36.61 KG/M2 | HEART RATE: 96 BPM

## 2021-08-18 DIAGNOSIS — D50.0 IRON DEFICIENCY ANEMIA DUE TO CHRONIC BLOOD LOSS: Primary | ICD-10-CM

## 2021-08-18 PROCEDURE — 63600175 PHARM REV CODE 636 W HCPCS: Mod: JG | Performed by: INTERNAL MEDICINE

## 2021-08-18 PROCEDURE — 96365 THER/PROPH/DIAG IV INF INIT: CPT

## 2021-08-18 PROCEDURE — 25000003 PHARM REV CODE 250: Performed by: INTERNAL MEDICINE

## 2021-08-18 RX ORDER — SODIUM CHLORIDE 0.9 % (FLUSH) 0.9 %
10 SYRINGE (ML) INJECTION
Status: DISCONTINUED | OUTPATIENT
Start: 2021-08-18 | End: 2021-08-18 | Stop reason: HOSPADM

## 2021-08-18 RX ORDER — HEPARIN 100 UNIT/ML
500 SYRINGE INTRAVENOUS
Status: DISCONTINUED | OUTPATIENT
Start: 2021-08-18 | End: 2021-08-18 | Stop reason: HOSPADM

## 2021-08-18 RX ADMIN — FERRIC CARBOXYMALTOSE INJECTION 750 MG: 50 INJECTION, SOLUTION INTRAVENOUS at 04:08

## 2021-08-18 RX ADMIN — SODIUM CHLORIDE: 9 INJECTION, SOLUTION INTRAVENOUS at 04:08

## 2021-08-25 ENCOUNTER — INFUSION (OUTPATIENT)
Dept: INFUSION THERAPY | Facility: HOSPITAL | Age: 66
End: 2021-08-25
Payer: MEDICARE

## 2021-08-25 VITALS
SYSTOLIC BLOOD PRESSURE: 128 MMHG | DIASTOLIC BLOOD PRESSURE: 70 MMHG | OXYGEN SATURATION: 99 % | RESPIRATION RATE: 18 BRPM | TEMPERATURE: 98 F | HEART RATE: 80 BPM

## 2021-08-25 DIAGNOSIS — D50.0 IRON DEFICIENCY ANEMIA DUE TO CHRONIC BLOOD LOSS: Primary | ICD-10-CM

## 2021-08-25 PROCEDURE — 63600175 PHARM REV CODE 636 W HCPCS: Mod: JG | Performed by: INTERNAL MEDICINE

## 2021-08-25 PROCEDURE — 25000003 PHARM REV CODE 250: Performed by: INTERNAL MEDICINE

## 2021-08-25 PROCEDURE — 96365 THER/PROPH/DIAG IV INF INIT: CPT

## 2021-08-25 RX ORDER — SODIUM CHLORIDE 0.9 % (FLUSH) 0.9 %
10 SYRINGE (ML) INJECTION
Status: DISCONTINUED | OUTPATIENT
Start: 2021-08-25 | End: 2021-08-25 | Stop reason: HOSPADM

## 2021-08-25 RX ORDER — HEPARIN 100 UNIT/ML
500 SYRINGE INTRAVENOUS
Status: DISCONTINUED | OUTPATIENT
Start: 2021-08-25 | End: 2021-08-25 | Stop reason: HOSPADM

## 2021-08-25 RX ADMIN — FERRIC CARBOXYMALTOSE INJECTION 750 MG: 50 INJECTION, SOLUTION INTRAVENOUS at 04:08

## 2021-08-25 RX ADMIN — SODIUM CHLORIDE: 9 INJECTION, SOLUTION INTRAVENOUS at 03:08

## 2021-08-28 RX ORDER — GABAPENTIN 300 MG/1
300 CAPSULE ORAL 3 TIMES DAILY
Qty: 90 CAPSULE | Refills: 2 | Status: SHIPPED | OUTPATIENT
Start: 2021-08-28 | End: 2021-09-28 | Stop reason: SDUPTHER

## 2021-09-15 ENCOUNTER — TELEPHONE (OUTPATIENT)
Dept: PRIMARY CARE CLINIC | Facility: CLINIC | Age: 66
End: 2021-09-15

## 2021-09-21 ENCOUNTER — OFFICE VISIT (OUTPATIENT)
Dept: INTERNAL MEDICINE | Facility: CLINIC | Age: 66
End: 2021-09-21
Payer: MEDICARE

## 2021-09-21 VITALS
BODY MASS INDEX: 41.62 KG/M2 | HEART RATE: 77 BPM | OXYGEN SATURATION: 99 % | HEIGHT: 62 IN | SYSTOLIC BLOOD PRESSURE: 132 MMHG | WEIGHT: 226.19 LBS | DIASTOLIC BLOOD PRESSURE: 60 MMHG

## 2021-09-21 DIAGNOSIS — R25.1 OCCASIONAL TREMORS: ICD-10-CM

## 2021-09-21 DIAGNOSIS — M79.89 LEG SWELLING: ICD-10-CM

## 2021-09-21 DIAGNOSIS — M47.816 LUMBAR SPONDYLOSIS: Primary | ICD-10-CM

## 2021-09-21 PROCEDURE — 99999 PR PBB SHADOW E&M-EST. PATIENT-LVL V: CPT | Mod: PBBFAC,GC,,

## 2021-09-21 PROCEDURE — 99214 OFFICE O/P EST MOD 30 MIN: CPT | Mod: GC,S$GLB,,

## 2021-09-21 PROCEDURE — 99999 PR PBB SHADOW E&M-EST. PATIENT-LVL V: ICD-10-PCS | Mod: PBBFAC,GC,,

## 2021-09-21 PROCEDURE — 99214 PR OFFICE/OUTPT VISIT, EST, LEVL IV, 30-39 MIN: ICD-10-PCS | Mod: GC,S$GLB,,

## 2021-09-21 RX ORDER — ACETAMINOPHEN 500 MG
500 TABLET ORAL EVERY 8 HOURS
Qty: 90 TABLET | Refills: 0 | Status: SHIPPED | OUTPATIENT
Start: 2021-09-21 | End: 2022-02-23

## 2021-09-21 RX ORDER — PREDNISONE 20 MG/1
20 TABLET ORAL DAILY
Qty: 30 TABLET | Refills: 0 | Status: SHIPPED | OUTPATIENT
Start: 2021-09-21 | End: 2021-11-04

## 2021-09-23 ENCOUNTER — TELEPHONE (OUTPATIENT)
Dept: PRIMARY CARE CLINIC | Facility: CLINIC | Age: 66
End: 2021-09-23

## 2021-09-23 RX ORDER — FUROSEMIDE 40 MG/1
40 TABLET ORAL DAILY
Qty: 30 TABLET | Refills: 5 | Status: SHIPPED | OUTPATIENT
Start: 2021-09-23 | End: 2022-02-03

## 2021-09-27 DIAGNOSIS — D50.0 IRON DEFICIENCY ANEMIA DUE TO CHRONIC BLOOD LOSS: Primary | ICD-10-CM

## 2021-09-28 ENCOUNTER — TELEPHONE (OUTPATIENT)
Dept: PAIN MEDICINE | Facility: CLINIC | Age: 66
End: 2021-09-28

## 2021-09-28 ENCOUNTER — LAB VISIT (OUTPATIENT)
Dept: LAB | Facility: HOSPITAL | Age: 66
End: 2021-09-28
Payer: MEDICARE

## 2021-09-28 ENCOUNTER — OFFICE VISIT (OUTPATIENT)
Dept: HEMATOLOGY/ONCOLOGY | Facility: CLINIC | Age: 66
End: 2021-09-28
Payer: MEDICARE

## 2021-09-28 ENCOUNTER — OFFICE VISIT (OUTPATIENT)
Dept: PAIN MEDICINE | Facility: CLINIC | Age: 66
End: 2021-09-28
Payer: MEDICARE

## 2021-09-28 VITALS
RESPIRATION RATE: 16 BRPM | DIASTOLIC BLOOD PRESSURE: 77 MMHG | HEART RATE: 102 BPM | BODY MASS INDEX: 42.03 KG/M2 | SYSTOLIC BLOOD PRESSURE: 125 MMHG | WEIGHT: 228.38 LBS | HEIGHT: 62 IN

## 2021-09-28 VITALS
TEMPERATURE: 99 F | SYSTOLIC BLOOD PRESSURE: 151 MMHG | DIASTOLIC BLOOD PRESSURE: 67 MMHG | OXYGEN SATURATION: 100 % | HEART RATE: 86 BPM | RESPIRATION RATE: 16 BRPM

## 2021-09-28 DIAGNOSIS — D50.0 IRON DEFICIENCY ANEMIA DUE TO CHRONIC BLOOD LOSS: Primary | ICD-10-CM

## 2021-09-28 DIAGNOSIS — D69.6 THROMBOCYTOPENIA: ICD-10-CM

## 2021-09-28 DIAGNOSIS — D50.0 IRON DEFICIENCY ANEMIA DUE TO CHRONIC BLOOD LOSS: ICD-10-CM

## 2021-09-28 DIAGNOSIS — M47.816 LUMBAR SPONDYLOSIS: ICD-10-CM

## 2021-09-28 DIAGNOSIS — M54.16 LUMBAR RADICULOPATHY: ICD-10-CM

## 2021-09-28 DIAGNOSIS — M51.36 DDD (DEGENERATIVE DISC DISEASE), LUMBAR: ICD-10-CM

## 2021-09-28 DIAGNOSIS — M54.16 LUMBAR RADICULOPATHY: Primary | ICD-10-CM

## 2021-09-28 PROBLEM — H92.01 RIGHT EAR PAIN: Status: RESOLVED | Noted: 2020-07-31 | Resolved: 2021-09-28

## 2021-09-28 LAB
ALBUMIN SERPL BCP-MCNC: 2.8 G/DL (ref 3.5–5.2)
ALP SERPL-CCNC: 160 U/L (ref 55–135)
ALT SERPL W/O P-5'-P-CCNC: 17 U/L (ref 10–44)
ANION GAP SERPL CALC-SCNC: 8 MMOL/L (ref 8–16)
AST SERPL-CCNC: 34 U/L (ref 10–40)
BASOPHILS # BLD AUTO: 0.03 K/UL (ref 0–0.2)
BASOPHILS NFR BLD: 0.7 % (ref 0–1.9)
BILIRUB SERPL-MCNC: 0.5 MG/DL (ref 0.1–1)
BUN SERPL-MCNC: 14 MG/DL (ref 8–23)
CALCIUM SERPL-MCNC: 9.1 MG/DL (ref 8.7–10.5)
CHLORIDE SERPL-SCNC: 113 MMOL/L (ref 95–110)
CO2 SERPL-SCNC: 21 MMOL/L (ref 23–29)
CREAT SERPL-MCNC: 0.7 MG/DL (ref 0.5–1.4)
DIFFERENTIAL METHOD: ABNORMAL
EOSINOPHIL # BLD AUTO: 0 K/UL (ref 0–0.5)
EOSINOPHIL NFR BLD: 0.9 % (ref 0–8)
ERYTHROCYTE [DISTWIDTH] IN BLOOD BY AUTOMATED COUNT: 19.5 % (ref 11.5–14.5)
EST. GFR  (AFRICAN AMERICAN): >60 ML/MIN/1.73 M^2
EST. GFR  (NON AFRICAN AMERICAN): >60 ML/MIN/1.73 M^2
FERRITIN SERPL-MCNC: 250 NG/ML (ref 20–300)
GLUCOSE SERPL-MCNC: 86 MG/DL (ref 70–110)
HCT VFR BLD AUTO: 36.7 % (ref 37–48.5)
HGB BLD-MCNC: 11.3 G/DL (ref 12–16)
IMM GRANULOCYTES # BLD AUTO: 0.02 K/UL (ref 0–0.04)
IMM GRANULOCYTES NFR BLD AUTO: 0.5 % (ref 0–0.5)
IRON SERPL-MCNC: 81 UG/DL (ref 30–160)
LYMPHOCYTES # BLD AUTO: 1.9 K/UL (ref 1–4.8)
LYMPHOCYTES NFR BLD: 43.3 % (ref 18–48)
MCH RBC QN AUTO: 28.2 PG (ref 27–31)
MCHC RBC AUTO-ENTMCNC: 30.8 G/DL (ref 32–36)
MCV RBC AUTO: 92 FL (ref 82–98)
MONOCYTES # BLD AUTO: 0.5 K/UL (ref 0.3–1)
MONOCYTES NFR BLD: 12.2 % (ref 4–15)
NEUTROPHILS # BLD AUTO: 1.9 K/UL (ref 1.8–7.7)
NEUTROPHILS NFR BLD: 42.4 % (ref 38–73)
NRBC BLD-RTO: 0 /100 WBC
PLATELET # BLD AUTO: 77 K/UL (ref 150–450)
PMV BLD AUTO: ABNORMAL FL (ref 9.2–12.9)
POTASSIUM SERPL-SCNC: 4.6 MMOL/L (ref 3.5–5.1)
PROT SERPL-MCNC: 6.5 G/DL (ref 6–8.4)
RBC # BLD AUTO: 4.01 M/UL (ref 4–5.4)
SATURATED IRON: 34 % (ref 20–50)
SODIUM SERPL-SCNC: 142 MMOL/L (ref 136–145)
TOTAL IRON BINDING CAPACITY: 235 UG/DL (ref 250–450)
TRANSFERRIN SERPL-MCNC: 159 MG/DL (ref 200–375)
WBC # BLD AUTO: 4.43 K/UL (ref 3.9–12.7)

## 2021-09-28 PROCEDURE — 99213 PR OFFICE/OUTPT VISIT, EST, LEVL III, 20-29 MIN: ICD-10-PCS | Mod: S$GLB,,, | Performed by: PAIN MEDICINE

## 2021-09-28 PROCEDURE — 3078F PR MOST RECENT DIASTOLIC BLOOD PRESSURE < 80 MM HG: ICD-10-PCS | Mod: CPTII,S$GLB,, | Performed by: INTERNAL MEDICINE

## 2021-09-28 PROCEDURE — 3077F PR MOST RECENT SYSTOLIC BLOOD PRESSURE >= 140 MM HG: ICD-10-PCS | Mod: CPTII,S$GLB,, | Performed by: INTERNAL MEDICINE

## 2021-09-28 PROCEDURE — 3078F DIAST BP <80 MM HG: CPT | Mod: CPTII,S$GLB,, | Performed by: PAIN MEDICINE

## 2021-09-28 PROCEDURE — 3008F BODY MASS INDEX DOCD: CPT | Mod: CPTII,S$GLB,, | Performed by: PAIN MEDICINE

## 2021-09-28 PROCEDURE — 1160F RVW MEDS BY RX/DR IN RCRD: CPT | Mod: CPTII,S$GLB,, | Performed by: PAIN MEDICINE

## 2021-09-28 PROCEDURE — 3078F DIAST BP <80 MM HG: CPT | Mod: CPTII,S$GLB,, | Performed by: INTERNAL MEDICINE

## 2021-09-28 PROCEDURE — 99213 OFFICE O/P EST LOW 20 MIN: CPT | Mod: S$GLB,,, | Performed by: PAIN MEDICINE

## 2021-09-28 PROCEDURE — 3074F SYST BP LT 130 MM HG: CPT | Mod: CPTII,S$GLB,, | Performed by: PAIN MEDICINE

## 2021-09-28 PROCEDURE — 3008F PR BODY MASS INDEX (BMI) DOCUMENTED: ICD-10-PCS | Mod: CPTII,S$GLB,, | Performed by: PAIN MEDICINE

## 2021-09-28 PROCEDURE — 99999 PR PBB SHADOW E&M-EST. PATIENT-LVL III: CPT | Mod: PBBFAC,,, | Performed by: INTERNAL MEDICINE

## 2021-09-28 PROCEDURE — 82728 ASSAY OF FERRITIN: CPT | Performed by: INTERNAL MEDICINE

## 2021-09-28 PROCEDURE — 99999 PR PBB SHADOW E&M-EST. PATIENT-LVL III: ICD-10-PCS | Mod: PBBFAC,,, | Performed by: INTERNAL MEDICINE

## 2021-09-28 PROCEDURE — 99999 PR PBB SHADOW E&M-EST. PATIENT-LVL III: ICD-10-PCS | Mod: PBBFAC,,, | Performed by: PAIN MEDICINE

## 2021-09-28 PROCEDURE — 1159F MED LIST DOCD IN RCRD: CPT | Mod: CPTII,S$GLB,, | Performed by: PAIN MEDICINE

## 2021-09-28 PROCEDURE — 1101F PR PT FALLS ASSESS DOC 0-1 FALLS W/OUT INJ PAST YR: ICD-10-PCS | Mod: CPTII,S$GLB,, | Performed by: PAIN MEDICINE

## 2021-09-28 PROCEDURE — 1159F PR MEDICATION LIST DOCUMENTED IN MEDICAL RECORD: ICD-10-PCS | Mod: CPTII,S$GLB,, | Performed by: PAIN MEDICINE

## 2021-09-28 PROCEDURE — 99999 PR PBB SHADOW E&M-EST. PATIENT-LVL III: CPT | Mod: PBBFAC,,, | Performed by: PAIN MEDICINE

## 2021-09-28 PROCEDURE — 3288F PR FALLS RISK ASSESSMENT DOCUMENTED: ICD-10-PCS | Mod: CPTII,S$GLB,, | Performed by: PAIN MEDICINE

## 2021-09-28 PROCEDURE — 3074F PR MOST RECENT SYSTOLIC BLOOD PRESSURE < 130 MM HG: ICD-10-PCS | Mod: CPTII,S$GLB,, | Performed by: PAIN MEDICINE

## 2021-09-28 PROCEDURE — 99499 UNLISTED E&M SERVICE: CPT | Mod: HCNC,S$GLB,, | Performed by: INTERNAL MEDICINE

## 2021-09-28 PROCEDURE — 99499 RISK ADDL DX/OHS AUDIT: ICD-10-PCS | Mod: HCNC,S$GLB,, | Performed by: INTERNAL MEDICINE

## 2021-09-28 PROCEDURE — 1101F PR PT FALLS ASSESS DOC 0-1 FALLS W/OUT INJ PAST YR: ICD-10-PCS | Mod: CPTII,S$GLB,, | Performed by: INTERNAL MEDICINE

## 2021-09-28 PROCEDURE — 1160F PR REVIEW ALL MEDS BY PRESCRIBER/CLIN PHARMACIST DOCUMENTED: ICD-10-PCS | Mod: CPTII,S$GLB,, | Performed by: INTERNAL MEDICINE

## 2021-09-28 PROCEDURE — 99214 OFFICE O/P EST MOD 30 MIN: CPT | Mod: S$GLB,,, | Performed by: INTERNAL MEDICINE

## 2021-09-28 PROCEDURE — 1159F MED LIST DOCD IN RCRD: CPT | Mod: CPTII,S$GLB,, | Performed by: INTERNAL MEDICINE

## 2021-09-28 PROCEDURE — 36415 COLL VENOUS BLD VENIPUNCTURE: CPT | Performed by: INTERNAL MEDICINE

## 2021-09-28 PROCEDURE — 1101F PT FALLS ASSESS-DOCD LE1/YR: CPT | Mod: CPTII,S$GLB,, | Performed by: INTERNAL MEDICINE

## 2021-09-28 PROCEDURE — 3288F PR FALLS RISK ASSESSMENT DOCUMENTED: ICD-10-PCS | Mod: CPTII,S$GLB,, | Performed by: INTERNAL MEDICINE

## 2021-09-28 PROCEDURE — 1101F PT FALLS ASSESS-DOCD LE1/YR: CPT | Mod: CPTII,S$GLB,, | Performed by: PAIN MEDICINE

## 2021-09-28 PROCEDURE — 84466 ASSAY OF TRANSFERRIN: CPT | Performed by: INTERNAL MEDICINE

## 2021-09-28 PROCEDURE — 80053 COMPREHEN METABOLIC PANEL: CPT | Performed by: INTERNAL MEDICINE

## 2021-09-28 PROCEDURE — 3288F FALL RISK ASSESSMENT DOCD: CPT | Mod: CPTII,S$GLB,, | Performed by: INTERNAL MEDICINE

## 2021-09-28 PROCEDURE — 1160F RVW MEDS BY RX/DR IN RCRD: CPT | Mod: CPTII,S$GLB,, | Performed by: INTERNAL MEDICINE

## 2021-09-28 PROCEDURE — 99214 PR OFFICE/OUTPT VISIT, EST, LEVL IV, 30-39 MIN: ICD-10-PCS | Mod: S$GLB,,, | Performed by: INTERNAL MEDICINE

## 2021-09-28 PROCEDURE — 1159F PR MEDICATION LIST DOCUMENTED IN MEDICAL RECORD: ICD-10-PCS | Mod: CPTII,S$GLB,, | Performed by: INTERNAL MEDICINE

## 2021-09-28 PROCEDURE — 1160F PR REVIEW ALL MEDS BY PRESCRIBER/CLIN PHARMACIST DOCUMENTED: ICD-10-PCS | Mod: CPTII,S$GLB,, | Performed by: PAIN MEDICINE

## 2021-09-28 PROCEDURE — 3078F PR MOST RECENT DIASTOLIC BLOOD PRESSURE < 80 MM HG: ICD-10-PCS | Mod: CPTII,S$GLB,, | Performed by: PAIN MEDICINE

## 2021-09-28 PROCEDURE — 3077F SYST BP >= 140 MM HG: CPT | Mod: CPTII,S$GLB,, | Performed by: INTERNAL MEDICINE

## 2021-09-28 PROCEDURE — 85025 COMPLETE CBC W/AUTO DIFF WBC: CPT | Performed by: INTERNAL MEDICINE

## 2021-09-28 PROCEDURE — 3288F FALL RISK ASSESSMENT DOCD: CPT | Mod: CPTII,S$GLB,, | Performed by: PAIN MEDICINE

## 2021-09-28 RX ORDER — GABAPENTIN 300 MG/1
300 CAPSULE ORAL 3 TIMES DAILY
Qty: 90 CAPSULE | Refills: 11 | Status: SHIPPED | OUTPATIENT
Start: 2021-09-28 | End: 2021-09-28

## 2021-09-28 RX ORDER — GABAPENTIN 300 MG/1
300 CAPSULE ORAL 3 TIMES DAILY
Qty: 90 CAPSULE | Refills: 11 | Status: SHIPPED | OUTPATIENT
Start: 2021-09-28 | End: 2022-02-03 | Stop reason: SDUPTHER

## 2021-09-29 ENCOUNTER — TELEPHONE (OUTPATIENT)
Dept: PAIN MEDICINE | Facility: CLINIC | Age: 66
End: 2021-09-29

## 2021-10-05 ENCOUNTER — TELEPHONE (OUTPATIENT)
Dept: PAIN MEDICINE | Facility: CLINIC | Age: 66
End: 2021-10-05

## 2021-10-06 ENCOUNTER — TELEPHONE (OUTPATIENT)
Dept: PAIN MEDICINE | Facility: CLINIC | Age: 66
End: 2021-10-06

## 2021-10-06 DIAGNOSIS — M54.16 LUMBAR RADICULOPATHY: Primary | ICD-10-CM

## 2021-10-06 DIAGNOSIS — M51.36 DDD (DEGENERATIVE DISC DISEASE), LUMBAR: ICD-10-CM

## 2021-10-11 ENCOUNTER — HOSPITAL ENCOUNTER (OUTPATIENT)
Dept: RADIOLOGY | Facility: HOSPITAL | Age: 66
Discharge: HOME | End: 2021-10-11
Attending: INTERNAL MEDICINE
Payer: MEDICARE

## 2021-10-11 ENCOUNTER — TELEPHONE (OUTPATIENT)
Dept: PAIN MEDICINE | Facility: CLINIC | Age: 66
End: 2021-10-11

## 2021-10-11 DIAGNOSIS — D69.6 THROMBOCYTOPENIA: ICD-10-CM

## 2021-10-11 PROCEDURE — 76700 US EXAM ABDOM COMPLETE: CPT | Mod: TC

## 2021-10-11 PROCEDURE — 76700 US ABDOMEN COMPLETE: ICD-10-PCS | Mod: 26,,, | Performed by: INTERNAL MEDICINE

## 2021-10-11 PROCEDURE — 76700 US EXAM ABDOM COMPLETE: CPT | Mod: 26,,, | Performed by: INTERNAL MEDICINE

## 2021-10-13 ENCOUNTER — TELEPHONE (OUTPATIENT)
Dept: HEPATOLOGY | Facility: CLINIC | Age: 66
End: 2021-10-13

## 2021-10-13 ENCOUNTER — TELEPHONE (OUTPATIENT)
Dept: HEMATOLOGY/ONCOLOGY | Facility: CLINIC | Age: 66
End: 2021-10-13

## 2021-10-13 DIAGNOSIS — K70.31 ALCOHOLIC CIRRHOSIS OF LIVER WITH ASCITES: Primary | ICD-10-CM

## 2021-10-20 ENCOUNTER — PATIENT OUTREACH (OUTPATIENT)
Dept: ADMINISTRATIVE | Facility: OTHER | Age: 66
End: 2021-10-20

## 2021-11-04 ENCOUNTER — OFFICE VISIT (OUTPATIENT)
Dept: PRIMARY CARE CLINIC | Facility: CLINIC | Age: 66
End: 2021-11-04
Payer: MEDICARE

## 2021-11-04 VITALS
SYSTOLIC BLOOD PRESSURE: 120 MMHG | OXYGEN SATURATION: 98 % | HEIGHT: 62 IN | WEIGHT: 237.13 LBS | BODY MASS INDEX: 43.64 KG/M2 | DIASTOLIC BLOOD PRESSURE: 86 MMHG | HEART RATE: 83 BPM | RESPIRATION RATE: 18 BRPM

## 2021-11-04 DIAGNOSIS — Z23 FLU VACCINE NEED: ICD-10-CM

## 2021-11-04 DIAGNOSIS — R42 VERTIGO: ICD-10-CM

## 2021-11-04 DIAGNOSIS — I10 ESSENTIAL HYPERTENSION: ICD-10-CM

## 2021-11-04 DIAGNOSIS — R53.81 DEBILITY: ICD-10-CM

## 2021-11-04 DIAGNOSIS — K76.0 HEPATIC STEATOSIS: ICD-10-CM

## 2021-11-04 DIAGNOSIS — F41.1 GAD (GENERALIZED ANXIETY DISORDER): ICD-10-CM

## 2021-11-04 DIAGNOSIS — I65.23 CAROTID ATHEROSCLEROSIS, BILATERAL: ICD-10-CM

## 2021-11-04 DIAGNOSIS — M54.16 LUMBAR RADICULOPATHY: ICD-10-CM

## 2021-11-04 DIAGNOSIS — N95.8 OTHER SPECIFIED MENOPAUSAL AND PERIMENOPAUSAL DISORDERS: ICD-10-CM

## 2021-11-04 DIAGNOSIS — Z13.820 ENCOUNTER FOR SCREENING FOR OSTEOPOROSIS: ICD-10-CM

## 2021-11-04 DIAGNOSIS — S39.012D LUMBOSACRAL STRAIN, SUBSEQUENT ENCOUNTER: ICD-10-CM

## 2021-11-04 DIAGNOSIS — K57.90 DIVERTICULOSIS: ICD-10-CM

## 2021-11-04 DIAGNOSIS — M51.36 DDD (DEGENERATIVE DISC DISEASE), LUMBAR: Primary | ICD-10-CM

## 2021-11-04 DIAGNOSIS — D50.0 ANEMIA DUE TO CHRONIC BLOOD LOSS: ICD-10-CM

## 2021-11-04 DIAGNOSIS — F10.11 ALCOHOL ABUSE, IN REMISSION: ICD-10-CM

## 2021-11-04 DIAGNOSIS — D50.0 IRON DEFICIENCY ANEMIA DUE TO CHRONIC BLOOD LOSS: ICD-10-CM

## 2021-11-04 DIAGNOSIS — K92.2 UGIB (UPPER GASTROINTESTINAL BLEED): ICD-10-CM

## 2021-11-04 DIAGNOSIS — E03.9 HYPOTHYROIDISM, UNSPECIFIED TYPE: ICD-10-CM

## 2021-11-04 PROCEDURE — 3074F PR MOST RECENT SYSTOLIC BLOOD PRESSURE < 130 MM HG: ICD-10-PCS | Mod: CPTII,S$GLB,, | Performed by: FAMILY MEDICINE

## 2021-11-04 PROCEDURE — 3079F DIAST BP 80-89 MM HG: CPT | Mod: CPTII,S$GLB,, | Performed by: FAMILY MEDICINE

## 2021-11-04 PROCEDURE — 1126F AMNT PAIN NOTED NONE PRSNT: CPT | Mod: CPTII,S$GLB,, | Performed by: FAMILY MEDICINE

## 2021-11-04 PROCEDURE — 3008F BODY MASS INDEX DOCD: CPT | Mod: CPTII,S$GLB,, | Performed by: FAMILY MEDICINE

## 2021-11-04 PROCEDURE — 99214 OFFICE O/P EST MOD 30 MIN: CPT | Mod: S$GLB,,, | Performed by: FAMILY MEDICINE

## 2021-11-04 PROCEDURE — 99999 PR PBB SHADOW E&M-EST. PATIENT-LVL III: CPT | Mod: PBBFAC,,, | Performed by: FAMILY MEDICINE

## 2021-11-04 PROCEDURE — 1126F PR PAIN SEVERITY QUANTIFIED, NO PAIN PRESENT: ICD-10-PCS | Mod: CPTII,S$GLB,, | Performed by: FAMILY MEDICINE

## 2021-11-04 PROCEDURE — 3288F FALL RISK ASSESSMENT DOCD: CPT | Mod: CPTII,S$GLB,, | Performed by: FAMILY MEDICINE

## 2021-11-04 PROCEDURE — 99214 PR OFFICE/OUTPT VISIT, EST, LEVL IV, 30-39 MIN: ICD-10-PCS | Mod: S$GLB,,, | Performed by: FAMILY MEDICINE

## 2021-11-04 PROCEDURE — 3074F SYST BP LT 130 MM HG: CPT | Mod: CPTII,S$GLB,, | Performed by: FAMILY MEDICINE

## 2021-11-04 PROCEDURE — 3288F PR FALLS RISK ASSESSMENT DOCUMENTED: ICD-10-PCS | Mod: CPTII,S$GLB,, | Performed by: FAMILY MEDICINE

## 2021-11-04 PROCEDURE — 3079F PR MOST RECENT DIASTOLIC BLOOD PRESSURE 80-89 MM HG: ICD-10-PCS | Mod: CPTII,S$GLB,, | Performed by: FAMILY MEDICINE

## 2021-11-04 PROCEDURE — 99999 PR PBB SHADOW E&M-EST. PATIENT-LVL III: ICD-10-PCS | Mod: PBBFAC,,, | Performed by: FAMILY MEDICINE

## 2021-11-04 PROCEDURE — 3008F PR BODY MASS INDEX (BMI) DOCUMENTED: ICD-10-PCS | Mod: CPTII,S$GLB,, | Performed by: FAMILY MEDICINE

## 2021-11-04 PROCEDURE — 1101F PR PT FALLS ASSESS DOC 0-1 FALLS W/OUT INJ PAST YR: ICD-10-PCS | Mod: CPTII,S$GLB,, | Performed by: FAMILY MEDICINE

## 2021-11-04 PROCEDURE — 1101F PT FALLS ASSESS-DOCD LE1/YR: CPT | Mod: CPTII,S$GLB,, | Performed by: FAMILY MEDICINE

## 2021-11-04 RX ORDER — TRAMADOL HYDROCHLORIDE 50 MG/1
50 TABLET ORAL EVERY 6 HOURS PRN
Qty: 30 TABLET | Refills: 0 | Status: SHIPPED | OUTPATIENT
Start: 2021-11-04 | End: 2022-02-03 | Stop reason: SDUPTHER

## 2021-11-04 RX ORDER — MECLIZINE HYDROCHLORIDE 25 MG/1
25 TABLET ORAL 3 TIMES DAILY PRN
Qty: 60 TABLET | Refills: 5 | Status: SHIPPED | OUTPATIENT
Start: 2021-11-04 | End: 2022-04-11 | Stop reason: SDUPTHER

## 2021-11-04 NOTE — PROGRESS NOTES
Subjective:       Patient ID: Lo Escalera is a 66 y.o. female.    Chief Complaint: No chief complaint on file.    HPI:  66 year-old black female -for 3 month checkup---eating too much---+BM--+ambulation--pain had epidural steroid injection in back --did not do anything. No discussion on reside a me or surgery. On gabapentin 300 takes it twice a day . Hx UGI bleed years ago Used use ultram. Right leg occas swells like baseball.  No history lupus rheumatoid gout--no fractures.  History 2 sisters lupus father with gout     MRI lumbar spine showing spondylosis L3-4, L4-5, L5           ROS:   Skin: no psoriasis, eczema, skin cancer--no bruising  HEENT: No headache, ocular pain, blurred vision, diplopia,  hoarseness change in voice, no thyroid disease no epistaxis  Lung: No pneumonia, asthma, Tb, wheezing, + occasional SOB, no smoking  Heart: No chest pain, ankle edema, palpitations, MI, eva murmur, hypertension, hyperlipidemia--no stent bypass arrhythmia history of hypertension--occasionally feels like heart skips a beat blood pressure was low so taken off blood pressure medicine   Abdomen: No nausea, vomiting, diarrhea, constipation, ulcers, hepatitis, status post cholecystectomy, melena, hematochezia, hematemesis recent esophagitis gastritis with transfusion 2 units of blood   : no UTI, renal disease, stones  GYN hysterectomy mammogram March 2021  MS: no fractures, O/A, lupus, rheumatoid, gout--has 2 sisters with lupus and her father has gout  Neuro: No dizziness, LOC, seizures +vertigo--help with meclizine November 2020 patient was admitted to [facility] for loss of consciousness used to drink  36 yrs ago-pancreatitis  No diabetes, + anemia had to be transfused blood had upper GI bleed, + anxiety, + depression upset had to quit working because of vertigo was working at [employer]-doing pretty good with--loss mom dad oldest sister back to back   3 children unemployed, lives with       Objective:   Physical Exam:   General: Well nourished, well developed, no acute distress+ Obesity   Skin: No lesions  HEENT: Eyes PERRLA, EOM intact, nose clear , throat nonerythematous +arcus senilis no pallor to the conjunctiva  NECK: Supple, no bruits, No JVD, no nodes  Lungs: Clear, no rales, rhonchi, wheezing  Heart: Regular rate and rhythm, no murmurs, gallops, or rubs  Abdomen: flat, bowel sounds positive, no tenderness, or organomegaly  MS:  No significant change problem with the knees bilaterally walks extremely stiff leg states seen by orthopedist told was severe arthritis- tenderness lumbar spine pain with palpation pain with anterior flexion 20 extension 10 lateral flexion rotation 10 crepitus in knees with extension but some pulling sensation back no radiculopathy able squat only snf down unable to arise without assistance very difficult for patient to get off of the exam table or get up on the exam table because of a small steps  Neuro: Alert, CN intact, oriented X 3  Extremities: No cyanosis, clubbing, or edema negative Romberg        Assessment:       1. DDD (degenerative disc disease), lumbar    2. Encounter for screening for osteoporosis    3. Flu vaccine need    4. Other specified menopausal and perimenopausal disorders     5. Lumbar radiculopathy    6. JHOAN (generalized anxiety disorder)    7. Alcohol abuse, in remission    8. Vertigo    9. Essential hypertension    10. Carotid atherosclerosis, bilateral    11. Anemia due to chronic blood loss    12. Iron deficiency anemia due to chronic blood loss    13. Hypothyroidism, unspecified type    14. UGIB (upper gastrointestinal bleed)    15. Hepatic steatosis    16. Diverticulosis    17. Lumbosacral strain, subsequent encounter    18. Debility        Plan:       DDD (degenerative disc disease), lumbar  -     Lipid Panel; Future; Expected date: 02/04/2022  -     TSH; Future; Expected date: 02/04/2022  -     T4, Free; Future; Expected date:  02/04/2022    Encounter for screening for osteoporosis  -     DXA Bone Density Spine And Hip; Future; Expected date: 11/04/2021  -     Lipid Panel; Future; Expected date: 02/04/2022  -     TSH; Future; Expected date: 02/04/2022  -     T4, Free; Future; Expected date: 02/04/2022    Flu vaccine need  -     Influenza (FLUAD) - Quadrivalent (Adjuvanted) *Preferred* (65+) (PF)  -     Lipid Panel; Future; Expected date: 02/04/2022  -     TSH; Future; Expected date: 02/04/2022  -     T4, Free; Future; Expected date: 02/04/2022    Other specified menopausal and perimenopausal disorders   -     DXA Bone Density Spine And Hip; Future; Expected date: 11/04/2021  -     Lipid Panel; Future; Expected date: 02/04/2022  -     TSH; Future; Expected date: 02/04/2022  -     T4, Free; Future; Expected date: 02/04/2022    Lumbar radiculopathy  -     Lipid Panel; Future; Expected date: 02/04/2022  -     TSH; Future; Expected date: 02/04/2022  -     T4, Free; Future; Expected date: 02/04/2022    JHOAN (generalized anxiety disorder)  -     Lipid Panel; Future; Expected date: 02/04/2022  -     TSH; Future; Expected date: 02/04/2022  -     T4, Free; Future; Expected date: 02/04/2022    Alcohol abuse, in remission  -     Lipid Panel; Future; Expected date: 02/04/2022  -     TSH; Future; Expected date: 02/04/2022  -     T4, Free; Future; Expected date: 02/04/2022    Vertigo  -     Lipid Panel; Future; Expected date: 02/04/2022  -     TSH; Future; Expected date: 02/04/2022  -     T4, Free; Future; Expected date: 02/04/2022    Essential hypertension  -     CBC Auto Differential; Future; Expected date: 02/04/2022  -     Comprehensive Metabolic Panel; Future; Expected date: 02/04/2022  -     Lipid Panel; Future; Expected date: 02/04/2022  -     TSH; Future; Expected date: 02/04/2022  -     T4, Free; Future; Expected date: 02/04/2022    Carotid atherosclerosis, bilateral  -     Lipid Panel; Future; Expected date: 02/04/2022  -     TSH; Future;  Expected date: 02/04/2022  -     T4, Free; Future; Expected date: 02/04/2022    Anemia due to chronic blood loss  -     Iron and TIBC; Future; Expected date: 02/04/2022  -     Lipid Panel; Future; Expected date: 02/04/2022  -     TSH; Future; Expected date: 02/04/2022  -     T4, Free; Future; Expected date: 02/04/2022    Iron deficiency anemia due to chronic blood loss  -     Lipid Panel; Future; Expected date: 02/04/2022  -     TSH; Future; Expected date: 02/04/2022  -     T4, Free; Future; Expected date: 02/04/2022    Hypothyroidism, unspecified type  -     Lipid Panel; Future; Expected date: 02/04/2022  -     TSH; Future; Expected date: 02/04/2022  -     T4, Free; Future; Expected date: 02/04/2022    UGIB (upper gastrointestinal bleed)  -     Lipid Panel; Future; Expected date: 02/04/2022  -     TSH; Future; Expected date: 02/04/2022  -     T4, Free; Future; Expected date: 02/04/2022    Hepatic steatosis  -     Lipid Panel; Future; Expected date: 02/04/2022  -     TSH; Future; Expected date: 02/04/2022  -     T4, Free; Future; Expected date: 02/04/2022    Diverticulosis  -     Lipid Panel; Future; Expected date: 02/04/2022  -     TSH; Future; Expected date: 02/04/2022  -     T4, Free; Future; Expected date: 02/04/2022    Lumbosacral strain, subsequent encounter  -     Lipid Panel; Future; Expected date: 02/04/2022  -     TSH; Future; Expected date: 02/04/2022  -     T4, Free; Future; Expected date: 02/04/2022    Debility  -     Lipid Panel; Future; Expected date: 02/04/2022  -     TSH; Future; Expected date: 02/04/2022  -     T4, Free; Future; Expected date: 02/04/2022    Other orders  -     meclizine (ANTIVERT) 25 mg tablet; Take 1 tablet (25 mg total) by mouth 3 (three) times daily as needed.  Dispense: 60 tablet; Refill: 5  -     traMADoL (ULTRAM) 50 mg tablet; Take 1 tablet (50 mg total) by mouth every 6 (six) hours as needed.  Dispense: 30 tablet; Refill: 0        Main Reason for Visit-3 month checkup   back  pain-sees pain management--Dr Tariq ---had epidural steroid injections x2 with no relief---patient is not sure the treatment plan--- difficulty bending unable squat arise difficulty getting up on exam table due to obesity  and back pain---had MRI showing spondylosis L3-4, L4-5, L5-S1--did not discuss surgery did not discuss reside [place]-- - Moist heat/theragesic or Tiger balm / range of motion exercise--- physical therapy--was seen neurosurgery --Ultram no NSAIDs secondary GI bleed will give Flexeril at night and gabapentin 300 bid increase tid if still no relief increase to 600 mg tid    osteoarthritis knees bilaterally--- crepitus bilaterally-- difficulty getting out of chair to ambulate-- may benefit from knees being injected may benefit from knee replacemen-t- morbid obesity has lost 100 lb  Abdominal pain epigastric area and left upper quadrant--history GI bleed--transfused 2 units of blood 03/12/2020--history of esophagitis/gastritis having a a test soon where she swallows a blue tablet which will going to parts of the intestine with the EGD:  Did not reach   GERD--avoid alcohol smoking NSAIDs caffeine stress carbonated drinks--can raise the head of the bed on a block of wood to prevent GERD--eat small meals--lay down for 1-2 hours after eating Cont protonix   Anemia hematocrit is increased from 28-30 2-36  Vertigo--sounds like orthostatic hypotension--see Dr Muller--had echocardiogram showing ejection fraction 65% otherwise norm---carotid ultrasound with no hemodynamically significant lesions right carotid 0 a 19% blockage left carotid 20-39% blockage--may need tilt-table test  Lab just done redo in 3 months CBCs CMP lipids T4 TSH-last iron level basically appears to be normal total serum iron and ferritin both normal  Needs decrease weight may benefit gastric bypass or sleeve

## 2021-11-19 ENCOUNTER — IMMUNIZATION (OUTPATIENT)
Dept: PRIMARY CARE CLINIC | Facility: CLINIC | Age: 66
End: 2021-11-19
Payer: MEDICARE

## 2021-11-19 DIAGNOSIS — Z23 NEED FOR VACCINATION: Primary | ICD-10-CM

## 2021-11-19 PROCEDURE — 0004A COVID-19, MRNA, LNP-S, PF, 30 MCG/0.3 ML DOSE VACCINE: CPT | Mod: HCNC,PBBFAC | Performed by: FAMILY MEDICINE

## 2021-12-29 ENCOUNTER — OFFICE VISIT (OUTPATIENT)
Dept: HEMATOLOGY/ONCOLOGY | Facility: CLINIC | Age: 66
End: 2021-12-29
Payer: MEDICARE

## 2021-12-29 ENCOUNTER — LAB VISIT (OUTPATIENT)
Dept: LAB | Facility: HOSPITAL | Age: 66
End: 2021-12-29
Payer: MEDICARE

## 2021-12-29 ENCOUNTER — TELEPHONE (OUTPATIENT)
Dept: HEMATOLOGY/ONCOLOGY | Facility: CLINIC | Age: 66
End: 2021-12-29

## 2021-12-29 ENCOUNTER — TELEPHONE (OUTPATIENT)
Dept: GASTROENTEROLOGY | Facility: CLINIC | Age: 66
End: 2021-12-29
Payer: MEDICARE

## 2021-12-29 VITALS
TEMPERATURE: 98 F | SYSTOLIC BLOOD PRESSURE: 125 MMHG | WEIGHT: 241.94 LBS | OXYGEN SATURATION: 100 % | HEART RATE: 93 BPM | HEIGHT: 62 IN | BODY MASS INDEX: 44.52 KG/M2 | RESPIRATION RATE: 20 BRPM | DIASTOLIC BLOOD PRESSURE: 69 MMHG

## 2021-12-29 DIAGNOSIS — D50.9 MICROCYTIC ANEMIA: Primary | ICD-10-CM

## 2021-12-29 DIAGNOSIS — D69.6 THROMBOCYTOPENIA: ICD-10-CM

## 2021-12-29 DIAGNOSIS — D50.0 IRON DEFICIENCY ANEMIA DUE TO CHRONIC BLOOD LOSS: Primary | ICD-10-CM

## 2021-12-29 DIAGNOSIS — D50.0 IRON DEFICIENCY ANEMIA DUE TO CHRONIC BLOOD LOSS: ICD-10-CM

## 2021-12-29 LAB
ALBUMIN SERPL BCP-MCNC: 2.9 G/DL (ref 3.5–5.2)
ALP SERPL-CCNC: 135 U/L (ref 55–135)
ALT SERPL W/O P-5'-P-CCNC: 11 U/L (ref 10–44)
ANION GAP SERPL CALC-SCNC: 5 MMOL/L (ref 8–16)
AST SERPL-CCNC: 30 U/L (ref 10–40)
BASOPHILS # BLD AUTO: 0.02 K/UL (ref 0–0.2)
BASOPHILS NFR BLD: 0.4 % (ref 0–1.9)
BILIRUB SERPL-MCNC: 0.8 MG/DL (ref 0.1–1)
BUN SERPL-MCNC: 12 MG/DL (ref 8–23)
CALCIUM SERPL-MCNC: 9.3 MG/DL (ref 8.7–10.5)
CHLORIDE SERPL-SCNC: 113 MMOL/L (ref 95–110)
CO2 SERPL-SCNC: 23 MMOL/L (ref 23–29)
CREAT SERPL-MCNC: 0.8 MG/DL (ref 0.5–1.4)
DIFFERENTIAL METHOD: ABNORMAL
EOSINOPHIL # BLD AUTO: 0 K/UL (ref 0–0.5)
EOSINOPHIL NFR BLD: 0.6 % (ref 0–8)
ERYTHROCYTE [DISTWIDTH] IN BLOOD BY AUTOMATED COUNT: 15.7 % (ref 11.5–14.5)
EST. GFR  (AFRICAN AMERICAN): >60 ML/MIN/1.73 M^2
EST. GFR  (NON AFRICAN AMERICAN): >60 ML/MIN/1.73 M^2
FERRITIN SERPL-MCNC: 41 NG/ML (ref 20–300)
GLUCOSE SERPL-MCNC: 98 MG/DL (ref 70–110)
HCT VFR BLD AUTO: 34.9 % (ref 37–48.5)
HCV AB SERPL QL IA: NEGATIVE
HGB BLD-MCNC: 11 G/DL (ref 12–16)
HIV 1+2 AB+HIV1 P24 AG SERPL QL IA: NEGATIVE
IMM GRANULOCYTES # BLD AUTO: 0.02 K/UL (ref 0–0.04)
IMM GRANULOCYTES NFR BLD AUTO: 0.4 % (ref 0–0.5)
IRON SERPL-MCNC: 56 UG/DL (ref 30–160)
LYMPHOCYTES # BLD AUTO: 2.3 K/UL (ref 1–4.8)
LYMPHOCYTES NFR BLD: 49.2 % (ref 18–48)
MCH RBC QN AUTO: 29.3 PG (ref 27–31)
MCHC RBC AUTO-ENTMCNC: 31.5 G/DL (ref 32–36)
MCV RBC AUTO: 93 FL (ref 82–98)
MONOCYTES # BLD AUTO: 0.4 K/UL (ref 0.3–1)
MONOCYTES NFR BLD: 9.3 % (ref 4–15)
NEUTROPHILS # BLD AUTO: 1.9 K/UL (ref 1.8–7.7)
NEUTROPHILS NFR BLD: 40.1 % (ref 38–73)
NRBC BLD-RTO: 0 /100 WBC
PLATELET # BLD AUTO: 122 K/UL (ref 150–450)
PMV BLD AUTO: 11.4 FL (ref 9.2–12.9)
POTASSIUM SERPL-SCNC: 3.8 MMOL/L (ref 3.5–5.1)
PROT SERPL-MCNC: 6.6 G/DL (ref 6–8.4)
RBC # BLD AUTO: 3.76 M/UL (ref 4–5.4)
RETICS/RBC NFR AUTO: 1.8 % (ref 0.5–2.5)
SATURATED IRON: 20 % (ref 20–50)
SODIUM SERPL-SCNC: 141 MMOL/L (ref 136–145)
TOTAL IRON BINDING CAPACITY: 275 UG/DL (ref 250–450)
TRANSFERRIN SERPL-MCNC: 186 MG/DL (ref 200–375)
WBC # BLD AUTO: 4.72 K/UL (ref 3.9–12.7)

## 2021-12-29 PROCEDURE — 1159F PR MEDICATION LIST DOCUMENTED IN MEDICAL RECORD: ICD-10-PCS | Mod: HCNC,CPTII,S$GLB, | Performed by: INTERNAL MEDICINE

## 2021-12-29 PROCEDURE — 99999 PR PBB SHADOW E&M-EST. PATIENT-LVL IV: ICD-10-PCS | Mod: PBBFAC,HCNC,, | Performed by: INTERNAL MEDICINE

## 2021-12-29 PROCEDURE — 85025 COMPLETE CBC W/AUTO DIFF WBC: CPT | Mod: HCNC | Performed by: INTERNAL MEDICINE

## 2021-12-29 PROCEDURE — 80053 COMPREHEN METABOLIC PANEL: CPT | Mod: HCNC | Performed by: INTERNAL MEDICINE

## 2021-12-29 PROCEDURE — 1160F PR REVIEW ALL MEDS BY PRESCRIBER/CLIN PHARMACIST DOCUMENTED: ICD-10-PCS | Mod: HCNC,CPTII,S$GLB, | Performed by: INTERNAL MEDICINE

## 2021-12-29 PROCEDURE — 3074F SYST BP LT 130 MM HG: CPT | Mod: HCNC,CPTII,S$GLB, | Performed by: INTERNAL MEDICINE

## 2021-12-29 PROCEDURE — 82728 ASSAY OF FERRITIN: CPT | Mod: HCNC | Performed by: INTERNAL MEDICINE

## 2021-12-29 PROCEDURE — 99999 PR PBB SHADOW E&M-EST. PATIENT-LVL IV: CPT | Mod: PBBFAC,HCNC,, | Performed by: INTERNAL MEDICINE

## 2021-12-29 PROCEDURE — 3288F FALL RISK ASSESSMENT DOCD: CPT | Mod: HCNC,CPTII,S$GLB, | Performed by: INTERNAL MEDICINE

## 2021-12-29 PROCEDURE — 83540 ASSAY OF IRON: CPT | Mod: HCNC | Performed by: INTERNAL MEDICINE

## 2021-12-29 PROCEDURE — 3288F PR FALLS RISK ASSESSMENT DOCUMENTED: ICD-10-PCS | Mod: HCNC,CPTII,S$GLB, | Performed by: INTERNAL MEDICINE

## 2021-12-29 PROCEDURE — 1101F PR PT FALLS ASSESS DOC 0-1 FALLS W/OUT INJ PAST YR: ICD-10-PCS | Mod: HCNC,CPTII,S$GLB, | Performed by: INTERNAL MEDICINE

## 2021-12-29 PROCEDURE — 86677 HELICOBACTER PYLORI ANTIBODY: CPT | Mod: HCNC | Performed by: INTERNAL MEDICINE

## 2021-12-29 PROCEDURE — 86803 HEPATITIS C AB TEST: CPT | Mod: HCNC | Performed by: INTERNAL MEDICINE

## 2021-12-29 PROCEDURE — 99214 PR OFFICE/OUTPT VISIT, EST, LEVL IV, 30-39 MIN: ICD-10-PCS | Mod: HCNC,S$GLB,, | Performed by: INTERNAL MEDICINE

## 2021-12-29 PROCEDURE — 3074F PR MOST RECENT SYSTOLIC BLOOD PRESSURE < 130 MM HG: ICD-10-PCS | Mod: HCNC,CPTII,S$GLB, | Performed by: INTERNAL MEDICINE

## 2021-12-29 PROCEDURE — 99499 RISK ADDL DX/OHS AUDIT: ICD-10-PCS | Mod: HCNC,S$GLB,, | Performed by: INTERNAL MEDICINE

## 2021-12-29 PROCEDURE — 99214 OFFICE O/P EST MOD 30 MIN: CPT | Mod: HCNC,S$GLB,, | Performed by: INTERNAL MEDICINE

## 2021-12-29 PROCEDURE — 3078F PR MOST RECENT DIASTOLIC BLOOD PRESSURE < 80 MM HG: ICD-10-PCS | Mod: HCNC,CPTII,S$GLB, | Performed by: INTERNAL MEDICINE

## 2021-12-29 PROCEDURE — 1101F PT FALLS ASSESS-DOCD LE1/YR: CPT | Mod: HCNC,CPTII,S$GLB, | Performed by: INTERNAL MEDICINE

## 2021-12-29 PROCEDURE — 1160F RVW MEDS BY RX/DR IN RCRD: CPT | Mod: HCNC,CPTII,S$GLB, | Performed by: INTERNAL MEDICINE

## 2021-12-29 PROCEDURE — 1159F MED LIST DOCD IN RCRD: CPT | Mod: HCNC,CPTII,S$GLB, | Performed by: INTERNAL MEDICINE

## 2021-12-29 PROCEDURE — 99499 UNLISTED E&M SERVICE: CPT | Mod: HCNC,S$GLB,, | Performed by: INTERNAL MEDICINE

## 2021-12-29 PROCEDURE — 36415 COLL VENOUS BLD VENIPUNCTURE: CPT | Mod: HCNC | Performed by: INTERNAL MEDICINE

## 2021-12-29 PROCEDURE — 3008F PR BODY MASS INDEX (BMI) DOCUMENTED: ICD-10-PCS | Mod: HCNC,CPTII,S$GLB, | Performed by: INTERNAL MEDICINE

## 2021-12-29 PROCEDURE — 3078F DIAST BP <80 MM HG: CPT | Mod: HCNC,CPTII,S$GLB, | Performed by: INTERNAL MEDICINE

## 2021-12-29 PROCEDURE — 85045 AUTOMATED RETICULOCYTE COUNT: CPT | Mod: HCNC | Performed by: INTERNAL MEDICINE

## 2021-12-29 PROCEDURE — 87389 HIV-1 AG W/HIV-1&-2 AB AG IA: CPT | Mod: HCNC | Performed by: INTERNAL MEDICINE

## 2021-12-29 PROCEDURE — 3008F BODY MASS INDEX DOCD: CPT | Mod: HCNC,CPTII,S$GLB, | Performed by: INTERNAL MEDICINE

## 2021-12-29 RX ORDER — CYCLOBENZAPRINE HCL 10 MG
10 TABLET ORAL 3 TIMES DAILY PRN
COMMUNITY
Start: 2021-12-13 | End: 2022-04-11 | Stop reason: SDUPTHER

## 2022-01-03 LAB — H PYLORI IGG SERPL QL IA: NEGATIVE

## 2022-01-04 ENCOUNTER — TELEPHONE (OUTPATIENT)
Dept: GASTROENTEROLOGY | Facility: CLINIC | Age: 67
End: 2022-01-04
Payer: MEDICARE

## 2022-01-04 NOTE — TELEPHONE ENCOUNTER
Spoke to patient, scheduled patient for VCE on 01/19/2022. Sent instructions via mail. Verbal understanding.

## 2022-01-04 NOTE — LETTER
[facility]  [address], [address]  [place] [address]  Phone: [phone]             Lo Escalera  [address] [place] [id number]                  What is a Video Capsule Endoscopy?  A video capsule endoscopy is a procedure that uses a tiny wireless camera to take pictures of your digestive tract. A capsule endoscopy camera sits inside a vitamin-size capsule that you will swallow. As the capsule travels through your digestive tract, the camera takes thousands of pictures that are transmitted to a recorder you wear on a belt around your waist.       DAY BEFORE:             01/18/2022(TUESDAY)  1. You will start your clear liquid diet (see attached list) beginning at NOON. After 7pm you will take nothing by mouth except necessary medication with a small sip of water and your bowel prep.   2. At noon, mix the 4.1 oz bottle (119 gram) of Miralax and the 32oz Gatorade (use white/clear color) products, place in the refrigerator (do not add ice).   3. At 7pm you will drink 8oz of Miralax/Gatorade mixture every 15 minutes until mixture is gone (a total of 32oz)  4. Just before going to bed, take one dose of over the counter Phazyme or Simethicone as directed on the box.       Day of Swallowing Capsule:          01/19/2022(WEDNESDAY)  1. You will check in at the Admitting desk on the first floor of the [facility]. Do not wear any lipstick or chap stick to appointment.    2. After you check in, you will meet with a nurse or medical assistant who will go over instructions, ensure consent is complete, and give you a small video capsule to swallow.   3. You will be given water to drink when ingesting the capsule.   4. You may drink water throughout the test and are encouraged to walk as much as possible.   5. Oral medications (except for diabetic medication) may be taken at 10:00am  6. You may drink 8ozs of clear liquids (see attached list) at 10:00am and again at 2:00pm  7. Do  not exercise Avoid heavy lifting. You can drive a car. You may return to work, if your work allows you to. Avoid unsuitable environments and/or physical movements.   8. Avoid getting the recorder or sensor belt wet  9. Observe the LED light on the data recorder at least every 15 minutes. If the light stops blinking blue, document the time and call our office at [phone].   10. Return to our office at _________to have the equipment removed. Your physician will discuss with you the outcome of the test and any further course of action needed.   11. After ingesting the capsule and until it leaves your body, avoid being near any source of powerful electromagnetic fields such as one created near a MRI device for 30 days.       If you suffer from any abdominal pain, nausea, or vomiting during this test contact your physician immediately at [phone] or [phone].       CLEAR LIQUID DIET:   - Avoid Red, Orange, Purple, and/or Blue food coloring   - NO DAIRY   - You can have:  Coffee with sugar (no creamer), tea, water, soda, apple or white grape juice, chicken or beef broth/bouillon (no meat, noodles, or veggies), green/yellow popsicles, green/yellow Jell-O, lemonade.      [address] [facility] [place] [address] [facility] [place] [phone]

## 2022-01-12 ENCOUNTER — PATIENT OUTREACH (OUTPATIENT)
Dept: ADMINISTRATIVE | Facility: OTHER | Age: 67
End: 2022-01-12
Payer: MEDICARE

## 2022-01-12 NOTE — PROGRESS NOTES
LINKS immunization registry updated  Care Everywhere updated  Health Maintenance updated  Chart reviewed for overdue Proactive [facility] Encounters (ABHI) health maintenance testing (CRS, Breast Ca, Diabetic Eye Exam)   Orders entered:N/A

## 2022-01-14 ENCOUNTER — TELEPHONE (OUTPATIENT)
Dept: ENDOSCOPY | Facility: HOSPITAL | Age: 67
End: 2022-01-14
Payer: MEDICARE

## 2022-01-14 NOTE — TELEPHONE ENCOUNTER
VCE confirmed for Wed, 01/19/22. Instructions mailed to patient, will recall on Tues to confirm receipt.  Arrival time 0800.  Covid vacc and boosted, no further testing required.

## 2022-01-18 ENCOUNTER — OFFICE VISIT (OUTPATIENT)
Dept: HEPATOLOGY | Facility: CLINIC | Age: 67
End: 2022-01-18
Payer: MEDICARE

## 2022-01-18 ENCOUNTER — TELEPHONE (OUTPATIENT)
Dept: HEPATOLOGY | Facility: CLINIC | Age: 67
End: 2022-01-18

## 2022-01-18 ENCOUNTER — TELEPHONE (OUTPATIENT)
Dept: GASTROENTEROLOGY | Facility: CLINIC | Age: 67
End: 2022-01-18
Payer: MEDICARE

## 2022-01-18 VITALS
RESPIRATION RATE: 19 BRPM | HEIGHT: 62 IN | BODY MASS INDEX: 44.89 KG/M2 | WEIGHT: 243.94 LBS | SYSTOLIC BLOOD PRESSURE: 138 MMHG | OXYGEN SATURATION: 99 % | HEART RATE: 102 BPM | TEMPERATURE: 97 F | DIASTOLIC BLOOD PRESSURE: 63 MMHG

## 2022-01-18 DIAGNOSIS — M79.89 LEG SWELLING: ICD-10-CM

## 2022-01-18 DIAGNOSIS — I85.00 ESOPHAGEAL VARICES WITHOUT BLEEDING, UNSPECIFIED ESOPHAGEAL VARICES TYPE: Primary | ICD-10-CM

## 2022-01-18 DIAGNOSIS — R60.9 EDEMA, UNSPECIFIED TYPE: ICD-10-CM

## 2022-01-18 DIAGNOSIS — K70.31 ALCOHOLIC CIRRHOSIS OF LIVER WITH ASCITES: ICD-10-CM

## 2022-01-18 PROCEDURE — 1159F MED LIST DOCD IN RCRD: CPT | Mod: HCNC,CPTII,S$GLB, | Performed by: INTERNAL MEDICINE

## 2022-01-18 PROCEDURE — 3075F PR MOST RECENT SYSTOLIC BLOOD PRESS GE 130-139MM HG: ICD-10-PCS | Mod: HCNC,CPTII,S$GLB, | Performed by: INTERNAL MEDICINE

## 2022-01-18 PROCEDURE — 3075F SYST BP GE 130 - 139MM HG: CPT | Mod: HCNC,CPTII,S$GLB, | Performed by: INTERNAL MEDICINE

## 2022-01-18 PROCEDURE — 99499 UNLISTED E&M SERVICE: CPT | Mod: S$GLB,,, | Performed by: INTERNAL MEDICINE

## 2022-01-18 PROCEDURE — 1101F PR PT FALLS ASSESS DOC 0-1 FALLS W/OUT INJ PAST YR: ICD-10-PCS | Mod: HCNC,CPTII,S$GLB, | Performed by: INTERNAL MEDICINE

## 2022-01-18 PROCEDURE — 1159F PR MEDICATION LIST DOCUMENTED IN MEDICAL RECORD: ICD-10-PCS | Mod: HCNC,CPTII,S$GLB, | Performed by: INTERNAL MEDICINE

## 2022-01-18 PROCEDURE — 1160F RVW MEDS BY RX/DR IN RCRD: CPT | Mod: HCNC,CPTII,S$GLB, | Performed by: INTERNAL MEDICINE

## 2022-01-18 PROCEDURE — 1125F AMNT PAIN NOTED PAIN PRSNT: CPT | Mod: HCNC,CPTII,S$GLB, | Performed by: INTERNAL MEDICINE

## 2022-01-18 PROCEDURE — 1101F PT FALLS ASSESS-DOCD LE1/YR: CPT | Mod: HCNC,CPTII,S$GLB, | Performed by: INTERNAL MEDICINE

## 2022-01-18 PROCEDURE — 99999 PR PBB SHADOW E&M-EST. PATIENT-LVL V: CPT | Mod: PBBFAC,HCNC,, | Performed by: INTERNAL MEDICINE

## 2022-01-18 PROCEDURE — 1160F PR REVIEW ALL MEDS BY PRESCRIBER/CLIN PHARMACIST DOCUMENTED: ICD-10-PCS | Mod: HCNC,CPTII,S$GLB, | Performed by: INTERNAL MEDICINE

## 2022-01-18 PROCEDURE — 3288F FALL RISK ASSESSMENT DOCD: CPT | Mod: HCNC,CPTII,S$GLB, | Performed by: INTERNAL MEDICINE

## 2022-01-18 PROCEDURE — 3008F PR BODY MASS INDEX (BMI) DOCUMENTED: ICD-10-PCS | Mod: HCNC,CPTII,S$GLB, | Performed by: INTERNAL MEDICINE

## 2022-01-18 PROCEDURE — 3008F BODY MASS INDEX DOCD: CPT | Mod: HCNC,CPTII,S$GLB, | Performed by: INTERNAL MEDICINE

## 2022-01-18 PROCEDURE — 1125F PR PAIN SEVERITY QUANTIFIED, PAIN PRESENT: ICD-10-PCS | Mod: HCNC,CPTII,S$GLB, | Performed by: INTERNAL MEDICINE

## 2022-01-18 PROCEDURE — 99204 OFFICE O/P NEW MOD 45 MIN: CPT | Mod: HCNC,S$GLB,, | Performed by: INTERNAL MEDICINE

## 2022-01-18 PROCEDURE — 99204 PR OFFICE/OUTPT VISIT, NEW, LEVL IV, 45-59 MIN: ICD-10-PCS | Mod: HCNC,S$GLB,, | Performed by: INTERNAL MEDICINE

## 2022-01-18 PROCEDURE — 3078F PR MOST RECENT DIASTOLIC BLOOD PRESSURE < 80 MM HG: ICD-10-PCS | Mod: HCNC,CPTII,S$GLB, | Performed by: INTERNAL MEDICINE

## 2022-01-18 PROCEDURE — 3078F DIAST BP <80 MM HG: CPT | Mod: HCNC,CPTII,S$GLB, | Performed by: INTERNAL MEDICINE

## 2022-01-18 PROCEDURE — 99499 RISK ADDL DX/OHS AUDIT: ICD-10-PCS | Mod: S$GLB,,, | Performed by: INTERNAL MEDICINE

## 2022-01-18 PROCEDURE — 99999 PR PBB SHADOW E&M-EST. PATIENT-LVL V: ICD-10-PCS | Mod: PBBFAC,HCNC,, | Performed by: INTERNAL MEDICINE

## 2022-01-18 PROCEDURE — 3288F PR FALLS RISK ASSESSMENT DOCUMENTED: ICD-10-PCS | Mod: HCNC,CPTII,S$GLB, | Performed by: INTERNAL MEDICINE

## 2022-01-18 NOTE — TELEPHONE ENCOUNTER
----- Message from Nate Meier sent at 1/18/2022  4:15 PM CST -----  Contact: pt  Type:  Patient Returning Call    Who Called:Pt  Would the patient rather a call back or a response via [facility]? call  Best Call Back Number:[phone]  Additional Information:     Pt would like to know what steps she needs to take prior to procedure

## 2022-01-18 NOTE — PROGRESS NOTES
HEPATOLOGY CONSULTATION    Referring Physician: Jorgito Rios MD  Current Corresponding Physician: Jorgito Rios MD, Jose Hernández MD    Reason for Consultation: Consultation for evaluation of Cirrhosis    History of Present Illness: Lo Escalera is a 66 y.o. female with a hx of HTN, carotid atherosclerosis, DDD and iron def anemia who has a history of heavy alcohol:  --drank heavily x 12-15 years; stopped x 36 years (stopped on her own)  -restarted 1.5 years ago - stopped 1 year ago (went to detox)    Abdo US 10/21: cirrhosis; small ascites; no HCC    Labs 12/29/21: Tbil 0.8, ALT 11, aST 30, ALKP 135, Na 141, Alb 2.9, plts 97  HCV Ab-, AKHIL-, ASMA-, AMA-, IgG normal, ferritin 41, iron sat 20%  BMI: 44    EGD 09/20: small esophageal varices; esophagitis    The patient denies any symptoms of decompensated cirrhosis, including no ascites or edema, cognitive problems that would suggest hepatic encephalopathy, or GI bleeding from varices.    Chief Complaint   Patient presents with    Cirrhosis       Past Medical History:   Diagnosis Date    Anemia due to chronic blood loss 3/6/2020    Anemia of chronic disorder 8/24/2020    Anxiety     Diverticulosis     Gastric ulcer     old    GERD (gastroesophageal reflux disease)     Hepatic steatosis 9/12/2016    Hyperbilirubinemia     Hypertension     Hypothyroidism     Iron deficiency anemia due to chronic blood loss 8/24/2020    Microcytic anemia 8/24/2020    Peptic ulcer disease 9/12/2016    UPJ (ureteropelvic junction) obstruction     Vertigo      Outpatient Encounter Medications as of 1/18/2022   Medication Sig Dispense Refill    acetaminophen (TYLENOL) 500 MG tablet Take 1 tablet (500 mg total) by mouth every 8 (eight) hours. 90 tablet 0    calcium carbonate (TUMS) 200 mg calcium (500 mg) chewable tablet Take 1 tablet by mouth once daily.      cyclobenzaprine (FLEXERIL) 10 MG tablet Take 10 mg by mouth 3 (three) times daily as needed.       ferrous sulfate (FEOSOL) 325 mg (65 mg iron) Tab tablet TAKE 1 TABLET (325 MG TOTAL) BY MOUTH ONCE DAILY. 90 tablet 0    gabapentin (NEURONTIN) 300 MG capsule Take 1 capsule (300 mg total) by mouth 3 (three) times daily. 90 capsule 11    meclizine (ANTIVERT) 25 mg tablet Take 1 tablet (25 mg total) by mouth 3 (three) times daily as needed. 60 tablet 5    pantoprazole (PROTONIX) 40 MG tablet TAKE 1 TABLET TWICE DAILY 180 tablet 1    potassium 99 mg Tab Take by mouth once daily.      furosemide (LASIX) 40 MG tablet Take 1 tablet (40 mg total) by mouth once daily. (Patient not taking: No sig reported) 30 tablet 5     No facility-administered encounter medications on file as of 1/18/2022.     Review of patient's allergies indicates:   Allergen Reactions    Codeine Itching    Iodine and iodide containing products Itching     Family History   Problem Relation Age of Onset    Lupus Sister     Arthritis Brother     No Known Problems Daughter     Mental illness Son         PTSD x 1 son in     Lupus Sister        Social History     Socioeconomic History    Marital status:    Tobacco Use    Smoking status: Never Smoker    Smokeless tobacco: Never Used   Substance and Sexual Activity    Alcohol use: No     Comment: 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  However, no consumption in 20 years.     Drug use: No    Sexual activity: Not Currently     Social Determinants of Health     Financial Resource Strain: Medium Risk    Difficulty of Paying Living Expenses: Somewhat hard   Food Insecurity: No Food Insecurity    Worried About Running Out of Food in the Last Year: Never true    Ran Out of Food in the Last Year: Never true   Transportation Needs: No Transportation Needs    Lack of Transportation (Medical): No    Lack of Transportation (Non-Medical): No   Physical Activity: Inactive    Days of Exercise per Week: 0 days    Minutes of Exercise per Session: 0 min   Stress: Stress Concern Present    Feeling  of Stress : To some extent   Social Connections: Moderately Isolated    Frequency of Communication with Friends and Family: More than three times a week    Frequency of Social Gatherings with Friends and Family: Never    Attends Roman Catholic Services: Never    Active Member of Clubs or Organizations: No    Attends Club or Organization Meetings: Never    Marital Status:    Housing Stability: Low Risk     Unable to Pay for Housing in the Last Year: No    Number of Places Lived in the Last Year: 1    Unstable Housing in the Last Year: No     Review of Systems   Constitutional: Negative.    HENT: Negative.    Eyes: Negative.    Respiratory: Negative.    Cardiovascular: Negative.    Gastrointestinal: Negative.    Genitourinary: Negative.    Musculoskeletal: Negative.    Skin: Negative.    Neurological: Negative.    Psychiatric/Behavioral: Negative.      Vitals:    01/18/22 1253   BP: 138/63   Pulse: 102   Resp: 19   Temp: 96.8 °F (36 °C)       Physical Exam  Vitals reviewed.   Constitutional:       Appearance: She is well-developed and well-nourished.   HENT:      Head: Normocephalic and atraumatic.   Eyes:      General: No scleral icterus.     Extraocular Movements: EOM normal.      Conjunctiva/sclera: Conjunctivae normal.      Pupils: Pupils are equal, round, and reactive to light.   Neck:      Thyroid: No thyromegaly.   Cardiovascular:      Rate and Rhythm: Normal rate and regular rhythm.      Heart sounds: Normal heart sounds.   Pulmonary:      Effort: Pulmonary effort is normal.      Breath sounds: Normal breath sounds. No rales.   Abdominal:      General: Bowel sounds are normal. There is no distension.      Palpations: Abdomen is soft. There is no mass.      Tenderness: There is no abdominal tenderness.   Musculoskeletal:         General: Swelling (right lower leg) present. No edema. Normal range of motion.      Cervical back: Normal range of motion and neck supple.   Skin:     General: Skin is warm  and dry.      Findings: No rash.   Neurological:      Mental Status: She is alert and oriented to person, place, and time.   Psychiatric:         Mood and Affect: Mood and affect normal.         Lab Results   Component Value Date    GLU 98 12/29/2021    BUN 12 12/29/2021    CREATININE 0.8 12/29/2021    CALCIUM 9.3 12/29/2021     12/29/2021    K 3.8 12/29/2021     (H) 12/29/2021    PROT 6.6 12/29/2021    CO2 23 12/29/2021    ANIONGAP 5 (L) 12/29/2021    WBC 4.72 12/29/2021    RBC 3.76 (L) 12/29/2021    HGB 11.0 (L) 12/29/2021    HCT 34.9 (L) 12/29/2021    HCT 41 10/25/2016    MCV 93 12/29/2021    MCH 29.3 12/29/2021    MCHC 31.5 (L) 12/29/2021     Lab Results   Component Value Date    RDW 15.7 (H) 12/29/2021     (L) 12/29/2021    MPV 11.4 12/29/2021    GRAN 1.9 12/29/2021    GRAN 40.1 12/29/2021    LYMPH 2.3 12/29/2021    LYMPH 49.2 (H) 12/29/2021    MONO 0.4 12/29/2021    MONO 9.3 12/29/2021    EOSINOPHIL 0.6 12/29/2021    BASOPHIL 0.4 12/29/2021    EOS 0.0 12/29/2021    BASO 0.02 12/29/2021    APTT 27.8 03/12/2020    GROUPTRH O POS 07/23/2021    GROUPTRH O POS 01/27/2005     (H) 03/12/2020    CHOL 126 05/15/2020    TRIG 75 05/15/2020    HDL 43 05/15/2020    CHOLHDL 34.1 05/15/2020    TOTALCHOLEST 2.9 05/15/2020    ALBUMIN 2.9 (L) 12/29/2021    BILIDIR 1.5 (H) 06/12/2019    AST 30 12/29/2021    ALT 11 12/29/2021    ALKPHOS 135 12/29/2021    MG 1.7 03/11/2020    LABPROT 13.1 (H) 03/12/2020    INR 1.3 (H) 03/12/2020       Assessment and Plan:  Patient Active Problem List   Diagnosis    Essential hypertension    Hepatic steatosis    Severe obesity with body mass index (BMI) of 35.0 to 39.9 with comorbidity    UGIB (upper gastrointestinal bleed)    Anemia due to chronic blood loss    Thrombocytopenia    History of Hypothyroidism    Vertigo    JHOAN (generalized anxiety disorder)    Debility    Esophagitis with gastritis    Alcohol abuse, in remission    Epigastric pain     Diverticulosis    Portal hypertension    Anemia    Microcytic anemia    Iron deficiency anemia due to chronic blood loss    Anemia of chronic disorder    Sinusitis    Lumbosacral strain    Post-traumatic osteoarthritis of both knees    Lumbar radiculopathy    Lumbar spondylosis    DDD (degenerative disc disease), lumbar    Shortness of breath    Dizziness    Carotid atherosclerosis, bilateral    Chest pain of uncertain etiology    Syncope and collapse    Benign paroxysmal positional vertigo    Iron deficiency anemia    Spondylolisthesis of lumbar region     Lo Escalera is a 66 y.o. female with Cirrhosis  The etiology of her cirrhosis is likely alcohol +/- NAFLD. She had a small amount of ascites, esophageal varices and hypoalbuminemia. Otherwise, her liver function is quite good. I suspect a MELD score of <15. Current recommendations:  1. Cirrhosis: check meld labs today and every 12 weeks; screen for HCC every 6 months with imaging and AFP (next due 04/22); check for immunity to hepatitis A and B and if not immune then vaccinate  2. Esophageal varices: repeat EGD now  3. Iron def anemia: agree with capsule endoscopy; suspect slow blood loss form PHG due to cirrhosis; TIPS generally not helpful with this type of blood loss; recommend prn iron infusions/PRBC transfusions  4. Elevated BMI: recommend weight loss  5. Hx alcohol absue: continute to abstain; will check periodic peth tests  6. Right lower leg swelling: stat US legs to r/o DVT    Return 8 weeks

## 2022-01-18 NOTE — PATIENT INSTRUCTIONS
1. abdo US every 6 months to look for cancer (next due in 04/22)  2. Labs every 12 weeks  3. EGD now to f/u on varices seen in 2020  4. Leg US- r/o DVT  Return 8 weeks

## 2022-01-19 ENCOUNTER — TELEPHONE (OUTPATIENT)
Dept: HEPATOLOGY | Facility: CLINIC | Age: 67
End: 2022-01-19
Payer: MEDICARE

## 2022-01-19 ENCOUNTER — HOSPITAL ENCOUNTER (OUTPATIENT)
Facility: HOSPITAL | Age: 67
Discharge: HOME | End: 2022-01-19
Attending: INTERNAL MEDICINE | Admitting: INTERNAL MEDICINE
Payer: MEDICARE

## 2022-01-19 PROCEDURE — 91110 GI TRC IMG INTRAL ESOPH-ILE: CPT | Mod: 26,NSCH,, | Performed by: INTERNAL MEDICINE

## 2022-01-19 PROCEDURE — 91110 GI TRC IMG INTRAL ESOPH-ILE: CPT | Performed by: INTERNAL MEDICINE

## 2022-01-19 PROCEDURE — 91110 PR GI TRACT CAPSULE ENDOSCOPY: ICD-10-PCS | Mod: 26,NSCH,, | Performed by: INTERNAL MEDICINE

## 2022-01-19 NOTE — TELEPHONE ENCOUNTER
MD TODD Valerio Staff  MELD-Na score: 8   Liver working well-please let pt know     Tumor marker is negative. Let pt know   Spoke with Mrs. Blanchard and she was glad to hear her results were good

## 2022-01-19 NOTE — PLAN OF CARE
Pill Capsule Discharge Instructions:    You may drink colorless liquids starting 2 hours after swallowing the capsule.    You may have a light snack (such as a sandwich) starting 4 hours after swallowing the capsule.    Check the blue flashing light frequently-be sure it is blinking- if it stops blinking or changes color, benja the time and call the Endoscopy Department at [phone].    Avoid Strong Magnetic fields such as MRI devices after swallowing the capsule until you pass it in a bowel movement. Note that the capsule is flushable and does not need to be retrieved after you pass the capsule.    Do not disconnect the equipment or completely remove the belt at any time during the procedure. Treat the data recorder carefully-avoid sudden movements and banging of the equipment.    Avoid direct exposure to bright sunlight.      Remove the belt and recorder in 8 hrs @ 530.      Return the belt and recorder to the 2nd floor of the hospital at the outpatient surgery desk @ 530.

## 2022-01-20 ENCOUNTER — TELEPHONE (OUTPATIENT)
Dept: HEPATOLOGY | Facility: CLINIC | Age: 67
End: 2022-01-20
Payer: MEDICARE

## 2022-01-20 ENCOUNTER — TELEPHONE (OUTPATIENT)
Dept: SURGERY | Facility: CLINIC | Age: 67
End: 2022-01-20
Payer: MEDICARE

## 2022-01-20 NOTE — TELEPHONE ENCOUNTER
EGD Prep Instructions    [facility]  [address] [place] [address]    You are schedule for an EGD with Clemente Ward MD on  02/14/2022 at [facility]. Located at [address]  Check in at the admit desk, first floor of the hospital (which is the building on the left).   You will receive a call 2-3 days before your EGD to tell you the time to arrive.  If you have not received a call by the day before your procedure, call the Endoscopy Lab at [phone].    Nothing to eat or drink after midnight before the procedure.  You MAY brush your teeth.    You MAY take your blood pressure, heart, and seizure medication on the morning of the procedure, with a SIP of water.  Hold ALL other medications until after the procedure.    If you are on blood thinners THAT YOU HAVE BEEN INSTRUCTED TO HOLD BY YOUR DOCTOR FOR THIS PROCEDURE, then do NOT take this the morning of your EGD.  Do NOT stop these medications on your own, they must be approved to be held by your doctor.  Your EGD can NOT be done if you are on these medications.  Examples of blood thinners include: Coumadin, Aggrenox, Plavix, Pradaxa, Reapro, Pletal, Xarelto, Ticagrelor, Brilinta, Eliquis, and high dose aspirin (325 mg).  You do not have to stop baby aspirin 81 mg.    Please make sure that you have a  home because you will receive an IV sedation. You may NOT take an uber, lyft, taxi, or bus home unless you have a responsible adult with you.     You will receive a call 2-3 days before your EGD to tell you the time to arrive.  If you have not received a call by the day before your procedure, call the Endoscopy department at [phone]

## 2022-01-20 NOTE — TELEPHONE ENCOUNTER
----- Message from Christine Higginbotham MD sent at 1/19/2022  8:38 PM CST -----  Pt immune to hepatitis B but not A. Consider vaccination for A. Pcp can do this.    Call placed to the patient. Message relayed from Dr Higginbotham. Patient stated she will speak to her PCP about the vaccination for Hepatitis A.

## 2022-01-20 NOTE — TELEPHONE ENCOUNTER
----- Message from Christine Higginbotham MD sent at 1/19/2022  8:38 PM CST -----  Pt immune to hepatitis B but not A. Consider vaccination for A. Pcp can do this.

## 2022-01-27 DIAGNOSIS — K63.3 ULCER OF ILEUM: Primary | ICD-10-CM

## 2022-01-27 NOTE — PROVATION PATIENT INSTRUCTIONS
Discharge Summary/Instructions after an Endoscopic Procedure  Patient Name: Lo Escalera  Patient MRN: [id number]  Patient YOB: 1955 Wednesday, January 19, 2022  Antwon Gardner MD  Dear patient,  As a result of recent federal legislation (The Federal Cures Act), you may   receive lab or pathology results from your procedure in your [facility]   account before your physician is able to contact you. Your physician or   their representative will relay the results to you with their   recommendations at their soonest availability.  Thank you,  Your health is very important to us during the Covid Crisis. Following your   procedure today, you will receive a daily text for 2 weeks asking about   signs or symptoms of Covid 19.  Please respond to this text when you   receive it so we can follow up and keep you as safe as possible.   RESTRICTIONS:  During your procedure today, you received medications for sedation.  These   medications may affect your judgment, balance and coordination.  Therefore,   for 24 hours, you have the following restrictions:   - DO NOT drive a car, operate machinery, make legal/financial decisions,   sign important papers or drink alcohol.    ACTIVITY:  Today: no heavy lifting, straining or running due to procedural   sedation/anesthesia.  The following day: return to full activity including work.  DIET:  Eat and drink normally unless instructed otherwise.     TREATMENT FOR COMMON SIDE EFFECTS:  - Mild abdominal pain, nausea, belching, bloating or excessive gas:  rest,   eat lightly and use a heating pad.  - Sore Throat: treat with throat lozenges and/or gargle with warm salt   water.  - Because air was used during the procedure, expelling large amounts of air   from your rectum or belching is normal.  - If a bowel prep was taken, you may not have a bowel movement for 1-3 days.    This is normal.  SYMPTOMS TO WATCH FOR AND REPORT TO YOUR PHYSICIAN:  1. Abdominal pain or bloating,  other than gas cramps.  2. Chest pain.  3. Back pain.  4. Signs of infection such as: chills or fever occurring within 24 hours   after the procedure.  5. Rectal bleeding, which would show as bright red, maroon, or black stools.   (A tablespoon of blood from the rectum is not serious, especially if   hemorrhoids are present.)  6. Vomiting.  7. Weakness or dizziness.  GO DIRECTLY TO THE NEAREST EMERGENCY ROOM IF YOU HAVE ANY OF THE FOLLOWING:      Difficulty breathing              Chills and/or fever over 101 F   Persistent vomiting and/or vomiting blood   Severe abdominal pain   Severe chest pain   Black, tarry stools   Bleeding- more than one tablespoon   Any other symptom or condition that you feel may need urgent attention  Your doctor recommends these additional instructions:  If any biopsies were taken, your doctors clinic will contact you in 1 to 2   weeks with any results.  - Refer to Dr. Blanchard for evaluation for lower DBE at the next available   appointment.   - Recommend an iron supplement.  For questions, problems or results please call your physician - Antwon Gardner MD.  EMERGENCY PHONE NUMBER: [phone],  LAB RESULTS: [phone]  IF A COMPLICATION OR EMERGENCY SITUATION ARISES AND YOU ARE UNABLE TO REACH   YOUR PHYSICIAN - GO DIRECTLY TO THE EMERGENCY ROOM.  Antwon Gardner MD  1/27/2022 12:34:59 PM  This report has been verified and signed electronically.  Dear patient,  As a result of recent federal legislation (The Federal Cures Act), you may   receive lab or pathology results from your procedure in your [facility]   account before your physician is able to contact you. Your physician or   their representative will relay the results to you with their   recommendations at their soonest availability.  Thank you,  PROVATION

## 2022-01-28 ENCOUNTER — TELEPHONE (OUTPATIENT)
Dept: GASTROENTEROLOGY | Facility: CLINIC | Age: 67
End: 2022-01-28
Payer: MEDICARE

## 2022-01-28 NOTE — TELEPHONE ENCOUNTER
Spoke to patient and discuss her video capsule results and that NP Linda stated that the Video capsule shows some small blood vessels in the small intestine that may be the cause of her anemia.  We are referring her to see Dr. Blanchard who specializes in this.   verbal understanding.

## 2022-02-01 ENCOUNTER — TELEPHONE (OUTPATIENT)
Dept: NEUROLOGY | Facility: HOSPITAL | Age: 67
End: 2022-02-01
Payer: MEDICARE

## 2022-02-01 NOTE — TELEPHONE ENCOUNTER
Clinic appt scheduled with pt from referral by iLnda Blanco for anemia.  Appt scheduled on Monday, February 7, 2022 at 215pm.  Pt given clinic address and repeated correctly.

## 2022-02-03 ENCOUNTER — OFFICE VISIT (OUTPATIENT)
Dept: PRIMARY CARE CLINIC | Facility: CLINIC | Age: 67
End: 2022-02-03
Payer: MEDICARE

## 2022-02-03 VITALS
OXYGEN SATURATION: 99 % | HEIGHT: 62 IN | HEART RATE: 112 BPM | RESPIRATION RATE: 18 BRPM | DIASTOLIC BLOOD PRESSURE: 70 MMHG | SYSTOLIC BLOOD PRESSURE: 132 MMHG | BODY MASS INDEX: 44.62 KG/M2

## 2022-02-03 DIAGNOSIS — K76.6 PORTAL HYPERTENSION: ICD-10-CM

## 2022-02-03 DIAGNOSIS — D50.0 IRON DEFICIENCY ANEMIA DUE TO CHRONIC BLOOD LOSS: ICD-10-CM

## 2022-02-03 DIAGNOSIS — R10.84 GENERALIZED ABDOMINAL PAIN: ICD-10-CM

## 2022-02-03 DIAGNOSIS — M51.36 DDD (DEGENERATIVE DISC DISEASE), LUMBAR: ICD-10-CM

## 2022-02-03 DIAGNOSIS — E66.01 SEVERE OBESITY WITH BODY MASS INDEX (BMI) OF 35.0 TO 39.9 WITH COMORBIDITY: ICD-10-CM

## 2022-02-03 DIAGNOSIS — M47.816 LUMBAR SPONDYLOSIS: ICD-10-CM

## 2022-02-03 DIAGNOSIS — D69.6 THROMBOCYTOPENIA: ICD-10-CM

## 2022-02-03 DIAGNOSIS — R42 VERTIGO: Primary | ICD-10-CM

## 2022-02-03 DIAGNOSIS — R42 DIZZINESS: ICD-10-CM

## 2022-02-03 DIAGNOSIS — M17.2 POST-TRAUMATIC OSTEOARTHRITIS OF BOTH KNEES: ICD-10-CM

## 2022-02-03 DIAGNOSIS — H81.10 BENIGN PAROXYSMAL POSITIONAL VERTIGO, UNSPECIFIED LATERALITY: ICD-10-CM

## 2022-02-03 DIAGNOSIS — I10 ESSENTIAL HYPERTENSION: ICD-10-CM

## 2022-02-03 DIAGNOSIS — M54.16 LUMBAR RADICULOPATHY: ICD-10-CM

## 2022-02-03 PROCEDURE — 99999 PR PBB SHADOW E&M-EST. PATIENT-LVL III: ICD-10-PCS | Mod: PBBFAC,,, | Performed by: FAMILY MEDICINE

## 2022-02-03 PROCEDURE — 1159F PR MEDICATION LIST DOCUMENTED IN MEDICAL RECORD: ICD-10-PCS | Mod: CPTII,S$GLB,, | Performed by: FAMILY MEDICINE

## 2022-02-03 PROCEDURE — 3075F PR MOST RECENT SYSTOLIC BLOOD PRESS GE 130-139MM HG: ICD-10-PCS | Mod: CPTII,S$GLB,, | Performed by: FAMILY MEDICINE

## 2022-02-03 PROCEDURE — 3008F PR BODY MASS INDEX (BMI) DOCUMENTED: ICD-10-PCS | Mod: CPTII,S$GLB,, | Performed by: FAMILY MEDICINE

## 2022-02-03 PROCEDURE — 1159F MED LIST DOCD IN RCRD: CPT | Mod: CPTII,S$GLB,, | Performed by: FAMILY MEDICINE

## 2022-02-03 PROCEDURE — 3078F DIAST BP <80 MM HG: CPT | Mod: CPTII,S$GLB,, | Performed by: FAMILY MEDICINE

## 2022-02-03 PROCEDURE — 1101F PR PT FALLS ASSESS DOC 0-1 FALLS W/OUT INJ PAST YR: ICD-10-PCS | Mod: CPTII,S$GLB,, | Performed by: FAMILY MEDICINE

## 2022-02-03 PROCEDURE — 99999 PR PBB SHADOW E&M-EST. PATIENT-LVL III: CPT | Mod: PBBFAC,,, | Performed by: FAMILY MEDICINE

## 2022-02-03 PROCEDURE — 3008F BODY MASS INDEX DOCD: CPT | Mod: CPTII,S$GLB,, | Performed by: FAMILY MEDICINE

## 2022-02-03 PROCEDURE — 1126F PR PAIN SEVERITY QUANTIFIED, NO PAIN PRESENT: ICD-10-PCS | Mod: CPTII,S$GLB,, | Performed by: FAMILY MEDICINE

## 2022-02-03 PROCEDURE — 1101F PT FALLS ASSESS-DOCD LE1/YR: CPT | Mod: CPTII,S$GLB,, | Performed by: FAMILY MEDICINE

## 2022-02-03 PROCEDURE — 3288F PR FALLS RISK ASSESSMENT DOCUMENTED: ICD-10-PCS | Mod: CPTII,S$GLB,, | Performed by: FAMILY MEDICINE

## 2022-02-03 PROCEDURE — 3288F FALL RISK ASSESSMENT DOCD: CPT | Mod: CPTII,S$GLB,, | Performed by: FAMILY MEDICINE

## 2022-02-03 PROCEDURE — 1126F AMNT PAIN NOTED NONE PRSNT: CPT | Mod: CPTII,S$GLB,, | Performed by: FAMILY MEDICINE

## 2022-02-03 PROCEDURE — 99214 OFFICE O/P EST MOD 30 MIN: CPT | Mod: S$GLB,,, | Performed by: FAMILY MEDICINE

## 2022-02-03 PROCEDURE — 3075F SYST BP GE 130 - 139MM HG: CPT | Mod: CPTII,S$GLB,, | Performed by: FAMILY MEDICINE

## 2022-02-03 PROCEDURE — 99214 PR OFFICE/OUTPT VISIT, EST, LEVL IV, 30-39 MIN: ICD-10-PCS | Mod: S$GLB,,, | Performed by: FAMILY MEDICINE

## 2022-02-03 PROCEDURE — 3078F PR MOST RECENT DIASTOLIC BLOOD PRESSURE < 80 MM HG: ICD-10-PCS | Mod: CPTII,S$GLB,, | Performed by: FAMILY MEDICINE

## 2022-02-03 RX ORDER — GABAPENTIN 300 MG/1
300 CAPSULE ORAL 3 TIMES DAILY
Qty: 90 CAPSULE | Refills: 5 | Status: SHIPPED | OUTPATIENT
Start: 2022-02-03 | End: 2022-02-23

## 2022-02-03 RX ORDER — TRAMADOL HYDROCHLORIDE 50 MG/1
50 TABLET ORAL EVERY 6 HOURS PRN
Qty: 30 TABLET | Refills: 0 | Status: SHIPPED | OUTPATIENT
Start: 2022-02-03 | End: 2022-02-13

## 2022-02-03 NOTE — PROGRESS NOTES
Subjective:       Patient ID: Lo Escalera is a 66 y.o. female.    Chief Complaint: 3 Month Check Up     HPI:  67 yo BF doing fair a lot of pain with the legs bilaterally right much greater than left--patient had a ultrasound of the right leg that was negative for DVT--Dr Fournier---has appt Mon. Also had test swallowing a pill x 8 hrs.  History peptic ulcer disease GERD anemia hepatic steatosis diverticulosis portal hypertension.  Eating well--+BM--occasionally dark--ambulating but pain--had epidural dural steroid injection         Supposed to get another EGD         No ETOH over a year          Lab January 2022 CBCs CMP PTT INR hepatitis AB all negative except for hematocrit 30 H showing anemia has resolved        Office  visit 011/04/2022 66 year-old black female -for 3 month checkup---eating too much---+BM--+ambulation--pain had epidural steroid injection in back --did not do anything. No discussion on reside a me or surgery. On gabapentin 300 takes it twice a day . Hx UGI bleed years ago Used use ultram. Right leg occas swells like baseball.  No history lupus rheumatoid gout--no fractures.  History 2 sisters lupus father with gout     MRI lumbar spine showing spondylosis L3-4, L4-5, L5           ROS:   Skin: no psoriasis, eczema, skin cancer--no bruising  HEENT: No headache, ocular pain, blurred vision, diplopia,  hoarseness change in voice, no thyroid disease no epistaxis  Lung: No pneumonia, asthma, Tb, wheezing, + occasional SOB, no smoking  Heart: No chest pain, ankle edema, palpitations, MI, eva murmur, no hypertension patient used to have high blood pressure but was taken off of medication blood pressure today 132/7, hyperlipidemia--no stent bypass arrhythmia history of hypertension--occasionally feels like heart skips a beat blood pressure was low so taken off blood pressure medicine   Abdomen: No nausea, vomiting, diarrhea, constipation, ulcers, hepatitis, status post cholecystectomy, melena,  hematochezia, hematemesis recent esophagitis gastritis with transfusion 2 units of blood history of peptic ulcer disease history of esophageal varices  : no UTI, renal disease, stones  GYN hysterectomy mammogram March 2021  MS: no fractures, O/A, lupus, rheumatoid, gout--has 2 sisters with lupus and her father has gout  Neuro: No dizziness, LOC, seizures +vertigo--help with meclizine November 2020 patient was admitted to [facility] for loss of consciousness used to drink  36 yrs ago-pancreatitis  No diabetes, + anemia had to be transfused blood had upper GI bleed, + anxiety, + depression upset had to quit working because of vertigo was working at [employer]-doing pretty good with--loss mom dad oldest sister back to back   3 children unemployed, lives with      Objective:   Physical Exam:   General: Well nourished, well developed, no acute distress+ Obesity   Skin: No lesions  HEENT: Eyes PERRLA, EOM intact, nose clear , throat nonerythematous +arcus senilis no pallor to the conjunctiva  NECK: Supple, no bruits, No JVD, no nodes  Lungs: Clear, no rales, rhonchi, wheezing  Heart: Regular rate and rhythm, no murmurs, gallops, or rubs  Abdomen: flat, bowel sounds positive, no tenderness, or organomegaly  MS:  No significant change problem with the knees bilaterally walks extremely stiff leg states seen by orthopedist told was severe arthritis- tenderness lumbar spine pain with palpation pain with anterior flexion 20 extension 10 lateral flexion rotation 10 crepitus in knees with extension but some pulling sensation back no radiculopathy able squat only long-term down unable to arise without assistance very difficult for patient to get off of the exam table or get up on the exam table because of a small steps  Neuro: Alert, CN intact, oriented X 3  Extremities: No cyanosis, clubbing, or edema negative Romberg        Assessment:       1. Vertigo    2. Lumbar spondylosis    3. Lumbar radiculopathy     4. DDD (degenerative disc disease), lumbar    5. Benign paroxysmal positional vertigo, unspecified laterality    6. Essential hypertension    7. Thrombocytopenia    8. Iron deficiency anemia due to chronic blood loss    9. Severe obesity with body mass index (BMI) of 35.0 to 39.9 with comorbidity    10. Portal hypertension    11. Generalized abdominal pain    12. Post-traumatic osteoarthritis of both knees    13. Dizziness        Plan:       Vertigo    Lumbar spondylosis  -     gabapentin (NEURONTIN) 300 MG capsule; Take 1 capsule (300 mg total) by mouth 3 (three) times daily.  Dispense: 90 capsule; Refill: 5    Lumbar radiculopathy  -     gabapentin (NEURONTIN) 300 MG capsule; Take 1 capsule (300 mg total) by mouth 3 (three) times daily.  Dispense: 90 capsule; Refill: 5    DDD (degenerative disc disease), lumbar  -     gabapentin (NEURONTIN) 300 MG capsule; Take 1 capsule (300 mg total) by mouth 3 (three) times daily.  Dispense: 90 capsule; Refill: 5    Benign paroxysmal positional vertigo, unspecified laterality    Essential hypertension    Thrombocytopenia    Iron deficiency anemia due to chronic blood loss    Severe obesity with body mass index (BMI) of 35.0 to 39.9 with comorbidity    Portal hypertension    Generalized abdominal pain    Post-traumatic osteoarthritis of both knees    Dizziness    Other orders  -     traMADoL (ULTRAM) 50 mg tablet; Take 1 tablet (50 mg total) by mouth every 6 (six) hours as needed.  Dispense: 30 tablet; Refill: 0        Main Reason for Visit-3 month checkup   Pain  Pedro buttock and both legs -sees pain management--Dr Tariq ---had epidural steroid injections x2 with no relief---patient is not sure the treatment plan--- difficulty bending unable squat arise difficulty getting up on exam table due to obesity  and back pain---had MRI showing spondylosis L3-4, L4-5, L5-S1--did not discuss surgery did not discuss reside [place]-- - Moist heat/theragesic or Tiger balm / range of motion  exercise--- physical therapy--was seen neurosurgery --Ultram no NSAIDs secondary GI bleed will give Flexeril at night and gabapentin 300 tid #90 5 refills   Tramadol one po q.6 hours p.r.n. breakthrough pain  Other medical issues   osteoarthritis knees bilaterally--- crepitus bilaterally-- difficulty getting out of chair to ambulate-- may benefit from knees being injected may benefit from knee replacemen-t- morbid obesity has lost 100 lb  Abdominal pain epigastric area and left upper quadrant--history GI bleed--transfused 2 units of blood 03/12/2020--history of esophagitis/gastritis having a a test soon where she swallows a blue tablet which will going to parts of the intestine with the EGD:  Did not reach   GERD--avoid alcohol smoking NSAIDs caffeine stress carbonated drinks--can raise the head of the bed on a block of wood to prevent GERD--eat small meals--lay down for 1-2 hours after eating Cont protonix   Anemia hematocrit is increased from 28-30 2-36  Vertigo--sounds like orthostatic hypotension--see Dr Muller--had echocardiogram showing ejection fraction 65% otherwise norm---carotid ultrasound with no hemodynamically significant lesions right carotid 0 a 19% blockage left carotid 20-39% blockage--may need tilt-table test  Lab just done Reed lópez in 3 months CBCs CMP lipids T4 TSH-last iron level basically appears to be normal total serum iron and ferritin both normal  Health maintenance shingles flu

## 2022-02-21 ENCOUNTER — TELEPHONE (OUTPATIENT)
Dept: NEUROLOGY | Facility: HOSPITAL | Age: 67
End: 2022-02-21
Payer: MEDICARE

## 2022-02-21 NOTE — TELEPHONE ENCOUNTER
GI clinic appt scheduled with pt on Wednesday, March 9, 2022 at 11am as requested by pt.  Pt states she has not be able to attend previous appt scheduled due to transportation. Pt given clinic address and repeated all information given correctly.

## 2022-02-23 ENCOUNTER — OFFICE VISIT (OUTPATIENT)
Dept: PRIMARY CARE CLINIC | Facility: CLINIC | Age: 67
End: 2022-02-23
Payer: MEDICARE

## 2022-02-23 VITALS
WEIGHT: 232.38 LBS | SYSTOLIC BLOOD PRESSURE: 128 MMHG | RESPIRATION RATE: 18 BRPM | DIASTOLIC BLOOD PRESSURE: 74 MMHG | OXYGEN SATURATION: 98 % | HEIGHT: 62 IN | BODY MASS INDEX: 42.76 KG/M2 | HEART RATE: 100 BPM

## 2022-02-23 DIAGNOSIS — M51.36 DDD (DEGENERATIVE DISC DISEASE), LUMBAR: Primary | ICD-10-CM

## 2022-02-23 DIAGNOSIS — M47.816 LUMBAR SPONDYLOSIS: ICD-10-CM

## 2022-02-23 DIAGNOSIS — M54.16 LUMBAR RADICULOPATHY: ICD-10-CM

## 2022-02-23 DIAGNOSIS — D63.8 ANEMIA OF CHRONIC DISORDER: ICD-10-CM
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
## 2022-02-23 DIAGNOSIS — F41.1 GAD (GENERALIZED ANXIETY DISORDER): ICD-10-CM

## 2022-02-23 DIAGNOSIS — R53.81 DEBILITY: ICD-10-CM

## 2022-02-23 DIAGNOSIS — I10 ESSENTIAL HYPERTENSION: ICD-10-CM

## 2022-02-23 DIAGNOSIS — E66.01 SEVERE OBESITY WITH BODY MASS INDEX (BMI) OF 35.0 TO 39.9 WITH COMORBIDITY: ICD-10-CM

## 2022-02-23 DIAGNOSIS — R25.1 TREMOR: ICD-10-CM

## 2022-02-23 DIAGNOSIS — F10.11 ALCOHOL ABUSE, IN REMISSION: ICD-10-CM

## 2022-02-23 PROBLEM — R29.6 FALLS: Status: ACTIVE | Noted: 2022-02-23

## 2022-02-23 PROCEDURE — 99214 PR OFFICE/OUTPT VISIT, EST, LEVL IV, 30-39 MIN: ICD-10-PCS | Mod: S$GLB,,, | Performed by: FAMILY MEDICINE

## 2022-02-23 PROCEDURE — 3288F PR FALLS RISK ASSESSMENT DOCUMENTED: ICD-10-PCS | Mod: CPTII,S$GLB,, | Performed by: FAMILY MEDICINE

## 2022-02-23 PROCEDURE — 99214 OFFICE O/P EST MOD 30 MIN: CPT | Mod: S$GLB,,, | Performed by: FAMILY MEDICINE

## 2022-02-23 PROCEDURE — 1159F MED LIST DOCD IN RCRD: CPT | Mod: CPTII,S$GLB,, | Performed by: FAMILY MEDICINE

## 2022-02-23 PROCEDURE — 3288F FALL RISK ASSESSMENT DOCD: CPT | Mod: CPTII,S$GLB,, | Performed by: FAMILY MEDICINE

## 2022-02-23 PROCEDURE — 1159F PR MEDICATION LIST DOCUMENTED IN MEDICAL RECORD: ICD-10-PCS | Mod: CPTII,S$GLB,, | Performed by: FAMILY MEDICINE

## 2022-02-23 PROCEDURE — 3008F BODY MASS INDEX DOCD: CPT | Mod: CPTII,S$GLB,, | Performed by: FAMILY MEDICINE

## 2022-02-23 PROCEDURE — 99499 RISK ADDL DX/OHS AUDIT: ICD-10-PCS | Mod: S$GLB,,, | Performed by: FAMILY MEDICINE

## 2022-02-23 PROCEDURE — 1125F AMNT PAIN NOTED PAIN PRSNT: CPT | Mod: CPTII,S$GLB,, | Performed by: FAMILY MEDICINE

## 2022-02-23 PROCEDURE — 1125F PR PAIN SEVERITY QUANTIFIED, PAIN PRESENT: ICD-10-PCS | Mod: CPTII,S$GLB,, | Performed by: FAMILY MEDICINE

## 2022-02-23 PROCEDURE — 1101F PT FALLS ASSESS-DOCD LE1/YR: CPT | Mod: CPTII,S$GLB,, | Performed by: FAMILY MEDICINE

## 2022-02-23 PROCEDURE — 1101F PR PT FALLS ASSESS DOC 0-1 FALLS W/OUT INJ PAST YR: ICD-10-PCS | Mod: CPTII,S$GLB,, | Performed by: FAMILY MEDICINE

## 2022-02-23 PROCEDURE — 3078F DIAST BP <80 MM HG: CPT | Mod: CPTII,S$GLB,, | Performed by: FAMILY MEDICINE

## 2022-02-23 PROCEDURE — 3074F SYST BP LT 130 MM HG: CPT | Mod: CPTII,S$GLB,, | Performed by: FAMILY MEDICINE

## 2022-02-23 PROCEDURE — 3008F PR BODY MASS INDEX (BMI) DOCUMENTED: ICD-10-PCS | Mod: CPTII,S$GLB,, | Performed by: FAMILY MEDICINE

## 2022-02-23 PROCEDURE — 99999 PR PBB SHADOW E&M-EST. PATIENT-LVL IV: ICD-10-PCS | Mod: PBBFAC,,, | Performed by: FAMILY MEDICINE

## 2022-02-23 PROCEDURE — 99999 PR PBB SHADOW E&M-EST. PATIENT-LVL IV: CPT | Mod: PBBFAC,,, | Performed by: FAMILY MEDICINE

## 2022-02-23 PROCEDURE — 3074F PR MOST RECENT SYSTOLIC BLOOD PRESSURE < 130 MM HG: ICD-10-PCS | Mod: CPTII,S$GLB,, | Performed by: FAMILY MEDICINE

## 2022-02-23 PROCEDURE — 99499 UNLISTED E&M SERVICE: CPT | Mod: S$GLB,,, | Performed by: FAMILY MEDICINE

## 2022-02-23 PROCEDURE — 3078F PR MOST RECENT DIASTOLIC BLOOD PRESSURE < 80 MM HG: ICD-10-PCS | Mod: CPTII,S$GLB,, | Performed by: FAMILY MEDICINE

## 2022-02-23 RX ORDER — TRAMADOL HYDROCHLORIDE 50 MG/1
50 TABLET ORAL EVERY 6 HOURS PRN
Qty: 30 TABLET | Refills: 0 | Status: SHIPPED | OUTPATIENT
Start: 2022-02-23 | End: 2022-03-05

## 2022-02-23 RX ORDER — GABAPENTIN 600 MG/1
600 TABLET ORAL 3 TIMES DAILY
Qty: 90 TABLET | Refills: 11 | OUTPATIENT
Start: 2022-02-23 | End: 2022-11-15

## 2022-02-23 NOTE — PROGRESS NOTES
Subjective:       Patient ID: Lo Escalera is a 66 y.o. female.    Chief Complaint: Leg Pain (Bilateral )    HPI: 65 yo BF--saw Dr Fournier--pill in stomach x 8 hrs--told saw small ulcer--was suppose have EGD Monday--2 days ago--told to delay the EGD and told her able see pictures of the ulcer on the pill study.Needs reschedule EGD and had appt today also missed it to be here --not dring x 1 year. Stomach--able eat--+ BM --no abd pain at this time.      Main problem here for bilateral leg pain--right greater than left--pt states fell x 4 last few days--getting up to go the bathroom, trying to walk into the living room or other bathroom. Walking about 5 feet and falls--gets whobbley then non floor.  MRI done lumbar spine 07/20/2021 showed spondylosis L3-4 L4-5 L5-S1.  Bone density was normal.  No EMG done--has not  seen neurologist . Has walker.  Problems also with hand shaking--right worse than the left.  Has seen above Neuro endocrinologist--Dr Fitzgerald .      Legs painful very hard to get up from a chair--painful. On neuronitin no relief       [age] Office visit 02/03/2022 65 yo BF doing fair a lot of pain with the legs bilaterally right much greater than left--patient had a ultrasound of the right leg that was negative for DVT--Dr Fournier---has appt Mon. Also had test swallowing a pill x 8 hrs.  History peptic ulcer disease GERD anemia hepatic steatosis diverticulosis portal hypertension.  Eating well--+BM--occasionally dark--ambulating but pain--had epidural dural steroid injection         Supposed to get another EGD         No ETOH over a year          Lab January 2022 CBCs CMP PTT INR hepatitis AB all negative except for hematocrit 30 H showing anemia has resolved        Office  visit 011/04/2022 66 year-old black female -for 3 month checkup---eating too much---+BM--+ambulation--pain had epidural steroid injection in back --did not do anything. No discussion on reside a me or surgery. On gabapentin 300  takes it twice a day . Hx UGI bleed years ago Used use ultram. Right leg occas swells like baseball.  No history lupus rheumatoid gout--no fractures.  History 2 sisters lupus father with gout     MRI lumbar spine showing spondylosis L3-4, L4-5, L5           ROS:  No significant change except history of present illness  Skin: no psoriasis, eczema, skin cancer--no bruising  HEENT: No headache, ocular pain, blurred vision, diplopia,  hoarseness change in voice, no thyroid disease no epistaxis  Lung: No pneumonia, asthma, Tb, wheezing, + occasional SOB, no smoking  Heart: No chest pain, ankle edema, palpitations, MI, eva murmur, no hypertension patient used to have high blood pressure but was taken off of medication blood pressure today 132/7, hyperlipidemia--no stent bypass arrhythmia history of hypertension--occasionally feels like heart skips a beat blood pressure was low so taken off blood pressure medicine   Abdomen: No nausea, vomiting, diarrhea, constipation, ulcers, hepatitis, status post cholecystectomy, melena, hematochezia, hematemesis recent esophagitis gastritis with transfusion 2 units of blood history of peptic ulcer disease history of esophageal varices  : no UTI, renal disease, stones  GYN hysterectomy mammogram March 2021  MS: no fractures, O/A, lupus, rheumatoid, gout--has 2 sisters with lupus and her father has gout  Neuro: No dizziness, LOC, seizures +vertigo--help with meclizine November 2020 patient was admitted to [facility] for loss of consciousness used to drink  36 yrs ago-pancreatitis  No diabetes, + anemia had to be transfused blood had upper GI bleed, + anxiety, + depression upset had to quit working because of vertigo was working at [employer]-doing pretty good with--loss mom dad oldest sister back to back   3 children unemployed, lives with      Objective:   Physical Exam:   General: Well nourished, well developed, no acute distress+ Obesity up with a  cane  Skin: No lesions  HEENT: Eyes PERRLA, EOM intact, nose clear , throat nonerythematous +arcus senilis no pallor to the conjunctiva  NECK: Supple, no bruits, No JVD, no nodes  Lungs: Clear, no rales, rhonchi, wheezing  Heart: Regular rate and rhythm, no murmurs, gallops, or rubs  Abdomen: flat, bowel sounds positive, no tenderness, or organomegaly  MS:  Complaining of pain in both legs right greater than left with trying to stand from the sitting position--had 4 falls---walked about 5-10 feet then legs gave out--tried to do physical therapy but 2 painful--took a while to get up from a sitting position to standing walks with a stiff-legged shuffled gait unable to get on to examining table.   Neuro: Alert, CN intact, oriented X  Extremities: No cyanosis, clubbing, or edema negative Romberg        Assessment:       1. DDD (degenerative disc disease), lumbar    2. Fall, initial encounter    3. Lumbar radiculopathy    4. Lumbar spondylosis    5. JHOAN (generalized anxiety disorder)    6. Alcohol abuse, in remission    7. Essential hypertension    8. Anemia of chronic disorder    9. Severe obesity with body mass index (BMI) of 35.0 to 39.9 with comorbidity    10. Debility        Plan:       DDD (degenerative disc disease), lumbar  -     EMG W/ ULTRASOUND AND NERVE CONDUCTION TEST 4 Extremities; Future    Fall, initial encounter  -     EMG W/ ULTRASOUND AND NERVE CONDUCTION TEST 4 Extremities; Future    Lumbar radiculopathy  -     EMG W/ ULTRASOUND AND NERVE CONDUCTION TEST 4 Extremities; Future    Lumbar spondylosis  -     EMG W/ ULTRASOUND AND NERVE CONDUCTION TEST 4 Extremities; Future    JHOAN (generalized anxiety disorder)    Alcohol abuse, in remission    Essential hypertension    Anemia of chronic disorder    Severe obesity with body mass index (BMI) of 35.0 to 39.9 with comorbidity    Debility    Other orders  -     gabapentin (NEURONTIN) 600 MG tablet; Take 1 tablet (600 mg total) by mouth 3 (three) times daily.   Dispense: 90 tablet; Refill: 11        Main Reason for Visits  Falls x 4 and pain    Pain  Pedro buttock and both legs -sees pain management--Dr Tariq ---had epidural steroid injections x2 with no relief---patient is not sure the treatment plan---for falling spells--pain in arising from sitting or supine to standing position--can only walk about 5-10 ft before falling--has walker but here in office with a cane---went to physical therapy states 1 time had wrong exercises next time was 2 painful---needs EMG studies of arms and legs--neurology consult still see Dr Bradshaw for chronic pain --increase gabapentin 600 mg tid prn pain --will give ultram for breakthrough pain--no NSAIDs due to GI problems  Other medical issues   osteoarthritis knees bilaterally--- crepitus bilaterally-- difficulty getting out of chair to ambulate-- may benefit from knees being injected may benefit from knee replacemen-t- morbid obesity has lost 100 lb  Abdominal pain epigastric area and left upper quadrant--history GI bleed--transfused 2 units of blood 03/12/2020--history of esophagitis/gastritis having a a test soon where she swallows a blue tablet which will going to parts of the intestine --had EGD scheduled 2 days ago but was canceled because they wanted to review the pill study prior to EGD   GERD--avoid alcohol smoking NSAIDs caffeine stress carbonated drinks--can raise the head of the bed on a block of wood to prevent GERD--eat small meals--lay down for 1-2 hours after eating Cont protonix   Anemia hematocrit is increased from 28-30 2-36  Vertigo--sounds like orthostatic hypotension--see Dr Muller--had echocardiogram showing ejection fraction 65% otherwise norm---carotid ultrasound with no hemodynamically significant lesions right carotid 0 a 19% blockage left carotid 20-39% blockage--may need tilt-table test  Lab --done January 18, 2022 CBC shows anemia resolve CMP normal   Health maintenance shingles flu

## 2022-03-23 ENCOUNTER — TELEPHONE (OUTPATIENT)
Dept: HEMATOLOGY/ONCOLOGY | Facility: CLINIC | Age: 67
End: 2022-03-23
Payer: MEDICARE

## 2022-03-23 NOTE — TELEPHONE ENCOUNTER
Left message regarding the confirmation of 's appointments scheduled on 3/24/22 as well as the clinic callback number.

## 2022-03-24 ENCOUNTER — TELEPHONE (OUTPATIENT)
Dept: HEMATOLOGY/ONCOLOGY | Facility: CLINIC | Age: 67
End: 2022-03-24
Payer: MEDICARE

## 2022-03-24 NOTE — TELEPHONE ENCOUNTER
Spoke to [email or web address] regarding her appointment scheduled for 3/24/22 at 9am with  as well as labs.She stated that she thought that it was for 3/25. Pt requested for reschedule to 3/25 and verbalized agreement to new appt date and time on 3/25 for a clinic visit at 2:40pm with  and labs at 1:40pm.

## 2022-03-25 ENCOUNTER — OFFICE VISIT (OUTPATIENT)
Dept: HEMATOLOGY/ONCOLOGY | Facility: CLINIC | Age: 67
End: 2022-03-25
Payer: MEDICARE

## 2022-03-25 ENCOUNTER — HOSPITAL ENCOUNTER (OUTPATIENT)
Dept: CARDIOLOGY | Facility: CLINIC | Age: 67
Discharge: HOME | End: 2022-03-25
Payer: MEDICARE

## 2022-03-25 VITALS
DIASTOLIC BLOOD PRESSURE: 58 MMHG | TEMPERATURE: 99 F | OXYGEN SATURATION: 99 % | BODY MASS INDEX: 44.3 KG/M2 | HEIGHT: 60 IN | HEART RATE: 108 BPM | RESPIRATION RATE: 18 BRPM | WEIGHT: 225.63 LBS | SYSTOLIC BLOOD PRESSURE: 118 MMHG

## 2022-03-25 DIAGNOSIS — R00.0 TACHYCARDIA: ICD-10-CM

## 2022-03-25 DIAGNOSIS — D50.0 IRON DEFICIENCY ANEMIA DUE TO CHRONIC BLOOD LOSS: ICD-10-CM

## 2022-03-25 DIAGNOSIS — D69.6 THROMBOCYTOPENIA: Primary | ICD-10-CM

## 2022-03-25 PROCEDURE — 1160F RVW MEDS BY RX/DR IN RCRD: CPT | Mod: CPTII,S$GLB,, | Performed by: INTERNAL MEDICINE

## 2022-03-25 PROCEDURE — 93010 ELECTROCARDIOGRAM REPORT: CPT | Mod: S$GLB,,, | Performed by: INTERNAL MEDICINE

## 2022-03-25 PROCEDURE — 1101F PR PT FALLS ASSESS DOC 0-1 FALLS W/OUT INJ PAST YR: ICD-10-PCS | Mod: CPTII,S$GLB,, | Performed by: INTERNAL MEDICINE

## 2022-03-25 PROCEDURE — 93005 EKG 12-LEAD: ICD-10-PCS | Mod: S$GLB,,, | Performed by: INTERNAL MEDICINE

## 2022-03-25 PROCEDURE — 99214 PR OFFICE/OUTPT VISIT, EST, LEVL IV, 30-39 MIN: ICD-10-PCS | Mod: S$GLB,,, | Performed by: INTERNAL MEDICINE

## 2022-03-25 PROCEDURE — 3288F FALL RISK ASSESSMENT DOCD: CPT | Mod: CPTII,S$GLB,, | Performed by: INTERNAL MEDICINE

## 2022-03-25 PROCEDURE — 1160F PR REVIEW ALL MEDS BY PRESCRIBER/CLIN PHARMACIST DOCUMENTED: ICD-10-PCS | Mod: CPTII,S$GLB,, | Performed by: INTERNAL MEDICINE

## 2022-03-25 PROCEDURE — 99214 OFFICE O/P EST MOD 30 MIN: CPT | Mod: S$GLB,,, | Performed by: INTERNAL MEDICINE

## 2022-03-25 PROCEDURE — 3288F PR FALLS RISK ASSESSMENT DOCUMENTED: ICD-10-PCS | Mod: CPTII,S$GLB,, | Performed by: INTERNAL MEDICINE

## 2022-03-25 PROCEDURE — 3008F PR BODY MASS INDEX (BMI) DOCUMENTED: ICD-10-PCS | Mod: CPTII,S$GLB,, | Performed by: INTERNAL MEDICINE

## 2022-03-25 PROCEDURE — 1159F MED LIST DOCD IN RCRD: CPT | Mod: CPTII,S$GLB,, | Performed by: INTERNAL MEDICINE

## 2022-03-25 PROCEDURE — 1101F PT FALLS ASSESS-DOCD LE1/YR: CPT | Mod: CPTII,S$GLB,, | Performed by: INTERNAL MEDICINE

## 2022-03-25 PROCEDURE — 1159F PR MEDICATION LIST DOCUMENTED IN MEDICAL RECORD: ICD-10-PCS | Mod: CPTII,S$GLB,, | Performed by: INTERNAL MEDICINE

## 2022-03-25 PROCEDURE — 3078F PR MOST RECENT DIASTOLIC BLOOD PRESSURE < 80 MM HG: ICD-10-PCS | Mod: CPTII,S$GLB,, | Performed by: INTERNAL MEDICINE

## 2022-03-25 PROCEDURE — 93010 EKG 12-LEAD: ICD-10-PCS | Mod: S$GLB,,, | Performed by: INTERNAL MEDICINE

## 2022-03-25 PROCEDURE — 99999 PR PBB SHADOW E&M-EST. PATIENT-LVL III: CPT | Mod: PBBFAC,,, | Performed by: INTERNAL MEDICINE

## 2022-03-25 PROCEDURE — 1125F PR PAIN SEVERITY QUANTIFIED, PAIN PRESENT: ICD-10-PCS | Mod: CPTII,S$GLB,, | Performed by: INTERNAL MEDICINE

## 2022-03-25 PROCEDURE — 1125F AMNT PAIN NOTED PAIN PRSNT: CPT | Mod: CPTII,S$GLB,, | Performed by: INTERNAL MEDICINE

## 2022-03-25 PROCEDURE — 3074F SYST BP LT 130 MM HG: CPT | Mod: CPTII,S$GLB,, | Performed by: INTERNAL MEDICINE

## 2022-03-25 PROCEDURE — 3074F PR MOST RECENT SYSTOLIC BLOOD PRESSURE < 130 MM HG: ICD-10-PCS | Mod: CPTII,S$GLB,, | Performed by: INTERNAL MEDICINE

## 2022-03-25 PROCEDURE — 99999 PR PBB SHADOW E&M-EST. PATIENT-LVL III: ICD-10-PCS | Mod: PBBFAC,,, | Performed by: INTERNAL MEDICINE

## 2022-03-25 PROCEDURE — 93005 ELECTROCARDIOGRAM TRACING: CPT | Mod: S$GLB,,, | Performed by: INTERNAL MEDICINE

## 2022-03-25 PROCEDURE — 3008F BODY MASS INDEX DOCD: CPT | Mod: CPTII,S$GLB,, | Performed by: INTERNAL MEDICINE

## 2022-03-25 PROCEDURE — 3078F DIAST BP <80 MM HG: CPT | Mod: CPTII,S$GLB,, | Performed by: INTERNAL MEDICINE

## 2022-03-25 NOTE — PROGRESS NOTES
HEMATOLOGY PROGRESS NOTE    IDENTIFYING STATEMENT   Lo Escalera (Lo) is a 66 y.o. female with a  of 1955 from [place] with the diagnosis of anemia.      HEMATOLOGY HISTORY:    1. Anemia and thrombocytopenia    2. Cirrhosis of the liver with history of alcohol abuse  3. Anxiety  4. Benign paroxysmal positional vertigo  5. Hypertension  6. Carotid artery disease  7. Hypothyroidism    INTERVAL HISTORY:      Ms. Escalera returns to hematology clinic for follow-up of iron deficiency anemia and thrombocytopenia. She is feeling tired.     Past Medical History, Past Social History and Past Family History have been reviewed and are unchanged except as noted in the interval history.    MEDICATIONS:     Current Outpatient Medications on File Prior to Visit   Medication Sig Dispense Refill    calcium carbonate (TUMS) 200 mg calcium (500 mg) chewable tablet Take 1 tablet by mouth once daily.      cyclobenzaprine (FLEXERIL) 10 MG tablet Take 10 mg by mouth 3 (three) times daily as needed.      ferrous sulfate (FEOSOL) 325 mg (65 mg iron) Tab tablet TAKE 1 TABLET (325 MG TOTAL) BY MOUTH ONCE DAILY. 90 tablet 0    gabapentin (NEURONTIN) 600 MG tablet Take 1 tablet (600 mg total) by mouth 3 (three) times daily. 90 tablet 11    meclizine (ANTIVERT) 25 mg tablet Take 1 tablet (25 mg total) by mouth 3 (three) times daily as needed. 60 tablet 5    pantoprazole (PROTONIX) 40 MG tablet TAKE 1 TABLET TWICE DAILY 180 tablet 1    potassium 99 mg Tab Take by mouth once daily.       No current facility-administered medications on file prior to visit.       ALLERGIES:   Review of patient's allergies indicates:   Allergen Reactions    Codeine Itching    Iodine and iodide containing products Itching        ROS:       Review of Systems   Constitutional: Negative for diaphoresis, fatigue, fever and unexpected weight change.   HENT:   Negative for lump/mass and sore throat.    Eyes: Negative for icterus.   Respiratory:  Negative for cough and shortness of breath.    Cardiovascular: Positive for palpitations. Negative for chest pain.   Gastrointestinal: Negative for abdominal distention, constipation, diarrhea, nausea and vomiting.   Genitourinary: Negative for dysuria and frequency.    Musculoskeletal: Positive for back pain. Negative for arthralgias, gait problem and myalgias.   Skin: Negative for rash.   Neurological: Negative for dizziness, gait problem and headaches.   Hematological: Negative for adenopathy. Does not bruise/bleed easily.   Psychiatric/Behavioral: The patient is not nervous/anxious.        PHYSICAL EXAM:  Vitals:    03/25/22 1437   BP: (!) 118/58   Pulse: 108   Resp: 18   Temp: 98.6 °F (37 °C)   TempSrc: Oral   SpO2: 99%   Weight: 102.3 kg (225 lb 10.3 oz)   Height: 5' (1.524 m)   PainSc:   8   PainLoc: Generalized     Physical Exam  Constitutional:       General: She is not in acute distress.     Appearance: She is well-developed.   HENT:      Head: Normocephalic and atraumatic.      Mouth/Throat:      Mouth: No oral lesions.   Eyes:      Conjunctiva/sclera: Conjunctivae normal.   Neck:      Thyroid: No thyromegaly.   Cardiovascular:      Rate and Rhythm: Regular rhythm. Tachycardia present.      Heart sounds: Normal heart sounds. No murmur heard.  Pulmonary:      Breath sounds: Normal breath sounds. No wheezing or rales.   Abdominal:      General: There is no distension.      Palpations: Abdomen is soft. There is no hepatomegaly, splenomegaly or mass.      Tenderness: There is no abdominal tenderness.   Lymphadenopathy:      Cervical: No cervical adenopathy.      Right cervical: No deep cervical adenopathy.     Left cervical: No deep cervical adenopathy.   Skin:     Findings: No rash.   Neurological:      Mental Status: She is alert and oriented to person, place, and time.      Cranial Nerves: No cranial nerve deficit.      Coordination: Coordination normal.      Deep Tendon Reflexes: Reflexes are normal and  symmetric.         LAB:   Results for orders placed or performed in visit on 03/25/22   CBC auto differential   Result Value Ref Range    WBC 4.35 3.90 - 12.70 K/uL    RBC 4.71 4.00 - 5.40 M/uL    Hemoglobin 13.4 12.0 - 16.0 g/dL    Hematocrit 41.6 37.0 - 48.5 %    MCV 88 82 - 98 fL    MCH 28.5 27.0 - 31.0 pg    MCHC 32.2 32.0 - 36.0 g/dL    RDW 14.7 (H) 11.5 - 14.5 %    Platelets 78 (L) 150 - 450 K/uL    MPV 11.7 9.2 - 12.9 fL    Immature Granulocytes 0.7 (H) 0.0 - 0.5 %    Gran # (ANC) 2.5 1.8 - 7.7 K/uL    Immature Grans (Abs) 0.03 0.00 - 0.04 K/uL    Lymph # 1.5 1.0 - 4.8 K/uL    Mono # 0.3 0.3 - 1.0 K/uL    Eos # 0.0 0.0 - 0.5 K/uL    Baso # 0.02 0.00 - 0.20 K/uL    nRBC 0 0 /100 WBC    Gran % 58.1 38.0 - 73.0 %    Lymph % 34.0 18.0 - 48.0 %    Mono % 6.0 4.0 - 15.0 %    Eosinophil % 0.7 0.0 - 8.0 %    Basophil % 0.5 0.0 - 1.9 %    Platelet Estimate Decreased (A)     Aniso Slight     Poik Slight     Poly Occasional     Ovalocytes Occasional     Differential Method Automated    Comprehensive Metabolic Panel   Result Value Ref Range    Sodium 138 136 - 145 mmol/L    Potassium 4.0 3.5 - 5.1 mmol/L    Chloride 106 95 - 110 mmol/L    CO2 25 23 - 29 mmol/L    Glucose 98 70 - 110 mg/dL    BUN 9 8 - 23 mg/dL    Creatinine 0.7 0.5 - 1.4 mg/dL    Calcium 9.3 8.7 - 10.5 mg/dL    Total Protein 6.8 6.0 - 8.4 g/dL    Albumin 2.9 (L) 3.5 - 5.2 g/dL    Total Bilirubin 1.0 0.1 - 1.0 mg/dL    Alkaline Phosphatase 158 (H) 55 - 135 U/L    AST 28 10 - 40 U/L    ALT 10 10 - 44 U/L    Anion Gap 7 (L) 8 - 16 mmol/L    eGFR if African American >60.0 >60 mL/min/1.73 m^2    eGFR if non African American >60.0 >60 mL/min/1.73 m^2   Iron and TIBC   Result Value Ref Range    Iron 117 30 - 160 ug/dL    Transferrin 169 (L) 200 - 375 mg/dL    TIBC 250 250 - 450 ug/dL    Saturated Iron 47 20 - 50 %   Ferritin   Result Value Ref Range    Ferritin 66 20.0 - 300.0 ng/mL   Reticulocytes   Result Value Ref Range    Retic 1.4 0.5 - 2.5 %        PROBLEMS ASSESSED THIS VISIT:    1. Thrombocytopenia    2. Tachycardia    3. Iron deficiency anemia due to chronic blood loss        PLAN:       Iron deficiency anemia  Iron deficiency anemia remains resolved. She should have twice annual monitoring of CBC and iron studies (Iron/TIBC and ferritin) and referral back to hematology for IV iron infusion if she develops recurrent iron deficiency.     Thrombocytopenia  Moderate . Likely related to underlying cirrhosis.     Tachycardia  Referred for ECG. During ECG, she had normal sinus rhythm with heart rate of 91.     Follow-up  Return to primary care  Follow-up in hematology as needed     Jorgito Rios MD  Hematology and Stem Cell Transplant

## 2022-03-27 PROBLEM — R55 SYNCOPE: Status: ACTIVE | Noted: 2022-03-27

## 2022-03-27 PROBLEM — R42 LIGHTHEADEDNESS: Status: ACTIVE | Noted: 2022-03-27

## 2022-03-28 ENCOUNTER — TELEPHONE (OUTPATIENT)
Dept: NEUROLOGY | Facility: HOSPITAL | Age: 67
End: 2022-03-28
Payer: MEDICARE

## 2022-03-28 ENCOUNTER — PATIENT OUTREACH (OUTPATIENT)
Dept: ADMINISTRATIVE | Facility: OTHER | Age: 67
End: 2022-03-28
Payer: MEDICARE

## 2022-03-28 DIAGNOSIS — Z12.31 SCREENING MAMMOGRAM FOR BREAST CANCER: Primary | ICD-10-CM

## 2022-03-28 DIAGNOSIS — R00.0 TACHYCARDIA: Primary | ICD-10-CM

## 2022-03-28 NOTE — PROGRESS NOTES
LINKS immunization registry updated  Care Everywhere updated  Health Maintenance updated  DIS/Chart reviewed for overdue Proactive [facility] (ABHI) health maintenance testing (CRS, Breast Ca, Diabetic Eye Exam)   Orders entered: screening mammogram

## 2022-03-28 NOTE — TELEPHONE ENCOUNTER
----- Message from Rehana Martinez sent at 3/28/2022 10:57 AM CDT -----  Contact: [phone]  Type:  Needs Medical Advice    Who Called: pt called  Would the patient rather a call back or a response via [facility]? Call back  Best Call Back Number: [phone]  Additional Information: pt needs to reschedule appt. Please call pt to advice

## 2022-03-28 NOTE — TELEPHONE ENCOUNTER
Pt rescheduled today's gi clinic appt to Monday, April 4, 2022 at 145pm.  Pt fell on yesterday and was in Emergency Room.

## 2022-03-28 NOTE — TELEPHONE ENCOUNTER
----- Message from Chrystal Sears sent at 3/28/2022  2:31 PM CDT -----  Type:  Needs Medical Advice    Who Called:  pt  Symptoms (please be specific):  would like to get a call back      Would the patient rather a call back or a response via [facility]?  Call   Best Call Back Number:  [phone]  Additional Information:

## 2022-04-11 ENCOUNTER — OFFICE VISIT (OUTPATIENT)
Dept: PRIMARY CARE CLINIC | Facility: CLINIC | Age: 67
End: 2022-04-11
Payer: MEDICARE

## 2022-04-11 VITALS
HEIGHT: 61 IN | HEART RATE: 119 BPM | OXYGEN SATURATION: 99 % | RESPIRATION RATE: 18 BRPM | BODY MASS INDEX: 41.77 KG/M2 | SYSTOLIC BLOOD PRESSURE: 98 MMHG | DIASTOLIC BLOOD PRESSURE: 68 MMHG | WEIGHT: 221.25 LBS

## 2022-04-11 DIAGNOSIS — R42 VERTIGO: Primary | ICD-10-CM

## 2022-04-11 DIAGNOSIS — E03.8 OTHER SPECIFIED HYPOTHYROIDISM: ICD-10-CM

## 2022-04-11 DIAGNOSIS — I65.23 CAROTID ATHEROSCLEROSIS, BILATERAL: ICD-10-CM

## 2022-04-11 DIAGNOSIS — D69.6 THROMBOCYTOPENIA: ICD-10-CM

## 2022-04-11 DIAGNOSIS — M17.0 OSTEOARTHRITIS OF BOTH KNEES, UNSPECIFIED OSTEOARTHRITIS TYPE: ICD-10-CM

## 2022-04-11 DIAGNOSIS — K76.0 HEPATIC STEATOSIS: ICD-10-CM

## 2022-04-11 DIAGNOSIS — M51.36 DDD (DEGENERATIVE DISC DISEASE), LUMBAR: ICD-10-CM
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
## 2022-04-11 DIAGNOSIS — E66.01 SEVERE OBESITY WITH BODY MASS INDEX (BMI) OF 35.0 TO 39.9 WITH COMORBIDITY: ICD-10-CM

## 2022-04-11 DIAGNOSIS — F41.1 GAD (GENERALIZED ANXIETY DISORDER): ICD-10-CM

## 2022-04-11 DIAGNOSIS — K92.2 UGIB (UPPER GASTROINTESTINAL BLEED): ICD-10-CM

## 2022-04-11 DIAGNOSIS — K29.70 ESOPHAGITIS WITH GASTRITIS: ICD-10-CM

## 2022-04-11 DIAGNOSIS — K20.90 ESOPHAGITIS WITH GASTRITIS: ICD-10-CM

## 2022-04-11 PROCEDURE — 3288F FALL RISK ASSESSMENT DOCD: CPT | Mod: CPTII,S$GLB,, | Performed by: FAMILY MEDICINE

## 2022-04-11 PROCEDURE — 1100F PR PT FALLS ASSESS DOC 2+ FALLS/FALL W/INJURY/YR: ICD-10-PCS | Mod: CPTII,S$GLB,, | Performed by: FAMILY MEDICINE

## 2022-04-11 PROCEDURE — 99999 PR PBB SHADOW E&M-EST. PATIENT-LVL V: ICD-10-PCS | Mod: PBBFAC,,, | Performed by: FAMILY MEDICINE

## 2022-04-11 PROCEDURE — 3008F PR BODY MASS INDEX (BMI) DOCUMENTED: ICD-10-PCS | Mod: CPTII,S$GLB,, | Performed by: FAMILY MEDICINE

## 2022-04-11 PROCEDURE — 99999 PR PBB SHADOW E&M-EST. PATIENT-LVL V: CPT | Mod: PBBFAC,,, | Performed by: FAMILY MEDICINE

## 2022-04-11 PROCEDURE — 3078F DIAST BP <80 MM HG: CPT | Mod: CPTII,S$GLB,, | Performed by: FAMILY MEDICINE

## 2022-04-11 PROCEDURE — 3078F PR MOST RECENT DIASTOLIC BLOOD PRESSURE < 80 MM HG: ICD-10-PCS | Mod: CPTII,S$GLB,, | Performed by: FAMILY MEDICINE

## 2022-04-11 PROCEDURE — 3008F BODY MASS INDEX DOCD: CPT | Mod: CPTII,S$GLB,, | Performed by: FAMILY MEDICINE

## 2022-04-11 PROCEDURE — 3288F PR FALLS RISK ASSESSMENT DOCUMENTED: ICD-10-PCS | Mod: CPTII,S$GLB,, | Performed by: FAMILY MEDICINE

## 2022-04-11 PROCEDURE — 1125F PR PAIN SEVERITY QUANTIFIED, PAIN PRESENT: ICD-10-PCS | Mod: CPTII,S$GLB,, | Performed by: FAMILY MEDICINE

## 2022-04-11 PROCEDURE — 1100F PTFALLS ASSESS-DOCD GE2>/YR: CPT | Mod: CPTII,S$GLB,, | Performed by: FAMILY MEDICINE

## 2022-04-11 PROCEDURE — 1159F MED LIST DOCD IN RCRD: CPT | Mod: CPTII,S$GLB,, | Performed by: FAMILY MEDICINE

## 2022-04-11 PROCEDURE — 1159F PR MEDICATION LIST DOCUMENTED IN MEDICAL RECORD: ICD-10-PCS | Mod: CPTII,S$GLB,, | Performed by: FAMILY MEDICINE

## 2022-04-11 PROCEDURE — 99214 PR OFFICE/OUTPT VISIT, EST, LEVL IV, 30-39 MIN: ICD-10-PCS | Mod: S$GLB,,, | Performed by: FAMILY MEDICINE

## 2022-04-11 PROCEDURE — 3074F SYST BP LT 130 MM HG: CPT | Mod: CPTII,S$GLB,, | Performed by: FAMILY MEDICINE

## 2022-04-11 PROCEDURE — 99499 RISK ADDL DX/OHS AUDIT: ICD-10-PCS | Mod: S$GLB,,, | Performed by: FAMILY MEDICINE

## 2022-04-11 PROCEDURE — 99499 UNLISTED E&M SERVICE: CPT | Mod: S$GLB,,, | Performed by: FAMILY MEDICINE

## 2022-04-11 PROCEDURE — 99214 OFFICE O/P EST MOD 30 MIN: CPT | Mod: S$GLB,,, | Performed by: FAMILY MEDICINE

## 2022-04-11 PROCEDURE — 3074F PR MOST RECENT SYSTOLIC BLOOD PRESSURE < 130 MM HG: ICD-10-PCS | Mod: CPTII,S$GLB,, | Performed by: FAMILY MEDICINE

## 2022-04-11 PROCEDURE — 1125F AMNT PAIN NOTED PAIN PRSNT: CPT | Mod: CPTII,S$GLB,, | Performed by: FAMILY MEDICINE

## 2022-04-11 RX ORDER — TRAMADOL HYDROCHLORIDE 50 MG/1
50 TABLET ORAL EVERY 6 HOURS PRN
Qty: 30 TABLET | Refills: 0 | Status: SHIPPED | OUTPATIENT
Start: 2022-04-11 | End: 2022-04-21

## 2022-04-11 RX ORDER — CYCLOBENZAPRINE HCL 10 MG
10 TABLET ORAL 3 TIMES DAILY PRN
Qty: 60 TABLET | Refills: 5 | Status: SHIPPED | OUTPATIENT
Start: 2022-04-11 | End: 2022-08-31 | Stop reason: SDUPTHER

## 2022-04-11 RX ORDER — MECLIZINE HYDROCHLORIDE 25 MG/1
25 TABLET ORAL 3 TIMES DAILY PRN
Qty: 60 TABLET | Refills: 5 | Status: SHIPPED | OUTPATIENT
Start: 2022-04-11 | End: 2022-08-31 | Stop reason: SDUPTHER

## 2022-04-11 RX ORDER — PANTOPRAZOLE SODIUM 40 MG/1
40 TABLET, DELAYED RELEASE ORAL 2 TIMES DAILY
Qty: 180 TABLET | Refills: 1 | Status: SHIPPED | OUTPATIENT
Start: 2022-04-11 | End: 2022-08-31 | Stop reason: SDUPTHER

## 2022-04-11 NOTE — PROGRESS NOTES
Subjective:       Patient ID: Lo Escalera is a 66 y.o. female.    Chief Complaint: Fall, Leg Pain, and Back Pain    HPI: 65 yo BF--falls from vertigo/pain in legs and back.       Pt went to  --went to get into the truck--head started swimming--patient fell down--couple people out there --ended up in sitting position. Ambulance brought patient to the hospital--patient was diagnosed with syncope lightheaded--did not lose consciousness        Lab CBCs platelet count low [id number] out/glucose increase 124 but patient was nonfasting in emergency room visit rest of lab was normal troponin chest x-ray EKG x-ray lumbar spine thoracic spine foot CT scan of the chest.      1/2 weeks ago patient went to the [employer]--pt feels misstep--sidewalk not even --was unable get up       Hx pain in shoulder --got after vaccine --arm still hurts alot--pain problems raising arm overhead--hard to lift things can lift things of her life     Legs and back --going go to chiropractor --had epidural steroid injections saw Dr Rios-neurosurgeon-- told probably needs surgery.  Both knees hurt--occas has lean wall --not sure if needed to have knees done 1st her back done first.                  ROS:    Skin: no psoriasis, eczema, skin cancer--no bruising  HEENT: No headache, ocular pain, blurred vision, diplopia,  hoarseness change in voice, no thyroid disease no epistaxis  Lung: No pneumonia, asthma, Tb, wheezing, + occasional SOB, no smoking  Heart: No chest pain, ankle edema, palpitations, MI, eva murmur, no hypertension patient used to have high blood pressure but was taken off of medication blood pressure today 132/7, hyperlipidemia--no stent bypass arrhythmia history of hypertension--occasionally feels like heart skips a beat blood pressure was low so taken off blood pressure medicine   Abdomen: No nausea, vomiting, diarrhea, constipation, ulcers, hepatitis, status post cholecystectomy, melena, hematochezia,  hematemesis recent esophagitis gastritis with transfusion 2 units of blood history of peptic ulcer disease history of esophageal varices  : no UTI, renal disease, stones  GYN hysterectomy mammogram March 2021  MS: no fractures, O/A, lupus, rheumatoid, gout--has 2 sisters with lupus and her father has gout  Neuro: No dizziness, LOC, seizures +vertigo---several recent episodes with falling will get MRI brain and restart antivert--dizzy exercises -help with meclizine November 2020 patient was admitted to [facility] for loss of consciousness used to drink  36 yrs ago-pancreatitis  No diabetes, + anemia had to be transfused blood had upper GI bleed, + anxiety, + depression upset had to quit working because of vertigo was working at [employer]-doing pretty good with--loss mom dad oldest sister back to back   3 children unemployed, lives with      Objective:   Physical Exam:   General: Well nourished, well developed, no acute distress+ Obesity up with a cane  Skin: No lesions  HEENT: Eyes PERRLA, EOM intact, nose clear , throat nonerythematous +arcus senilis no pallor to the conjunctiva  NECK: Supple, no bruits, No JVD, no nodes  Lungs: Clear, no rales, rhonchi, wheezing  Heart: Regular rate and rhythm, no murmurs, gallops, or rubs  Abdomen: flat, bowel sounds positive, no tenderness, or organomegaly  MS:  Complaining of pain in both legs right greater than left with trying to stand from the sitting position--had 4 falls---walked about 5-10 feet then legs gave out--tried to do physical therapy    Neuro: Alert, CN intact, oriented X3 --unable do heel toe due weight and knees   Extremities: No cyanosis, clubbing, or edema negative Romberg        Assessment:       1. Vertigo    2. UGIB (upper gastrointestinal bleed)    3. Esophagitis with gastritis    4. DDD (degenerative disc disease), lumbar    5. JHOAN (generalized anxiety disorder)    6. Carotid atherosclerosis, bilateral    7. Thrombocytopenia     8. Other specified hypothyroidism    9. Severe obesity with body mass index (BMI) of 35.0 to 39.9 with comorbidity    10. Hepatic steatosis    11. Fall, subsequent encounter    12. Osteoarthritis of both knees, unspecified osteoarthritis type        Plan:       Vertigo  -     Ambulatory referral/consult to ENT; Future; Expected date: 04/18/2022    UGIB (upper gastrointestinal bleed)  -     pantoprazole (PROTONIX) 40 MG tablet; Take 1 tablet (40 mg total) by mouth 2 (two) times daily.  Dispense: 180 tablet; Refill: 1    Esophagitis with gastritis  -     pantoprazole (PROTONIX) 40 MG tablet; Take 1 tablet (40 mg total) by mouth 2 (two) times daily.  Dispense: 180 tablet; Refill: 1    DDD (degenerative disc disease), lumbar    JHOAN (generalized anxiety disorder)    Carotid atherosclerosis, bilateral    Thrombocytopenia  -     Ambulatory referral/consult to Hematology / Oncology; Future; Expected date: 04/18/2022    Other specified hypothyroidism    Severe obesity with body mass index (BMI) of 35.0 to 39.9 with comorbidity    Hepatic steatosis    Fall, subsequent encounter    Osteoarthritis of both knees, unspecified osteoarthritis type  -     Ambulatory referral/consult to Orthopedics; Future; Expected date: 04/18/2022    Other orders  -     cyclobenzaprine (FLEXERIL) 10 MG tablet; Take 1 tablet (10 mg total) by mouth 3 (three) times daily as needed.  Dispense: 60 tablet; Refill: 5  -     meclizine (ANTIVERT) 25 mg tablet; Take 1 tablet (25 mg total) by mouth 3 (three) times daily as needed.  Dispense: 60 tablet; Refill: 5  -     traMADoL (ULTRAM) 50 mg tablet; Take 1 tablet (50 mg total) by mouth every 6 (six) hours as needed.  Dispense: 30 tablet; Refill: 0        Main Reason for Visits  Falls x 2 with vertigo --has had similar episodes in the past help with Antivert will give Antivert 25 mg t.i.d.--dizzy exercise--see ENT--MRI the brain with and without contrast--see episodes of falls above   Pain  Pedro buttock and  both legs -sees pain management--Dr Tariq ---had epidural steroid injections x2 with no relief---patient is not sure the treatment plan---for falling spells--pain in arising from sitting or supine to standing position--can only walk about 5-10 ft before falling--has walker but here in office with a cane---went to physical therapy states 1 time had wrong exercises next time was 2 painful---needs EMG studies of arms and legs--neurology consult still see Dr Bradshaw for chronic pain --told needed surgery --pt states with significant knee pain not sure of back should be 1st of the knee should be done first Pt plans see chiropractor   Appt DR Michaud evaluate knee--has crepitus bilaterally with flexion extension--?? Needs injections or surgery   Morbid obesity needs to lose a lot of weight may benefit from gastric bypass  Other medical issues   GERD--avoid alcohol smoking NSAIDs caffeine stress carbonated drinks--can raise the head of the bed on a block of wood to prevent GERD--eat small meals--lay down for 1-2 hours after eating Cont protonix --no NSAIDs due to history of having be transfused due to GI issues  Vertigo--sounds like orthostatic hypotension--see Dr Muller--had echocardiogram showing ejection fraction 65% otherwise norm---carotid ultrasound with no hemodynamically significant lesions right carotid 0 a 19% blockage left carotid 20-39% blockage--may need tilt-table test  Lab --done January 18, 2022 CBCs platelets 78needs see Hem onc    Health maintenance shingles flu pneumococcal mammo

## 2022-04-11 NOTE — PATIENT INSTRUCTIONS
Vertigo  MRI brain  See ENT  Antivert 25mg 1 tablet every 8 hours as needed for dizziness    Knee pain  See orthopedic--Dr Michaud    Back pain  See pain management  Patient to see chiropractor     Gabapentin, Ultram, Flexeril    See Hem/Onc

## 2022-04-29 ENCOUNTER — IMMUNIZATION (OUTPATIENT)
Dept: PRIMARY CARE CLINIC | Facility: CLINIC | Age: 67
End: 2022-04-29
Payer: MEDICARE

## 2022-04-29 DIAGNOSIS — Z23 NEED FOR VACCINATION: Primary | ICD-10-CM

## 2022-04-29 PROCEDURE — 91305 COVID-19, MRNA, LNP-S, PF, 30 MCG/0.3 ML DOSE VACCINE (PFIZER): CPT | Mod: PBBFAC | Performed by: EMERGENCY MEDICINE

## 2022-05-04 ENCOUNTER — LAB VISIT (OUTPATIENT)
Dept: LAB | Facility: OTHER | Age: 67
End: 2022-05-04
Attending: INTERNAL MEDICINE
Payer: MEDICARE

## 2022-05-04 ENCOUNTER — OFFICE VISIT (OUTPATIENT)
Dept: HEMATOLOGY/ONCOLOGY | Facility: CLINIC | Age: 67
End: 2022-05-04
Payer: MEDICARE

## 2022-05-04 VITALS
SYSTOLIC BLOOD PRESSURE: 114 MMHG | RESPIRATION RATE: 18 BRPM | HEART RATE: 104 BPM | HEIGHT: 62 IN | OXYGEN SATURATION: 100 % | TEMPERATURE: 99 F | DIASTOLIC BLOOD PRESSURE: 65 MMHG | BODY MASS INDEX: 42.28 KG/M2 | WEIGHT: 229.75 LBS

## 2022-05-04 DIAGNOSIS — D50.8 IRON DEFICIENCY ANEMIA SECONDARY TO INADEQUATE DIETARY IRON INTAKE: Primary | ICD-10-CM

## 2022-05-04 DIAGNOSIS — D50.8 IRON DEFICIENCY ANEMIA SECONDARY TO INADEQUATE DIETARY IRON INTAKE: ICD-10-CM

## 2022-05-04 DIAGNOSIS — D69.6 THROMBOCYTOPENIA: ICD-10-CM

## 2022-05-04 LAB
BASOPHILS # BLD AUTO: 0.03 K/UL (ref 0–0.2)
BASOPHILS NFR BLD: 0.5 % (ref 0–1.9)
DIFFERENTIAL METHOD: ABNORMAL
EOSINOPHIL # BLD AUTO: 0 K/UL (ref 0–0.5)
EOSINOPHIL NFR BLD: 0.5 % (ref 0–8)
ERYTHROCYTE [DISTWIDTH] IN BLOOD BY AUTOMATED COUNT: 16.4 % (ref 11.5–14.5)
FERRITIN SERPL-MCNC: 51 NG/ML (ref 20–300)
HCT VFR BLD AUTO: 38.6 % (ref 37–48.5)
HGB BLD-MCNC: 12.5 G/DL (ref 12–16)
IMM GRANULOCYTES # BLD AUTO: 0.02 K/UL (ref 0–0.04)
IMM GRANULOCYTES NFR BLD AUTO: 0.4 % (ref 0–0.5)
IRON SERPL-MCNC: 58 UG/DL (ref 30–160)
LYMPHOCYTES # BLD AUTO: 2.4 K/UL (ref 1–4.8)
LYMPHOCYTES NFR BLD: 42.6 % (ref 18–48)
MCH RBC QN AUTO: 29.3 PG (ref 27–31)
MCHC RBC AUTO-ENTMCNC: 32.4 G/DL (ref 32–36)
MCV RBC AUTO: 90 FL (ref 82–98)
MONOCYTES # BLD AUTO: 0.4 K/UL (ref 0.3–1)
MONOCYTES NFR BLD: 7.4 % (ref 4–15)
NEUTROPHILS # BLD AUTO: 2.7 K/UL (ref 1.8–7.7)
NEUTROPHILS NFR BLD: 48.6 % (ref 38–73)
NRBC BLD-RTO: 0 /100 WBC
PLATELET # BLD AUTO: 112 K/UL (ref 150–450)
PMV BLD AUTO: 10.3 FL (ref 9.2–12.9)
RBC # BLD AUTO: 4.27 M/UL (ref 4–5.4)
SATURATED IRON: 20 % (ref 20–50)
TOTAL IRON BINDING CAPACITY: 283 UG/DL (ref 250–450)
TRANSFERRIN SERPL-MCNC: 191 MG/DL (ref 200–375)
WBC # BLD AUTO: 5.51 K/UL (ref 3.9–12.7)

## 2022-05-04 PROCEDURE — 3074F SYST BP LT 130 MM HG: CPT | Mod: CPTII,S$GLB,, | Performed by: INTERNAL MEDICINE

## 2022-05-04 PROCEDURE — 3288F FALL RISK ASSESSMENT DOCD: CPT | Mod: CPTII,S$GLB,, | Performed by: INTERNAL MEDICINE

## 2022-05-04 PROCEDURE — 99214 PR OFFICE/OUTPT VISIT, EST, LEVL IV, 30-39 MIN: ICD-10-PCS | Mod: S$GLB,,, | Performed by: INTERNAL MEDICINE

## 2022-05-04 PROCEDURE — 3008F BODY MASS INDEX DOCD: CPT | Mod: CPTII,S$GLB,, | Performed by: INTERNAL MEDICINE

## 2022-05-04 PROCEDURE — 3078F DIAST BP <80 MM HG: CPT | Mod: CPTII,S$GLB,, | Performed by: INTERNAL MEDICINE

## 2022-05-04 PROCEDURE — 1159F MED LIST DOCD IN RCRD: CPT | Mod: CPTII,S$GLB,, | Performed by: INTERNAL MEDICINE

## 2022-05-04 PROCEDURE — 36415 COLL VENOUS BLD VENIPUNCTURE: CPT | Performed by: INTERNAL MEDICINE

## 2022-05-04 PROCEDURE — 1125F AMNT PAIN NOTED PAIN PRSNT: CPT | Mod: CPTII,S$GLB,, | Performed by: INTERNAL MEDICINE

## 2022-05-04 PROCEDURE — 99214 OFFICE O/P EST MOD 30 MIN: CPT | Mod: S$GLB,,, | Performed by: INTERNAL MEDICINE

## 2022-05-04 PROCEDURE — 3008F PR BODY MASS INDEX (BMI) DOCUMENTED: ICD-10-PCS | Mod: CPTII,S$GLB,, | Performed by: INTERNAL MEDICINE

## 2022-05-04 PROCEDURE — 3078F PR MOST RECENT DIASTOLIC BLOOD PRESSURE < 80 MM HG: ICD-10-PCS | Mod: CPTII,S$GLB,, | Performed by: INTERNAL MEDICINE

## 2022-05-04 PROCEDURE — 99999 PR PBB SHADOW E&M-EST. PATIENT-LVL III: ICD-10-PCS | Mod: PBBFAC,,, | Performed by: INTERNAL MEDICINE

## 2022-05-04 PROCEDURE — 1100F PTFALLS ASSESS-DOCD GE2>/YR: CPT | Mod: CPTII,S$GLB,, | Performed by: INTERNAL MEDICINE

## 2022-05-04 PROCEDURE — 3074F PR MOST RECENT SYSTOLIC BLOOD PRESSURE < 130 MM HG: ICD-10-PCS | Mod: CPTII,S$GLB,, | Performed by: INTERNAL MEDICINE

## 2022-05-04 PROCEDURE — 1159F PR MEDICATION LIST DOCUMENTED IN MEDICAL RECORD: ICD-10-PCS | Mod: CPTII,S$GLB,, | Performed by: INTERNAL MEDICINE

## 2022-05-04 PROCEDURE — 85025 COMPLETE CBC W/AUTO DIFF WBC: CPT | Performed by: INTERNAL MEDICINE

## 2022-05-04 PROCEDURE — 84466 ASSAY OF TRANSFERRIN: CPT | Performed by: INTERNAL MEDICINE

## 2022-05-04 PROCEDURE — 82728 ASSAY OF FERRITIN: CPT | Performed by: INTERNAL MEDICINE

## 2022-05-04 PROCEDURE — 1125F PR PAIN SEVERITY QUANTIFIED, PAIN PRESENT: ICD-10-PCS | Mod: CPTII,S$GLB,, | Performed by: INTERNAL MEDICINE

## 2022-05-04 PROCEDURE — 99999 PR PBB SHADOW E&M-EST. PATIENT-LVL III: CPT | Mod: PBBFAC,,, | Performed by: INTERNAL MEDICINE

## 2022-05-04 PROCEDURE — 1100F PR PT FALLS ASSESS DOC 2+ FALLS/FALL W/INJURY/YR: ICD-10-PCS | Mod: CPTII,S$GLB,, | Performed by: INTERNAL MEDICINE

## 2022-05-04 PROCEDURE — 3288F PR FALLS RISK ASSESSMENT DOCUMENTED: ICD-10-PCS | Mod: CPTII,S$GLB,, | Performed by: INTERNAL MEDICINE

## 2022-05-04 RX ORDER — TRAMADOL HYDROCHLORIDE 100 MG/1
100 TABLET, EXTENDED RELEASE ORAL DAILY PRN
COMMUNITY
End: 2022-08-03 | Stop reason: SDUPTHER

## 2022-05-04 NOTE — PROGRESS NOTES
[facility]         Chief Complaint:   Encounter Diagnoses   Name Primary?    Thrombocytopenia     Iron deficiency anemia secondary to inadequate dietary iron intake Yes       Cancer Staging  No matching staging information was found for the patient.    HPI:  Lo Escalera is a 66 y.o. female who presents today for evaluation of thromboctyopenia.     HEMATOLOGY HISTORY:   1. Anemia and thrombocytopenia   2. Cirrhosis of the liver with history of alcohol abuse    Patient has been seen by Dr Jorgito Rios, most recently seen 3/25/22  She has been worked up extensively for GILDARDO and thrombocytopenia. Last received IV iron in July 2021 with improvement in hgb. She has been holding stable with hgb ~11g/dL  She was re-referred by Dr Hernández -- she has chronic fatigue which is unchanged. No increase in SOB/MAURER. No new symptoms       Social History     Tobacco Use    Smoking status: Never Smoker    Smokeless tobacco: Never Used   Substance Use Topics    Alcohol use: No     Comment: 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  However, no consumption in 20 years.     Drug use: No     Family History   Problem Relation Age of Onset    Lupus Sister     Arthritis Brother     No Known Problems Daughter     Mental illness Son         PTSD x 1 son in     Lupus Sister      Past Medical History:   Diagnosis Date    Anemia due to chronic blood loss 3/6/2020    Anemia of chronic disorder 8/24/2020    Anxiety     Diverticulosis     Gastric ulcer     old    GERD (gastroesophageal reflux disease)     Hepatic steatosis 9/12/2016    Hyperbilirubinemia     Hypertension     Hypothyroidism     Iron deficiency anemia due to chronic blood loss 8/24/2020    Microcytic anemia 8/24/2020    Peptic ulcer disease 9/12/2016    UPJ (ureteropelvic junction) obstruction     Vertigo     Vertigo      Past Surgical History:   Procedure Laterality Date    APPENDECTOMY      CHOLECYSTECTOMY      COLONOSCOPY  N/A 7/6/2020    Procedure: COLONOSCOPY;  Surgeon: Leander Cornejo MD;  Location: [facility] ENDO;  Service: Colon and Rectal;  Laterality: N/A;    ESOPHAGOGASTRODUODENOSCOPY N/A 6/12/2019    Procedure: ESOPHAGOGASTRODUODENOSCOPY (EGD);  Surgeon: Arben Brown MD;  Location: [facility] ENDO;  Service: Endoscopy;  Laterality: N/A;    ESOPHAGOGASTRODUODENOSCOPY N/A 3/9/2020    Procedure: EGD (ESOPHAGOGASTRODUODENOSCOPY);  Surgeon: Andrea Salomon MD;  Location[facility];  Service: Endoscopy;  Laterality: N/A;    ESOPHAGOGASTRODUODENOSCOPY N/A 9/21/2020    Procedure: EGD (ESOPHAGOGASTRODUODENOSCOPY);  Surgeon: Antwon Gardner MD;  Location: [facility] ENDO;  Service: Endoscopy;  Laterality: N/A;    HYSTERECTOMY      INTRALUMINAL GASTROINTESTINAL TRACT IMAGING VIA CAPSULE N/A 1/19/2022    Procedure: IMAGING PROCEDURE, GI TRACT, INTRALUMINAL, VIA CAPSULE;  Surgeon: Antwon Gardner MD;  Location: [facility];  Service: Endoscopy;  Laterality: N/A;    OOPHORECTOMY      PANCREAS SURGERY      TONSILLECTOMY      TRANSFORAMINAL EPIDURAL INJECTION OF STEROID Right 10/14/2021    Procedure: Injection,steroid,epidural,transforaminal approach---RIGHT L5 AND S1;  Surgeon: Cheng Tariq Jr., MD;  Location: [facility];  Service: Pain Management;  Laterality: Right;       Patient Active Problem List   Diagnosis    Essential hypertension    Hepatic steatosis    Severe obesity with body mass index (BMI) of 35.0 to 39.9 with comorbidity    UGIB (upper gastrointestinal bleed)    Anemia due to chronic blood loss    Thrombocytopenia    History of Hypothyroidism    Vertigo    JHOAN (generalized anxiety disorder)    Debility    Esophagitis with gastritis    Alcohol abuse, in remission    Epigastric pain    Diverticulosis    Portal hypertension    Anemia    Microcytic anemia    Iron deficiency anemia due to chronic blood loss    Anemia of chronic disorder    Sinusitis    Lumbosacral strain     "Post-traumatic osteoarthritis of both knees    Lumbar radiculopathy    Lumbar spondylosis    DDD (degenerative disc disease), lumbar    Shortness of breath    Dizziness    Carotid atherosclerosis, bilateral    Chest pain of uncertain etiology    Syncope and collapse    Benign paroxysmal positional vertigo    Iron deficiency anemia    Spondylolisthesis of lumbar region    Falls    Syncope    Lightheadedness       Current Outpatient Medications   Medication Instructions    calcium carbonate (TUMS) 200 mg calcium (500 mg) chewable tablet 1 tablet, Oral, Daily    cyclobenzaprine (FLEXERIL) 10 mg, Oral, 3 times daily PRN    ferrous sulfate (FEOSOL) 325 mg, Oral, Daily    gabapentin (NEURONTIN) 600 mg, Oral, 3 times daily    meclizine (ANTIVERT) 25 mg, Oral, 3 times daily PRN    pantoprazole (PROTONIX) 40 mg, Oral, 2 times daily    potassium 99 mg Tab Oral, Daily    traMADoL (ULTRAM-ER) 100 mg, Oral, Daily PRN         Review of Systems:   Review of Systems   Constitutional: Negative.    HENT: Negative.    Respiratory: Negative.    Cardiovascular: Negative.    Gastrointestinal: Negative.    Musculoskeletal: Negative.    Neurological: Negative.    Endo/Heme/Allergies: Negative.    All other systems reviewed and are negative.      PHYSICAL EXAM:  /65 (BP Location: Left arm, Patient Position: Sitting, BP Method: Medium (Automatic))   Pulse 104   Temp 98.7 °F (37.1 °C) (Oral)   Resp 18   Ht 5' 2" (1.575 m)   Wt 104.2 kg (229 lb 11.5 oz)   LMP  (LMP Unknown)   SpO2 100%   BMI 42.02 kg/m²     General Appearance:    Alert, cooperative, no distress, appears stated age   Head:    Normocephalic, without obvious abnormality, atraumatic   Neck:   Supple, symmetrical, no adenopathy;     thyroid:  no enlargement/tenderness/nodules   Lungs:     Clear to auscultation bilaterally, respirations unlabored    Heart:    Regular rate and rhythm, S1 and S2 normal   Abdomen:     Soft, non-tender, bowel sounds " active, no masses, no organomegaly   Extremities:   Extremities normal, atraumatic, no cyanosis or edema   Pulses:   2+ and symmetric all extremities   Skin:   Skin color, texture normal, no rashes or lesions   Lymph nodes:   Cervical, supraclavicular, and axillary nodes normal   Neurologic:   CNII-XII intact, normal strength           Pertinent Labs & Imaging:  Recent Labs   Lab Result Units 03/25/22  1430 03/27/22  1938   WBC K/uL 4.35 5.02   Hemoglobin g/dL 13.4 11.3*   Hematocrit % 41.6 36.2*   Platelets K/uL 78* 89*     Recent Labs   Lab Result Units 03/25/22  1430 03/27/22  1938   Creatinine mg/dL 0.7 0.9   AST U/L 28 25   ALT U/L 10 8*         Assessment & Plan:    1. Thrombocytopenia  - Ambulatory referral/consult to Hematology / Oncology    2. Iron deficiency anemia secondary to inadequate dietary iron intake  - CBC W/ AUTO DIFFERENTIAL; Future  - Iron and TIBC; Future  - FERRITIN; Future    Full chart review of past labs and imaging, referring providers' notes, and consultants' reports.   Patient has been followed by Dr Jorgito Rios. Platelet count is now higher than last checked in Dr Rios' clinic. Thrombocytopenia is moderate and likely related to underlying cirrhosis given extensive negative work up prior.   She has a history of Iron deficiency anemia which remains resolved.   Will repeat labs today. If all is stable, she can either continue to follow up with Dr Hernández for twice annual monitoring of CBC and iron studies (Iron/TIBC and ferritin) or see Dr Rios in 6 mos.       Med Onc Chart Routing      Follow up with physician    Follow up with JUDIE    Labs CBC and ferritin   Lab interval:     Imaging    Pharmacy appointment    Other referrals            Total time of this visit, including time spent face to face with patient and/or via video/audio, and also in preparing for today's visit for MDM and documentation. (Medical Decision Making, including consideration of possible diagnoses, management  options, complex medical record review, review of diagnostic tests and information, consideration and discussion of significant complications based on comorbidities, and discussion with providers involved with the care of the patient) 30 minutes. Greater than 50% was spent face to face with the patient counseling and coordinating care.      Becca Gautam MD  05/04/2022

## 2022-06-02 ENCOUNTER — TELEPHONE (OUTPATIENT)
Dept: ORTHOPEDICS | Facility: CLINIC | Age: 67
End: 2022-06-02
Payer: MEDICARE

## 2022-06-02 DIAGNOSIS — M25.562 PAIN IN BOTH KNEES, UNSPECIFIED CHRONICITY: Primary | ICD-10-CM

## 2022-06-02 DIAGNOSIS — M25.561 PAIN IN BOTH KNEES, UNSPECIFIED CHRONICITY: Primary | ICD-10-CM

## 2022-06-07 ENCOUNTER — OFFICE VISIT (OUTPATIENT)
Dept: ORTHOPEDICS | Facility: CLINIC | Age: 67
End: 2022-06-07
Payer: MEDICARE

## 2022-06-07 VITALS
HEIGHT: 62 IN | SYSTOLIC BLOOD PRESSURE: 118 MMHG | WEIGHT: 229.25 LBS | DIASTOLIC BLOOD PRESSURE: 68 MMHG | HEART RATE: 88 BPM | BODY MASS INDEX: 42.19 KG/M2

## 2022-06-07 DIAGNOSIS — M25.511 ACUTE PAIN OF RIGHT SHOULDER: Primary | ICD-10-CM

## 2022-06-07 DIAGNOSIS — M17.0 OSTEOARTHRITIS OF BOTH KNEES, UNSPECIFIED OSTEOARTHRITIS TYPE: ICD-10-CM

## 2022-06-07 DIAGNOSIS — M17.2 POST-TRAUMATIC OSTEOARTHRITIS OF BOTH KNEES: ICD-10-CM

## 2022-06-07 DIAGNOSIS — M75.101 ROTATOR CUFF SYNDROME OF RIGHT SHOULDER: ICD-10-CM

## 2022-06-07 PROCEDURE — 1100F PR PT FALLS ASSESS DOC 2+ FALLS/FALL W/INJURY/YR: ICD-10-PCS | Mod: CPTII,S$GLB,, | Performed by: ORTHOPAEDIC SURGERY

## 2022-06-07 PROCEDURE — 1125F PR PAIN SEVERITY QUANTIFIED, PAIN PRESENT: ICD-10-PCS | Mod: CPTII,S$GLB,, | Performed by: ORTHOPAEDIC SURGERY

## 2022-06-07 PROCEDURE — 99999 PR PBB SHADOW E&M-EST. PATIENT-LVL IV: ICD-10-PCS | Mod: PBBFAC,,, | Performed by: ORTHOPAEDIC SURGERY

## 2022-06-07 PROCEDURE — 3008F BODY MASS INDEX DOCD: CPT | Mod: CPTII,S$GLB,, | Performed by: ORTHOPAEDIC SURGERY

## 2022-06-07 PROCEDURE — 3288F FALL RISK ASSESSMENT DOCD: CPT | Mod: CPTII,S$GLB,, | Performed by: ORTHOPAEDIC SURGERY

## 2022-06-07 PROCEDURE — 3078F PR MOST RECENT DIASTOLIC BLOOD PRESSURE < 80 MM HG: ICD-10-PCS | Mod: CPTII,S$GLB,, | Performed by: ORTHOPAEDIC SURGERY

## 2022-06-07 PROCEDURE — 1160F RVW MEDS BY RX/DR IN RCRD: CPT | Mod: CPTII,S$GLB,, | Performed by: ORTHOPAEDIC SURGERY

## 2022-06-07 PROCEDURE — 3008F PR BODY MASS INDEX (BMI) DOCUMENTED: ICD-10-PCS | Mod: CPTII,S$GLB,, | Performed by: ORTHOPAEDIC SURGERY

## 2022-06-07 PROCEDURE — 3078F DIAST BP <80 MM HG: CPT | Mod: CPTII,S$GLB,, | Performed by: ORTHOPAEDIC SURGERY

## 2022-06-07 PROCEDURE — 99213 OFFICE O/P EST LOW 20 MIN: CPT | Mod: 25,S$GLB,, | Performed by: ORTHOPAEDIC SURGERY

## 2022-06-07 PROCEDURE — 20610 LARGE JOINT ASPIRATION/INJECTION: R SUBACROMIAL BURSA: ICD-10-PCS | Mod: RT,S$GLB,, | Performed by: ORTHOPAEDIC SURGERY

## 2022-06-07 PROCEDURE — 99999 PR PBB SHADOW E&M-EST. PATIENT-LVL IV: CPT | Mod: PBBFAC,,, | Performed by: ORTHOPAEDIC SURGERY

## 2022-06-07 PROCEDURE — 3074F SYST BP LT 130 MM HG: CPT | Mod: CPTII,S$GLB,, | Performed by: ORTHOPAEDIC SURGERY

## 2022-06-07 PROCEDURE — 1159F MED LIST DOCD IN RCRD: CPT | Mod: CPTII,S$GLB,, | Performed by: ORTHOPAEDIC SURGERY

## 2022-06-07 PROCEDURE — 99213 PR OFFICE/OUTPT VISIT, EST, LEVL III, 20-29 MIN: ICD-10-PCS | Mod: 25,S$GLB,, | Performed by: ORTHOPAEDIC SURGERY

## 2022-06-07 PROCEDURE — 1100F PTFALLS ASSESS-DOCD GE2>/YR: CPT | Mod: CPTII,S$GLB,, | Performed by: ORTHOPAEDIC SURGERY

## 2022-06-07 PROCEDURE — 3074F PR MOST RECENT SYSTOLIC BLOOD PRESSURE < 130 MM HG: ICD-10-PCS | Mod: CPTII,S$GLB,, | Performed by: ORTHOPAEDIC SURGERY

## 2022-06-07 PROCEDURE — 3288F PR FALLS RISK ASSESSMENT DOCUMENTED: ICD-10-PCS | Mod: CPTII,S$GLB,, | Performed by: ORTHOPAEDIC SURGERY

## 2022-06-07 PROCEDURE — 1159F PR MEDICATION LIST DOCUMENTED IN MEDICAL RECORD: ICD-10-PCS | Mod: CPTII,S$GLB,, | Performed by: ORTHOPAEDIC SURGERY

## 2022-06-07 PROCEDURE — 1160F PR REVIEW ALL MEDS BY PRESCRIBER/CLIN PHARMACIST DOCUMENTED: ICD-10-PCS | Mod: CPTII,S$GLB,, | Performed by: ORTHOPAEDIC SURGERY

## 2022-06-07 PROCEDURE — 20610 DRAIN/INJ JOINT/BURSA W/O US: CPT | Mod: RT,S$GLB,, | Performed by: ORTHOPAEDIC SURGERY

## 2022-06-07 PROCEDURE — 1125F AMNT PAIN NOTED PAIN PRSNT: CPT | Mod: CPTII,S$GLB,, | Performed by: ORTHOPAEDIC SURGERY

## 2022-06-07 RX ADMIN — TRIAMCINOLONE ACETONIDE 40 MG: 40 INJECTION, SUSPENSION INTRA-ARTICULAR; INTRAMUSCULAR at 01:06

## 2022-06-10 PROBLEM — M75.101 ROTATOR CUFF SYNDROME OF RIGHT SHOULDER: Status: ACTIVE | Noted: 2022-06-10

## 2022-06-10 RX ORDER — TRIAMCINOLONE ACETONIDE 40 MG/ML
40 INJECTION, SUSPENSION INTRA-ARTICULAR; INTRAMUSCULAR
Status: DISCONTINUED | OUTPATIENT
Start: 2022-06-07 | End: 2022-06-10 | Stop reason: HOSPADM

## 2022-06-10 NOTE — PROCEDURES
Large Joint Aspiration/Injection: R subacromial bursa    Date/Time: 6/7/2022 1:15 PM  Performed by: Cheng Manzanares MD  Authorized by: Cheng Manzanares MD     Consent Done?:  Yes (Verbal)  Indications:  Pain  Site marked: the procedure site was marked    Timeout: prior to procedure the correct patient, procedure, and site was verified    Prep: patient was prepped and draped in usual sterile fashion      Local anesthesia used?: Yes    Local anesthetic:  Bupivacaine 0.25% without epinephrine  Anesthetic total (ml):  1      Details:  Needle Size:  25 G  Ultrasonic Guidance for needle placement?: No    Approach:  Posterior  Location:  Shoulder  Site:  R subacromial bursa  Medications:  40 mg triamcinolone acetonide 40 mg/mL

## 2022-06-10 NOTE — PROGRESS NOTES
Subjective:      Patient ID: Lo Escalera is a 66 y.o. female.    Chief Complaint:   Pain of the Right Knee, Pain of the Left Knee, and Pain of the Right Shoulder    HPI  She came in today ostensibly for follow-up of her bilateral knee osteoarthritis pain, but her chief complaint today is the right shoulder.  She has had pain in the right shoulder for the last few weeks, gradually getting worse so that she can barely lift her arm up at this point.  She has pain when she lies on the side at night.  There was no fall, accident, injury involving the shoulder.  No distal numbness or tingling.  No significant neck pain.      Objective:        Ortho/SPM Exam    On exam, she has a positive drop-arm sign and a positive empty can test.  She is tender around the acromion, less so anteriorly over the biceps tendon.  She has external rotation of 40°, and internal rotation to the belly.  She has forward flexion passively fully, but cannot hold it up because of pain.  Distal neurovascular intact.      IMAGING:  X-rays of the shoulder show some AC arthrosis, but a well-maintained acromiohumeral interval and minimal glenohumeral arthrosis.  Radiographic osteopenia.  New knee x-rays done today show tricompartmental degenerative changes, most prominent in the right medial compartment and left lateral compartment, with mild joint space loss and osteophytosis.  Assessment:   Rotator cuff syndrome, right shoulder.  DJD, bilateral knees      Plan:   We did inject her shoulder today as documented in the procedure note.  She will come back next week for knee joint injections so as not to give her too much cortisone in 1 day.    The patient's pathophysiology was explained in detail with reference to x-rays, models, other visual aids as appropriate.  Treatment options were discussed in detail.  Questions were invited and answered to the patient's satisfaction.      Cheng Manzanares MD  Orthopedic Surgery

## 2022-08-03 ENCOUNTER — OFFICE VISIT (OUTPATIENT)
Dept: PRIMARY CARE CLINIC | Facility: CLINIC | Age: 67
End: 2022-08-03
Payer: MEDICARE

## 2022-08-03 VITALS
DIASTOLIC BLOOD PRESSURE: 86 MMHG | OXYGEN SATURATION: 98 % | RESPIRATION RATE: 18 BRPM | BODY MASS INDEX: 43.06 KG/M2 | HEART RATE: 105 BPM | HEIGHT: 62 IN | WEIGHT: 234 LBS | SYSTOLIC BLOOD PRESSURE: 130 MMHG

## 2022-08-03 DIAGNOSIS — G89.29 CHRONIC RIGHT SHOULDER PAIN: ICD-10-CM

## 2022-08-03 DIAGNOSIS — F41.1 GAD (GENERALIZED ANXIETY DISORDER): ICD-10-CM

## 2022-08-03 DIAGNOSIS — M54.16 LUMBAR RADICULOPATHY: Primary | ICD-10-CM

## 2022-08-03 DIAGNOSIS — I65.23 CAROTID ATHEROSCLEROSIS, BILATERAL: ICD-10-CM

## 2022-08-03 DIAGNOSIS — R53.81 DEBILITY: ICD-10-CM

## 2022-08-03 DIAGNOSIS — M51.36 DDD (DEGENERATIVE DISC DISEASE), LUMBAR: ICD-10-CM

## 2022-08-03 DIAGNOSIS — I10 ESSENTIAL HYPERTENSION: ICD-10-CM

## 2022-08-03 DIAGNOSIS — M25.511 CHRONIC RIGHT SHOULDER PAIN: ICD-10-CM

## 2022-08-03 DIAGNOSIS — M17.0 OSTEOARTHRITIS OF BOTH KNEES, UNSPECIFIED OSTEOARTHRITIS TYPE: ICD-10-CM
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
## 2022-08-03 DIAGNOSIS — R42 VERTIGO: ICD-10-CM

## 2022-08-03 DIAGNOSIS — K76.0 HEPATIC STEATOSIS: ICD-10-CM

## 2022-08-03 PROCEDURE — 99214 PR OFFICE/OUTPT VISIT, EST, LEVL IV, 30-39 MIN: ICD-10-PCS | Mod: S$GLB,,, | Performed by: FAMILY MEDICINE

## 2022-08-03 PROCEDURE — 1101F PR PT FALLS ASSESS DOC 0-1 FALLS W/OUT INJ PAST YR: ICD-10-PCS | Mod: CPTII,S$GLB,, | Performed by: FAMILY MEDICINE

## 2022-08-03 PROCEDURE — 1159F MED LIST DOCD IN RCRD: CPT | Mod: CPTII,S$GLB,, | Performed by: FAMILY MEDICINE

## 2022-08-03 PROCEDURE — 99214 OFFICE O/P EST MOD 30 MIN: CPT | Mod: S$GLB,,, | Performed by: FAMILY MEDICINE

## 2022-08-03 PROCEDURE — 1159F PR MEDICATION LIST DOCUMENTED IN MEDICAL RECORD: ICD-10-PCS | Mod: CPTII,S$GLB,, | Performed by: FAMILY MEDICINE

## 2022-08-03 PROCEDURE — 1125F PR PAIN SEVERITY QUANTIFIED, PAIN PRESENT: ICD-10-PCS | Mod: CPTII,S$GLB,, | Performed by: FAMILY MEDICINE

## 2022-08-03 PROCEDURE — 99999 PR PBB SHADOW E&M-EST. PATIENT-LVL III: ICD-10-PCS | Mod: PBBFAC,,, | Performed by: FAMILY MEDICINE

## 2022-08-03 PROCEDURE — 3079F DIAST BP 80-89 MM HG: CPT | Mod: CPTII,S$GLB,, | Performed by: FAMILY MEDICINE

## 2022-08-03 PROCEDURE — 3008F BODY MASS INDEX DOCD: CPT | Mod: CPTII,S$GLB,, | Performed by: FAMILY MEDICINE

## 2022-08-03 PROCEDURE — 99999 PR PBB SHADOW E&M-EST. PATIENT-LVL III: CPT | Mod: PBBFAC,,, | Performed by: FAMILY MEDICINE

## 2022-08-03 PROCEDURE — 3075F PR MOST RECENT SYSTOLIC BLOOD PRESS GE 130-139MM HG: ICD-10-PCS | Mod: CPTII,S$GLB,, | Performed by: FAMILY MEDICINE

## 2022-08-03 PROCEDURE — 3288F FALL RISK ASSESSMENT DOCD: CPT | Mod: CPTII,S$GLB,, | Performed by: FAMILY MEDICINE

## 2022-08-03 PROCEDURE — 1125F AMNT PAIN NOTED PAIN PRSNT: CPT | Mod: CPTII,S$GLB,, | Performed by: FAMILY MEDICINE

## 2022-08-03 PROCEDURE — 3288F PR FALLS RISK ASSESSMENT DOCUMENTED: ICD-10-PCS | Mod: CPTII,S$GLB,, | Performed by: FAMILY MEDICINE

## 2022-08-03 PROCEDURE — 99499 RISK ADDL DX/OHS AUDIT: ICD-10-PCS | Mod: S$GLB,,, | Performed by: FAMILY MEDICINE

## 2022-08-03 PROCEDURE — 3079F PR MOST RECENT DIASTOLIC BLOOD PRESSURE 80-89 MM HG: ICD-10-PCS | Mod: CPTII,S$GLB,, | Performed by: FAMILY MEDICINE

## 2022-08-03 PROCEDURE — 3008F PR BODY MASS INDEX (BMI) DOCUMENTED: ICD-10-PCS | Mod: CPTII,S$GLB,, | Performed by: FAMILY MEDICINE

## 2022-08-03 PROCEDURE — 1101F PT FALLS ASSESS-DOCD LE1/YR: CPT | Mod: CPTII,S$GLB,, | Performed by: FAMILY MEDICINE

## 2022-08-03 PROCEDURE — 3075F SYST BP GE 130 - 139MM HG: CPT | Mod: CPTII,S$GLB,, | Performed by: FAMILY MEDICINE

## 2022-08-03 PROCEDURE — 99499 UNLISTED E&M SERVICE: CPT | Mod: S$GLB,,, | Performed by: FAMILY MEDICINE

## 2022-08-03 RX ORDER — TRAMADOL HYDROCHLORIDE 100 MG/1
100 TABLET, EXTENDED RELEASE ORAL DAILY PRN
Qty: 30 TABLET | Refills: 0 | OUTPATIENT
Start: 2022-08-03 | End: 2022-11-05

## 2022-08-03 NOTE — PROGRESS NOTES
Subjective:       Patient ID: Lo Escalera is a 67 y.o. female.    Chief Complaint: Back Pain, Leg Pain, and Med Change (Wants to see if we can refill her Tramadol or try something else)    HPI:  67-year-old black female--back and both leg right greater than leg. Pt sees orthopedist  At [facility] --he suggested surgery ---right shoulder -- then right knee .   Pt had EMG studies--had epidural steroid injections  But not sure where or by whom.   Pt on gabapentin -- pt on tramadol .        Pain back and legs bilaterally --cricking noise.        Right shoulder pain --pt not sure why hurts.       No lupus rheumatoid gout       Very poor historian         Office visit 04/11/2022  67 yo BF--falls from vertigo/pain in legs and back.       Pt went to [place] --went to get into the truck--head started swimming--patient fell down--couple people out there --ended up in sitting position. Ambulance brought patient to the hospital--patient was diagnosed with syncope lightheaded--did not lose consciousness        Lab CBCs platelet count low [id number] out/glucose increase 124 but patient was nonfasting in emergency room visit rest of lab was normal troponin chest x-ray EKG x-ray lumbar spine thoracic spine foot CT scan of the chest.      1/2 weeks ago patient went to the [employer]--pt feels misstep--sidewalk not even --was unable get up       Hx pain in shoulder --got after vaccine --arm still hurts alot--pain problems raising arm overhead--hard to lift things can lift things of her life     Legs and back --going go to chiropractor --had epidural steroid injections saw Dr Rios-neurosurgeon-- told probably needs surgery.  Both knees hurt--occas has lean wall --not sure if needed to have knees done 1st her back done first.                  ROS:  No significant change except for history of present illness  Skin: no psoriasis, eczema, skin cancer--no bruising  HEENT: No headache, ocular pain, blurred vision, diplopia,   hoarseness change in voice, no thyroid disease no epistaxis  Lung: No pneumonia, asthma, Tb, wheezing, + occasional SOB, no smoking  Heart: No chest pain, ankle edema, palpitations, MI, eva murmur, no hypertension patient used to have high blood pressure but was taken off of medication blood pressure today 132/7, hyperlipidemia--no stent bypass arrhythmia history of hypertension--occasionally feels like heart skips a beat blood pressure was low so taken off blood pressure medicine   Abdomen: No nausea, vomiting, diarrhea, constipation, ulcers, hepatitis, status post cholecystectomy, melena, hematochezia, hematemesis recent esophagitis gastritis with transfusion 2 units of blood history of peptic ulcer disease history of esophageal varices  : no UTI, renal disease, stones  GYN hysterectomy mammogram March 2021  MS: no fractures, O/A, lupus, rheumatoid, gout--has 2 sisters with lupus and her father has gout  Neuro: No dizziness, LOC, seizures +vertigo---several recent episodes with falling will get MRI brain and restart antivert--dizzy exercises -help with meclizine November 2020 patient was admitted to [facility] for loss of consciousness used to drink  36 yrs ago-pancreatitis  No diabetes, + anemia had to be transfused blood had upper GI bleed, + anxiety, + depression upset had to quit working because of vertigo was working at [employer]-doing pretty good with--loss mom dad oldest sister back to back   3 children unemployed, lives with      Objective:   Physical Exam:   General: Well nourished, well developed, no acute distress+ Obesity up with a cane  Skin: No lesions  HEENT: Eyes PERRLA, EOM intact, nose clear , throat nonerythematous +arcus senilis no pallor to the conjunctiva  NECK: Supple, no bruits, No JVD, no nodes  Lungs: Clear, no rales, rhonchi, wheezing  Heart: Regular rate and rhythm, no murmurs, gallops, or rubs  Abdomen: flat, bowel sounds positive, no tenderness, or  organomegaly  MS:  Complaining of pain in both legs right greater than left with trying to stand from the sitting position--had 4 falls---walked about 5-10 feet then legs gave out--tried to do physical therapy    Neuro: Alert, CN intact, oriented X3 --unable do heel toe due weight and knees   Extremities: No cyanosis, clubbing, or edema negative Romberg        Assessment:       1. Lumbar radiculopathy    2. DDD (degenerative disc disease), lumbar    3. Osteoarthritis of both knees, unspecified osteoarthritis type    4. Chronic right shoulder pain    5. JHOAN (generalized anxiety disorder)    6. Vertigo    7. Essential hypertension    8. Carotid atherosclerosis, bilateral    9. Hepatic steatosis    10. Fall, subsequent encounter    11. Debility        Plan:       Lumbar radiculopathy  -     Sedimentation rate; Future; Expected date: 08/03/2022  -     RPR; Future; Expected date: 08/03/2022  -     Rheumatoid Factor; Future; Expected date: 08/03/2022  -     AKHIL Screen w/Reflex; Future; Expected date: 08/03/2022    DDD (degenerative disc disease), lumbar  -     Sedimentation rate; Future; Expected date: 08/03/2022  -     RPR; Future; Expected date: 08/03/2022  -     Rheumatoid Factor; Future; Expected date: 08/03/2022  -     AKHIL Screen w/Reflex; Future; Expected date: 08/03/2022    Osteoarthritis of both knees, unspecified osteoarthritis type    Chronic right shoulder pain    JHOAN (generalized anxiety disorder)    Vertigo    Essential hypertension    Carotid atherosclerosis, bilateral    Hepatic steatosis    Fall, subsequent encounter    Debility    Other orders  -     traMADoL (ULTRAM-ER) 100 MG Tb24; Take 1 tablet (100 mg total) by mouth daily as needed.  Dispense: 30 tablet; Refill: 0        Main Reason for Visits  Falls x 2 with vertigo --but also with legs giving out --severe arthritis in both knees right much greater than left--up with a cane  Pain in the right shoulder specially with palpation or rotation were  attempting raise right arm over the head  Back pain was seeing Dr Traiq had epidural steroid injections   Appt DR Michaud evaluate knee--has crepitus bilaterally with flexion extension--?? Needs injections or surgery   Morbid obesity needs to lose a lot of weight may benefit from gastric bypass  Other medical issues   GERD--avoid alcohol smoking NSAIDs caffeine stress carbonated drinks--can raise the head of the bed on a block of wood to prevent GERD--eat small meals--lay down for 1-2 hours after eating Cont protonix --no NSAIDs due to history of having be transfused due to GI issues  Lab --done May    Health maintenance mammograM

## 2022-08-29 ENCOUNTER — TELEPHONE (OUTPATIENT)
Dept: PRIMARY CARE CLINIC | Facility: CLINIC | Age: 67
End: 2022-08-29
Payer: MEDICARE

## 2022-08-29 NOTE — TELEPHONE ENCOUNTER
Returned call to patient. Scheduled patient for an appt to get handicap papers filled out so she can get her license.

## 2022-08-29 NOTE — TELEPHONE ENCOUNTER
----- Message from Jennifer Zhou sent at 8/29/2022 12:40 PM CDT -----  Contact: [phone]  Pt is calling for the nurse she is needing some paper work filled out and would like to bring it in please give return call

## 2022-08-31 ENCOUNTER — OFFICE VISIT (OUTPATIENT)
Dept: PRIMARY CARE CLINIC | Facility: CLINIC | Age: 67
End: 2022-08-31
Payer: MEDICARE

## 2022-08-31 VITALS
SYSTOLIC BLOOD PRESSURE: 114 MMHG | DIASTOLIC BLOOD PRESSURE: 78 MMHG | OXYGEN SATURATION: 99 % | HEART RATE: 83 BPM | WEIGHT: 234.13 LBS | RESPIRATION RATE: 18 BRPM | BODY MASS INDEX: 43.08 KG/M2 | HEIGHT: 62 IN | TEMPERATURE: 99 F

## 2022-08-31 DIAGNOSIS — K92.2 UGIB (UPPER GASTROINTESTINAL BLEED): ICD-10-CM

## 2022-08-31 DIAGNOSIS — I10 ESSENTIAL HYPERTENSION: ICD-10-CM

## 2022-08-31 DIAGNOSIS — K29.70 ESOPHAGITIS WITH GASTRITIS: ICD-10-CM

## 2022-08-31 DIAGNOSIS — D63.8 ANEMIA OF CHRONIC DISORDER: ICD-10-CM

## 2022-08-31 DIAGNOSIS — E03.9 HYPOTHYROIDISM, UNSPECIFIED TYPE: ICD-10-CM

## 2022-08-31 DIAGNOSIS — R42 DIZZINESS: Primary | ICD-10-CM

## 2022-08-31 DIAGNOSIS — K20.90 ESOPHAGITIS WITH GASTRITIS: ICD-10-CM

## 2022-08-31 PROCEDURE — 1125F PR PAIN SEVERITY QUANTIFIED, PAIN PRESENT: ICD-10-PCS | Mod: CPTII,S$GLB,, | Performed by: FAMILY MEDICINE

## 2022-08-31 PROCEDURE — 99499 UNLISTED E&M SERVICE: CPT | Mod: S$GLB,,, | Performed by: FAMILY MEDICINE

## 2022-08-31 PROCEDURE — 1101F PT FALLS ASSESS-DOCD LE1/YR: CPT | Mod: CPTII,S$GLB,, | Performed by: FAMILY MEDICINE

## 2022-08-31 PROCEDURE — 99214 PR OFFICE/OUTPT VISIT, EST, LEVL IV, 30-39 MIN: ICD-10-PCS | Mod: S$GLB,,, | Performed by: FAMILY MEDICINE

## 2022-08-31 PROCEDURE — 99214 OFFICE O/P EST MOD 30 MIN: CPT | Mod: S$GLB,,, | Performed by: FAMILY MEDICINE

## 2022-08-31 PROCEDURE — 3288F FALL RISK ASSESSMENT DOCD: CPT | Mod: CPTII,S$GLB,, | Performed by: FAMILY MEDICINE

## 2022-08-31 PROCEDURE — 3008F PR BODY MASS INDEX (BMI) DOCUMENTED: ICD-10-PCS | Mod: CPTII,S$GLB,, | Performed by: FAMILY MEDICINE

## 2022-08-31 PROCEDURE — 1159F MED LIST DOCD IN RCRD: CPT | Mod: CPTII,S$GLB,, | Performed by: FAMILY MEDICINE

## 2022-08-31 PROCEDURE — 3288F PR FALLS RISK ASSESSMENT DOCUMENTED: ICD-10-PCS | Mod: CPTII,S$GLB,, | Performed by: FAMILY MEDICINE

## 2022-08-31 PROCEDURE — 3078F DIAST BP <80 MM HG: CPT | Mod: CPTII,S$GLB,, | Performed by: FAMILY MEDICINE

## 2022-08-31 PROCEDURE — 3078F PR MOST RECENT DIASTOLIC BLOOD PRESSURE < 80 MM HG: ICD-10-PCS | Mod: CPTII,S$GLB,, | Performed by: FAMILY MEDICINE

## 2022-08-31 PROCEDURE — 1159F PR MEDICATION LIST DOCUMENTED IN MEDICAL RECORD: ICD-10-PCS | Mod: CPTII,S$GLB,, | Performed by: FAMILY MEDICINE

## 2022-08-31 PROCEDURE — 99999 PR PBB SHADOW E&M-EST. PATIENT-LVL III: ICD-10-PCS | Mod: PBBFAC,,, | Performed by: FAMILY MEDICINE

## 2022-08-31 PROCEDURE — 99999 PR PBB SHADOW E&M-EST. PATIENT-LVL III: CPT | Mod: PBBFAC,,, | Performed by: FAMILY MEDICINE

## 2022-08-31 PROCEDURE — 3074F PR MOST RECENT SYSTOLIC BLOOD PRESSURE < 130 MM HG: ICD-10-PCS | Mod: CPTII,S$GLB,, | Performed by: FAMILY MEDICINE

## 2022-08-31 PROCEDURE — 99499 RISK ADDL DX/OHS AUDIT: ICD-10-PCS | Mod: S$GLB,,, | Performed by: FAMILY MEDICINE

## 2022-08-31 PROCEDURE — 3008F BODY MASS INDEX DOCD: CPT | Mod: CPTII,S$GLB,, | Performed by: FAMILY MEDICINE

## 2022-08-31 PROCEDURE — 1125F AMNT PAIN NOTED PAIN PRSNT: CPT | Mod: CPTII,S$GLB,, | Performed by: FAMILY MEDICINE

## 2022-08-31 PROCEDURE — 1101F PR PT FALLS ASSESS DOC 0-1 FALLS W/OUT INJ PAST YR: ICD-10-PCS | Mod: CPTII,S$GLB,, | Performed by: FAMILY MEDICINE

## 2022-08-31 PROCEDURE — 3074F SYST BP LT 130 MM HG: CPT | Mod: CPTII,S$GLB,, | Performed by: FAMILY MEDICINE

## 2022-08-31 RX ORDER — MECLIZINE HYDROCHLORIDE 25 MG/1
25 TABLET ORAL 3 TIMES DAILY PRN
Qty: 60 TABLET | Refills: 5 | Status: SHIPPED | OUTPATIENT
Start: 2022-08-31 | End: 2024-03-12 | Stop reason: SDUPTHER

## 2022-08-31 RX ORDER — PANTOPRAZOLE SODIUM 40 MG/1
40 TABLET, DELAYED RELEASE ORAL 2 TIMES DAILY
Qty: 180 TABLET | Refills: 1 | Status: SHIPPED | OUTPATIENT
Start: 2022-08-31 | End: 2024-03-12 | Stop reason: SDUPTHER

## 2022-08-31 RX ORDER — CYCLOBENZAPRINE HCL 10 MG
10 TABLET ORAL 3 TIMES DAILY PRN
Qty: 60 TABLET | Refills: 5 | OUTPATIENT
Start: 2022-08-31 | End: 2022-11-15

## 2022-08-31 NOTE — PROGRESS NOTES
Subjective:       Patient ID: Lo Escalera is a 67 y.o. female.    Chief Complaint: Back Pain    HPI:  [id number] multiple somatic complaints legs back right shoulder.  Most of time spent doingDMV form         Office visit 08/02/2022 67-year-old black female--back and both leg right greater than leg. Pt sees orthopedist  At [facility] --he suggested surgery ---right shoulder -- then right knee .   Pt had EMG studies--had epidural steroid injections  But not sure where or by whom.   Pt on gabapentin -- pt on tramadol .        Pain back and legs bilaterally --cricking noise.        Right shoulder pain --pt not sure why hurts.       No lupus rheumatoid gout       Very poor historian         Office visit 04/11/2022  65 yo BF--falls from vertigo/pain in legs and back.       Pt went to [place] --went to get into the truck--head started swimming--patient fell down--couple people out there --ended up in sitting position. Ambulance brought patient to the hospital--patient was diagnosed with syncope lightheaded--did not lose consciousness        Lab CBCs platelet count low [id number] out/glucose increase 124 but patient was nonfasting in emergency room visit rest of lab was normal troponin chest x-ray EKG x-ray lumbar spine thoracic spine foot CT scan of the chest.      1/2 weeks ago patient went to the [employer]--pt feels misstep--sidewalk not even --was unable get up       Hx pain in shoulder --got after vaccine --arm still hurts alot--pain problems raising arm overhead--hard to lift things can lift things of her life     Legs and back --going go to chiropractor --had epidural steroid injections saw Dr Rios-neurosurgeon-- told probably needs surgery.  Both knees hurt--occas has lean wall --not sure if needed to have knees done 1st her back done first.                  ROS: Not done this visit due time took do form for DMV to drive   Skin: no psoriasis, eczema, skin cancer--no bruising  HEENT: No headache,  ocular pain, blurred vision, diplopia,  hoarseness change in voice, no thyroid disease no epistaxis  Lung: No pneumonia, asthma, Tb, wheezing, + occasional SOB, no smoking  Heart: No chest pain, ankle edema, palpitations, MI, eva murmur, no hypertension patient used to have high blood pressure but was taken off of medication blood pressure today 132/7, hyperlipidemia--no stent bypass arrhythmia history of hypertension--occasionally feels like heart skips a beat blood pressure was low so taken off blood pressure medicine   Abdomen: No nausea, vomiting, diarrhea, constipation, ulcers, hepatitis, status post cholecystectomy, melena, hematochezia, hematemesis recent esophagitis gastritis with transfusion 2 units of blood history of peptic ulcer disease history of esophageal varices  : no UTI, renal disease, stones  GYN hysterectomy mammogram March 2021  MS: no fractures, O/A, lupus, rheumatoid, gout--has 2 sisters with lupus and her father has gout  Neuro: No dizziness, LOC, seizures +vertigo---several recent episodes with falling will get MRI brain and restart antivert--dizzy exercises -help with meclizine November 2020 patient was admitted to [facility] for loss of consciousness used to drink  36 yrs ago-pancreatitis  No diabetes, + anemia had to be transfused blood had upper GI bleed, + anxiety, + depression upset had to quit working because of vertigo was working at [employer]-doing pretty good with--loss mom dad oldest sister back to back   3 children unemployed, lives with      Objective:   Physical Exam:   General: Well nourished, well developed, no acute distress+ Obesity up with a cane  Skin: No lesions  HEENT: Eyes PERRLA, EOM intact, nose clear , throat nonerythematous +arcus senilis no pallor to the conjunctiva  NECK: Supple, no bruits, No JVD, no nodes  Lungs: Clear, no rales, rhonchi, wheezing  Heart: Regular rate and rhythm, no murmurs, gallops, or rubs  Abdomen: flat, bowel  sounds positive, no tenderness, or organomegaly  MS:  Complaining of pain in both legs right greater than left with trying to stand from the sitting position--had 4 falls---walked about 5-10 feet then legs gave out--tried to do physical therapy    Neuro: Alert, CN intact, oriented X3 --unable do heel toe due weight and knees   Extremities: No cyanosis, clubbing, or edema negative Romberg        Assessment:       1. UGIB (upper gastrointestinal bleed)    2. Esophagitis with gastritis        Plan:       UGIB (upper gastrointestinal bleed)    Esophagitis with gastritis      Main Reason for Visits  DMV form to be filled out --took long time 45 min   Needs see orthopedist for knees ??genu valgus --unsteady gait   Falls x 2 with vertigo --but also with legs giving out --severe arthritis in both knees right much greater than left--up with a cane--states has not fallen long time   Other medicalm issues   Pain in the right shoulder specially with palpation or rotation were attempting raise right arm over the head  Back pain was seeing Dr Tariq had epidural steroid injections   Appt DR Michaud evaluate knee--has crepitus bilaterally with flexion extension--?? Needs injections or surgery   Morbid obesity needs to lose a lot of weight may benefit from gastric bypass  Other medical issues   GERD--avoid alcohol smoking NSAIDs caffeine stress carbonated drinks--can raise the head of the bed on a block of wood to prevent GERD--eat small meals--lay down for 1-2 hours after eating Cont protonix --no NSAIDs due to history of having be transfused due to GI issues  Lab --done May    Health maintenance mammograM

## 2022-11-16 ENCOUNTER — TELEPHONE (OUTPATIENT)
Dept: PRIMARY CARE CLINIC | Facility: CLINIC | Age: 67
End: 2022-11-16
Payer: MEDICARE

## 2022-11-16 DIAGNOSIS — N93.9 VAGINAL BLEEDING: Primary | ICD-10-CM

## 2022-11-16 NOTE — TELEPHONE ENCOUNTER
----- Message from Socorro Sharma sent at 11/16/2022  3:01 PM CST -----  Contact: Patient, [phone]  Calling because she went to the emergency room yesterday for vaginal bleeding, was told to follow up wit her PCP. No available appointments. Please call her. Thanks.

## 2022-11-29 ENCOUNTER — TELEPHONE (OUTPATIENT)
Dept: PRIMARY CARE CLINIC | Facility: CLINIC | Age: 67
End: 2022-11-29
Payer: MEDICARE

## 2022-11-29 NOTE — TELEPHONE ENCOUNTER
----- Message from Janet Kraus MA sent at 11/28/2022  5:14 PM CST -----  Contact: Patient, [phone]    ----- Message -----  From: Socorro Sharma  Sent: 11/28/2022   2:23 PM CST  To: Edgar Garcia Staff    Calling to speak with the nurse regarding her leg pain. Please call her. Thanks.

## 2022-12-01 ENCOUNTER — TELEPHONE (OUTPATIENT)
Dept: PRIMARY CARE CLINIC | Facility: CLINIC | Age: 67
End: 2022-12-01
Payer: MEDICARE

## 2022-12-01 NOTE — TELEPHONE ENCOUNTER
----- Message from Chelo Garcia sent at 12/1/2022  9:45 AM CST -----  Contact: pt [phone]  Pt states she has been to the ED twice in regards to left knee pain and sciatic pain. Pt would like to be advised on what she can do to help with the pain or if a muscle relaxer can be called in. Pt is using   [id number] - [place] - [address]  [address] [place] [address]  Phone: [phone] Fax: [phone]. The first available appt with Dr. Hernández is 12/21. The first available appt with anyone at that location is 12/09.      Thank you

## 2022-12-02 ENCOUNTER — OFFICE VISIT (OUTPATIENT)
Dept: PRIMARY CARE CLINIC | Facility: CLINIC | Age: 67
End: 2022-12-02
Payer: MEDICARE

## 2022-12-02 DIAGNOSIS — M17.11 PRIMARY OSTEOARTHRITIS OF RIGHT KNEE: ICD-10-CM

## 2022-12-02 DIAGNOSIS — M54.41 ACUTE MIDLINE LOW BACK PAIN WITH RIGHT-SIDED SCIATICA: Primary | ICD-10-CM

## 2022-12-02 DIAGNOSIS — N95.0 POSTMENOPAUSAL VAGINAL BLEEDING: ICD-10-CM

## 2022-12-02 PROCEDURE — 99442 PR PHYSICIAN TELEPHONE EVALUATION 11-20 MIN: CPT | Mod: 95,,, | Performed by: INTERNAL MEDICINE

## 2022-12-02 PROCEDURE — 1159F MED LIST DOCD IN RCRD: CPT | Mod: CPTII,95,, | Performed by: INTERNAL MEDICINE

## 2022-12-02 PROCEDURE — 1160F PR REVIEW ALL MEDS BY PRESCRIBER/CLIN PHARMACIST DOCUMENTED: ICD-10-PCS | Mod: CPTII,95,, | Performed by: INTERNAL MEDICINE

## 2022-12-02 PROCEDURE — 99442 PR PHYSICIAN TELEPHONE EVALUATION 11-20 MIN: ICD-10-PCS | Mod: 95,,, | Performed by: INTERNAL MEDICINE

## 2022-12-02 PROCEDURE — 1159F PR MEDICATION LIST DOCUMENTED IN MEDICAL RECORD: ICD-10-PCS | Mod: CPTII,95,, | Performed by: INTERNAL MEDICINE

## 2022-12-02 PROCEDURE — 1160F RVW MEDS BY RX/DR IN RCRD: CPT | Mod: CPTII,95,, | Performed by: INTERNAL MEDICINE

## 2022-12-02 RX ORDER — TRAMADOL HYDROCHLORIDE 50 MG/1
50 TABLET ORAL EVERY 12 HOURS PRN
Qty: 25 TABLET | Refills: 0 | Status: SHIPPED | OUTPATIENT
Start: 2022-12-02 | End: 2023-01-24

## 2022-12-02 RX ORDER — PREDNISONE 20 MG/1
20 TABLET ORAL 2 TIMES DAILY
Qty: 10 TABLET | Refills: 0 | Status: SHIPPED | OUTPATIENT
Start: 2022-12-02 | End: 2022-12-07

## 2022-12-02 RX ORDER — CYCLOBENZAPRINE HCL 5 MG
5 TABLET ORAL 3 TIMES DAILY PRN
Qty: 30 TABLET | Refills: 1 | Status: SHIPPED | OUTPATIENT
Start: 2022-12-02 | End: 2022-12-12

## 2022-12-02 NOTE — PROGRESS NOTES
Subjective:    The patient location is: home   The chief complaint leading to consultation is: lower back pain with sciatica and knee pain and vaginal bleeding    Visit type: audio only    Face to Face time with patient: 15  minutes of total time spent on the encounter, which includes face to face time and non-face to face time preparing to see the patient (eg, review of tests), Obtaining and/or reviewing separately obtained history, Documenting clinical information in the electronic or other health record, Independently interpreting results (not separately reported) and communicating results to the patient/family/caregiver, or Care coordination (not separately reported).         Each patient to whom he or she provides medical services by telemedicine is:  (1) informed of the relationship between the physician and patient and the respective role of any other health care provider with respect to management of the patient; and (2) notified that he or she may decline to receive medical services by telemedicine and may withdraw from such care at any time.    Notes:     Patient ID: Lo Escalera is a 67 y.o. female.    Chief Complaint: No chief complaint on file.    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 Acute midline low back pain with right-sided sciatica    2. Primary osteoarthritis of right knee    3. Postmenopausal vaginal bleeding          Plan:       Acute midline low  back pain with right-sided sciatica  -     traMADoL (ULTRAM) 50 mg tablet; Take 1 tablet (50 mg total) by mouth every 12 (twelve) hours as needed for Pain.  Dispense: 25 tablet; Refill: 0  -     predniSONE (DELTASONE) 20 MG tablet; Take 1 tablet (20 mg total) by mouth 2 (two) times daily. for 5 days  Dispense: 10 tablet; Refill: 0  -     cyclobenzaprine (FLEXERIL) 5 MG tablet; Take 1 tablet (5 mg total) by mouth 3 (three) times daily as needed for Muscle spasms.  Dispense: 30 tablet; Refill: 1    Primary osteoarthritis of right knee  -     predniSONE (DELTASONE) 20 MG tablet; Take 1 tablet (20 mg total) by mouth 2 (two) times daily. for 5 days  Dispense: 10 tablet; Refill: 0    Postmenopausal vaginal bleeding  Comments:  f/u with GYN on Dec18       Medication List with Changes/Refills   New Medications    CYCLOBENZAPRINE (FLEXERIL) 5 MG TABLET    Take 1 tablet (5 mg total) by mouth 3 (three) times daily as needed for Muscle spasms.    PREDNISONE (DELTASONE) 20 MG TABLET    Take 1 tablet (20 mg total) by mouth 2 (two) times daily. for 5 days    TRAMADOL (ULTRAM) 50 MG TABLET    Take 1 tablet (50 mg total) by mouth every 12 (twelve) hours as needed for Pain.   Current Medications    CALCIUM CARBONATE (TUMS) 200 MG CALCIUM (500 MG) CHEWABLE TABLET    Take 1 tablet by mouth once daily.    FERROUS SULFATE (FEOSOL) 325 MG (65 MG IRON) TAB TABLET    TAKE 1 TABLET (325 MG TOTAL) BY MOUTH ONCE DAILY.    GABAPENTIN (NEURONTIN) 600 MG TABLET    Take 1 tablet (600 mg total) by mouth 3 (three) times daily.    MECLIZINE (ANTIVERT) 25 MG TABLET    Take 1 tablet (25 mg total) by mouth 3 (three) times daily as needed.    PANTOPRAZOLE (PROTONIX) 40 MG TABLET    Take 1 tablet (40 mg total) by mouth 2 (two) times daily.    POTASSIUM 99 MG TAB    Take by mouth once daily.

## 2022-12-07 ENCOUNTER — TELEPHONE (OUTPATIENT)
Dept: OBSTETRICS AND GYNECOLOGY | Facility: CLINIC | Age: 67
End: 2022-12-07
Payer: MEDICARE

## 2022-12-07 NOTE — TELEPHONE ENCOUNTER
----- Message from Trinidad Mcpherson sent at 12/7/2022  4:00 PM CST -----  Name Of Caller:leeanne            Provider Name:Angelo Jiang            Does patient feel the need to be seen today?NO            Relationship to the Pt?:PT            Contact Preference?:[phone]            What is the nature of the call?: Pt would like a call back concerning her appointment being cancelled.    
Rescheduled for the next wk  
minimum assist (75% patients effort)

## 2022-12-08 ENCOUNTER — PES CALL (OUTPATIENT)
Dept: ADMINISTRATIVE | Facility: CLINIC | Age: 67
End: 2022-12-08
Payer: MEDICARE

## 2022-12-12 ENCOUNTER — TELEPHONE (OUTPATIENT)
Dept: PRIMARY CARE CLINIC | Facility: CLINIC | Age: 67
End: 2022-12-12
Payer: MEDICARE

## 2022-12-12 NOTE — TELEPHONE ENCOUNTER
----- Message from Liz Gan sent at 12/12/2022 12:16 PM CST -----  Contact: [phone]  .1 Patient would like to get medical advice.  Symptoms (please be specific):sciatic pain  How long has patient had these symptoms: 12/05  Pharmacy name and phone#:[phone] - [id number] - [address] Phone:  [phone]  Fax:  [phone]  Any drug allergies: on file  Comments: Patient would like to get medical advice from Dr Hernández's nurse. Thank you

## 2022-12-12 NOTE — TELEPHONE ENCOUNTER
Patient is still dealing with severe pain from sciatica and she can't take it anymore. She can't function at home the way she is used too. She wants to know what we can do to help her. The medications are just temporary relief for not long.

## 2022-12-13 ENCOUNTER — HOSPITAL ENCOUNTER (EMERGENCY)
Facility: OTHER | Age: 67
Discharge: HOME | End: 2022-12-13
Attending: EMERGENCY MEDICINE
Payer: MEDICARE

## 2022-12-13 VITALS
HEIGHT: 62 IN | DIASTOLIC BLOOD PRESSURE: 59 MMHG | RESPIRATION RATE: 17 BRPM | BODY MASS INDEX: 44.16 KG/M2 | SYSTOLIC BLOOD PRESSURE: 138 MMHG | HEART RATE: 82 BPM | WEIGHT: 240 LBS | TEMPERATURE: 98 F | OXYGEN SATURATION: 98 %

## 2022-12-13 DIAGNOSIS — M54.17 LUMBOSACRAL RADICULOPATHY: ICD-10-CM

## 2022-12-13 DIAGNOSIS — M54.41 ACUTE RIGHT-SIDED LOW BACK PAIN WITH RIGHT-SIDED SCIATICA: Primary | ICD-10-CM

## 2022-12-13 PROCEDURE — 96372 THER/PROPH/DIAG INJ SC/IM: CPT | Performed by: PHYSICIAN ASSISTANT

## 2022-12-13 PROCEDURE — 99284 EMERGENCY DEPT VISIT MOD MDM: CPT

## 2022-12-13 PROCEDURE — 63600175 PHARM REV CODE 636 W HCPCS: Performed by: PHYSICIAN ASSISTANT

## 2022-12-13 PROCEDURE — 25000003 PHARM REV CODE 250: Performed by: PHYSICIAN ASSISTANT

## 2022-12-13 RX ORDER — METHOCARBAMOL 500 MG/1
1000 TABLET, FILM COATED ORAL 3 TIMES DAILY
Qty: 30 TABLET | Refills: 0 | Status: SHIPPED | OUTPATIENT
Start: 2022-12-13 | End: 2022-12-18

## 2022-12-13 RX ORDER — LIDOCAINE 50 MG/G
1 PATCH TOPICAL
Status: DISCONTINUED | OUTPATIENT
Start: 2022-12-13 | End: 2022-12-13 | Stop reason: HOSPADM

## 2022-12-13 RX ORDER — HYDROCODONE BITARTRATE AND ACETAMINOPHEN 10; 325 MG/1; MG/1
1 TABLET ORAL EVERY 6 HOURS PRN
Qty: 12 TABLET | Refills: 0 | Status: SHIPPED | OUTPATIENT
Start: 2022-12-13 | End: 2023-01-04

## 2022-12-13 RX ORDER — KETOROLAC TROMETHAMINE 30 MG/ML
30 INJECTION, SOLUTION INTRAMUSCULAR; INTRAVENOUS
Status: COMPLETED | OUTPATIENT
Start: 2022-12-13 | End: 2022-12-13

## 2022-12-13 RX ORDER — LIDOCAINE 50 MG/G
1 PATCH TOPICAL DAILY
Qty: 15 PATCH | Refills: 0 | Status: SHIPPED | OUTPATIENT
Start: 2022-12-13 | End: 2023-01-04 | Stop reason: SDUPTHER

## 2022-12-13 RX ORDER — OXYCODONE AND ACETAMINOPHEN 5; 325 MG/1; MG/1
1 TABLET ORAL
Status: COMPLETED | OUTPATIENT
Start: 2022-12-13 | End: 2022-12-13

## 2022-12-13 RX ADMIN — OXYCODONE HYDROCHLORIDE AND ACETAMINOPHEN 1 TABLET: 5; 325 TABLET ORAL at 06:12

## 2022-12-13 RX ADMIN — KETOROLAC TROMETHAMINE 30 MG: 30 INJECTION, SOLUTION INTRAMUSCULAR; INTRAVENOUS at 06:12

## 2022-12-13 RX ADMIN — LIDOCAINE 1 PATCH: 50 PATCH CUTANEOUS at 06:12

## 2022-12-13 NOTE — ED TRIAGE NOTES
Pt c/o sciatica pain to RLE with tingling. Denies weakness. Seen 2 weeks ago for this and given steroids and muscle relaxers. States she has had increasing difficulty ambulating over the last two weeks ago.

## 2022-12-13 NOTE — FIRST PROVIDER EVALUATION
Emergency Department TeleTriage Encounter Note      CHIEF COMPLAINT    Chief Complaint   Patient presents with    Sciatica     Patient to ED with c/o right sided sciatica pain since December 5th       VITAL SIGNS   Initial Vitals [12/13/22 1550]   BP Pulse Resp Temp SpO2   131/76 (!) 120 16 99.4 °F (37.4 °C) 97 %      MAP       --            ALLERGIES    Review of patient's allergies indicates:   Allergen Reactions    Codeine Itching    Iodine and iodide containing products Itching       PROVIDER TRIAGE NOTE  This is a teletriage evaluation of a 67 y.o. female presenting to the ED complaining of sciatica. Patient reports pain to the right lower back that radiates into posterior right leg. She has had pain since December 5. She is taking tylenol without relief.     Initial orders will be placed and care will be transferred to an alternate provider when patient is roomed for a full evaluation. Any additional orders and the final disposition will be determined by that provider.         ORDERS  Labs Reviewed - No data to display    ED Orders (720h ago, onward)      None              Virtual Visit Note: The provider triage portion of this emergency department evaluation and documentation was performed via [employer], a HIPAA-compliant telemedicine application, in concert with a tele-presenter in the room. A face to face patient evaluation with one of my colleagues will occur once the patient is placed in an emergency department room.      DISCLAIMER: This note was prepared with [employer] voice recognition transcription software. Garbled syntax, mangled pronouns, and other bizarre constructions may be attributed to that software system.

## 2022-12-14 NOTE — DISCHARGE INSTRUCTIONS
Take medication as prescribed.  Do not take this with tramadol and Flexeril.  Use heat pack to the region.  Follow-up with orthopedist for further management and evaluation.  Likely require outpatient MRI for symptoms persist or worsen.

## 2022-12-14 NOTE — ED PROVIDER NOTES
Encounter Date: 12/13/2022       History     Chief Complaint   Patient presents with    Sciatica     Patient to ED with c/o right sided sciatica pain since December 5th     67-year-old female with history of hypertension anemia, hypothyroidism, sciatica presents ER for evaluation right-sided low back pain.  Patient reports symptoms started on the 2nd of December and has persisted.  Reports pain that worsens upon prolonged standing or walking.  Reports that the pain radiates from her right low back down to her buttock and lower extremity.  Denies any paresthesia focal weakness otherwise.  She denies any recent fall or trauma.  Patient was seen by primary doctor who prescribed home on steroids and pain medicine.  She reports some alleviation with that.  Denies any flank pain or UTI symptoms including dysuria hematuria.  No previous back surgeries.  Does not follow with a specialist.    The history is provided by the patient.   Review of patient's allergies indicates:   Allergen Reactions    Codeine Itching    Iodine and iodide containing products Itching     Past Medical History:   Diagnosis Date    Anemia due to chronic blood loss 3/6/2020    Anemia of chronic disorder 8/24/2020    Anxiety     Diverticulosis     Gastric ulcer     old    GERD (gastroesophageal reflux disease)     Hepatic steatosis 9/12/2016    Hyperbilirubinemia     Hypertension     Hypothyroidism     Iron deficiency anemia due to chronic blood loss 8/24/2020    Microcytic anemia 8/24/2020    Peptic ulcer disease 9/12/2016    UPJ (ureteropelvic junction) obstruction     Vertigo     Vertigo      Past Surgical History:   Procedure Laterality Date    APPENDECTOMY      CHOLECYSTECTOMY      COLONOSCOPY N/A 7/6/2020    Procedure: COLONOSCOPY;  Surgeon: Leander Cornejo MD;  Location: [facility];  Service: Colon and Rectal;  Laterality: N/A;    ESOPHAGOGASTRODUODENOSCOPY N/A 6/12/2019    Procedure: ESOPHAGOGASTRODUODENOSCOPY (EGD);  Surgeon: Arben Brown,  MD;  Location: [facility] ENDO;  Service: Endoscopy;  Laterality: N/A;    ESOPHAGOGASTRODUODENOSCOPY N/A 3/9/2020    Procedure: EGD (ESOPHAGOGASTRODUODENOSCOPY);  Surgeon: Andrea Salomon MD;  Location: [facility] ENDO;  Service: Endoscopy;  Laterality: N/A;    ESOPHAGOGASTRODUODENOSCOPY N/A 9/21/2020    Procedure: EGD (ESOPHAGOGASTRODUODENOSCOPY);  Surgeon: Antwon Gardner MD;  Location: [facility] ENDO;  Service: Endoscopy;  Laterality: N/A;    HYSTERECTOMY      INTRALUMINAL GASTROINTESTINAL TRACT IMAGING VIA CAPSULE N/A 1/19/2022    Procedure: IMAGING PROCEDURE, GI TRACT, INTRALUMINAL, VIA CAPSULE;  Surgeon: Antwon Gardner MD;  Location: [facility];  Service: Endoscopy;  Laterality: N/A;    OOPHORECTOMY      PANCREAS SURGERY      TONSILLECTOMY      TRANSFORAMINAL EPIDURAL INJECTION OF STEROID Right 10/14/2021    Procedure: Injection,steroid,epidural,transforaminal approach---RIGHT L5 AND S1;  Surgeon: Cheng Tariq Jr., MD;  Location: [facility] PAIN MGMT;  Service: Pain Management;  Laterality: Right;     Family History   Problem Relation Age of Onset    Lupus Sister     Arthritis Brother     No Known Problems Daughter     Mental illness Son         PTSD x 1 son in     Lupus Sister      Social History     Tobacco Use    Smoking status: Never    Smokeless tobacco: Never   Substance Use Topics    Alcohol use: No     Comment: 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  However, no consumption in 20 years.     Drug use: No     Review of Systems   Constitutional:  Negative for chills and fever.   HENT:  Negative for congestion.    Eyes:  Negative for visual disturbance.   Respiratory:  Negative for shortness of breath.    Cardiovascular:  Negative for chest pain.   Gastrointestinal:  Negative for nausea and vomiting.   Genitourinary:  Negative for dysuria, flank pain, hematuria and pelvic pain.   Musculoskeletal:  Positive for back pain and myalgias. Negative for arthralgias and joint swelling.   Skin:  Negative for rash.    Allergic/Immunologic: Negative for immunocompromised state.   Neurological:  Negative for weakness and numbness.   Hematological:  Does not bruise/bleed easily.   Psychiatric/Behavioral:  Negative for confusion.      Physical Exam     Initial Vitals [12/13/22 1550]   BP Pulse Resp Temp SpO2   131/76 (!) 120 16 99.4 °F (37.4 °C) 97 %      MAP       --         Physical Exam    Vitals reviewed.  Constitutional: She appears well-developed and well-nourished. She is not diaphoretic. No distress.   HENT:   Head: Normocephalic and atraumatic.   Eyes: Conjunctivae and EOM are normal.   Neck: Neck supple.   Pulmonary/Chest: No respiratory distress.   Musculoskeletal:      Cervical back: Normal and neck supple.      Thoracic back: Normal.      Lumbar back: Tenderness and bony tenderness present. Positive right straight leg raise test.     Neurological: She is alert and oriented to person, place, and time. She has normal strength. No sensory deficit. GCS score is 15. GCS eye subscore is 4. GCS verbal subscore is 5. GCS motor subscore is 6.   Skin: Skin is warm.       ED Course   Procedures  Labs Reviewed - No data to display       Imaging Results    None          Medications   LIDOcaine 5 % patch 1 patch (1 patch Transdermal Patch Applied 12/13/22 1817)   ketorolac injection 30 mg (30 mg Intramuscular Given 12/13/22 1816)   oxyCODONE-acetaminophen 5-325 mg per tablet 1 tablet (1 tablet Oral Given 12/13/22 1816)           APC / Resident Notes:   Patient in the ER promptly upon arrival.  She is afebrile, no acute distress.  Physical examination reveals tenderness on palpation to the paraspinal muscle of the lumbar spine.  Pain on straight leg raise.  No focal neurological deficit on exam.  She is ambulatory in the ED.  Distal pulses intact and equal bilaterally.      ED Course as of 12/13/22 1916   Tue Dec 13, 2022   1910 On reassessment patient is resting comfortably.  Has had some improvement with the medications provided to  her.    Will prescribe home on [place] instead of the tramadol.  Will also prescribed Robaxin instead of the Flexeril and Lidoderm patch.  Will give referral to back and spine specialist.  Advised use heat pack to the region.  Patient will likely require outpatient MRI if symptoms persist or worsen.  She was however given strict return precautions ED which was agreeable to.  Stable for discharge and close follow-up. [AJ]      ED Course User Index  [AJ] Areli Tarango PA-C                 Clinical Impression:   Final diagnoses:  [M54.41] Acute right-sided low back pain with right-sided sciatica (Primary)  [M54.17] Lumbosacral radiculopathy        ED Disposition Condition    Discharge Stable          ED Prescriptions       Medication Sig Dispense Start Date End Date Auth. Provider    HYDROcodone-acetaminophen (NORCO)  mg per tablet Take 1 tablet by mouth every 6 (six) hours as needed for Pain. 12 tablet 12/13/2022 -- Areli Tarango PA-C    methocarbamoL (ROBAXIN) 500 MG Tab Take 2 tablets (1,000 mg total) by mouth 3 (three) times daily. for 5 days 30 tablet 12/13/2022 12/18/2022 Areli Tarango PA-C    LIDOcaine (LIDODERM) 5 % Place 1 patch onto the skin once daily. Remove & Discard patch within 12 hours or as directed by MD 15 patch 12/13/2022 -- Areli Tarango PA-C          Follow-up Information       Follow up With Specialties Details Why Contact Info Additional Information    Jose Hernández MD Family Medicine   [address] [place] [address]  [phone]       [facility] 5th Fl Orthopedics   [address]th [address]  [facility] [address] [phone] [facility] - Main Building, 5th Floor Please park in [facility] and take Atrium elevator             Areli Tarango PA-C  12/13/22 2414

## 2022-12-19 DIAGNOSIS — M54.16 LUMBAR RADICULOPATHY: Primary | ICD-10-CM

## 2022-12-28 RX ORDER — GABAPENTIN 600 MG/1
600 TABLET ORAL 3 TIMES DAILY
Qty: 90 TABLET | Refills: 5 | Status: SHIPPED | OUTPATIENT
Start: 2022-12-28 | End: 2023-06-22

## 2022-12-28 NOTE — TELEPHONE ENCOUNTER
----- Message from Daniela Medrano sent at 12/28/2022 12:42 PM CST -----  Contact: Self/[phone]  Pt said that she is calling in regards to needing to get a return call from the nurse about some appt's. Please advise

## 2022-12-28 NOTE — TELEPHONE ENCOUNTER
Returned patients call. Patient asked questions about her appt dates and times. Also rescheduled her AWV to [facility]. She also needs a refill on her gabapentin. I let her know I would send it to the Dr. Hernández.

## 2022-12-28 NOTE — TELEPHONE ENCOUNTER
No new care gaps identified.  [facility] Embedded Care Gaps. Reference number: [id number]. 12/28/2022   1:01:12 PM CST

## 2023-01-03 DIAGNOSIS — M51.36 DDD (DEGENERATIVE DISC DISEASE), LUMBAR: Primary | ICD-10-CM

## 2023-01-03 NOTE — PROGRESS NOTES
DATE: 1/5/2023  PATIENT: Lo Escalera    Supervising Physician: Yobani Alonzo M.D.    CHIEF COMPLAINT: low back and right leg pain    HISTORY:  Lo Escalera is a 67 y.o. female here for initial evaluation of low back and right leg pain (Back - 5, Leg - 6).  The pain in the right posterior/lateral leg is what bothers her most.  The pain has been present for years but has progressively worsened over the past year. The patient describes the pain as dull.  The pain is worse with walking and standing and improved by rest. There is associated numbness and tingling. There is subjective weakness. Prior treatments have included advil, tylenol, Gabapentin and an PAM in 2021 that gave minimal relief in her pain at the time, but no surgery.    The patient denies myelopathic symptoms such as handwriting changes or difficulty with buttons/coins/keys. Denies perineal paresthesias, bowel/bladder dysfunction.    PAST MEDICAL/SURGICAL HISTORY:  Past Medical History:   Diagnosis Date    Anemia due to chronic blood loss 03/06/2020    Anemia of chronic disorder 08/24/2020    Anxiety     Cirrhosis     Coronary artery disease     Diverticulosis     Gastric ulcer     old    GERD (gastroesophageal reflux disease)     Hepatic steatosis 09/12/2016    Hyperbilirubinemia     Hypertension     Hypothyroidism     Iron deficiency anemia due to chronic blood loss 08/24/2020    Microcytic anemia 08/24/2020    Obesity     Peptic ulcer disease 09/12/2016    UPJ (ureteropelvic junction) obstruction     Vertigo     Vertigo      Past Surgical History:   Procedure Laterality Date    APPENDECTOMY      CHOLECYSTECTOMY      COLONOSCOPY N/A 7/6/2020    Procedure: COLONOSCOPY;  Surgeon: Leander Cornejo MD;  Location: [facility];  Service: Colon and Rectal;  Laterality: N/A;    ESOPHAGOGASTRODUODENOSCOPY N/A 6/12/2019    Procedure: ESOPHAGOGASTRODUODENOSCOPY (EGD);  Surgeon: Arben Brown MD;  Location: [facility];  Service: Endoscopy;   Laterality: N/A;    ESOPHAGOGASTRODUODENOSCOPY N/A 3/9/2020    Procedure: EGD (ESOPHAGOGASTRODUODENOSCOPY);  Surgeon: Andrea Salomon MD;  Location: [facility] ENDO;  Service: Endoscopy;  Laterality: N/A;    ESOPHAGOGASTRODUODENOSCOPY N/A 9/21/2020    Procedure: EGD (ESOPHAGOGASTRODUODENOSCOPY);  Surgeon: Antwon Gardner MD;  Location: [facility] ENDO;  Service: Endoscopy;  Laterality: N/A;    HYSTERECTOMY      INTRALUMINAL GASTROINTESTINAL TRACT IMAGING VIA CAPSULE N/A 1/19/2022    Procedure: IMAGING PROCEDURE, GI TRACT, INTRALUMINAL, VIA CAPSULE;  Surgeon: Antwon Gardner MD;  Location: [facility] ENDO;  Service: Endoscopy;  Laterality: N/A;    OOPHORECTOMY      PANCREAS SURGERY      TONSILLECTOMY      TRANSFORAMINAL EPIDURAL INJECTION OF STEROID Right 10/14/2021    Procedure: Injection,steroid,epidural,transforaminal approach---RIGHT L5 AND S1;  Surgeon: Cheng Tariq Jr., MD;  Location: [facility] PAIN MGMT;  Service: Pain Management;  Laterality: Right;       Medications:   Current Outpatient Medications on File Prior to Visit   Medication Sig Dispense Refill    calcium carbonate (TUMS) 200 mg calcium (500 mg) chewable tablet Take 1 tablet by mouth once daily.      DULoxetine (CYMBALTA) 30 MG capsule Take 1 capsule (30 mg total) by mouth once daily. 30 capsule 11    ferrous sulfate (FEOSOL) 325 mg (65 mg iron) Tab tablet TAKE 1 TABLET (325 MG TOTAL) BY MOUTH ONCE DAILY. 90 tablet 0    gabapentin (NEURONTIN) 600 MG tablet Take 1 tablet (600 mg total) by mouth 3 (three) times daily. 90 tablet 5    LIDOcaine (LIDODERM) 5 % Place 1 patch onto the skin once daily. Remove & Discard patch within 12 hours or as directed by MD 15 patch 2    meclizine (ANTIVERT) 25 mg tablet Take 1 tablet (25 mg total) by mouth 3 (three) times daily as needed. 60 tablet 5    pantoprazole (PROTONIX) 40 MG tablet Take 1 tablet (40 mg total) by mouth 2 (two) times daily. 180 tablet 1    potassium 99 mg Tab Take by mouth once daily.       "traMADoL (ULTRAM) 50 mg tablet Take 1 tablet (50 mg total) by mouth every 12 (twelve) hours as needed for Pain. (Patient not taking: Reported on 1/5/2023) 25 tablet 0     No current facility-administered medications on file prior to visit.       Social History:   Social History     Socioeconomic History    Marital status:    Tobacco Use    Smoking status: Never    Smokeless tobacco: Never   Substance and Sexual Activity    Alcohol use: No     Comment: 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  However, no consumption in 20 years.     Drug use: No    Sexual activity: Not Currently       REVIEW OF SYSTEMS:  Constitution: Negative. Negative for chills, fever and night sweats.   Cardiovascular: Negative for chest pain and syncope.   Respiratory: Negative for cough and shortness of breath.   Gastrointestinal: See HPI. Negative for nausea/vomiting. Negative for abdominal pain.  Genitourinary: See HPI. Negative for discoloration or dysuria.  Skin: Negative for dry skin, itching and rash.   Hematologic/Lymphatic: Negative for bleeding problem. Does not bruise/bleed easily.   Musculoskeletal: Negative for falls and muscle weakness.   Neurological: See HPI. No seizures.   Endocrine: Negative for polydipsia, polyphagia and polyuria.   Allergic/Immunologic: Negative for hives and persistent infections.     EXAM:  Ht 5' 2" (1.575 m)   Wt 108.6 kg (239 lb 5 oz)   LMP  (LMP Unknown)   BMI 43.77 kg/m²     General: The patient is a very pleasant 67 y.o. female in no apparent distress, the patient is oriented to person, place and time.  Psych: Normal mood and affect  HEENT: Vision grossly intact, hearing intact to the spoken word.  Lungs: Respirations unlabored.  Gait: antalgic station and gait, no difficulty with toe or heel walk.   Skin: Dorsal lumbar skin negative for rashes, lesions, hairy patches and surgical scars. There is mild lumbar tenderness to palpation.  Range of motion: Lumbar range of motion is acceptable.  Spinal Balance: " Global saggital and coronal spinal balance acceptable, not significant for scoliosis and kyphosis.  Musculoskeletal: No pain with the range of motion of the bilateral hips. No trochanteric tenderness to palpation.  Vascular: Bilateral lower extremities warm and well perfused, dorsalis pedis pulses 2+ bilaterally.  Neurological: Normal strength and tone in all major motor groups in the bilateral lower extremities. decreased sensation to light touch in the L2-S1 dermatomes bilaterally.  Deep tendon reflexes symmetric 2+ in the bilateral lower extremities.  Negative Babinski bilaterally. Straight leg raise positive bilaterally.    IMAGING:      Today I personally reviewed AP, Lat and Flex/Ex  upright L-spine films that demonstrate grade 1 anterolisthesis of L4 on L5.       Body mass index is 43.77 kg/m².    No results found for: HGBA1C        ASSESSMENT/PLAN:    Lo was seen today for pain.    Diagnoses and all orders for this visit:    Acute right-sided low back pain with right-sided sciatica  -     Ambulatory referral/consult to Orthopedics    Lumbosacral radiculopathy  -     Ambulatory referral/consult to Orthopedics  -     Procedure Order to Pain Management; Future    Other orders  -     methylPREDNISolone (MEDROL DOSEPACK) 4 mg tablet; use as directed      Today we discussed at length all of the different treatment options including anti-inflammatories, acetaminophen, rest, ice, heat, physical therapy including strengthening and stretching exercises, home exercises, ROM, aerobic conditioning, aqua therapy, other modalities including ultrasound, massage, and dry needling, epidural steroid injections and finally surgical intervention.    Medrol dose pack ordered. Right L4/5 & L5/S1 TF PAM ordered with pain management. She may follow up 2 weeks after if her pain persists.

## 2023-01-04 ENCOUNTER — OFFICE VISIT (OUTPATIENT)
Dept: PRIMARY CARE CLINIC | Facility: CLINIC | Age: 68
End: 2023-01-04
Payer: MEDICARE

## 2023-01-04 VITALS
WEIGHT: 247.38 LBS | DIASTOLIC BLOOD PRESSURE: 60 MMHG | TEMPERATURE: 99 F | BODY MASS INDEX: 45.52 KG/M2 | OXYGEN SATURATION: 98 % | RESPIRATION RATE: 16 BRPM | HEART RATE: 114 BPM | HEIGHT: 62 IN | SYSTOLIC BLOOD PRESSURE: 124 MMHG

## 2023-01-04 DIAGNOSIS — Z12.31 ENCOUNTER FOR SCREENING MAMMOGRAM FOR BREAST CANCER: ICD-10-CM

## 2023-01-04 DIAGNOSIS — Z87.19 HISTORY OF SMALL INTESTINE ULCER: ICD-10-CM

## 2023-01-04 DIAGNOSIS — Z23 NEED FOR VACCINATION: ICD-10-CM

## 2023-01-04 DIAGNOSIS — I10 ESSENTIAL HYPERTENSION: ICD-10-CM

## 2023-01-04 DIAGNOSIS — F33.0 MILD EPISODE OF RECURRENT MAJOR DEPRESSIVE DISORDER: ICD-10-CM

## 2023-01-04 DIAGNOSIS — Z00.00 ENCOUNTER FOR PREVENTIVE HEALTH EXAMINATION: Primary | ICD-10-CM

## 2023-01-04 DIAGNOSIS — F41.1 GAD (GENERALIZED ANXIETY DISORDER): ICD-10-CM

## 2023-01-04 DIAGNOSIS — D50.0 IRON DEFICIENCY ANEMIA DUE TO CHRONIC BLOOD LOSS: ICD-10-CM

## 2023-01-04 DIAGNOSIS — I65.23 CAROTID ATHEROSCLEROSIS, BILATERAL: ICD-10-CM

## 2023-01-04 DIAGNOSIS — I70.0 AORTIC ATHEROSCLEROSIS: ICD-10-CM

## 2023-01-04 DIAGNOSIS — G89.29 OTHER CHRONIC PAIN: ICD-10-CM

## 2023-01-04 DIAGNOSIS — K76.6 PORTAL HYPERTENSION: ICD-10-CM

## 2023-01-04 DIAGNOSIS — K70.31 ALCOHOLIC CIRRHOSIS OF LIVER WITH ASCITES: ICD-10-CM

## 2023-01-04 DIAGNOSIS — F10.11 ALCOHOL ABUSE, IN REMISSION: ICD-10-CM

## 2023-01-04 DIAGNOSIS — D50.9 MICROCYTIC ANEMIA: ICD-10-CM

## 2023-01-04 DIAGNOSIS — D69.6 THROMBOCYTOPENIA: ICD-10-CM

## 2023-01-04 PROBLEM — R06.02 SHORTNESS OF BREATH: Status: RESOLVED | Noted: 2021-06-17 | Resolved: 2023-01-04

## 2023-01-04 PROBLEM — R55 SYNCOPE: Status: RESOLVED | Noted: 2022-03-27 | Resolved: 2023-01-04

## 2023-01-04 PROBLEM — R53.81 DEBILITY: Status: RESOLVED | Noted: 2020-03-08 | Resolved: 2023-01-04

## 2023-01-04 PROBLEM — R10.13 EPIGASTRIC PAIN: Status: RESOLVED | Noted: 2020-04-15 | Resolved: 2023-01-04

## 2023-01-04 PROBLEM — R07.9 CHEST PAIN OF UNCERTAIN ETIOLOGY: Status: RESOLVED | Noted: 2021-07-22 | Resolved: 2023-01-04

## 2023-01-04 PROBLEM — K92.2 UGIB (UPPER GASTROINTESTINAL BLEED): Status: RESOLVED | Noted: 2020-03-06 | Resolved: 2023-01-04

## 2023-01-04 PROBLEM — R55 SYNCOPE AND COLLAPSE: Status: RESOLVED | Noted: 2021-07-22 | Resolved: 2023-01-04

## 2023-01-04 PROBLEM — R42 DIZZINESS: Status: RESOLVED | Noted: 2021-06-17 | Resolved: 2023-01-04

## 2023-01-04 PROBLEM — M75.101 ROTATOR CUFF SYNDROME OF RIGHT SHOULDER: Status: RESOLVED | Noted: 2022-06-10 | Resolved: 2023-01-04

## 2023-01-04 PROBLEM — S39.012A LUMBOSACRAL STRAIN: Status: RESOLVED | Noted: 2021-04-13 | Resolved: 2023-01-04

## 2023-01-04 PROBLEM — J32.9 SINUSITIS: Status: RESOLVED | Noted: 2021-03-02 | Resolved: 2023-01-04

## 2023-01-04 PROBLEM — H81.10 BENIGN PAROXYSMAL POSITIONAL VERTIGO: Status: RESOLVED | Noted: 2021-07-22 | Resolved: 2023-01-04

## 2023-01-04 PROBLEM — R42 LIGHTHEADEDNESS: Status: RESOLVED | Noted: 2022-03-27 | Resolved: 2023-01-04

## 2023-01-04 PROBLEM — R42 VERTIGO: Status: RESOLVED | Noted: 2020-03-06 | Resolved: 2023-01-04

## 2023-01-04 PROCEDURE — 1125F AMNT PAIN NOTED PAIN PRSNT: CPT | Mod: HCNC,CPTII,S$GLB, | Performed by: NURSE PRACTITIONER

## 2023-01-04 PROCEDURE — 99999 PR PBB SHADOW E&M-EST. PATIENT-LVL V: ICD-10-PCS | Mod: PBBFAC,HCNC,, | Performed by: NURSE PRACTITIONER

## 2023-01-04 PROCEDURE — 3078F DIAST BP <80 MM HG: CPT | Mod: HCNC,CPTII,S$GLB, | Performed by: NURSE PRACTITIONER

## 2023-01-04 PROCEDURE — 3288F PR FALLS RISK ASSESSMENT DOCUMENTED: ICD-10-PCS | Mod: HCNC,CPTII,S$GLB, | Performed by: NURSE PRACTITIONER

## 2023-01-04 PROCEDURE — 3074F SYST BP LT 130 MM HG: CPT | Mod: HCNC,CPTII,S$GLB, | Performed by: NURSE PRACTITIONER

## 2023-01-04 PROCEDURE — 1125F PR PAIN SEVERITY QUANTIFIED, PAIN PRESENT: ICD-10-PCS | Mod: HCNC,CPTII,S$GLB, | Performed by: NURSE PRACTITIONER

## 2023-01-04 PROCEDURE — G0008 FLU VACCINE - QUADRIVALENT - ADJUVANTED: ICD-10-PCS | Mod: HCNC,S$GLB,, | Performed by: NURSE PRACTITIONER

## 2023-01-04 PROCEDURE — 1170F FXNL STATUS ASSESSED: CPT | Mod: HCNC,CPTII,S$GLB, | Performed by: NURSE PRACTITIONER

## 2023-01-04 PROCEDURE — G0008 ADMIN INFLUENZA VIRUS VAC: HCPCS | Mod: HCNC,S$GLB,, | Performed by: NURSE PRACTITIONER

## 2023-01-04 PROCEDURE — 1159F MED LIST DOCD IN RCRD: CPT | Mod: HCNC,CPTII,S$GLB, | Performed by: NURSE PRACTITIONER

## 2023-01-04 PROCEDURE — 1160F RVW MEDS BY RX/DR IN RCRD: CPT | Mod: HCNC,CPTII,S$GLB, | Performed by: NURSE PRACTITIONER

## 2023-01-04 PROCEDURE — 3288F FALL RISK ASSESSMENT DOCD: CPT | Mod: HCNC,CPTII,S$GLB, | Performed by: NURSE PRACTITIONER

## 2023-01-04 PROCEDURE — 1170F PR FUNCTIONAL STATUS ASSESSED: ICD-10-PCS | Mod: HCNC,CPTII,S$GLB, | Performed by: NURSE PRACTITIONER

## 2023-01-04 PROCEDURE — 90694 VACC AIIV4 NO PRSRV 0.5ML IM: CPT | Mod: HCNC,S$GLB,, | Performed by: NURSE PRACTITIONER

## 2023-01-04 PROCEDURE — G0439 PR MEDICARE ANNUAL WELLNESS SUBSEQUENT VISIT: ICD-10-PCS | Mod: HCNC,S$GLB,, | Performed by: NURSE PRACTITIONER

## 2023-01-04 PROCEDURE — 3008F PR BODY MASS INDEX (BMI) DOCUMENTED: ICD-10-PCS | Mod: HCNC,CPTII,S$GLB, | Performed by: NURSE PRACTITIONER

## 2023-01-04 PROCEDURE — 3078F PR MOST RECENT DIASTOLIC BLOOD PRESSURE < 80 MM HG: ICD-10-PCS | Mod: HCNC,CPTII,S$GLB, | Performed by: NURSE PRACTITIONER

## 2023-01-04 PROCEDURE — 1100F PTFALLS ASSESS-DOCD GE2>/YR: CPT | Mod: HCNC,CPTII,S$GLB, | Performed by: NURSE PRACTITIONER

## 2023-01-04 PROCEDURE — 1159F PR MEDICATION LIST DOCUMENTED IN MEDICAL RECORD: ICD-10-PCS | Mod: HCNC,CPTII,S$GLB, | Performed by: NURSE PRACTITIONER

## 2023-01-04 PROCEDURE — G0439 PPPS, SUBSEQ VISIT: HCPCS | Mod: HCNC,S$GLB,, | Performed by: NURSE PRACTITIONER

## 2023-01-04 PROCEDURE — 3008F BODY MASS INDEX DOCD: CPT | Mod: HCNC,CPTII,S$GLB, | Performed by: NURSE PRACTITIONER

## 2023-01-04 PROCEDURE — 1160F PR REVIEW ALL MEDS BY PRESCRIBER/CLIN PHARMACIST DOCUMENTED: ICD-10-PCS | Mod: HCNC,CPTII,S$GLB, | Performed by: NURSE PRACTITIONER

## 2023-01-04 PROCEDURE — 1100F PR PT FALLS ASSESS DOC 2+ FALLS/FALL W/INJURY/YR: ICD-10-PCS | Mod: HCNC,CPTII,S$GLB, | Performed by: NURSE PRACTITIONER

## 2023-01-04 PROCEDURE — 90694 FLU VACCINE - QUADRIVALENT - ADJUVANTED: ICD-10-PCS | Mod: HCNC,S$GLB,, | Performed by: NURSE PRACTITIONER

## 2023-01-04 PROCEDURE — 3074F PR MOST RECENT SYSTOLIC BLOOD PRESSURE < 130 MM HG: ICD-10-PCS | Mod: HCNC,CPTII,S$GLB, | Performed by: NURSE PRACTITIONER

## 2023-01-04 PROCEDURE — 99999 PR PBB SHADOW E&M-EST. PATIENT-LVL V: CPT | Mod: PBBFAC,HCNC,, | Performed by: NURSE PRACTITIONER

## 2023-01-04 RX ORDER — DULOXETIN HYDROCHLORIDE 30 MG/1
30 CAPSULE, DELAYED RELEASE ORAL DAILY
Qty: 30 CAPSULE | Refills: 11 | Status: SHIPPED | OUTPATIENT
Start: 2023-01-04 | End: 2023-05-09 | Stop reason: ALTCHOICE

## 2023-01-04 RX ORDER — LIDOCAINE 50 MG/G
1 PATCH TOPICAL DAILY
Qty: 15 PATCH | Refills: 2 | Status: SHIPPED | OUTPATIENT
Start: 2023-01-04 | End: 2023-09-19

## 2023-01-04 NOTE — PATIENT INSTRUCTIONS
Counseling and Referral of Other Preventative  (Italic type indicates deductible and co-insurance are waived)    Patient Name: Lo Escalera  Today's Date: 1/4/2023    Health Maintenance       Date Due Completion Date    Aspirin/Antiplatelet Therapy Never done ---    Hemoglobin A1c (Diabetic Prevention Screening) Never done ---    Mammogram 03/15/2022 3/15/2021    COVID-19 Vaccine (5 - Booster for Pfizer series) 06/24/2022 4/29/2022    Shingles Vaccine (1 of 2) 04/11/2023 (Originally 7/31/2006) ---    Pneumococcal Vaccines (Age 65+) (2 - PCV) 04/11/2023 (Originally 3/15/2022) 3/15/2021    Colorectal Cancer Screening 07/06/2023 7/6/2020    DEXA Scan 11/17/2024 11/17/2021    Lipid Panel 05/15/2025 5/15/2020    TETANUS VACCINE 03/15/2031 3/15/2021        Orders Placed This Encounter   Procedures    Mammo Digital Screening Bilat w/ Demario    Influenza (FLUAD) - Quadrivalent (Adjuvanted) *Preferred* (65+) (PF)    Ambulatory referral/consult to Psychology     The following information is provided to all patients.  This information is to help you find resources for any of the problems found today that may be affecting your health:                Living healthy guide: www.[facility].[place].gov      Understanding Diabetes: www.diabetes.org      Eating healthy: www.cdc.gov/healthyweight      CDC home safety checklist: www.cdc.gov/steadi/patient.html      Agency on Aging: www.goea.[place].[facility]      Alcoholics anonymous (AA): www.aa.org      Physical Activity: www.siomara.nih.gov/cp8yqni      Tobacco use: www.quitwithusla.org

## 2023-01-04 NOTE — PROGRESS NOTES
"  Lo Escalera presented for a  Medicare AWV and comprehensive Health Risk Assessment today. The following components were reviewed and updated:    Medical history  Family History  Social history  Allergies and Current Medications  Health Risk Assessment  Health Maintenance  Care Team         ** See Completed Assessments for Annual Wellness Visit within the encounter summary.**         The following assessments were completed:  Living Situation  CAGE  Depression Screening  Timed Get Up and Go  Whisper Test  Cognitive Function Screening  Nutrition Screening  ADL Screening  PAQ Screening  Opioid screening : currently on tramadol chronically. Made aware of alternative treatments for pain management.         Vitals:    01/04/23 1113   BP: 124/60   BP Location: Right arm   Patient Position: Sitting   BP Method: Medium (Manual)   Pulse: (!) 114   Resp: 16   Temp: 98.5 °F (36.9 °C)   TempSrc: Temporal   SpO2: 98%   Weight: 112.2 kg (247 lb 5.7 oz)   Height: 5' 2" (1.575 m)     Body mass index is 45.24 kg/m².  Physical Exam  Constitutional:       General: She is not in acute distress.     Appearance: She is not ill-appearing.   HENT:      Head: Normocephalic.   Cardiovascular:      Rate and Rhythm: Tachycardia present.      Pulses: Normal pulses.      Heart sounds: No murmur heard.  Pulmonary:      Effort: Pulmonary effort is normal. No respiratory distress.      Breath sounds: Normal breath sounds.   Skin:     General: Skin is warm and dry.   Neurological:      General: No focal deficit present.      Mental Status: She is alert.   Psychiatric:         Mood and Affect: Mood is depressed. Affect is tearful.               Diagnoses and health risks identified today and associated recommendations/orders:    1. Encounter for preventive health examination  Flu vaccine today.  Lab work per PCP.  Mammogram ordered and scheduled.    2. Mild episode of recurrent major depressive disorder  Referral placed to Psychology.  Started on " Cymbalta.  Follow-up with PCP for management.  - Ambulatory referral/consult to Psychology; Future  - DULoxetine (CYMBALTA) 30 MG capsule; Take 1 capsule (30 mg total) by mouth once daily.  Dispense: 30 capsule; Refill: 11    3. Aortic atherosclerosis  Would benefit from statin.    4. Body mass index (BMI) 40.0-44.9, adult  Encourage weight loss, dietary modification.    5. Portal hypertension  Would benefit from referral to hepatology.  Continue abstinence from alcohol.    6. Thrombocytopenia  Continue to monitor.    7. Encounter for screening mammogram for breast cancer  Mammogram ordered and scheduled.  - Mammo Digital Screening Bilat w/ Demario; Future    8. History of small intestine ulcer  As managed per Hematology.  Continue iron supplementation.    9. Microcytic anemia  As managed per Hematology.  Continue iron supplementation.    10. Carotid atherosclerosis, bilateral  Stable.  Has managed per PCP.    11. Alcohol abuse, in remission  Continue abstinence.    12. JHOAN (generalized anxiety disorder)  Start Cymbalta.    13. Iron deficiency anemia due to chronic blood loss  Continue iron supplementation.    14. Alcoholic cirrhosis of liver with ascites  Would benefit from referral to hepatology.  Continue abstinence from alcohol.  Avoid hepatotoxic drugs.    15. Essential hypertension  Stable.    16. Need for vaccination  Flu vaccine today.  - Influenza (FLUAD) - Quadrivalent (Adjuvanted) *Preferred* (65+) (PF)    17. Other chronic pain  Start Cymbalta.  - DULoxetine (CYMBALTA) 30 MG capsule; Take 1 capsule (30 mg total) by mouth once daily.  Dispense: 30 capsule; Refill: 11      Provided Lo with a 5-10 year written screening schedule and personal prevention plan. Recommendations were developed using the USPSTF age appropriate recommendations. Education, counseling, and referrals were provided as needed. After Visit Summary printed and given to patient which includes a list of additional screenings\tests  needed.    Follow up in about 1 year (around 1/4/2024) for yearly Enhanced Annual Wellness Exam.    Gillian James, JOCELYNE  I offered to discuss advanced care planning, including how to pick a person who would make decisions for you if you were unable to make them for yourself, called a health care power of , and what kind of decisions you might make such as use of life sustaining treatments such as ventilators and tube feeding when faced with a life limiting illness recorded on a living will that they will need to know. (How you want to be cared for as you near the end of your natural life)     X Patient is interested in learning more about how to make advanced directives.  I provided them paperwork and offered to discuss this with them.

## 2023-01-05 ENCOUNTER — OFFICE VISIT (OUTPATIENT)
Dept: ORTHOPEDICS | Facility: CLINIC | Age: 68
End: 2023-01-05
Payer: MEDICARE

## 2023-01-05 ENCOUNTER — HOSPITAL ENCOUNTER (OUTPATIENT)
Dept: RADIOLOGY | Facility: HOSPITAL | Age: 68
Discharge: HOME | End: 2023-01-05
Attending: REGISTERED NURSE
Payer: MEDICARE

## 2023-01-05 VITALS — HEIGHT: 62 IN | WEIGHT: 239.31 LBS | BODY MASS INDEX: 44.04 KG/M2

## 2023-01-05 DIAGNOSIS — M54.41 ACUTE RIGHT-SIDED LOW BACK PAIN WITH RIGHT-SIDED SCIATICA: ICD-10-CM

## 2023-01-05 DIAGNOSIS — M51.36 DDD (DEGENERATIVE DISC DISEASE), LUMBAR: ICD-10-CM

## 2023-01-05 DIAGNOSIS — M54.17 LUMBOSACRAL RADICULOPATHY: ICD-10-CM

## 2023-01-05 PROCEDURE — 99214 PR OFFICE/OUTPT VISIT, EST, LEVL IV, 30-39 MIN: ICD-10-PCS | Mod: HCNC,S$GLB,, | Performed by: REGISTERED NURSE

## 2023-01-05 PROCEDURE — 72110 X-RAY EXAM L-2 SPINE 4/>VWS: CPT | Mod: TC,HCNC

## 2023-01-05 PROCEDURE — 72110 XR LUMBAR SPINE AP AND LAT WITH FLEX/EXT: ICD-10-PCS | Mod: 26,HCNC,, | Performed by: RADIOLOGY

## 2023-01-05 PROCEDURE — 3008F PR BODY MASS INDEX (BMI) DOCUMENTED: ICD-10-PCS | Mod: HCNC,CPTII,S$GLB, | Performed by: REGISTERED NURSE

## 2023-01-05 PROCEDURE — 72110 X-RAY EXAM L-2 SPINE 4/>VWS: CPT | Mod: 26,HCNC,, | Performed by: RADIOLOGY

## 2023-01-05 PROCEDURE — 1125F AMNT PAIN NOTED PAIN PRSNT: CPT | Mod: HCNC,CPTII,S$GLB, | Performed by: REGISTERED NURSE

## 2023-01-05 PROCEDURE — 3288F PR FALLS RISK ASSESSMENT DOCUMENTED: ICD-10-PCS | Mod: HCNC,CPTII,S$GLB, | Performed by: REGISTERED NURSE

## 2023-01-05 PROCEDURE — 1101F PR PT FALLS ASSESS DOC 0-1 FALLS W/OUT INJ PAST YR: ICD-10-PCS | Mod: HCNC,CPTII,S$GLB, | Performed by: REGISTERED NURSE

## 2023-01-05 PROCEDURE — 3008F BODY MASS INDEX DOCD: CPT | Mod: HCNC,CPTII,S$GLB, | Performed by: REGISTERED NURSE

## 2023-01-05 PROCEDURE — 1125F PR PAIN SEVERITY QUANTIFIED, PAIN PRESENT: ICD-10-PCS | Mod: HCNC,CPTII,S$GLB, | Performed by: REGISTERED NURSE

## 2023-01-05 PROCEDURE — 1159F MED LIST DOCD IN RCRD: CPT | Mod: HCNC,CPTII,S$GLB, | Performed by: REGISTERED NURSE

## 2023-01-05 PROCEDURE — 99999 PR PBB SHADOW E&M-EST. PATIENT-LVL III: ICD-10-PCS | Mod: PBBFAC,HCNC,, | Performed by: REGISTERED NURSE

## 2023-01-05 PROCEDURE — 99214 OFFICE O/P EST MOD 30 MIN: CPT | Mod: HCNC,S$GLB,, | Performed by: REGISTERED NURSE

## 2023-01-05 PROCEDURE — 99999 PR PBB SHADOW E&M-EST. PATIENT-LVL III: CPT | Mod: PBBFAC,HCNC,, | Performed by: REGISTERED NURSE

## 2023-01-05 PROCEDURE — 3288F FALL RISK ASSESSMENT DOCD: CPT | Mod: HCNC,CPTII,S$GLB, | Performed by: REGISTERED NURSE

## 2023-01-05 PROCEDURE — 1159F PR MEDICATION LIST DOCUMENTED IN MEDICAL RECORD: ICD-10-PCS | Mod: HCNC,CPTII,S$GLB, | Performed by: REGISTERED NURSE

## 2023-01-05 PROCEDURE — 1101F PT FALLS ASSESS-DOCD LE1/YR: CPT | Mod: HCNC,CPTII,S$GLB, | Performed by: REGISTERED NURSE

## 2023-01-05 RX ORDER — METHYLPREDNISOLONE 4 MG/1
TABLET ORAL
Qty: 1 EACH | Refills: 0 | Status: SHIPPED | OUTPATIENT
Start: 2023-01-05 | End: 2023-01-26

## 2023-01-06 ENCOUNTER — TELEPHONE (OUTPATIENT)
Dept: ADMINISTRATIVE | Facility: OTHER | Age: 68
End: 2023-01-06
Payer: MEDICARE

## 2023-01-11 ENCOUNTER — TELEPHONE (OUTPATIENT)
Dept: PAIN MEDICINE | Facility: CLINIC | Age: 68
End: 2023-01-11
Payer: MEDICARE

## 2023-01-11 NOTE — TELEPHONE ENCOUNTER
----- Message from Jermain Bhakta sent at 1/11/2023 10:06 AM CST -----  Name Of Caller: Lo        Provider Name: Coni Franco        Does patient feel the need to be seen today? no        Relationship to the Pt?: patient        Contact Preference?: [phone]        What is the nature of the call?: Patient states that she would like to speak with someone in the office in regards to some questions that she has about her upcoming procedure that is scheduled for Friday 1-

## 2023-01-13 ENCOUNTER — HOSPITAL ENCOUNTER (OUTPATIENT)
Facility: OTHER | Age: 68
Discharge: HOME | End: 2023-01-13
Attending: ANESTHESIOLOGY | Admitting: ANESTHESIOLOGY
Payer: MEDICARE

## 2023-01-13 VITALS
TEMPERATURE: 99 F | SYSTOLIC BLOOD PRESSURE: 130 MMHG | HEART RATE: 92 BPM | WEIGHT: 247 LBS | HEIGHT: 62 IN | BODY MASS INDEX: 45.45 KG/M2 | DIASTOLIC BLOOD PRESSURE: 79 MMHG | RESPIRATION RATE: 16 BRPM | OXYGEN SATURATION: 97 %

## 2023-01-13 DIAGNOSIS — G89.29 CHRONIC PAIN: ICD-10-CM

## 2023-01-13 DIAGNOSIS — M51.36 DDD (DEGENERATIVE DISC DISEASE), LUMBAR: Primary | ICD-10-CM

## 2023-01-13 DIAGNOSIS — M54.16 LUMBAR RADICULOPATHY: ICD-10-CM

## 2023-01-13 PROCEDURE — 64484 NJX AA&/STRD TFRM EPI L/S EA: CPT | Mod: HCNC,RT,, | Performed by: ANESTHESIOLOGY

## 2023-01-13 PROCEDURE — 64483 NJX AA&/STRD TFRM EPI L/S 1: CPT | Mod: HCNC,RT,, | Performed by: ANESTHESIOLOGY

## 2023-01-13 PROCEDURE — 64484 NJX AA&/STRD TFRM EPI L/S EA: CPT | Mod: HCNC,RT | Performed by: ANESTHESIOLOGY

## 2023-01-13 PROCEDURE — 64483 NJX AA&/STRD TFRM EPI L/S 1: CPT | Mod: HCNC,RT | Performed by: ANESTHESIOLOGY

## 2023-01-13 PROCEDURE — 64483 PR EPIDURAL INJ, ANES/STEROID, TRANSFORAMINAL, LUMB/SACR, SNGL LEVL: ICD-10-PCS | Mod: HCNC,RT,, | Performed by: ANESTHESIOLOGY

## 2023-01-13 PROCEDURE — 63600175 PHARM REV CODE 636 W HCPCS: Mod: HCNC | Performed by: ANESTHESIOLOGY

## 2023-01-13 PROCEDURE — 64484 PRA INJECT ANES/STEROID FORAMEN LUMBAR/SACRAL W IMG GUIDE ,EA ADD LEVEL: ICD-10-PCS | Mod: HCNC,RT,, | Performed by: ANESTHESIOLOGY

## 2023-01-13 RX ORDER — DIPHENHYDRAMINE HYDROCHLORIDE 50 MG/ML
25 INJECTION INTRAMUSCULAR; INTRAVENOUS ONCE
Status: COMPLETED | OUTPATIENT
Start: 2023-01-13 | End: 2023-01-13

## 2023-01-13 RX ORDER — DIPHENHYDRAMINE HYDROCHLORIDE 50 MG/ML
25 INJECTION INTRAMUSCULAR; INTRAVENOUS ONCE
Status: DISCONTINUED | OUTPATIENT
Start: 2023-01-13 | End: 2023-01-13

## 2023-01-13 RX ORDER — FENTANYL CITRATE 50 UG/ML
INJECTION, SOLUTION INTRAMUSCULAR; INTRAVENOUS
Status: DISCONTINUED | OUTPATIENT
Start: 2023-01-13 | End: 2023-01-13 | Stop reason: HOSPADM

## 2023-01-13 RX ORDER — MIDAZOLAM HYDROCHLORIDE 1 MG/ML
INJECTION INTRAMUSCULAR; INTRAVENOUS
Status: DISCONTINUED | OUTPATIENT
Start: 2023-01-13 | End: 2023-01-13 | Stop reason: HOSPADM

## 2023-01-13 RX ORDER — SODIUM CHLORIDE 9 MG/ML
500 INJECTION, SOLUTION INTRAVENOUS CONTINUOUS
Status: DISCONTINUED | OUTPATIENT
Start: 2023-01-13 | End: 2023-01-13 | Stop reason: HOSPADM

## 2023-01-13 RX ADMIN — DIPHENHYDRAMINE HYDROCHLORIDE 25 MG: 50 INJECTION, SOLUTION INTRAMUSCULAR; INTRAVENOUS at 10:01

## 2023-01-13 NOTE — DISCHARGE SUMMARY
Discharge Note  Short Stay      SUMMARY     Admit Date: 1/13/2023    Attending Physician: Coni Franco      Discharge Physician: Coni Franco      Discharge Date: 1/13/2023 11:22 AM    Procedure(s) (LRB):  INJECTION, STEROID, EPIDURAL, TRANSFORAMINAL APPROACH, RIGHT L4/L5 & L5-S1 CONTRAST DIRECT REF (Right)    Final Diagnosis: Lumbar radiculopathy [M54.16]    Disposition: Home or self care    Patient Instructions:   Current Discharge Medication List        CONTINUE these medications which have NOT CHANGED    Details   calcium carbonate (TUMS) 200 mg calcium (500 mg) chewable tablet Take 1 tablet by mouth once daily.      DULoxetine (CYMBALTA) 30 MG capsule Take 1 capsule (30 mg total) by mouth once daily.  Qty: 30 capsule, Refills: 11    Associated Diagnoses: Mild episode of recurrent major depressive disorder; Other chronic pain      ferrous sulfate (FEOSOL) 325 mg (65 mg iron) Tab tablet TAKE 1 TABLET (325 MG TOTAL) BY MOUTH ONCE DAILY.  Qty: 90 tablet, Refills: 0    Associated Diagnoses: Acute blood loss anemia      gabapentin (NEURONTIN) 600 MG tablet Take 1 tablet (600 mg total) by mouth 3 (three) times daily.  Qty: 90 tablet, Refills: 5      LIDOcaine (LIDODERM) 5 % Place 1 patch onto the skin once daily. Remove & Discard patch within 12 hours or as directed by MD  Qty: 15 patch, Refills: 2      meclizine (ANTIVERT) 25 mg tablet Take 1 tablet (25 mg total) by mouth 3 (three) times daily as needed.  Qty: 60 tablet, Refills: 5      methylPREDNISolone (MEDROL DOSEPACK) 4 mg tablet use as directed  Qty: 1 each, Refills: 0      pantoprazole (PROTONIX) 40 MG tablet Take 1 tablet (40 mg total) by mouth 2 (two) times daily.  Qty: 180 tablet, Refills: 1    Associated Diagnoses: UGIB (upper gastrointestinal bleed); Esophagitis with gastritis      potassium 99 mg Tab Take by mouth once daily.      traMADoL (ULTRAM) 50 mg tablet Take 1 tablet (50 mg total) by mouth every 12 (twelve) hours as needed for  Pain.  Qty: 25 tablet, Refills: 0    Comments: Quantity prescribed more than 7 day supply? Yes, quantity medically necessary  Associated Diagnoses: Acute midline low back pain with right-sided sciatica                 Discharge Diagnosis: Lumbar radiculopathy [M54.16]  Condition on Discharge: Stable with no complications to procedure   Diet on Discharge: Same as before.  Activity: as per instruction sheet.  Discharge to: Home with a responsible adult.  Follow up: 2-4 weeks       Please call my office or pager at [phone] if experienced any weakness or loss of sensation, fever > 101.5, pain uncontrolled with oral medications, persistent nausea/vomiting/or diarrhea, redness or drainage from the incisions, or any other worrisome concerns. If physician on call was not reached or could not communicate with our office for any reason please go to the nearest emergency department      Coni Franco  01/13/2023

## 2023-01-13 NOTE — DISCHARGE INSTRUCTIONS

## 2023-01-13 NOTE — OP NOTE
Lumbar Transforaminal Epidural Steroid Injection under Fluoroscopic Guidance    The procedure, risks, benefits, and options were discussed with the patient. There are no contraindications to the procedure. The patent expressed understanding and agreed to the procedure. Informed written consent was obtained prior to the start of the procedure and can be found in the patient's chart.    PATIENT NAME: Lo Escalera   MRN: [id number]     DATE OF PROCEDURE: 01/13/2023    PROCEDURE:  Right  L4/5 and L5/S1 Lumbar Transforaminal Epidural Steroid Injection under Fluoroscopic Guidance    PRE-OP DIAGNOSIS: Lumbar radiculopathy [M54.16] Lumbar radiculopathy [M54.16]    POST-OP DIAGNOSIS: Same    PHYSICIAN: Coni Franco MD    ASSISTANTS: Zachariah Weilenman, MD     MEDICATIONS INJECTED: Preservative-free Decadron 10mg with 5cc of Lidocaine 1% MPF     LOCAL ANESTHETIC INJECTED: Xylocaine 2%     SEDATION: Versed 2mg and Fentanyl 50mcg                                                                                                                                                                                     Conscious sedation ordered by M.FRANCI. Patient re-evaluation prior to administration of conscious sedation. No changes noted in patient's status from initial evaluation. The patient's vital signs were monitored by RN and patient remained hemodynamically stable throughout the procedure.    Event Time In   Sedation Start 1111   Sedation End 1119       ESTIMATED BLOOD LOSS: None    COMPLICATIONS: None    TECHNIQUE: Time-out was performed to identify the patient and procedure to be performed. With the patient laying in a prone position, the surgical area was prepped and draped in the usual sterile fashion using ChloraPrep and a fenestrated drape.The levels were determined under fluoroscopy guidance. Skin anesthesia was achieved by injecting Lidocaine 2% over the injection sites. The transforaminal spaces were then approached with  a 22 gauge, 5 inch spinal quinke needle that was introduced under fluoroscopic guidance in the AP and Lateral views. Once the needle tip was in the area of the transforaminal space, and there was no blood, CSF or paraesthesias, contrast dye Omnipaque (300mg/mL) was injected to confirm placement and there was no vascular runoff. Fluoroscopic imaging in the AP and lateral views revealed a clear outline of the spinal nerve with proximal spread of agent through the neural foramen into the epidural space. 3 mL of the medication mixture listed above was injected slowly at each site. Displacement of the radio opaque contrast after injection of the medication confirmed that the medication went into the area of the transforaminal spaces. The needles were removed and bleeding was nil. A sterile dressing was applied. No specimens collected. The patient tolerated the procedure well.       The patient was monitored after the procedure in the recovery area. They were given post-procedure and discharge instructions to follow at home. The patient was discharged in a stable condition.    Coni Franco MD

## 2023-01-13 NOTE — H&P
HPI  Patient presenting for Procedure(s) (LRB):  INJECTION, STEROID, EPIDURAL, TRANSFORAMINAL APPROACH, RIGHT L4/L5 & L5-S1 CONTRAST DIRECT REF (Right)     Patient on Anti-coagulation No    No health changes since previous encounter    Past Medical History:   Diagnosis Date    Anemia due to chronic blood loss 03/06/2020    Anemia of chronic disorder 08/24/2020    Anxiety     Cirrhosis     Coronary artery disease     Diverticulosis     Gastric ulcer     old    GERD (gastroesophageal reflux disease)     Hepatic steatosis 09/12/2016    Hyperbilirubinemia     Hypertension     Hypothyroidism     Iron deficiency anemia due to chronic blood loss 08/24/2020    Microcytic anemia 08/24/2020    Obesity     Peptic ulcer disease 09/12/2016    UPJ (ureteropelvic junction) obstruction     Vertigo     Vertigo      Past Surgical History:   Procedure Laterality Date    APPENDECTOMY      CHOLECYSTECTOMY      COLONOSCOPY N/A 7/6/2020    Procedure: COLONOSCOPY;  Surgeon: Leander Cornejo MD;  Location: [facility];  Service: Colon and Rectal;  Laterality: N/A;    ESOPHAGOGASTRODUODENOSCOPY N/A 6/12/2019    Procedure: ESOPHAGOGASTRODUODENOSCOPY (EGD);  Surgeon: Arben Brown MD;  Location: [facility];  Service: Endoscopy;  Laterality: N/A;    ESOPHAGOGASTRODUODENOSCOPY N/A 3/9/2020    Procedure: EGD (ESOPHAGOGASTRODUODENOSCOPY);  Surgeon: Andrea Salomon MD;  Location: [facility];  Service: Endoscopy;  Laterality: N/A;    ESOPHAGOGASTRODUODENOSCOPY N/A 9/21/2020    Procedure: EGD (ESOPHAGOGASTRODUODENOSCOPY);  Surgeon: Antwon Gardner MD;  Location: [facility];  Service: Endoscopy;  Laterality: N/A;    HYSTERECTOMY      INTRALUMINAL GASTROINTESTINAL TRACT IMAGING VIA CAPSULE N/A 1/19/2022    Procedure: IMAGING PROCEDURE, GI TRACT, INTRALUMINAL, VIA CAPSULE;  Surgeon: Antwon Gardner MD;  Location: [facility];  Service: Endoscopy;  Laterality: N/A;    OOPHORECTOMY      PANCREAS SURGERY      TONSILLECTOMY       TRANSFORAMINAL EPIDURAL INJECTION OF STEROID Right 10/14/2021    Procedure: Injection,steroid,epidural,transforaminal approach---RIGHT L5 AND S1;  Surgeon: Cheng Tariq Jr., MD;  Location: [facility];  Service: Pain Management;  Laterality: Right;     Review of patient's allergies indicates:   Allergen Reactions    Codeine Itching    Iodine and iodide containing products Itching      Current Facility-Administered Medications   Medication    0.9%  NaCl infusion    diphenhydrAMINE injection 25 mg       PMHx, PSHx, Allergies, Medications reviewed in epic    ROS negative except pain complaints in HPI    OBJECTIVE:    LMP  (LMP Unknown)     PHYSICAL EXAMINATION:    GENERAL: Well appearing, in no acute distress, alert and oriented x3.  PSYCH:  Mood and affect appropriate.  SKIN: Skin color, texture, turgor normal, no rashes or lesions which will impact the procedure.  CV: RRR with palpation of the radial artery.  PULM: No evidence of respiratory difficulty, symmetric chest rise. Clear to auscultation.  NEURO: Cranial nerves grossly intact.    Plan:    Proceed with procedure as planned Procedure(s) (LRB):  INJECTION, STEROID, EPIDURAL, TRANSFORAMINAL APPROACH, RIGHT L4/L5 & L5-S1 CONTRAST DIRECT REF (Right)    Zachariah N Weilenman  01/13/2023

## 2023-01-17 ENCOUNTER — TELEPHONE (OUTPATIENT)
Dept: PAIN MEDICINE | Facility: CLINIC | Age: 68
End: 2023-01-17
Payer: MEDICARE

## 2023-01-17 NOTE — TELEPHONE ENCOUNTER
----- Message from Roshni Cole sent at 1/17/2023 10:07 AM CST -----  Regarding: Reschedule appt  Pt calling to reschedule appointment that was for today. Take any thing available for next appt. Was unable to schedule.    [phone] (home)

## 2023-02-28 ENCOUNTER — OFFICE VISIT (OUTPATIENT)
Dept: PRIMARY CARE CLINIC | Facility: CLINIC | Age: 68
End: 2023-02-28
Payer: MEDICARE

## 2023-02-28 VITALS
HEIGHT: 62 IN | RESPIRATION RATE: 18 BRPM | BODY MASS INDEX: 44.49 KG/M2 | SYSTOLIC BLOOD PRESSURE: 120 MMHG | TEMPERATURE: 97 F | DIASTOLIC BLOOD PRESSURE: 80 MMHG | WEIGHT: 241.75 LBS | HEART RATE: 108 BPM | OXYGEN SATURATION: 98 %

## 2023-02-28 DIAGNOSIS — R07.89 CHEST PAIN, ATYPICAL: Primary | ICD-10-CM

## 2023-02-28 DIAGNOSIS — R73.01 IMPAIRED FASTING GLUCOSE: ICD-10-CM

## 2023-02-28 DIAGNOSIS — I70.0 AORTIC ATHEROSCLEROSIS: ICD-10-CM

## 2023-02-28 DIAGNOSIS — M51.36 DDD (DEGENERATIVE DISC DISEASE), LUMBAR: ICD-10-CM

## 2023-02-28 DIAGNOSIS — K57.90 DIVERTICULOSIS: ICD-10-CM

## 2023-02-28 DIAGNOSIS — I10 ESSENTIAL HYPERTENSION: ICD-10-CM

## 2023-02-28 DIAGNOSIS — Z13.1 SCREENING FOR DIABETES MELLITUS: ICD-10-CM

## 2023-02-28 DIAGNOSIS — E03.9 HYPOTHYROIDISM, UNSPECIFIED TYPE: ICD-10-CM

## 2023-02-28 DIAGNOSIS — K29.70 ESOPHAGITIS WITH GASTRITIS: ICD-10-CM

## 2023-02-28 DIAGNOSIS — K20.90 ESOPHAGITIS WITH GASTRITIS: ICD-10-CM

## 2023-02-28 DIAGNOSIS — K70.31 ALCOHOLIC CIRRHOSIS OF LIVER WITH ASCITES: ICD-10-CM

## 2023-02-28 DIAGNOSIS — E66.01 MORBID (SEVERE) OBESITY DUE TO EXCESS CALORIES: ICD-10-CM

## 2023-02-28 DIAGNOSIS — K76.6 PORTAL HYPERTENSION: ICD-10-CM

## 2023-02-28 DIAGNOSIS — Z87.19 HISTORY OF SMALL INTESTINE ULCER: ICD-10-CM

## 2023-02-28 DIAGNOSIS — Z01.419 ROUTINE GYNECOLOGICAL EXAMINATION: ICD-10-CM

## 2023-02-28 DIAGNOSIS — F41.1 GAD (GENERALIZED ANXIETY DISORDER): ICD-10-CM

## 2023-02-28 PROCEDURE — 3074F PR MOST RECENT SYSTOLIC BLOOD PRESSURE < 130 MM HG: ICD-10-PCS | Mod: HCNC,CPTII,S$GLB, | Performed by: FAMILY MEDICINE

## 2023-02-28 PROCEDURE — 99214 OFFICE O/P EST MOD 30 MIN: CPT | Mod: HCNC,S$GLB,, | Performed by: FAMILY MEDICINE

## 2023-02-28 PROCEDURE — 1100F PR PT FALLS ASSESS DOC 2+ FALLS/FALL W/INJURY/YR: ICD-10-PCS | Mod: HCNC,CPTII,S$GLB, | Performed by: FAMILY MEDICINE

## 2023-02-28 PROCEDURE — 99999 PR PBB SHADOW E&M-EST. PATIENT-LVL V: ICD-10-PCS | Mod: PBBFAC,HCNC,, | Performed by: FAMILY MEDICINE

## 2023-02-28 PROCEDURE — 93010 EKG 12-LEAD: ICD-10-PCS | Mod: HCNC,S$GLB,, | Performed by: INTERNAL MEDICINE

## 2023-02-28 PROCEDURE — 3044F PR MOST RECENT HEMOGLOBIN A1C LEVEL <7.0%: ICD-10-PCS | Mod: HCNC,CPTII,S$GLB, | Performed by: FAMILY MEDICINE

## 2023-02-28 PROCEDURE — 3288F FALL RISK ASSESSMENT DOCD: CPT | Mod: HCNC,CPTII,S$GLB, | Performed by: FAMILY MEDICINE

## 2023-02-28 PROCEDURE — 99999 PR PBB SHADOW E&M-EST. PATIENT-LVL V: CPT | Mod: PBBFAC,HCNC,, | Performed by: FAMILY MEDICINE

## 2023-02-28 PROCEDURE — 1159F PR MEDICATION LIST DOCUMENTED IN MEDICAL RECORD: ICD-10-PCS | Mod: HCNC,CPTII,S$GLB, | Performed by: FAMILY MEDICINE

## 2023-02-28 PROCEDURE — 93010 ELECTROCARDIOGRAM REPORT: CPT | Mod: HCNC,S$GLB,, | Performed by: INTERNAL MEDICINE

## 2023-02-28 PROCEDURE — 1159F MED LIST DOCD IN RCRD: CPT | Mod: HCNC,CPTII,S$GLB, | Performed by: FAMILY MEDICINE

## 2023-02-28 PROCEDURE — 3008F PR BODY MASS INDEX (BMI) DOCUMENTED: ICD-10-PCS | Mod: HCNC,CPTII,S$GLB, | Performed by: FAMILY MEDICINE

## 2023-02-28 PROCEDURE — 3079F DIAST BP 80-89 MM HG: CPT | Mod: HCNC,CPTII,S$GLB, | Performed by: FAMILY MEDICINE

## 2023-02-28 PROCEDURE — 3074F SYST BP LT 130 MM HG: CPT | Mod: HCNC,CPTII,S$GLB, | Performed by: FAMILY MEDICINE

## 2023-02-28 PROCEDURE — 3288F PR FALLS RISK ASSESSMENT DOCUMENTED: ICD-10-PCS | Mod: HCNC,CPTII,S$GLB, | Performed by: FAMILY MEDICINE

## 2023-02-28 PROCEDURE — 93005 EKG 12-LEAD: ICD-10-PCS | Mod: HCNC,S$GLB,, | Performed by: FAMILY MEDICINE

## 2023-02-28 PROCEDURE — 3008F BODY MASS INDEX DOCD: CPT | Mod: HCNC,CPTII,S$GLB, | Performed by: FAMILY MEDICINE

## 2023-02-28 PROCEDURE — 3079F PR MOST RECENT DIASTOLIC BLOOD PRESSURE 80-89 MM HG: ICD-10-PCS | Mod: HCNC,CPTII,S$GLB, | Performed by: FAMILY MEDICINE

## 2023-02-28 PROCEDURE — 99499 RISK ADDL DX/OHS AUDIT: ICD-10-PCS | Mod: HCNC,S$GLB,, | Performed by: FAMILY MEDICINE

## 2023-02-28 PROCEDURE — 93005 ELECTROCARDIOGRAM TRACING: CPT | Mod: HCNC,S$GLB,, | Performed by: FAMILY MEDICINE

## 2023-02-28 PROCEDURE — 99214 PR OFFICE/OUTPT VISIT, EST, LEVL IV, 30-39 MIN: ICD-10-PCS | Mod: HCNC,S$GLB,, | Performed by: FAMILY MEDICINE

## 2023-02-28 PROCEDURE — 1100F PTFALLS ASSESS-DOCD GE2>/YR: CPT | Mod: HCNC,CPTII,S$GLB, | Performed by: FAMILY MEDICINE

## 2023-02-28 PROCEDURE — 99499 UNLISTED E&M SERVICE: CPT | Mod: HCNC,S$GLB,, | Performed by: FAMILY MEDICINE

## 2023-02-28 PROCEDURE — 3044F HG A1C LEVEL LT 7.0%: CPT | Mod: HCNC,CPTII,S$GLB, | Performed by: FAMILY MEDICINE

## 2023-02-28 RX ORDER — SUCRALFATE 1 G/1
TABLET ORAL
Qty: 21 TABLET | Refills: 1 | Status: SHIPPED | OUTPATIENT
Start: 2023-02-28 | End: 2023-04-14 | Stop reason: ALTCHOICE

## 2023-02-28 RX ORDER — INFLUENZA A VIRUS A/VICTORIA/2570/2019 IVR-215 (H1N1) ANTIGEN (FORMALDEHYDE INACTIVATED), INFLUENZA A VIRUS A/DARWIN/6/2021 IVR-227 (H3N2) ANTIGEN (FORMALDEHYDE INACTIVATED), INFLUENZA B VIRUS B/AUSTRIA/1359417/2021 BVR-26 ANTIGEN (FORMALDEHYDE INACTIVATED), INFLUENZA B VIRUS B/PHUKET/3073/2013 BVR-1B ANTIGEN (FORMALDEHYDE INACTIVATED) 15; 15; 15; 15 UG/.5ML; UG/.5ML; UG/.5ML; UG/.5ML
INJECTION, SUSPENSION INTRAMUSCULAR
COMMUNITY
Start: 2023-01-04 | End: 2023-09-19

## 2023-02-28 RX ORDER — TRAMADOL HYDROCHLORIDE 50 MG/1
50 TABLET ORAL EVERY 6 HOURS PRN
Qty: 10 TABLET | Refills: 0 | Status: SHIPPED | OUTPATIENT
Start: 2023-02-28 | End: 2023-04-25

## 2023-02-28 NOTE — PROGRESS NOTES
Subjective:       Patient ID: Lo Escalera is a 67 y.o. female.    Chief Complaint: Follow-up      HPI:  [id number] in for checkup  History of chest pain in the left breast area radiating into the left arm to the elbow--quality sharp pain-- severity 5/10-  Frequent 3 times per day--duration --about 3 min. some nausea no shortness of breath no sweats  Pt feels probably GI or muscle.  Patient also has some abdominal pain in the epigastric area--patient on Protonix  Back pain shoulder pain has been chronic patient on gabapentin tramadol prn  Depression on Cymbalta  PATIENT HAS HISTORY OF ESOPHAGITIS/GASTRITIS/ULCER/CIRRHOSIS WITH ESOPHAGEAL VARICES AND PORTAL HYPERTENSION DIVERTICULOSIS HEPATIC STEATOSIS      ROS:   Skin: no psoriasis, eczema, skin cancer  HEENT: No headache, ocular pain, blurred vision, diplopia,  hoarseness change in voice, no thyroid disease no epistaxis  Lung: No pneumonia, asthma, Tb, wheezing, + occasional SOB, no smoking  Heart: +chest pain,+ ankle edema,NO  palpitations, MI, eva murmur, no hypertension patient used to have high blood pressure but was taken off of medication blood pressure today 132/7, hyperlipidemia--no stent bypass arrhythmia history of hypertension--occasionally feels like heart skips a beat blood pressure was low so taken off blood pressure medicine   Abdomen: No nausea, vomiting, diarrhea, constipation, ulcers, hepatitis, status post cholecystectomy, melena, hematochezia, hematemesis recent esophagitis gastritis with transfusion 2 units of blood history of peptic ulcer disease history of esophageal varices  : no UTI, renal disease, stones  GYN hysterectomy mammogram March 2021  MS: no fractures, O/A, lupus, rheumatoid, gout--has 2 sisters with lupus and her father has gout  Neuro: No dizziness, LOC, seizures +vertigo---several recent episodes with falling will get MRI brain and restart antivert--dizzy exercises -help with meclizine November 2020 patient was  admitted to [facility] for loss of consciousness used to drink  36 yrs ago-pancreatitis  No diabetes, + anemia had to be transfused blood had upper GI bleed, + anxiety, + depression upset had to quit working because of vertigo was working at [employer]-doing pretty good with--loss mom dad oldest sister back to back   3 children unemployed, lives with      Objective:   Physical Exam:   General: Well nourished, well developed, no acute distress+ Obesity up with a cane + morbid obesity   Skin: No lesions  HEENT: Eyes PERRLA, EOM intact, nose clear , throat nonerythematous +arcus senilis no pallor to the conjunctiva  NECK: Supple, no bruits, No JVD, no nodes  Lungs: Clear, no rales, rhonchi, wheezing  Heart: Regular rate and rhythm, no murmurs, gallops, or rubs  Abdomen: flat, bowel sounds positive, no tenderness, or organomegaly--tenderness knee epigastric area on palpation no significant rebound slight guarding  MS:  Complaining of pain in both legs right greater than left with trying to stand from the sitting position--had 4 falls---walked about 5-10 feet then legs gave out--tried to do physical therapy    Neuro: Alert, CN intact, oriented X3 --unable do heel toe due weight and knees   Extremities: No cyanosis, clubbing, or edema negative Romberg        Assessment:       1. Chest pain, atypical    2. Screening for diabetes mellitus    3. Routine gynecological examination    4. Esophagitis with gastritis    5. Diverticulosis    6. Portal hypertension    7. Alcoholic cirrhosis of liver with ascites    8. History of small intestine ulcer    9. Hypothyroidism, unspecified type    10. Essential hypertension    11. Aortic atherosclerosis    12. JHOAN (generalized anxiety disorder)    13. DDD (degenerative disc disease), lumbar    14. Morbid (severe) obesity due to excess calories    15. Impaired fasting glucose        Plan:       Chest pain, atypical  -     EKG 12-lead; Future  -     Ambulatory  referral/consult to Gastroenterology; Future; Expected date: 03/07/2023    Screening for diabetes mellitus  -     Hemoglobin A1C; Future; Expected date: 02/28/2023    Routine gynecological examination  -     Ambulatory referral/consult to Gynecology; Future; Expected date: 03/07/2023    Esophagitis with gastritis    Diverticulosis    Portal hypertension    Alcoholic cirrhosis of liver with ascites    History of small intestine ulcer    Hypothyroidism, unspecified type    Essential hypertension    Aortic atherosclerosis    JHOAN (generalized anxiety disorder)  -     Ambulatory referral/consult to Psychology; Future; Expected date: 03/07/2023    DDD (degenerative disc disease), lumbar    Morbid (severe) obesity due to excess calories    Impaired fasting glucose  -     Hemoglobin A1C; Future; Expected date: 02/28/2023    Other orders  -     traMADoL (ULTRAM) 50 mg tablet; Take 1 tablet (50 mg total) by mouth every 6 (six) hours as needed for Pain. The medication you have been prescribed may cause drowsiness and impair your judgement.  Therefore, you should avoid driving, climbing, using machinery, etc., so as not to increase your risk of injury.  Do NOT drink any alcohol while on this medication(s). It is also addictive.  Dispense: 10 tablet; Refill: 0        Main Reason for Visits  Atypical chest pain--probably GI---EKG done--patient with epigastric pain--left breast pain left arm pain---continue Protonix--Carafate 1 g 1 hour a.c. breakfast lunch and supper x1 week--see GI mg EGD see if feels needs to repeat CT scan-  History of esophagitis gastritis gastric ulcer cirrhosis esophageal varices diverticulosis  Other medical issues  Needs see orthopedist for knees ??genu valgus --unsteady gait   Falls x 2 with vertigo -better  Other medicalm issues   Back pain was seeing Dr Tariq had epidural steroid injections   Appt DR Michaud evaluate knee--has crepitus bilaterally with flexion extension--?? Needs injections or  surgery   Morbid obesity needs to lose a lot of weight may benefit from gastric bypass  Other medical issues   GERD--avoid alcohol smoking NSAIDs caffeine stress carbonated drinks--can raise the head of the bed on a block of wood to prevent GERD--eat small meals--lay down for 1-2 hours after eating Cont protonix --no NSAIDs due to history of having be transfused due to GI issues  Lab --done Nov     [facility] maintenance sign pain contract Lotrisone prescription aspirin urine drug screen mammogram COVID

## 2023-03-01 ENCOUNTER — TELEPHONE (OUTPATIENT)
Dept: PSYCHOLOGY | Facility: CLINIC | Age: 68
End: 2023-03-01
Payer: MEDICARE

## 2023-03-01 NOTE — TELEPHONE ENCOUNTER
----- Message from Jyoti Carlson LPN sent at 2/28/2023  2:39 PM CST -----    ----- Message -----  From: Opal Craven  Sent: 2/28/2023   1:09 PM CST  To: [facility]ALLAN[facility] Staff    JHOAN (generalized anxiety disorder) [F41.1] please call patient back to schedule

## 2023-03-06 ENCOUNTER — OFFICE VISIT (OUTPATIENT)
Dept: CARDIOLOGY | Facility: CLINIC | Age: 68
End: 2023-03-06
Payer: MEDICARE

## 2023-03-06 VITALS
OXYGEN SATURATION: 97 % | DIASTOLIC BLOOD PRESSURE: 58 MMHG | HEIGHT: 62 IN | HEART RATE: 106 BPM | BODY MASS INDEX: 45.32 KG/M2 | SYSTOLIC BLOOD PRESSURE: 120 MMHG | WEIGHT: 246.25 LBS

## 2023-03-06 DIAGNOSIS — R07.89 CHEST PAIN, ATYPICAL: ICD-10-CM

## 2023-03-06 DIAGNOSIS — I10 ESSENTIAL HYPERTENSION: ICD-10-CM

## 2023-03-06 DIAGNOSIS — I65.23 CAROTID ATHEROSCLEROSIS, BILATERAL: Primary | ICD-10-CM

## 2023-03-06 DIAGNOSIS — R07.9 CHEST PAIN, UNSPECIFIED TYPE: ICD-10-CM

## 2023-03-06 DIAGNOSIS — K74.60 HEPATIC CIRRHOSIS, UNSPECIFIED HEPATIC CIRRHOSIS TYPE, UNSPECIFIED WHETHER ASCITES PRESENT: ICD-10-CM

## 2023-03-06 PROCEDURE — 3074F SYST BP LT 130 MM HG: CPT | Mod: HCNC,CPTII,S$GLB, | Performed by: INTERNAL MEDICINE

## 2023-03-06 PROCEDURE — 1125F PR PAIN SEVERITY QUANTIFIED, PAIN PRESENT: ICD-10-PCS | Mod: HCNC,CPTII,S$GLB, | Performed by: INTERNAL MEDICINE

## 2023-03-06 PROCEDURE — 99999 PR PBB SHADOW E&M-EST. PATIENT-LVL V: ICD-10-PCS | Mod: PBBFAC,HCNC,, | Performed by: INTERNAL MEDICINE

## 2023-03-06 PROCEDURE — 3044F PR MOST RECENT HEMOGLOBIN A1C LEVEL <7.0%: ICD-10-PCS | Mod: HCNC,CPTII,S$GLB, | Performed by: INTERNAL MEDICINE

## 2023-03-06 PROCEDURE — 99999 PR PBB SHADOW E&M-EST. PATIENT-LVL V: CPT | Mod: PBBFAC,HCNC,, | Performed by: INTERNAL MEDICINE

## 2023-03-06 PROCEDURE — 3078F DIAST BP <80 MM HG: CPT | Mod: HCNC,CPTII,S$GLB, | Performed by: INTERNAL MEDICINE

## 2023-03-06 PROCEDURE — 3288F PR FALLS RISK ASSESSMENT DOCUMENTED: ICD-10-PCS | Mod: HCNC,CPTII,S$GLB, | Performed by: INTERNAL MEDICINE

## 2023-03-06 PROCEDURE — 3074F PR MOST RECENT SYSTOLIC BLOOD PRESSURE < 130 MM HG: ICD-10-PCS | Mod: HCNC,CPTII,S$GLB, | Performed by: INTERNAL MEDICINE

## 2023-03-06 PROCEDURE — 1159F PR MEDICATION LIST DOCUMENTED IN MEDICAL RECORD: ICD-10-PCS | Mod: HCNC,CPTII,S$GLB, | Performed by: INTERNAL MEDICINE

## 2023-03-06 PROCEDURE — 99203 OFFICE O/P NEW LOW 30 MIN: CPT | Mod: HCNC,S$GLB,, | Performed by: INTERNAL MEDICINE

## 2023-03-06 PROCEDURE — 3044F HG A1C LEVEL LT 7.0%: CPT | Mod: HCNC,CPTII,S$GLB, | Performed by: INTERNAL MEDICINE

## 2023-03-06 PROCEDURE — 1159F MED LIST DOCD IN RCRD: CPT | Mod: HCNC,CPTII,S$GLB, | Performed by: INTERNAL MEDICINE

## 2023-03-06 PROCEDURE — 1125F AMNT PAIN NOTED PAIN PRSNT: CPT | Mod: HCNC,CPTII,S$GLB, | Performed by: INTERNAL MEDICINE

## 2023-03-06 PROCEDURE — 1100F PR PT FALLS ASSESS DOC 2+ FALLS/FALL W/INJURY/YR: ICD-10-PCS | Mod: HCNC,CPTII,S$GLB, | Performed by: INTERNAL MEDICINE

## 2023-03-06 PROCEDURE — 3288F FALL RISK ASSESSMENT DOCD: CPT | Mod: HCNC,CPTII,S$GLB, | Performed by: INTERNAL MEDICINE

## 2023-03-06 PROCEDURE — 99203 PR OFFICE/OUTPT VISIT, NEW, LEVL III, 30-44 MIN: ICD-10-PCS | Mod: HCNC,S$GLB,, | Performed by: INTERNAL MEDICINE

## 2023-03-06 PROCEDURE — 1160F RVW MEDS BY RX/DR IN RCRD: CPT | Mod: HCNC,CPTII,S$GLB, | Performed by: INTERNAL MEDICINE

## 2023-03-06 PROCEDURE — 3078F PR MOST RECENT DIASTOLIC BLOOD PRESSURE < 80 MM HG: ICD-10-PCS | Mod: HCNC,CPTII,S$GLB, | Performed by: INTERNAL MEDICINE

## 2023-03-06 PROCEDURE — 1100F PTFALLS ASSESS-DOCD GE2>/YR: CPT | Mod: HCNC,CPTII,S$GLB, | Performed by: INTERNAL MEDICINE

## 2023-03-06 PROCEDURE — 3008F BODY MASS INDEX DOCD: CPT | Mod: HCNC,CPTII,S$GLB, | Performed by: INTERNAL MEDICINE

## 2023-03-06 PROCEDURE — 1160F PR REVIEW ALL MEDS BY PRESCRIBER/CLIN PHARMACIST DOCUMENTED: ICD-10-PCS | Mod: HCNC,CPTII,S$GLB, | Performed by: INTERNAL MEDICINE

## 2023-03-06 PROCEDURE — 3008F PR BODY MASS INDEX (BMI) DOCUMENTED: ICD-10-PCS | Mod: HCNC,CPTII,S$GLB, | Performed by: INTERNAL MEDICINE

## 2023-03-06 NOTE — PROGRESS NOTES
[facility] - Cardiology [address]  Cardiology Clinic Note      Chief Complaint  Chief Complaint   Patient presents with    Follow-up       HPI:    68 y/o female with past medical history of vertigo, alcohol abuse in remission, morbid obesity, hypothyroidism, GERD, hepatic steatosis, PUD/ulceration of small intestine, cirrhosis, UPJ obstruction, microcytic anemia/GILDARDO/AICD, thrombocytopenia, hypertension, carotid disease but no hemodynamically significant stenosis ultrasound 05/2020, echo 05/2020 normal EF diastolic function indeterminate mild left atrial enlargement normal RV normal PASP    The patient states that she has been having a few weeks of chest discomfort with radiation down her arm which is not reproducible, exertional, pleuritic.  The chest discomfort is worse when supine.    EKG 02/28/2023 normal sinus rhythm borderline short ME (remeasured ME interval 120) normal EKG no ischemic ST-T changes    Medications  Current Outpatient Medications   Medication Sig Dispense Refill    calcium carbonate (TUMS) 200 mg calcium (500 mg) chewable tablet Take 1 tablet by mouth once daily.      DULoxetine (CYMBALTA) 30 MG capsule Take 1 capsule (30 mg total) by mouth once daily. 30 capsule 11    gabapentin (NEURONTIN) 600 MG tablet Take 1 tablet (600 mg total) by mouth 3 (three) times daily. 90 tablet 5    meclizine (ANTIVERT) 25 mg tablet Take 1 tablet (25 mg total) by mouth 3 (three) times daily as needed. 60 tablet 5    pantoprazole (PROTONIX) 40 MG tablet Take 1 tablet (40 mg total) by mouth 2 (two) times daily. 180 tablet 1    potassium 99 mg Tab Take by mouth once daily.      sucralfate (CARAFATE) 1 gram tablet One p.o. 1 hour a.c. breakfast/ 1 p.o. 1 hour a.c. lunch/1 p.o. 1 hour a.c. supper do not take with Protonix 21 tablet 1    traMADoL (ULTRAM) 50 mg tablet Take 1 tablet (50 mg total) by mouth every 6 (six) hours as needed for Pain. The medication you have been prescribed may cause drowsiness and impair your  judgement.  Therefore, you should avoid driving, climbing, using machinery, etc., so as not to increase your risk of injury.  Do NOT drink any alcohol while on this medication(s). It is also addictive. 10 tablet 0    ferrous sulfate (FEOSOL) 325 mg (65 mg iron) Tab tablet TAKE 1 TABLET (325 MG TOTAL) BY MOUTH ONCE DAILY. (Patient not taking: Reported on 2/28/2023) 90 tablet 0    FLUAD QUAD 2022-23,65Y UP,,PF, 60 mcg (15 mcg x 4)/0.5 mL Syrg       LIDOcaine (LIDODERM) 5 % Place 1 patch onto the skin once daily. Remove & Discard patch within 12 hours or as directed by MD 15 patch 2     No current facility-administered medications for this visit.        History  Past Medical History:   Diagnosis Date    Anemia due to chronic blood loss 03/06/2020    Anemia of chronic disorder 08/24/2020    Anxiety     Cirrhosis     Coronary artery disease     Diverticulosis     Gastric ulcer     old    GERD (gastroesophageal reflux disease)     Hepatic steatosis 09/12/2016    Hyperbilirubinemia     Hypertension     Hypothyroidism     Iron deficiency anemia due to chronic blood loss 08/24/2020    Microcytic anemia 08/24/2020    Obesity     Peptic ulcer disease 09/12/2016    UPJ (ureteropelvic junction) obstruction     Vertigo     Vertigo      Past Surgical History:   Procedure Laterality Date    APPENDECTOMY      CHOLECYSTECTOMY      COLONOSCOPY N/A 7/6/2020    Procedure: COLONOSCOPY;  Surgeon: Leander Cornejo MD;  Location: [facility];  Service: Colon and Rectal;  Laterality: N/A;    ESOPHAGOGASTRODUODENOSCOPY N/A 6/12/2019    Procedure: ESOPHAGOGASTRODUODENOSCOPY (EGD);  Surgeon: Arben Brown MD;  Location: [facility];  Service: Endoscopy;  Laterality: N/A;    ESOPHAGOGASTRODUODENOSCOPY N/A 3/9/2020    Procedure: EGD (ESOPHAGOGASTRODUODENOSCOPY);  Surgeon: Andrea Salomon MD;  Location: [facility];  Service: Endoscopy;  Laterality: N/A;    ESOPHAGOGASTRODUODENOSCOPY N/A 9/21/2020    Procedure: EGD (ESOPHAGOGASTRODUODENOSCOPY);   Surgeon: Antwon Gardner MD;  Location: [facility] ENDO;  Service: Endoscopy;  Laterality: N/A;    HYSTERECTOMY      INTRALUMINAL GASTROINTESTINAL TRACT IMAGING VIA CAPSULE N/A 1/19/2022    Procedure: IMAGING PROCEDURE, GI TRACT, INTRALUMINAL, VIA CAPSULE;  Surgeon: Antwon Gardner MD;  Location: [facility] ENDO;  Service: Endoscopy;  Laterality: N/A;    OOPHORECTOMY      PANCREAS SURGERY      TONSILLECTOMY      TRANSFORAMINAL EPIDURAL INJECTION OF STEROID Right 10/14/2021    Procedure: Injection,steroid,epidural,transforaminal approach---RIGHT L5 AND S1;  Surgeon: Cheng Tariq Jr., MD;  Location: [facility] PAIN MGMT;  Service: Pain Management;  Laterality: Right;    TRANSFORAMINAL EPIDURAL INJECTION OF STEROID Right 1/13/2023    Procedure: INJECTION, STEROID, EPIDURAL, TRANSFORAMINAL APPROACH, RIGHT L4/L5 & L5-S1 CONTRAST DIRECT REF;  Surgeon: Coni Franco MD;  Location: [facility] PAIN MGT;  Service: Pain Management;  Laterality: Right;     Social History     Socioeconomic History    Marital status:    Tobacco Use    Smoking status: Never    Smokeless tobacco: Never   Substance and Sexual Activity    Alcohol use: No     Comment: 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  However, no consumption in 20 years.     Drug use: No    Sexual activity: Not Currently     Family History   Problem Relation Age of Onset    Hyperlipidemia Mother     Heart disease Mother     Hypertension Father     Heart disease Father     Lupus Sister     Lupus Sister     Arthritis Brother     No Known Problems Daughter     Mental illness Son         PTSD x 1 son in         Allergies  Review of patient's allergies indicates:   Allergen Reactions    Codeine Itching    Iodine and iodide containing products Itching       Review of Systems   ROS    Physical Exam  Vitals:    03/06/23 1041   BP: (!) 120/58   Pulse: 106     Wt Readings from Last 1 Encounters:   03/06/23 111.7 kg (246 lb 4.1 oz)     Physical Exam    Labs  Lab Visit on 02/28/2023    Component Date Value Ref Range Status    Hemoglobin A1C 02/28/2023 5.3  4.0 - 5.6 % Final    Comment: ADA Screening Guidelines:  5.7-6.4%  Consistent with prediabetes  >or=6.5%  Consistent with diabetes    High levels of fetal hemoglobin interfere with the HbA1C  assay. Heterozygous hemoglobin variants (HbS, HgC, etc)do  not significantly interfere with this assay.   However, presence of multiple variants may affect accuracy.      Estimated Avg Glucose 02/28/2023 105  68 - 131 mg/dL Final   Admission on 01/24/2023, Discharged on 01/24/2023   Component Date Value Ref Range Status    Specimen UA 01/24/2023 Urine, Clean Catch   Final    Color, UA 01/24/2023 Yellow  Yellow, Straw, Mckenna Final    Appearance, UA 01/24/2023 Hazy (A)  Clear Final    pH, UA 01/24/2023 7.0  5.0 - 8.0 Final    Specific Gravity, UA 01/24/2023 1.030  1.005 - 1.030 Final    Protein, UA 01/24/2023 1+ (A)  Negative Final    Comment: Recommend a 24 hour urine protein or a urine   protein/creatinine ratio if globulin induced proteinuria is  clinically suspected.      Glucose, UA 01/24/2023 Negative  Negative Final    Ketones, UA 01/24/2023 Trace (A)  Negative Final    Bilirubin (UA) 01/24/2023 1+ (A)  Negative Final    Comment: Positive urine bilirubin is not confirmed. Correlate with   serum bilirubin and clinical presentation.      Occult Blood UA 01/24/2023 Negative  Negative Final    Nitrite, UA 01/24/2023 Negative  Negative Final    Urobilinogen, UA 01/24/2023 >=8.0 (A)  Negative EU/dL Final    Leukocytes, UA 01/24/2023 3+ (A)  Negative Final    RBC, UA 01/24/2023 4  0 - 4 /hpf Final    WBC, UA 01/24/2023 22 (H)  0 - 5 /hpf Final    Bacteria 01/24/2023 Moderate (A)  None-Occ /hpf Final    Squam Epithel, UA 01/24/2023 48  /hpf Final    Hyaline Casts, UA 01/24/2023 2 (A)  0-1/lpf /lpf Final    Microscopic Comment 01/24/2023 SEE COMMENT   Final    Comment: Other formed elements not mentioned in the report are not   present in the microscopic  examination.       Urine Culture, Routine 01/24/2023 Multiple organisms isolated. None in predominance.  Repeat if   Final    Urine Culture, Routine 01/24/2023 clinically necessary.   Final   Admission on 11/15/2022, Discharged on 11/15/2022   Component Date Value Ref Range Status    Specimen UA 11/15/2022 Urine, Unspecified   Final    Color, UA 11/15/2022 Yellow  Yellow, Straw, Mckenna Final    Appearance, UA 11/15/2022 Hazy (A)  Clear Final    pH, UA 11/15/2022 7.0  5.0 - 8.0 Final    Specific Gravity, UA 11/15/2022 1.025  1.005 - 1.030 Final    Protein, UA 11/15/2022 1+ (A)  Negative Final    Comment: Recommend a 24 hour urine protein or a urine   protein/creatinine ratio if globulin induced proteinuria is  clinically suspected.      Glucose, UA 11/15/2022 Negative  Negative Final    Ketones, UA 11/15/2022 Trace (A)  Negative Final    Bilirubin (UA) 11/15/2022 Negative  Negative Final    Occult Blood UA 11/15/2022 Negative  Negative Final    Nitrite, UA 11/15/2022 Negative  Negative Final    Urobilinogen, UA 11/15/2022 >=8.0 (A)  Negative EU/dL Final    Leukocytes, UA 11/15/2022 2+ (A)  Negative Final    RBC, UA 11/15/2022 4  0 - 4 /hpf Final    WBC, UA 11/15/2022 6 (H)  0 - 5 /hpf Final    Bacteria 11/15/2022 Few (A)  None-Occ /hpf Final    Squam Epithel, UA 11/15/2022 15  /hpf Final    Hyaline Casts, UA 11/15/2022 9 (A)  0-1/lpf /lpf Final    Microscopic Comment 11/15/2022 SEE COMMENT   Final    Comment: Other formed elements not mentioned in the report are not   present in the microscopic examination.      Admission on 11/05/2022, Discharged on 11/05/2022   Component Date Value Ref Range Status    Specimen UA 11/05/2022 Urine, Clean Catch   Final    Color, UA 11/05/2022 Yellow  Yellow, Straw, Mckenna Final    Appearance, UA 11/05/2022 Hazy (A)  Clear Final    pH, UA 11/05/2022 7.0  5.0 - 8.0 Final    Specific Gravity, UA 11/05/2022 1.025  1.005 - 1.030 Final    Protein, UA 11/05/2022 1+ (A)  Negative Final     Comment: Recommend a 24 hour urine protein or a urine   protein/creatinine ratio if globulin induced proteinuria is  clinically suspected.      Glucose, UA 11/05/2022 Negative  Negative Final    Ketones, UA 11/05/2022 Trace (A)  Negative Final    Bilirubin (UA) 11/05/2022 Negative  Negative Final    Occult Blood UA 11/05/2022 Negative  Negative Final    Nitrite, UA 11/05/2022 Negative  Negative Final    Urobilinogen, UA 11/05/2022 >=8.0 (A)  Negative EU/dL Final    Leukocytes, UA 11/05/2022 1+ (A)  Negative Final    RBC, UA 11/05/2022 4  0 - 4 /hpf Final    WBC, UA 11/05/2022 9 (H)  0 - 5 /hpf Final    Bacteria 11/05/2022 Occasional  None-Occ /hpf Final    Squam Epithel, UA 11/05/2022 30  /hpf Final    Hyaline Casts, UA 11/05/2022 1  0-1/lpf /lpf Final    Microscopic Comment 11/05/2022 SEE COMMENT   Final    Comment: Other formed elements not mentioned in the report are not   present in the microscopic examination.       WBC 11/05/2022 6.57  3.90 - 12.70 K/uL Final    RBC 11/05/2022 4.24  4.00 - 5.40 M/uL Final    Hemoglobin 11/05/2022 12.2  12.0 - 16.0 g/dL Final    Hematocrit 11/05/2022 37.6  37.0 - 48.5 % Final    MCV 11/05/2022 89  82 - 98 fL Final    MCH 11/05/2022 28.8  27.0 - 31.0 pg Final    MCHC 11/05/2022 32.4  32.0 - 36.0 g/dL Final    RDW 11/05/2022 15.0 (H)  11.5 - 14.5 % Final    Platelets 11/05/2022 107 (L)  150 - 450 K/uL Final    MPV 11/05/2022 10.5  9.2 - 12.9 fL Final    Immature Granulocytes 11/05/2022 0.5  0.0 - 0.5 % Final    Gran # (ANC) 11/05/2022 3.5  1.8 - 7.7 K/uL Final    Immature Grans (Abs) 11/05/2022 0.03  0.00 - 0.04 K/uL Final    Comment: Mild elevation in immature granulocytes is non specific and   can be seen in a variety of conditions including stress response,   acute inflammation, trauma and pregnancy. Correlation with other   laboratory and clinical findings is essential.      Lymph # 11/05/2022 2.4  1.0 - 4.8 K/uL Final    Mono # 11/05/2022 0.6  0.3 - 1.0 K/uL Final    Eos #  11/05/2022 0.0  0.0 - 0.5 K/uL Final    Baso # 11/05/2022 0.04  0.00 - 0.20 K/uL Final    nRBC 11/05/2022 0  0 /100 WBC Final    Gran % 11/05/2022 53.0  38.0 - 73.0 % Final    Lymph % 11/05/2022 37.1  18.0 - 48.0 % Final    Mono % 11/05/2022 8.5  4.0 - 15.0 % Final    Eosinophil % 11/05/2022 0.3  0.0 - 8.0 % Final    Basophil % 11/05/2022 0.6  0.0 - 1.9 % Final    Differential Method 11/05/2022 Automated   Final    Sodium 11/05/2022 139  136 - 145 mmol/L Final    Potassium 11/05/2022 4.3  3.5 - 5.1 mmol/L Final    Chloride 11/05/2022 104  95 - 110 mmol/L Final    CO2 11/05/2022 24  23 - 29 mmol/L Final    Glucose 11/05/2022 88  70 - 110 mg/dL Final    BUN 11/05/2022 10  8 - 23 mg/dL Final    Creatinine 11/05/2022 0.8  0.5 - 1.4 mg/dL Final    Calcium 11/05/2022 9.3  8.7 - 10.5 mg/dL Final    Total Protein 11/05/2022 7.9  6.0 - 8.4 g/dL Final    Albumin 11/05/2022 3.4 (L)  3.5 - 5.2 g/dL Final    Total Bilirubin 11/05/2022 0.7  0.1 - 1.0 mg/dL Final    Comment: For infants and newborns, interpretation of results should be based  on gestational age, weight and in agreement with clinical  observations.    Premature Infant recommended reference ranges:  Up to 24 hours.............<8.0 mg/dL  Up to 48 hours............<12.0 mg/dL  3-5 days..................<15.0 mg/dL  6-29 days.................<15.0 mg/dL      Alkaline Phosphatase 11/05/2022 141 (H)  55 - 135 U/L Final    AST 11/05/2022 32  10 - 40 U/L Final    ALT 11/05/2022 12  10 - 44 U/L Final    Anion Gap 11/05/2022 11  8 - 16 mmol/L Final    eGFR 11/05/2022 >60.0  >60 mL/min/1.73 m^2 Final    aPTT 11/05/2022 27.1  21.0 - 32.0 sec Final    Comment: aPTT therapeutic range = 39-69 seconds  LOT^050^APTT FSL^[id number]      Prothrombin Time 11/05/2022 12.4  9.0 - 12.5 sec Final    INR 11/05/2022 1.1  0.8 - 1.2 Final    Comment: Coumadin Therapy:  2.0 - 3.0 for INR for all indicators except mechanical heart valves  and antiphospholipid syndromes which should use 2.5 -  3.5.  LOT^040^PT Inn^[id number]         EKG  02/28/2023 normal sinus rhythm borderline short KY (remeasured KY interval 120) normal EKG no ischemic ST-T changes    Echo   Results for orders placed or performed in visit on 04/23/20   Echo Color Flow Doppler? Yes   Result Value Ref Range    Ascending aorta 2.95 cm    STJ 2.50 cm    AV mean gradient 5 mmHg    Ao peak jb 1.49 m/s    Ao VTI 28.36 cm    IVRT 74.22 msec    IVS 1.00 0.6 - 1.1 cm    LA size 3.70 cm    Left Atrium Major Axis 5.23 cm    Left Atrium Minor Axis 5.11 cm    LVIDd 4.46 3.5 - 6.0 cm    LVIDs 2.95 2.1 - 4.0 cm    LVOT diameter 2.08 cm    LVOT peak VTI 24.05 cm    Posterior Wall 0.80 (A) 0.6 - 1.1 cm    MV Peak A Jb 1.02 m/s    E wave deceleration time 199.50 msec    MV Peak E Jb 0.89 m/s    PV Peak D Jb 0.53 m/s    PV Peak S Jb 0.90 m/s    RA Major Axis 4.40 cm    RA Width 3.55 cm    RVDD 3.13 cm    Sinus 3.03 cm    TAPSE 1.62 cm    TR Max Jb 2.59 m/s    TDI LATERAL 0.09 m/s    TDI SEPTAL 0.08 m/s    LA WIDTH 4.11 cm    LV Diastolic Volume 90.40 mL    LV Systolic Volume 33.71 mL    RV S' 15.69 cm/s    LVOT peak jb 1.18 m/s    LV LATERAL E/E' RATIO 9.89 m/s    LV SEPTAL E/E' RATIO 11.13 m/s    FS 34 %    LA volume 66.82 cm3    LV mass 130.89 g    Left Ventricle Relative Wall Thickness 0.36 cm    AV valve area 2.88 cm2    AV Velocity Ratio 0.79     AV index (prosthetic) 0.85     E/A ratio 0.87     Mean e' 0.09 m/s    Pulm vein S/D ratio 1.70     LVOT area 3.4 cm2    LVOT stroke volume 81.68 cm3    AV peak gradient 9 mmHg    E/E' ratio 10.47 m/s    Triscuspid Valve Regurgitation Peak Gradient 27 mmHg    BSA 1.91 m2    LV Systolic Volume Index 18.4 mL/m2    LV Diastolic Volume Index 49.39 mL/m2    LA Volume Index 36.5 mL/m2    LV Mass Index 72 g/m2    Right Atrial Pressure (from IVC) 3 mmHg    TV rest pulmonary artery pressure 30 mmHg    Narrative    · Normal left ventricular systolic function. The estimated ejection   fraction is 65%.  ·  Indeterminate left ventricular diastolic function.  · No wall motion abnormalities.  · Normal right ventricular systolic function.  · Mild left atrial enlargement.  · The estimated PA systolic pressure is 30 mmHg.  · Normal central venous pressure (3 mmHg).          Imaging  No results found.    Prior coronary angiogram / intervention:  None    Assessment and Plan  1. Chest pain, atypical  Atypical as in HPI will perform MPI  - NM Myocardial Perfusion Spect Multi Pharmacologic; Future  - Nuclear Stress Test; Future    2. Carotid atherosclerosis, bilateral  Would like to add aspirin statin but will send to hepatology to ensure that this is okay in the context of cirrhosis first  No hemodynamically significant disease    3. Essential hypertension  Manage with diet and activity    5. Hepatic cirrhosis, unspecified hepatic cirrhosis type, unspecified whether ascites present  - Ambulatory referral/consult to Hepatology; Future  Would be okay to refer the patient to be on aspirin 81 and statin?      Follow Up  Follow up in about 6 months (around 9/6/2023).      Des Rao MD, FACC, RPVI  Interventional Cardiology

## 2023-03-10 ENCOUNTER — TELEPHONE (OUTPATIENT)
Dept: HEPATOLOGY | Facility: CLINIC | Age: 68
End: 2023-03-10
Payer: MEDICARE

## 2023-03-10 NOTE — TELEPHONE ENCOUNTER
Referral to Hepatology for cirrhosis. Established patient of Dr. Higginbotham, last seen 1/2022. Please contact patient to schedule f/u visit with Dr. Higginbotham, thanks!

## 2023-03-10 NOTE — TELEPHONE ENCOUNTER
Message left times two to schd pt an appt. No avail appts at [facility] [place]. KRISTEN SNYDER

## 2023-04-04 ENCOUNTER — TELEPHONE (OUTPATIENT)
Dept: OBSTETRICS AND GYNECOLOGY | Facility: CLINIC | Age: 68
End: 2023-04-04
Payer: MEDICARE

## 2023-04-04 ENCOUNTER — TELEPHONE (OUTPATIENT)
Dept: CARDIOLOGY | Facility: CLINIC | Age: 68
End: 2023-04-04
Payer: MEDICARE

## 2023-04-04 NOTE — TELEPHONE ENCOUNTER
Called pt to r/s appt on 4/28 due Dr. Ireland not being in clinic. Appt was r/s and she vu. No further questions.

## 2023-04-04 NOTE — TELEPHONE ENCOUNTER
Spoke with patient, scheduled first available appointment with Dr. Higginbotham. Patient verbalized understanding and reminder mailed.

## 2023-04-05 ENCOUNTER — HOSPITAL ENCOUNTER (OUTPATIENT)
Dept: RADIOLOGY | Facility: OTHER | Age: 68
Discharge: HOME | End: 2023-04-05
Attending: NURSE PRACTITIONER
Payer: MEDICARE

## 2023-04-05 DIAGNOSIS — Z12.31 ENCOUNTER FOR SCREENING MAMMOGRAM FOR BREAST CANCER: ICD-10-CM

## 2023-04-05 PROCEDURE — 77063 BREAST TOMOSYNTHESIS BI: CPT | Mod: 26,HCNC,, | Performed by: RADIOLOGY

## 2023-04-05 PROCEDURE — 77067 MAMMO DIGITAL SCREENING BILAT WITH TOMO: ICD-10-PCS | Mod: 26,HCNC,, | Performed by: RADIOLOGY

## 2023-04-05 PROCEDURE — 77067 SCR MAMMO BI INCL CAD: CPT | Mod: TC,HCNC

## 2023-04-05 PROCEDURE — 77063 MAMMO DIGITAL SCREENING BILAT WITH TOMO: ICD-10-PCS | Mod: 26,HCNC,, | Performed by: RADIOLOGY

## 2023-04-05 PROCEDURE — 77067 SCR MAMMO BI INCL CAD: CPT | Mod: 26,HCNC,, | Performed by: RADIOLOGY

## 2023-04-14 ENCOUNTER — OFFICE VISIT (OUTPATIENT)
Dept: GASTROENTEROLOGY | Facility: CLINIC | Age: 68
End: 2023-04-14
Payer: MEDICARE

## 2023-04-14 VITALS — BODY MASS INDEX: 44.59 KG/M2 | HEIGHT: 62 IN | WEIGHT: 242.31 LBS

## 2023-04-14 DIAGNOSIS — I85.00 ESOPHAGEAL VARICES WITHOUT BLEEDING, UNSPECIFIED ESOPHAGEAL VARICES TYPE: ICD-10-CM

## 2023-04-14 DIAGNOSIS — R10.13 EPIGASTRIC PAIN: Primary | ICD-10-CM

## 2023-04-14 DIAGNOSIS — Z12.11 SCREENING FOR COLON CANCER: ICD-10-CM

## 2023-04-14 DIAGNOSIS — R07.89 CHEST PAIN, ATYPICAL: ICD-10-CM

## 2023-04-14 DIAGNOSIS — K21.9 GASTROESOPHAGEAL REFLUX DISEASE, UNSPECIFIED WHETHER ESOPHAGITIS PRESENT: ICD-10-CM

## 2023-04-14 PROCEDURE — 1100F PTFALLS ASSESS-DOCD GE2>/YR: CPT | Mod: HCNC,CPTII,S$GLB, | Performed by: NURSE PRACTITIONER

## 2023-04-14 PROCEDURE — 99214 PR OFFICE/OUTPT VISIT, EST, LEVL IV, 30-39 MIN: ICD-10-PCS | Mod: HCNC,S$GLB,, | Performed by: NURSE PRACTITIONER

## 2023-04-14 PROCEDURE — 1159F PR MEDICATION LIST DOCUMENTED IN MEDICAL RECORD: ICD-10-PCS | Mod: HCNC,CPTII,S$GLB, | Performed by: NURSE PRACTITIONER

## 2023-04-14 PROCEDURE — 1125F AMNT PAIN NOTED PAIN PRSNT: CPT | Mod: HCNC,CPTII,S$GLB, | Performed by: NURSE PRACTITIONER

## 2023-04-14 PROCEDURE — 1159F MED LIST DOCD IN RCRD: CPT | Mod: HCNC,CPTII,S$GLB, | Performed by: NURSE PRACTITIONER

## 2023-04-14 PROCEDURE — 99214 OFFICE O/P EST MOD 30 MIN: CPT | Mod: HCNC,S$GLB,, | Performed by: NURSE PRACTITIONER

## 2023-04-14 PROCEDURE — 3008F PR BODY MASS INDEX (BMI) DOCUMENTED: ICD-10-PCS | Mod: HCNC,CPTII,S$GLB, | Performed by: NURSE PRACTITIONER

## 2023-04-14 PROCEDURE — 1100F PR PT FALLS ASSESS DOC 2+ FALLS/FALL W/INJURY/YR: ICD-10-PCS | Mod: HCNC,CPTII,S$GLB, | Performed by: NURSE PRACTITIONER

## 2023-04-14 PROCEDURE — 3044F PR MOST RECENT HEMOGLOBIN A1C LEVEL <7.0%: ICD-10-PCS | Mod: HCNC,CPTII,S$GLB, | Performed by: NURSE PRACTITIONER

## 2023-04-14 PROCEDURE — 3288F FALL RISK ASSESSMENT DOCD: CPT | Mod: HCNC,CPTII,S$GLB, | Performed by: NURSE PRACTITIONER

## 2023-04-14 PROCEDURE — 3044F HG A1C LEVEL LT 7.0%: CPT | Mod: HCNC,CPTII,S$GLB, | Performed by: NURSE PRACTITIONER

## 2023-04-14 PROCEDURE — 1125F PR PAIN SEVERITY QUANTIFIED, PAIN PRESENT: ICD-10-PCS | Mod: HCNC,CPTII,S$GLB, | Performed by: NURSE PRACTITIONER

## 2023-04-14 PROCEDURE — 99999 PR PBB SHADOW E&M-EST. PATIENT-LVL IV: ICD-10-PCS | Mod: PBBFAC,HCNC,, | Performed by: NURSE PRACTITIONER

## 2023-04-14 PROCEDURE — 99999 PR PBB SHADOW E&M-EST. PATIENT-LVL IV: CPT | Mod: PBBFAC,HCNC,, | Performed by: NURSE PRACTITIONER

## 2023-04-14 PROCEDURE — 3008F BODY MASS INDEX DOCD: CPT | Mod: HCNC,CPTII,S$GLB, | Performed by: NURSE PRACTITIONER

## 2023-04-14 PROCEDURE — 3288F PR FALLS RISK ASSESSMENT DOCUMENTED: ICD-10-PCS | Mod: HCNC,CPTII,S$GLB, | Performed by: NURSE PRACTITIONER

## 2023-04-14 RX ORDER — SUCRALFATE 1 G/1
1 TABLET ORAL 3 TIMES DAILY
Qty: 90 TABLET | Refills: 1 | Status: SHIPPED | OUTPATIENT
Start: 2023-04-14 | End: 2023-05-14

## 2023-04-14 RX ORDER — SODIUM PICOSULFATE, MAGNESIUM OXIDE, AND ANHYDROUS CITRIC ACID 10; 3.5; 12 MG/160ML; G/160ML; G/160ML
LIQUID ORAL
Qty: 320 ML | Refills: 0 | Status: SHIPPED | OUTPATIENT
Start: 2023-04-14 | End: 2023-11-09 | Stop reason: SDUPTHER

## 2023-04-14 NOTE — PATIENT INSTRUCTIONS
Start Metamucil daily. Take 3 capsules once a day. Can increase to 5 capsules once a day if needed.

## 2023-04-14 NOTE — PROGRESS NOTES
GASTROENTEROLOGY CLINIC NOTE    Chief Complaint: The primary encounter diagnosis was Generalized abdominal pain. Diagnoses of Chest pain, atypical, Depression, unspecified depression type, Gastroesophageal reflux disease, unspecified whether esophagitis present, and Screening for colon cancer were also pertinent to this visit.  Referring provider/PCP: Jose Hernández MD    HPI:  Lo Escalera is a 67 y.o. female who is a new patient to me with a PMH that is significant for Anemia due to chronic blood loss, Anemia of chronic disorder, Anxiety, Diverticulosis, Gastric ulcer, GERD (gastroesophageal reflux disease), Hepatic steatosis, Hyperbilirubinemia, Hypertension, Hypothyroidism, Iron deficiency anemia due to chronic blood loss, Microcytic anemia, Peptic ulcer disease, UPJ (ureteropelvic junction) obstruction, and Vertigo.  She was previously seen in clinic one year ago by Dr. Cornejo for esophagitis is here today to establish care for chronic anemia.  She underwent colonoscopy with Dr. Cornejo in 7/2020 which revealed 3 polyps and diverticulosis. She also underwent EGD in 9/2020 with Dr. Gardner which revealed small varices in middle third of esophagus and LA Grade C esophagitis. Small amount of food residue was also noted in stomach and gastric emptying study was recommended. Patient has not yet completed emptying study. She denies any recent blood in her stool but reports an episode of hematochezia about 6 weeks ago which lasted 1 day.  Denies melena, abdominal pain, water brash, pyrosis, or changes in bowel habits. She does endorse increased fatigue, shortness of breath, and cravings for ice chips. She is currently taking PO iron supplements (2 tablets) daily. She does not take any blood thinners but does take Protonix 40mg BID.     Interval Note 4/14/2023  Ms. Lo Escalera who is known to me presents for abdominal pain and alternating bowel movements. Last seen 2021 for anemia. Underwent EGD and  Colonoscopy. EGD with small varices, LA Grade C esophagitis, and small amount of food residue. Colonoscopy with tubular adenomas and diverticulosis noted. VCE obtained which revealed multiple small angioectasias without bleeding and non-bleeding small ulcer in the small bowel.  Referral was placed to Dr. Blanchard but patient did not follow up. Anemia has improved.  Ast seen by hematology 3/2022; recommended monitoring CBC twice per year.     Currently being followed by hepatology. Due for EGD for surveillance of varices.  Epigastric/chest pain began about two months ago. Sought care with PCP who continued Protonix and started Carafate. Referral also placed to cardiology. Cards r/o cardiac etiology.      Bowel movements alternating between diarrhea  Taking imodium twice a week; increased gas and will pass some stool with gas    Treatments: Imodium    Prior Upper Endoscopy: 9/2020 with Dr. Gardner  Findings: Small (< 5 mm) varices were found in the middle third of the esophagus. LA Grade C (one or more mucosal breaks continuous between tops of 2 or more mucosal folds, less than 75% circumference) esophagitis with no bleeding was found in the lower third of the esophagus. Biopsies were taken with a cold forceps for histology. Verification of patient identification for the specimen was done. Estimated blood loss was minimal.  A small amount of food (residue) was found in the gastric body. Suspect gastroparesis due to retained gastric contents. The examined duodenum was normal.     Impression:   - Small (< 5 mm) esophageal varices.                         - LA Grade C esophagitis. Biopsied.                         - A small amount of food (residue) in the stomach.                         - Gastroparesis.                         - Normal examined duodenum.     Recommendation:       - Discharge patient to home.                         - Resume previous diet.                         - Continue present medications.                          - Await pathology results.                         - Do a gastric emptying study at appointment to be scheduled.                         - Return to GI office at appointment to be   Scheduled.    Pathology:  Esophagus (biopsy):  - Benign squamous mucosa with reactive epithelium, mild intraepithelial lymphocytosis and mild papillaecongestion (see comment). Zero eosinophils  Comments  There are deep spongiosis of the epithelium, as well as mild basal layer hyperplasia. Upon location of the biopsy, this could be compatible with reflux esophagitis and esophageal varices.    Prior Colonoscopy: 7/2020 with Dr. Cornejo  Findings:The perianal and digital rectal examinations were normal. A 7 mm polyp was found in the sigmoid colon. The polyp was sessile. The polyp was removed with a cold snare. Resection and retrieval were complete. Two sessile polyps were found in the cecum. The polyps were 2 to 3 mm in size. These polyps were removed with a jumbo cold forceps. Resection and retrieval were complete. Multiple medium-mouthed diverticula were found in the sigmoid colon.  The exam was otherwise without abnormality on direct and retroflexion views.     Impression:   - One 7 mm polyp in the sigmoid colon, removed with a cold snare. Resected and retrieved.                         - Two 2 to 3 mm polyps in the cecum, removed with a  jumbo cold forceps. Resected and retrieved.                         - Diverticulosis in the sigmoid colon.                         - The examination was otherwise normal on direct and retroflexion views.     Recommendation:    - Discharge patient to home.                         - Resume previous diet.                         - Continue present medications.                         - Await pathology results.                         - Repeat colonoscopy in 3 years for surveillance.     Pathology:  1. Colon, cecal polyp x2, biopsies:  -FRAGMENTS OF TUBULAR ADENOMA  2. Colon, sigmoid polyp,  biopsy:  -Fecal material    Family h/o Colon Cancer: No  Family h/o Crohn's Disease or Ulcerative Colitis: No  Abdominal Surgeries: Appendectomy, Cholecystectomy    Anticoagulation or Antiplatelet: No    Review of Systems   Constitutional:  Positive for malaise/fatigue. Negative for weight loss.   HENT:  Negative for sore throat.    Eyes:  Negative for blurred vision.   Respiratory:  Positive for shortness of breath. Negative for cough.    Cardiovascular:  Negative for chest pain.   Gastrointestinal:  Positive for abdominal pain (epigastric), heartburn and nausea. Negative for blood in stool, constipation, diarrhea and vomiting.   Genitourinary:  Negative for dysuria.   Musculoskeletal:  Negative for myalgias.   Skin:  Negative for rash.   Neurological:  Negative for headaches.   Endo/Heme/Allergies:  Negative for environmental allergies.   Psychiatric/Behavioral:  Negative for suicidal ideas. The patient is not nervous/anxious.      Past Medical History: has a past medical history of Anemia due to chronic blood loss, Anemia of chronic disorder, Anxiety, Cirrhosis, Coronary artery disease, Diverticulosis, Gastric ulcer, GERD (gastroesophageal reflux disease), Hepatic steatosis, Hyperbilirubinemia, Hypertension, Hypothyroidism, Iron deficiency anemia due to chronic blood loss, Microcytic anemia, Obesity, Peptic ulcer disease, UPJ (ureteropelvic junction) obstruction, Vertigo, and Vertigo.    Past Surgical History: has a past surgical history that includes Hysterectomy; Appendectomy; Cholecystectomy; Tonsillectomy; Esophagogastroduodenoscopy (N/A, 6/12/2019); Pancreas surgery; Esophagogastroduodenoscopy (N/A, 3/9/2020); Colonoscopy (N/A, 7/6/2020); Esophagogastroduodenoscopy (N/A, 9/21/2020); Oophorectomy; Transforaminal epidural injection of steroid (Right, 10/14/2021); Intraluminal gastrointestinal tract imaging via capsule (N/A, 1/19/2022); and Transforaminal epidural injection of steroid (Right,  1/13/2023).    Family History:family history includes Arthritis in her brother; Heart disease in her father and mother; Hyperlipidemia in her mother; Hypertension in her father; Lupus in her sister and sister; Mental illness in her son; No Known Problems in her daughter.    Allergies:   Review of patient's allergies indicates:   Allergen Reactions    Codeine Itching    Iodine and iodide containing products Itching       Social History: reports that she has never smoked. She has never used smokeless tobacco. She reports that she does not drink alcohol and does not use drugs.    Home medications:   Current Outpatient Medications on File Prior to Visit   Medication Sig Dispense Refill    DULoxetine (CYMBALTA) 30 MG capsule Take 1 capsule (30 mg total) by mouth once daily. 30 capsule 11    ferrous sulfate (FEOSOL) 325 mg (65 mg iron) Tab tablet TAKE 1 TABLET (325 MG TOTAL) BY MOUTH ONCE DAILY. 90 tablet 0    FLUAD QUAD 2022-23,65Y UP,,PF, 60 mcg (15 mcg x 4)/0.5 mL Syrg       gabapentin (NEURONTIN) 600 MG tablet Take 1 tablet (600 mg total) by mouth 3 (three) times daily. 90 tablet 5    LIDOcaine (LIDODERM) 5 % Place 1 patch onto the skin once daily. Remove & Discard patch within 12 hours or as directed by MD 15 patch 2    meclizine (ANTIVERT) 25 mg tablet Take 1 tablet (25 mg total) by mouth 3 (three) times daily as needed. 60 tablet 5    pantoprazole (PROTONIX) 40 MG tablet Take 1 tablet (40 mg total) by mouth 2 (two) times daily. 180 tablet 1    potassium 99 mg Tab Take by mouth once daily.      traMADoL (ULTRAM) 50 mg tablet Take 1 tablet (50 mg total) by mouth every 6 (six) hours as needed for Pain. The medication you have been prescribed may cause drowsiness and impair your judgement.  Therefore, you should avoid driving, climbing, using machinery, etc., so as not to increase your risk of injury.  Do NOT drink any alcohol while on this medication(s). It is also addictive. 10 tablet 0    calcium carbonate (TUMS)  "200 mg calcium (500 mg) chewable tablet Take 1 tablet by mouth once daily.       No current facility-administered medications on file prior to visit.       Vital signs:  Ht 5' 2" (1.575 m)   Wt 109.9 kg (242 lb 4.6 oz)   LMP  (LMP Unknown)   BMI 44.31 kg/m²     Physical Exam  Vitals reviewed.   Constitutional:       General: She is not in acute distress.     Appearance: Normal appearance. She is not ill-appearing.   HENT:      Head: Normocephalic.   Cardiovascular:      Rate and Rhythm: Normal rate and regular rhythm.      Heart sounds: Normal heart sounds. No murmur heard.  Pulmonary:      Effort: Pulmonary effort is normal. No respiratory distress.      Breath sounds: Normal breath sounds.   Chest:      Chest wall: No tenderness.   Abdominal:      General: Bowel sounds are normal. There is no distension.      Palpations: Abdomen is soft.      Tenderness: There is no abdominal tenderness. Negative signs include Sheldon's sign.      Hernia: No hernia is present.   Skin:     General: Skin is warm.   Neurological:      Mental Status: She is alert and oriented to person, place, and time.   Psychiatric:         Mood and Affect: Mood normal.         Behavior: Behavior normal.       Routine labs:  Lab Results   Component Value Date    WBC 6.57 11/05/2022    HGB 12.2 11/05/2022    HCT 37.6 11/05/2022    MCV 89 11/05/2022     (L) 11/05/2022     Lab Results   Component Value Date    INR 1.1 11/05/2022     Lab Results   Component Value Date    IRON 58 05/04/2022    FERRITIN 51 05/04/2022    TIBC 283 05/04/2022    FESATURATED 20 05/04/2022     Lab Results   Component Value Date     11/05/2022    K 4.3 11/05/2022     11/05/2022    CO2 24 11/05/2022    BUN 10 11/05/2022    CREATININE 0.8 11/05/2022    GLUF 124 (H) 03/27/2022     Lab Results   Component Value Date    ALBUMIN 3.4 (L) 11/05/2022    ALT 12 11/05/2022    AST 32 11/05/2022    GGT 1195 (H) 11/16/2016    ALKPHOS 141 (H) 11/05/2022    BILITOT 0.7 " 11/05/2022     No results found for: GLUCOSE  Lab Results   Component Value Date    TSH 3.78 05/15/2020     Lab Results   Component Value Date    CALCIUM 9.3 11/05/2022       Imaging:      I have reviewed prior labs, imaging, and notes.      Assessment:  1. Generalized abdominal pain    2. Chest pain, atypical    3. Depression, unspecified depression type    4. Gastroesophageal reflux disease, unspecified whether esophagitis present    5. Screening for colon cancer      H/o LA Grade C Esophagitis with esophageal varices. Epigastric and chest pain non-cardiac etiology.   Taking Protonix daily; has not started Carafate.   Due for colon cancer screening. Bowel habits alternating.     Plan:  Orders Placed This Encounter    Ambulatory referral/consult to Psychology    sod picosulf-mag ox-citric ac (CLENPIQ) 10 mg-3.5 gram- 12 gram/160 mL Soln    sucralfate (CARAFATE) 1 gram tablet    Case Request Endoscopy: COLONOSCOPY, EGD (ESOPHAGOGASTRODUODENOSCOPY)     EGD for monitoring of esophageal varices.   Continue Protonix as previously prescribed.   Start Carafate TID  Colonoscopy for colon cancer screening. Clenpiq  Start Metamucil daily  Referral placed to psychology for anxiety/depression and h/o ETOH abuse.       Plan of care discussed with patient who is in agreement and verbalized understanding.     I have explained the planned procedures to the patient.The risks, benefits and alternatives of the procedure were also explained in detail. Patient verbalized understanding, all questions were answered. The patient agrees to proceed as planned    Follow Up: As Needed        Linda Zaldivar, CHINYERE,FNP-BC  [facility]

## 2023-04-17 ENCOUNTER — TELEPHONE (OUTPATIENT)
Dept: PRIMARY CARE CLINIC | Facility: CLINIC | Age: 68
End: 2023-04-17
Payer: MEDICARE

## 2023-04-17 ENCOUNTER — TELEPHONE (OUTPATIENT)
Dept: PSYCHOLOGY | Facility: CLINIC | Age: 68
End: 2023-04-17
Payer: MEDICARE

## 2023-04-17 NOTE — TELEPHONE ENCOUNTER
----- Message from Gillian James NP sent at 4/17/2023  8:06 AM CDT -----  Mammogram normal with no evidence of malignancy.  Repeat yearly.

## 2023-04-17 NOTE — TELEPHONE ENCOUNTER
----- Message from Melodie Mondragon MA sent at 4/17/2023  8:41 AM CDT -----  Regarding: FW: psych referral  Please help this patient get an appt.  Thanks,  Melodie    ----- Message -----  From: Linda Blanco NP  Sent: 4/16/2023   2:59 PM CDT  To: Melodie Mondragon MA  Subject: psych referral                                   She has two or three previous psych referrals. Is there any way we can help her get an appointment or is there someone I can contact? Thanks.

## 2023-04-17 NOTE — TELEPHONE ENCOUNTER
LVM requesting a call back to be scheduled. Will direct scheduling to [facility] ([phone]) due to limited availabilities with provider.

## 2023-04-18 ENCOUNTER — TELEPHONE (OUTPATIENT)
Dept: PRIMARY CARE CLINIC | Facility: CLINIC | Age: 68
End: 2023-04-18
Payer: MEDICARE

## 2023-04-18 NOTE — TELEPHONE ENCOUNTER
----- Message from Daniela Medrano sent at 4/18/2023  2:24 PM CDT -----  Contact: Self/[phone]  Patient is returning a phone call.  Who left a message for the patient: ?  Does patient know what this is regarding:  ?  Would you like a call back, or a response through your [facility] portal?:   call back   Comments:

## 2023-04-25 ENCOUNTER — OFFICE VISIT (OUTPATIENT)
Dept: PAIN MEDICINE | Facility: CLINIC | Age: 68
End: 2023-04-25
Payer: MEDICARE

## 2023-04-25 VITALS
DIASTOLIC BLOOD PRESSURE: 70 MMHG | WEIGHT: 242.06 LBS | SYSTOLIC BLOOD PRESSURE: 139 MMHG | HEIGHT: 62 IN | BODY MASS INDEX: 44.55 KG/M2 | HEART RATE: 109 BPM

## 2023-04-25 DIAGNOSIS — M54.16 LUMBAR RADICULOPATHY, CHRONIC: ICD-10-CM

## 2023-04-25 DIAGNOSIS — M54.16 LUMBAR RADICULOPATHY: Primary | ICD-10-CM

## 2023-04-25 DIAGNOSIS — M51.36 DDD (DEGENERATIVE DISC DISEASE), LUMBAR: ICD-10-CM

## 2023-04-25 DIAGNOSIS — M47.816 LUMBAR SPONDYLOSIS: ICD-10-CM

## 2023-04-25 PROCEDURE — 3008F BODY MASS INDEX DOCD: CPT | Mod: HCNC,CPTII,S$GLB, | Performed by: PAIN MEDICINE

## 2023-04-25 PROCEDURE — 99214 PR OFFICE/OUTPT VISIT, EST, LEVL IV, 30-39 MIN: ICD-10-PCS | Mod: HCNC,S$GLB,, | Performed by: PAIN MEDICINE

## 2023-04-25 PROCEDURE — 1125F AMNT PAIN NOTED PAIN PRSNT: CPT | Mod: HCNC,CPTII,S$GLB, | Performed by: PAIN MEDICINE

## 2023-04-25 PROCEDURE — 99214 OFFICE O/P EST MOD 30 MIN: CPT | Mod: HCNC,S$GLB,, | Performed by: PAIN MEDICINE

## 2023-04-25 PROCEDURE — 1125F PR PAIN SEVERITY QUANTIFIED, PAIN PRESENT: ICD-10-PCS | Mod: HCNC,CPTII,S$GLB, | Performed by: PAIN MEDICINE

## 2023-04-25 PROCEDURE — 3044F HG A1C LEVEL LT 7.0%: CPT | Mod: HCNC,CPTII,S$GLB, | Performed by: PAIN MEDICINE

## 2023-04-25 PROCEDURE — 1160F PR REVIEW ALL MEDS BY PRESCRIBER/CLIN PHARMACIST DOCUMENTED: ICD-10-PCS | Mod: HCNC,CPTII,S$GLB, | Performed by: PAIN MEDICINE

## 2023-04-25 PROCEDURE — 1159F MED LIST DOCD IN RCRD: CPT | Mod: HCNC,CPTII,S$GLB, | Performed by: PAIN MEDICINE

## 2023-04-25 PROCEDURE — 3044F PR MOST RECENT HEMOGLOBIN A1C LEVEL <7.0%: ICD-10-PCS | Mod: HCNC,CPTII,S$GLB, | Performed by: PAIN MEDICINE

## 2023-04-25 PROCEDURE — 3008F PR BODY MASS INDEX (BMI) DOCUMENTED: ICD-10-PCS | Mod: HCNC,CPTII,S$GLB, | Performed by: PAIN MEDICINE

## 2023-04-25 PROCEDURE — 3078F PR MOST RECENT DIASTOLIC BLOOD PRESSURE < 80 MM HG: ICD-10-PCS | Mod: HCNC,CPTII,S$GLB, | Performed by: PAIN MEDICINE

## 2023-04-25 PROCEDURE — 99999 PR PBB SHADOW E&M-EST. PATIENT-LVL IV: CPT | Mod: PBBFAC,HCNC,, | Performed by: PAIN MEDICINE

## 2023-04-25 PROCEDURE — 3075F PR MOST RECENT SYSTOLIC BLOOD PRESS GE 130-139MM HG: ICD-10-PCS | Mod: HCNC,CPTII,S$GLB, | Performed by: PAIN MEDICINE

## 2023-04-25 PROCEDURE — 99999 PR PBB SHADOW E&M-EST. PATIENT-LVL IV: ICD-10-PCS | Mod: PBBFAC,HCNC,, | Performed by: PAIN MEDICINE

## 2023-04-25 PROCEDURE — 1159F PR MEDICATION LIST DOCUMENTED IN MEDICAL RECORD: ICD-10-PCS | Mod: HCNC,CPTII,S$GLB, | Performed by: PAIN MEDICINE

## 2023-04-25 PROCEDURE — 3078F DIAST BP <80 MM HG: CPT | Mod: HCNC,CPTII,S$GLB, | Performed by: PAIN MEDICINE

## 2023-04-25 PROCEDURE — 3075F SYST BP GE 130 - 139MM HG: CPT | Mod: HCNC,CPTII,S$GLB, | Performed by: PAIN MEDICINE

## 2023-04-25 PROCEDURE — 1160F RVW MEDS BY RX/DR IN RCRD: CPT | Mod: HCNC,CPTII,S$GLB, | Performed by: PAIN MEDICINE

## 2023-04-25 NOTE — PROGRESS NOTES
Subjective:     Patient ID: Lo Escalera is a 67 y.o. female    Chief Complaint: Follow-up      Referred by: No ref. provider found      HPI:    Interval History (4/25/23):  She returns today for follow up.  She reports that she continues have right-sided low back and lower extremity pain as before.  And L5 transforaminal epidural steroid injection [facility] 1/13/23.  She reports some relief but for only 4 days.  Today, she states she continues to have right-sided low back and lower extremity pain.  And effects the right posterolateral leg primarily.  Associated paresthesias.  Subjective weakness though nothing focal.  Denies any bowel bladder dysfunction.        Interval History (9/28/21):  She returns today for follow up.  She reports that she continues to have low back and right lower extremity pain. She denies any changes in the quality or location of her pain since last encounter. She denies any new worsening symptoms. She has completed lumbar MRI.       Interval History (8/10/21):    The patient location is: home  The chief complaint leading to consultation is: f/u  Visit type: Virtual visit with audio.  Patient verbally consented for audio visit.  Total time spent with patient: 11 minutes  Each patient to whom he or she provides medical services by telemedicine is:  (1) informed of the relationship between the physician and patient and the respective role of any other health care provider with respect to management of the patient; and (2) notified that he or she may decline to receive medical services by telemedicine and may withdraw from such care at any time.      She returns today for follow up.  She reports that she continues to have pain as before. She denies any changes in the quality or location of her pain. She has attended two therapy sessions so far. States that her pain was exacerbated by PT. MRI ordered by her PCP. Also started on Gabapentin. Gabapentin has not been very helpful.  MRI has not been done yet.      Initial Encounter (6/16/21):  Lo Escalera is a 67 y.o. female who presents today with chronic bilateral low back pain.  Pain has been present for years.  No specific inciting event or injury noted.  The pain is located across the lower lumbar region radiates bilateral lower extremities right worse than left.  She reports associated numbness and tingling mainly in the right lower extremity all the way to her foot..  She denies any associated weakness, bowel bladder dysfunction.  The pain is constant worse with activity..   This pain is described in detail below.    Physical Therapy:  Yes.    Non-pharmacologic Treatment:  Rest helps         TENS?  No    Pain Medications:         Currently taking:  Gabapentin, tylenol, Cymbalta    Has tried in the past:  NSAIDs, Tylenol, tramadol, Norco    Has not tried:  Muscle relaxants, TCAs, SNRIs, topical creams    Blood thinners:  None    Interventional Therapies:    10/14/21 - right L5 and S1 transforaminal epidural steroid injection - minimal relief  1/13/23 - right L4 and L5 transforaminal epidural steroid injection - minimal relief 4 days    Relevant Surgeries:  None    Affecting sleep?  Yes    Affecting daily activities? yes    Depressive symptoms? no          SI/HI? No    Work status: Retired    Pain Scores:    Best:       5/10  Worst:     10/10  Usually:   5/10  Today:    8/10    Review of Systems   Constitutional:  Negative for activity change, appetite change, chills, fatigue, fever and unexpected weight change.   HENT:  Negative for hearing loss.    Eyes:  Negative for visual disturbance.   Respiratory:  Negative for chest tightness and shortness of breath.    Cardiovascular:  Negative for chest pain.   Gastrointestinal:  Negative for abdominal pain, constipation, diarrhea, nausea and vomiting.   Genitourinary:  Negative for difficulty urinating.   Musculoskeletal:  Positive for arthralgias, back pain, gait problem and myalgias.  Negative for neck pain.   Skin:  Negative for rash.   Neurological:  Positive for weakness and numbness. Negative for dizziness, light-headedness and headaches.   Psychiatric/Behavioral:  Positive for sleep disturbance. Negative for hallucinations and suicidal ideas. The patient is not nervous/anxious.      Past Medical History:   Diagnosis Date    Anemia due to chronic blood loss 03/06/2020    Anemia of chronic disorder 08/24/2020    Anxiety     Cirrhosis     Coronary artery disease     Diverticulosis     Gastric ulcer     old    GERD (gastroesophageal reflux disease)     Hepatic steatosis 09/12/2016    Hyperbilirubinemia     Hypertension     Hypothyroidism     Iron deficiency anemia due to chronic blood loss 08/24/2020    Microcytic anemia 08/24/2020    Obesity     Peptic ulcer disease 09/12/2016    UPJ (ureteropelvic junction) obstruction     Vertigo     Vertigo        Past Surgical History:   Procedure Laterality Date    APPENDECTOMY      CHOLECYSTECTOMY      COLONOSCOPY N/A 7/6/2020    Procedure: COLONOSCOPY;  Surgeon: Leander Cornejo MD;  Location: [facility];  Service: Colon and Rectal;  Laterality: N/A;    ESOPHAGOGASTRODUODENOSCOPY N/A 6/12/2019    Procedure: ESOPHAGOGASTRODUODENOSCOPY (EGD);  Surgeon: Arben Brown MD;  Location: [facility];  Service: Endoscopy;  Laterality: N/A;    ESOPHAGOGASTRODUODENOSCOPY N/A 3/9/2020    Procedure: EGD (ESOPHAGOGASTRODUODENOSCOPY);  Surgeon: Andrea Salomon MD;  Location: [facility];  Service: Endoscopy;  Laterality: N/A;    ESOPHAGOGASTRODUODENOSCOPY N/A 9/21/2020    Procedure: EGD (ESOPHAGOGASTRODUODENOSCOPY);  Surgeon: Antwon Gardner MD;  Location: [facility];  Service: Endoscopy;  Laterality: N/A;    HYSTERECTOMY      INTRALUMINAL GASTROINTESTINAL TRACT IMAGING VIA CAPSULE N/A 1/19/2022    Procedure: IMAGING PROCEDURE, GI TRACT, INTRALUMINAL, VIA CAPSULE;  Surgeon: Antwon Gardner MD;  Location: [facility];  Service: Endoscopy;  Laterality: N/A;     OOPHORECTOMY      PANCREAS SURGERY      TONSILLECTOMY      TRANSFORAMINAL EPIDURAL INJECTION OF STEROID Right 10/14/2021    Procedure: Injection,steroid,epidural,transforaminal approach---RIGHT L5 AND S1;  Surgeon: Cheng Tariq Jr., MD;  Location: [facility];  Service: Pain Management;  Laterality: Right;    TRANSFORAMINAL EPIDURAL INJECTION OF STEROID Right 1/13/2023    Procedure: INJECTION, STEROID, EPIDURAL, TRANSFORAMINAL APPROACH, RIGHT L4/L5 & L5-S1 CONTRAST DIRECT REF;  Surgeon: Coni Franco MD;  Location: [facility];  Service: Pain Management;  Laterality: Right;       Social History     Socioeconomic History    Marital status:    Tobacco Use    Smoking status: Never    Smokeless tobacco: Never   Substance and Sexual Activity    Alcohol use: No     Comment: 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  However, no consumption in 20 years.     Drug use: No    Sexual activity: Not Currently       Review of patient's allergies indicates:   Allergen Reactions    Codeine Itching    Iodine and iodide containing products Itching       Current Outpatient Medications on File Prior to Visit   Medication Sig Dispense Refill    calcium carbonate (TUMS) 200 mg calcium (500 mg) chewable tablet Take 1 tablet by mouth once daily.      DULoxetine (CYMBALTA) 30 MG capsule Take 1 capsule (30 mg total) by mouth once daily. 30 capsule 11    ferrous sulfate (FEOSOL) 325 mg (65 mg iron) Tab tablet TAKE 1 TABLET (325 MG TOTAL) BY MOUTH ONCE DAILY. 90 tablet 0    FLUAD QUAD 2022-23,65Y UP,,PF, 60 mcg (15 mcg x 4)/0.5 mL Syrg       LIDOcaine (LIDODERM) 5 % Place 1 patch onto the skin once daily. Remove & Discard patch within 12 hours or as directed by MD 15 patch 2    meclizine (ANTIVERT) 25 mg tablet Take 1 tablet (25 mg total) by mouth 3 (three) times daily as needed. 60 tablet 5    pantoprazole (PROTONIX) 40 MG tablet Take 1 tablet (40 mg total) by mouth 2 (two) times daily. 180 tablet 1    potassium 99 mg Tab Take by mouth  "once daily.      sod picosulf-mag ox-citric ac (CLENPIQ) 10 mg-3.5 gram- 12 gram/160 mL Soln Take As Directed. 320 mL 0    sucralfate (CARAFATE) 1 gram tablet Take 1 tablet (1 g total) by mouth 3 (three) times daily. 90 tablet 1    gabapentin (NEURONTIN) 600 MG tablet Take 1 tablet (600 mg total) by mouth 3 (three) times daily. 90 tablet 5     No current facility-administered medications on file prior to visit.       Objective:      /70   Pulse 109   Ht 5' 2" (1.575 m)   Wt 109.8 kg (242 lb 1 oz)   LMP  (LMP Unknown)   BMI 44.27 kg/m²     Exam:  GEN:  Well developed, well nourished.  No acute distress.  Normal pain behavior.  HEENT:  No trauma.  Mucous membranes moist.  Nares patent bilaterally.  PSYCH: Normal affect. Thought content appropriate.  CHEST:  Breathing symmetric.  No audible wheezing.  ABD: Soft, non-distended.  SKIN:  Warm, pink, dry.  No rash on exposed areas.    EXT:  No cyanosis, clubbing, or edema.  No color change or changes in nail or hair growth.  NEURO/MUSCULOSKELETAL:  Fully alert, oriented, and appropriate. Speech normal tan. No cranial nerve deficits.   Gait:  Normal.  No trendelenburg sign bilaterally.   Motor Strength:  5/5 motor strength throughout lower extremities.   Sensory:  No sensory deficit in the lower extremities.   Reflexes:  1+ and symmetric throughout.  Downgoing Babinski's bilaterally.  No clonus or spasticity.  L-Spine:  Limited ROM with pain on flexion and extension.  Positive pain with axial/facet loading bilaterally.  Positive SLR bilaterally.    Diffuse TTP over midline lumbar spine and lumbar paraspinals.            Imaging:      Narrative & Impression    EXAMINATION:  MRI LUMBAR SPINE WITHOUT CONTRAST     CLINICAL HISTORY:  Back pain severe difficulty ambulating difficulty standing after sitting or riding in a truck; Spondylosis without myelopathy or radiculopathy, lumbar region     TECHNIQUE:  Multiplanar, multisequence MR images were acquired from the " thoracolumbar junction to the sacrum without the administration of contrast.     COMPARISON:  06/15/2021     FINDINGS:  There is no evidence of fracture or aggressive marrow replacement process.  There is mild multilevel lumbar disc height loss and disc desiccation.  There is 6 mm of anterolisthesis of L4 on L5.  Otherwise, sagittal alignment is maintained.  Conus terminates at the L1-2 disc level.  Visualized cord signal is within normal limits.  The visualized retroperitoneal structures show no acute abnormalities.     T12-L1: Mild diffuse disc bulge.  No significant central canal stenosis or neural foraminal narrowing.     L1-2: Mild diffuse disc bulge.  No significant central canal stenosis or neural foraminal narrowing.     L2-3: Mild diffuse disc bulge with mild facet arthropathy.  No significant central canal stenosis or neural foraminal narrowing.     L3-4: There is a diffuse disc bulge with moderate facet arthropathy and ligamentum flavum thickening contributing to moderate central canal stenosis.  There is mild right neural foraminal narrowing.     L4-5: Severe facet arthropathy contributing to grade 1 anterolisthesis with uncovering of the disc.  There is a diffuse disc bulge with ligamentum flavum thickening contributing to mild central canal stenosis.  There is right lateral recess stenosis.  Mild bilateral neural foraminal narrowing right greater than left.     L5-S1: There is a diffuse disc bulge with a superimposed central protrusion which abuts the descending right S1 nerve root.  There is severe facet arthropathy on the left.  Mild bilateral neural foraminal narrowing.  No significant central canal stenosis.     Impression:     Multilevel lumbar spondylosis most significant at L3-4, L4-5 and L5-S1.  Specific details at each level are discussed above.     Advanced multilevel facet arthropathy contributing to grade 1 anterolisthesis of L4 on L5.        Electronically signed by: Yobani Sousa  MD  Date:                                            08/13/2021  Time:                                           14:31           Narrative & Impression    EXAMINATION:  XR LUMBAR SPINE AP AND LAT WITH FLEX/EXT     CLINICAL HISTORY:  Other intervertebral disc degeneration, lumbar region     TECHNIQUE:  AP and lateral views as well as lateral flexion and extension images are performed through the lumbar spine.     COMPARISON:  Lumbar spine radiograph dated 07/27/2005     FINDINGS:  Grade 1 anterolisthesis of L4 on L5 with multilevel discogenic change and lower facet DJD.  No significant translation.  No lytic or blastic lesion.  Right upper quadrant surgical clips.     Impression:     As above.        Electronically signed by: Duran Zuniga  Date:                                            06/15/2021  Time:                                           14:27         Assessment:       Encounter Diagnoses   Name Primary?    Lumbar radiculopathy Yes    Lumbar spondylosis     DDD (degenerative disc disease), lumbar     Lumbar radiculopathy, chronic          Plan:       Lo was seen today for follow-up.    Diagnoses and all orders for this visit:    Lumbar radiculopathy  -     MRI Lumbar Spine Without Contrast; Future    Lumbar spondylosis  -     MRI Lumbar Spine Without Contrast; Future    DDD (degenerative disc disease), lumbar  -     MRI Lumbar Spine Without Contrast; Future    Lumbar radiculopathy, chronic  -     MRI Lumbar Spine Without Contrast; Future        Lo Bernarda Escalera is a 67 y.o. female with chronic bilateral low back pain.  Pain does radiate to the bilateral lower extremities right worse than left.  Concerning for radicular symptoms though likely has some degree of axial pain related to lower lumbar facet joints. Lumbar xrays do show some lumbar facet arthropathy and anterolisthesis of L4 without instability noted.     Lumbar MRI to get updated imaging.  Return to clinic after MRI to review results.   May consider additional interventional procedures if appropriate.

## 2023-04-26 ENCOUNTER — TELEPHONE (OUTPATIENT)
Dept: GASTROENTEROLOGY | Facility: CLINIC | Age: 68
End: 2023-04-26
Payer: MEDICARE

## 2023-04-26 NOTE — TELEPHONE ENCOUNTER
Informed the patient that someone from the pre-op dept will call her on Friday after lunch.    ----- Message from Freddy Duldey MA sent at 4/26/2023  8:26 AM CDT -----  Regarding: FW: pt advice  Contact: pt @ [phone]    ----- Message -----  From: Lianne Holman  Sent: 4/25/2023   5:00 PM CDT  To: [facility] Staff  Subject: pt advice                                        Pt is calling for surgery time and instructions please call to advise further, thank you for all that you do.

## 2023-05-01 ENCOUNTER — TELEPHONE (OUTPATIENT)
Dept: GASTROENTEROLOGY | Facility: CLINIC | Age: 68
End: 2023-05-01
Payer: MEDICARE

## 2023-05-01 DIAGNOSIS — I85.00 ESOPHAGEAL VARICES WITHOUT BLEEDING, UNSPECIFIED ESOPHAGEAL VARICES TYPE: Primary | ICD-10-CM

## 2023-05-02 ENCOUNTER — TELEPHONE (OUTPATIENT)
Dept: GASTROENTEROLOGY | Facility: CLINIC | Age: 68
End: 2023-05-02
Payer: MEDICARE

## 2023-05-02 NOTE — TELEPHONE ENCOUNTER
LVM for the patient to call me back at [phone] to schedule her Repeat EGD in June with Kendra.

## 2023-05-03 PROBLEM — I85.00 ESOPHAGEAL VARICES WITHOUT BLEEDING: Status: ACTIVE | Noted: 2023-05-03

## 2023-05-08 ENCOUNTER — OFFICE VISIT (OUTPATIENT)
Dept: PSYCHOLOGY | Facility: CLINIC | Age: 68
End: 2023-05-08
Payer: MEDICARE

## 2023-05-08 DIAGNOSIS — F51.01 PRIMARY INSOMNIA: ICD-10-CM

## 2023-05-08 DIAGNOSIS — F33.0 MILD EPISODE OF RECURRENT MAJOR DEPRESSIVE DISORDER: Primary | ICD-10-CM

## 2023-05-08 DIAGNOSIS — F41.1 GAD (GENERALIZED ANXIETY DISORDER): ICD-10-CM

## 2023-05-08 PROCEDURE — 1159F PR MEDICATION LIST DOCUMENTED IN MEDICAL RECORD: ICD-10-PCS | Mod: HCNC,CPTII,S$GLB, | Performed by: NURSE PRACTITIONER

## 2023-05-08 PROCEDURE — 3044F PR MOST RECENT HEMOGLOBIN A1C LEVEL <7.0%: ICD-10-PCS | Mod: HCNC,CPTII,S$GLB, | Performed by: NURSE PRACTITIONER

## 2023-05-08 PROCEDURE — 1160F PR REVIEW ALL MEDS BY PRESCRIBER/CLIN PHARMACIST DOCUMENTED: ICD-10-PCS | Mod: HCNC,CPTII,S$GLB, | Performed by: NURSE PRACTITIONER

## 2023-05-08 PROCEDURE — 99999 PR PBB SHADOW E&M-EST. PATIENT-LVL IV: ICD-10-PCS | Mod: PBBFAC,HCNC,, | Performed by: NURSE PRACTITIONER

## 2023-05-08 PROCEDURE — 1159F MED LIST DOCD IN RCRD: CPT | Mod: HCNC,CPTII,S$GLB, | Performed by: NURSE PRACTITIONER

## 2023-05-08 PROCEDURE — 99205 OFFICE O/P NEW HI 60 MIN: CPT | Mod: HCNC,S$GLB,, | Performed by: NURSE PRACTITIONER

## 2023-05-08 PROCEDURE — 99205 PR OFFICE/OUTPT VISIT, NEW, LEVL V, 60-74 MIN: ICD-10-PCS | Mod: HCNC,S$GLB,, | Performed by: NURSE PRACTITIONER

## 2023-05-08 PROCEDURE — 1160F RVW MEDS BY RX/DR IN RCRD: CPT | Mod: HCNC,CPTII,S$GLB, | Performed by: NURSE PRACTITIONER

## 2023-05-08 PROCEDURE — 99999 PR PBB SHADOW E&M-EST. PATIENT-LVL IV: CPT | Mod: PBBFAC,HCNC,, | Performed by: NURSE PRACTITIONER

## 2023-05-08 PROCEDURE — 3044F HG A1C LEVEL LT 7.0%: CPT | Mod: HCNC,CPTII,S$GLB, | Performed by: NURSE PRACTITIONER

## 2023-05-08 RX ORDER — DULOXETIN HYDROCHLORIDE 30 MG/1
30 CAPSULE, DELAYED RELEASE ORAL DAILY
Qty: 60 CAPSULE | Refills: 0 | Status: CANCELLED | OUTPATIENT
Start: 2023-05-08 | End: 2023-07-07

## 2023-05-08 RX ORDER — DULOXETIN HYDROCHLORIDE 20 MG/1
20 CAPSULE, DELAYED RELEASE ORAL DAILY
Qty: 60 CAPSULE | Refills: 0 | Status: CANCELLED | OUTPATIENT
Start: 2023-05-08 | End: 2023-07-07

## 2023-05-08 NOTE — PROGRESS NOTES
Outpatient Psychiatry Initial Visit ([facility])    5/8/2023    Lo Escalera, a 67 y.o. female, presenting for initial evaluation visit. Met with patient.    Reason for Encounter: Referral from Dr. Edgar ZAPATA  her PCP. Patient complains of:     History of Present Illness: Anxiety  Patient reports battling depression and anxiety since the birth of her first child, 30 years ago. She  is currently experiencing low energy, anhedonia, fatigue, low mood, sadness, feeling like a failure, and not good enough. Reports that she was started on Cymbalta 30 mg 3-4 months ago, however after chart review she initially began Cymbalta during hospital admission in 2016. Patient has long history of AUD, hx of Hepatic steatosis, Portal Hypertension, Pancreatitis, Alcoholic cirrhosis of liver with ascites and Esophageal Varices. She reports she has been sober for the past 4 -5 years. She reports being able to cheer herself u and motivate herself but it is a struggle to do so. Report Cymbalta medication never helping improve her mood.   Sleep: trouble falling and staying asleep, frequent waking throughout the night and early morning waking,   Denies SI/HI/AH/VH paranoia or delusions. Patient verbalized motivation for compliance with medications and all other elements of treatment plan.       Standardized Screenings tools:   PHQ9: 17  JHOAN- 7: 6    Stressors:  Medical issues including chronic back pain  Coping mechanisms: looking at funny videos on her phone and social media     History:     Past Psychiatric History:   Previous therapy: no  Previous psychiatric treatment and medication trials: yes - Cymbalta 30 mg- not effective  Previous psychiatric hospitalizations: no  Previous diagnoses: no  Previous suicide attempts: no  History of violence: no  History of abuse: no  Currently in treatment with: no  Suicidal Ideation: no  Auditory Hallucination: no  Visual Hallucination: no  Access to gun: no    Family Hx  Sister- Anxiety,  depression  Daughter- Anxiety    Social History:  Housing: home  Lives with:with   Marital status:   Children: 3,  Education: technical college  Special Ed: none  Legal: none  Employment: none    Substance Abuse History:  Recreational drugs: no  Alcohol: no  Tobacco use: no  Rehab: Outpatient, took antibuse    Neuro Hx  Seizure: no  Head trauma/TBI: no      Review Of Systems:     Medical Review Of Systems:  Pertinent positives noted in HPI    Psychiatric Review Of Systems:  Sleep Disturbance: yes  Appetite changes: yes  Weight changes: no  Energy Changes: yes  Anhedonia yes  Somatic symptoms: no  Anxiety/panic: yes  Guilty/hopeless: yes  Self-injurious behavior/risky behavior: no  Any drugs: no  Alcohol: no       Current Evaluation:       Mental Status Evaluation:  Appearance:  unremarkable, age appropriate, normal weight   Behavior:  normal, cooperative   Speech:  no latency; no press   Mood:  steady, sad   Affect:  congruent and appropriate   Thought Process:  normal and logical, tangential   Thought Content:  normal, no suicidality, no homicidality, delusions, or paranoia   Sensorium:  grossly intact   Cognition:  grossly intact   Insight:  intact, limited awareness of illness   Judgment:  behavior is adequate to circumstances, age appropriate     Physical/Somatic Complaints   The patient lists: no physical complaints.    Constitutional  Vitals:  Most recent vital signs, dated less than 90 days prior to this appointment, were reviewed.   There were no vitals filed for this visit.     General:  unremarkable, age appropriate       Laboratory Data  Admission on 05/01/2023, Discharged on 05/01/2023   Component Date Value Ref Range Status    WBC 05/01/2023 7.27  3.90 - 12.70 K/uL Final    RBC 05/01/2023 3.68 (L)  4.00 - 5.40 M/uL Final    Hemoglobin 05/01/2023 10.0 (L)  12.0 - 16.0 g/dL Final    Hematocrit 05/01/2023 32.1 (L)  37.0 - 48.5 % Final    MCV 05/01/2023 87  82 - 98 fL Final    MCH 05/01/2023  27.2  27.0 - 31.0 pg Final    MCHC 05/01/2023 31.2 (L)  32.0 - 36.0 g/dL Final    RDW 05/01/2023 14.9 (H)  11.5 - 14.5 % Final    Platelets 05/01/2023 125 (L)  150 - 450 K/uL Final    MPV 05/01/2023 10.2  9.2 - 12.9 fL Final    Immature Granulocytes 05/01/2023 0.3  0.0 - 0.5 % Final    Gran # (ANC) 05/01/2023 4.1  1.8 - 7.7 K/uL Final    Immature Grans (Abs) 05/01/2023 0.02  0.00 - 0.04 K/uL Final    Lymph # 05/01/2023 2.4  1.0 - 4.8 K/uL Final    Mono # 05/01/2023 0.7  0.3 - 1.0 K/uL Final    Eos # 05/01/2023 0.0  0.0 - 0.5 K/uL Final    Baso # 05/01/2023 0.03  0.00 - 0.20 K/uL Final    nRBC 05/01/2023 0  0 /100 WBC Final    Gran % 05/01/2023 56.1  38.0 - 73.0 % Final    Lymph % 05/01/2023 33.4  18.0 - 48.0 % Final    Mono % 05/01/2023 9.4  4.0 - 15.0 % Final    Eosinophil % 05/01/2023 0.4  0.0 - 8.0 % Final    Basophil % 05/01/2023 0.4  0.0 - 1.9 % Final    Differential Method 05/01/2023 Automated   Final    Sodium 05/01/2023 139  136 - 145 mmol/L Final    Potassium 05/01/2023 3.7  3.5 - 5.1 mmol/L Final    Chloride 05/01/2023 104  95 - 110 mmol/L Final    CO2 05/01/2023 28  23 - 29 mmol/L Final    Glucose 05/01/2023 97  70 - 110 mg/dL Final    BUN 05/01/2023 15  8 - 23 mg/dL Final    Creatinine 05/01/2023 0.7  0.5 - 1.4 mg/dL Final    Calcium 05/01/2023 8.5 (L)  8.7 - 10.5 mg/dL Final    Total Protein 05/01/2023 6.4  6.0 - 8.4 g/dL Final    Albumin 05/01/2023 2.9 (L)  3.5 - 5.2 g/dL Final    Total Bilirubin 05/01/2023 0.3  0.1 - 1.0 mg/dL Final    Alkaline Phosphatase 05/01/2023 107  55 - 135 U/L Final    AST 05/01/2023 20  10 - 40 U/L Final    ALT 05/01/2023 7 (L)  10 - 44 U/L Final    Anion Gap 05/01/2023 7 (L)  8 - 16 mmol/L Final    eGFR 05/01/2023 >60.0  >60 mL/min/1.73 m^2 Final    BNP 05/01/2023 13  0 - 99 pg/mL Final    Troponin I 05/01/2023 <0.006  0.000 - 0.026 ng/mL Final    Prothrombin Time 05/01/2023 11.8  9.0 - 12.5 sec Final    INR 05/01/2023 1.1  0.8 - 1.2 Final    Lipase 05/01/2023 12  4 - 60 U/L  Final   Admission on 05/01/2023, Discharged on 05/01/2023   Component Date Value Ref Range Status    Final Pathologic Diagnosis 05/01/2023    Final                    Value:1. Random gastric biopsy:      -  Gastric oxyntic and antral mucosa with minimal chronic superficial gastritis      -  Routine H&E stain is negative for Helicobacter pylori    Comment:  Negative for atrophy or intestinal metaplasia.  Negative for dysplasia or malignancy.      Gross 05/01/2023    Final                    Value:Hospital/Clinic label MRN:  [id number]  Pathology label MRN:  [id number]    The specimen is received in formalin labeled &quot;random gastric&quot;.  The specimen consists of three tan-yellow fragments of soft tissue with measurements from 0.1 cm to 0.5 cm in greatest dimension.  The specimen is submitted entirely in cassette   PVC--1-GIO     Adarsh Corona      Disclaimer 05/01/2023 Unless the case is a 'gross only' or additional testing only, the final diagnosis for each specimen is based on a microscopic examination of appropriate tissue sections.   Final   Admission on 04/22/2023, Discharged on 04/22/2023   Component Date Value Ref Range Status    WBC 04/22/2023 9.46  3.90 - 12.70 K/uL Final    RBC 04/22/2023 4.49  4.00 - 5.40 M/uL Final    Hemoglobin 04/22/2023 12.2  12.0 - 16.0 g/dL Final    Hematocrit 04/22/2023 39.6  37.0 - 48.5 % Final    MCV 04/22/2023 88  82 - 98 fL Final    MCH 04/22/2023 27.2  27.0 - 31.0 pg Final    MCHC 04/22/2023 30.8 (L)  32.0 - 36.0 g/dL Final    RDW 04/22/2023 15.2 (H)  11.5 - 14.5 % Final    Platelets 04/22/2023 190  150 - 450 K/uL Final    MPV 04/22/2023 9.9  9.2 - 12.9 fL Final    Immature Granulocytes 04/22/2023 0.5  0.0 - 0.5 % Final    Gran # (ANC) 04/22/2023 5.2  1.8 - 7.7 K/uL Final    Immature Grans (Abs) 04/22/2023 0.05 (H)  0.00 - 0.04 K/uL Final    Lymph # 04/22/2023 3.2  1.0 - 4.8 K/uL Final    Mono # 04/22/2023 0.9  0.3 - 1.0 K/uL Final    Eos # 04/22/2023 0.0  0.0 - 0.5 K/uL  Final    Baso # 04/22/2023 0.07  0.00 - 0.20 K/uL Final    nRBC 04/22/2023 0  0 /100 WBC Final    Gran % 04/22/2023 55.2  38.0 - 73.0 % Final    Lymph % 04/22/2023 33.4  18.0 - 48.0 % Final    Mono % 04/22/2023 9.9  4.0 - 15.0 % Final    Eosinophil % 04/22/2023 0.3  0.0 - 8.0 % Final    Basophil % 04/22/2023 0.7  0.0 - 1.9 % Final    Differential Method 04/22/2023 Automated   Final    Sodium 04/22/2023 140  136 - 145 mmol/L Final    Potassium 04/22/2023 3.6  3.5 - 5.1 mmol/L Final    Chloride 04/22/2023 108  95 - 110 mmol/L Final    CO2 04/22/2023 22 (L)  23 - 29 mmol/L Final    Glucose 04/22/2023 77  70 - 110 mg/dL Final    BUN 04/22/2023 12  8 - 23 mg/dL Final    Creatinine 04/22/2023 1.3  0.5 - 1.4 mg/dL Final    Calcium 04/22/2023 9.3  8.7 - 10.5 mg/dL Final    Total Protein 04/22/2023 7.7  6.0 - 8.4 g/dL Final    Albumin 04/22/2023 3.3 (L)  3.5 - 5.2 g/dL Final    Total Bilirubin 04/22/2023 0.4  0.1 - 1.0 mg/dL Final    Alkaline Phosphatase 04/22/2023 134  55 - 135 U/L Final    AST 04/22/2023 30  10 - 40 U/L Final    ALT 04/22/2023 11  10 - 44 U/L Final    Anion Gap 04/22/2023 10  8 - 16 mmol/L Final    eGFR 04/22/2023 45.4 (A)  >60 mL/min/1.73 m^2 Final    BNP 04/22/2023 32  0 - 99 pg/mL Final    Troponin I 04/22/2023 <0.006  0.000 - 0.026 ng/mL Final    Prothrombin Time 04/22/2023 11.8  9.0 - 12.5 sec Final    INR 04/22/2023 1.1  0.8 - 1.2 Final    Magnesium 04/22/2023 1.7  1.6 - 2.6 mg/dL Final         Medications  Outpatient Encounter Medications as of 5/8/2023   Medication Sig Dispense Refill    calcium carbonate (TUMS) 200 mg calcium (500 mg) chewable tablet Take 1 tablet by mouth once daily.      ferrous sulfate (FEOSOL) 325 mg (65 mg iron) Tab tablet TAKE 1 TABLET (325 MG TOTAL) BY MOUTH ONCE DAILY. 90 tablet 0    FLUAD QUAD 2022-23,65Y UP,,PF, 60 mcg (15 mcg x 4)/0.5 mL Syrg       gabapentin (NEURONTIN) 600 MG tablet Take 1 tablet (600 mg total) by mouth 3 (three) times daily. 90 tablet 5     hydrOXYzine pamoate (VISTARIL) 50 MG Cap Take 1 capsule (50 mg total) by mouth nightly as needed (insomnia). 60 capsule 0    LIDOcaine (LIDODERM) 5 % Place 1 patch onto the skin once daily. Remove & Discard patch within 12 hours or as directed by MD 15 patch 2    meclizine (ANTIVERT) 25 mg tablet Take 1 tablet (25 mg total) by mouth 3 (three) times daily as needed. 60 tablet 5    pantoprazole (PROTONIX) 40 MG tablet Take 1 tablet (40 mg total) by mouth 2 (two) times daily. 180 tablet 1    potassium 99 mg Tab Take by mouth once daily.      sertraline (ZOLOFT) 25 MG tablet Take 1 tablet (25 mg total) by mouth once daily. 60 tablet 0    sod picosulf-mag ox-citric ac (CLENPIQ) 10 mg-3.5 gram- 12 gram/160 mL Soln Take As Directed. 320 mL 0    sucralfate (CARAFATE) 1 gram tablet Take 1 tablet (1 g total) by mouth 3 (three) times daily. 90 tablet 1    [DISCONTINUED] DULoxetine (CYMBALTA) 30 MG capsule Take 1 capsule (30 mg total) by mouth once daily. 30 capsule 11     Facility-Administered Encounter Medications as of 5/8/2023   Medication Dose Route Frequency Provider Last Rate Last Admin    lactated ringers bolus 1,000 mL  1,000 mL Intravenous Once Antwon Gardner MD        LIDOcaine (PF) 10 mg/ml (1%) injection 10 mg  1 mL Intradermal Once PRN Antwon Gardner MD               Assessment - Diagnosis - Goals:     Impression: Lo Escalera, a 67 y.o. female, presenting for initial evaluation visit for Anxiety and depression. PHQ-9 17, JHOAN-7: 6. Patient has been on Cymbalta 30 mg for many years, reports medication is not effective. Increasing dose is too risky for severe hx of AUD with medical complications. D/C Cymbalta and begin low dose Sertraline 25 mg daily. Start Hydroxyzine 25-50 mg @ HS for insomnia      ICD-10-CM ICD-9-CM    1. Mild episode of recurrent major depressive disorder  F33.0 296.31 sertraline (ZOLOFT) 25 MG tablet      2. Primary insomnia  F51.01 307.42 hydrOXYzine pamoate  (VISTARIL) 50 MG Cap      3. JHOAN (generalized anxiety disorder)  F41.1 300.02 hydrOXYzine pamoate (VISTARIL) 50 MG Cap      Ambulatory referral/consult to Psychology      Ambulatory referral/consult to Psychology      Ambulatory referral/consult to Psychology           Strengths and Liabilities: Strength: Patient accepts guidance/feedback, Strength: Patient is expressive/articulate., Strength: Patient is intelligent., Strength: Patient is motivated for change., Strength: Patient has reasonable judgment.    Treatment Goals:    Anxiety: reducing negative automatic thoughts  Depression: increasing energy, increasing interest in usual activities, increasing motivation, reducing excessive guilt, and reducing fatigue    Treatment Plan/Recommendations:   Medication Management: Continue current medications. The risks and benefits of medication were discussed with the patient.  The treatment plan and follow up plan were reviewed with the patient.  D/C Cymbalta  Start Zoloft 25 mg daily  Hydroxyzine 25-50 mg @ HS    Discussed diagnosis, risks and benefits of proposed treatment above vs alternative treatments vs no treatment, and potential side effects of these treatments, and the inherent unpredictability of individual response to treatment.The patient expresses understanding and gives informed consent to pursue treatment at this time believing that the potential benefits outweigh the potential risks. Patient has no other questions. Risks/adverse effects discussed at this time including but not limited to: GI side effects, sexual dysfunction, activation vs sedation, triggering of suicidal thoughts, and serotonin syndrome.  Patient was cautioned on drinking alcohol, operating machinery or driving while on sedating medications.    Patient voices understanding and agreement with this plan  Provided crisis numbers  Encouraged patient to keep future appointments.  Instructed patient to call or message with questions  In the event  of an emergency, including suicidal ideation, patient was advised to go to the emergency room      Return to Clinic:  4 weeks    Total time: 60 minutes with more than 50% of time spent counseling and/or coordinating care.  (which included pts differential diagnosis and prognosis for psychiatric conditions, risks, benefits of treatments, instructions and adherence to treatment plan, risk reduction, reviewing current psychiatric medication regimen, medical problems and social stressors. In addition to possible discussion with other healthcare provider/S)      Lilliana Perez DNP, PMANGELITOP, FNP

## 2023-05-09 RX ORDER — HYDROXYZINE PAMOATE 50 MG/1
50 CAPSULE ORAL NIGHTLY PRN
Qty: 60 CAPSULE | Refills: 0 | Status: SHIPPED | OUTPATIENT
Start: 2023-05-09 | End: 2023-07-08

## 2023-05-09 RX ORDER — SERTRALINE HYDROCHLORIDE 25 MG/1
25 TABLET, FILM COATED ORAL DAILY
Qty: 60 TABLET | Refills: 0 | Status: SHIPPED | OUTPATIENT
Start: 2023-05-09 | End: 2023-09-19

## 2023-05-15 ENCOUNTER — TELEPHONE (OUTPATIENT)
Dept: PRIMARY CARE CLINIC | Facility: CLINIC | Age: 68
End: 2023-05-15
Payer: MEDICARE

## 2023-05-15 NOTE — TELEPHONE ENCOUNTER
----- Message from Luis Carlson sent at 5/15/2023  1:59 PM CDT -----  Contact: self [phone]  Pt requesting a call in regards to ER f/u appt.    Please call and advise

## 2023-05-18 ENCOUNTER — TELEPHONE (OUTPATIENT)
Dept: GASTROENTEROLOGY | Facility: CLINIC | Age: 68
End: 2023-05-18
Payer: MEDICARE

## 2023-05-18 NOTE — TELEPHONE ENCOUNTER
--LVM for the patient to get her EHD  / Colon scheduled.    --- Message from Antwon Gardner MD sent at 5/16/2023  1:51 PM CDT -----  Biopsies unremarkable. F/u EGD to be scheduled. Also needs repeat colonoscopy d/t inadequate prep.

## 2023-05-30 ENCOUNTER — TELEPHONE (OUTPATIENT)
Dept: PRIMARY CARE CLINIC | Facility: CLINIC | Age: 68
End: 2023-05-30
Payer: MEDICARE

## 2023-05-30 ENCOUNTER — OFFICE VISIT (OUTPATIENT)
Dept: PRIMARY CARE CLINIC | Facility: CLINIC | Age: 68
End: 2023-05-30
Payer: MEDICARE

## 2023-05-30 VITALS
DIASTOLIC BLOOD PRESSURE: 74 MMHG | HEART RATE: 105 BPM | SYSTOLIC BLOOD PRESSURE: 118 MMHG | RESPIRATION RATE: 16 BRPM | OXYGEN SATURATION: 97 % | WEIGHT: 244.69 LBS | HEIGHT: 61 IN | BODY MASS INDEX: 46.2 KG/M2

## 2023-05-30 DIAGNOSIS — F41.1 GAD (GENERALIZED ANXIETY DISORDER): ICD-10-CM

## 2023-05-30 DIAGNOSIS — I10 ESSENTIAL HYPERTENSION: ICD-10-CM

## 2023-05-30 DIAGNOSIS — G89.29 CHRONIC LEFT SHOULDER PAIN: ICD-10-CM

## 2023-05-30 DIAGNOSIS — K76.0 HEPATIC STEATOSIS: ICD-10-CM

## 2023-05-30 DIAGNOSIS — M51.36 DDD (DEGENERATIVE DISC DISEASE), LUMBAR: Primary | ICD-10-CM

## 2023-05-30 DIAGNOSIS — M43.16 SPONDYLOLISTHESIS OF LUMBAR REGION: ICD-10-CM

## 2023-05-30 DIAGNOSIS — K70.31 ALCOHOLIC CIRRHOSIS OF LIVER WITH ASCITES: ICD-10-CM

## 2023-05-30 DIAGNOSIS — M25.512 CHRONIC LEFT SHOULDER PAIN: ICD-10-CM

## 2023-05-30 DIAGNOSIS — K57.90 DIVERTICULOSIS: ICD-10-CM

## 2023-05-30 DIAGNOSIS — M47.816 LUMBAR SPONDYLOSIS: ICD-10-CM

## 2023-05-30 DIAGNOSIS — E03.9 HYPOTHYROIDISM, UNSPECIFIED TYPE: ICD-10-CM

## 2023-05-30 DIAGNOSIS — I85.00 ESOPHAGEAL VARICES WITHOUT BLEEDING, UNSPECIFIED ESOPHAGEAL VARICES TYPE: ICD-10-CM

## 2023-05-30 DIAGNOSIS — D69.6 THROMBOCYTOPENIA: ICD-10-CM

## 2023-05-30 DIAGNOSIS — M54.9 DORSALGIA, UNSPECIFIED: ICD-10-CM

## 2023-05-30 DIAGNOSIS — D50.9 IRON DEFICIENCY ANEMIA, UNSPECIFIED IRON DEFICIENCY ANEMIA TYPE: ICD-10-CM

## 2023-05-30 DIAGNOSIS — F10.11 ALCOHOL ABUSE, IN REMISSION: ICD-10-CM

## 2023-05-30 PROCEDURE — 3008F PR BODY MASS INDEX (BMI) DOCUMENTED: ICD-10-PCS | Mod: CPTII,S$GLB,, | Performed by: FAMILY MEDICINE

## 2023-05-30 PROCEDURE — 3078F PR MOST RECENT DIASTOLIC BLOOD PRESSURE < 80 MM HG: ICD-10-PCS | Mod: CPTII,S$GLB,, | Performed by: FAMILY MEDICINE

## 2023-05-30 PROCEDURE — 99214 PR OFFICE/OUTPT VISIT, EST, LEVL IV, 30-39 MIN: ICD-10-PCS | Mod: S$GLB,,, | Performed by: FAMILY MEDICINE

## 2023-05-30 PROCEDURE — 3078F DIAST BP <80 MM HG: CPT | Mod: CPTII,S$GLB,, | Performed by: FAMILY MEDICINE

## 2023-05-30 PROCEDURE — 1159F MED LIST DOCD IN RCRD: CPT | Mod: CPTII,S$GLB,, | Performed by: FAMILY MEDICINE

## 2023-05-30 PROCEDURE — 99499 UNLISTED E&M SERVICE: CPT | Mod: HCNC,S$GLB,, | Performed by: FAMILY MEDICINE

## 2023-05-30 PROCEDURE — 1100F PR PT FALLS ASSESS DOC 2+ FALLS/FALL W/INJURY/YR: ICD-10-PCS | Mod: CPTII,S$GLB,, | Performed by: FAMILY MEDICINE

## 2023-05-30 PROCEDURE — 99499 RISK ADDL DX/OHS AUDIT: ICD-10-PCS | Mod: HCNC,S$GLB,, | Performed by: FAMILY MEDICINE

## 2023-05-30 PROCEDURE — 3288F PR FALLS RISK ASSESSMENT DOCUMENTED: ICD-10-PCS | Mod: CPTII,S$GLB,, | Performed by: FAMILY MEDICINE

## 2023-05-30 PROCEDURE — 3008F BODY MASS INDEX DOCD: CPT | Mod: CPTII,S$GLB,, | Performed by: FAMILY MEDICINE

## 2023-05-30 PROCEDURE — 1125F AMNT PAIN NOTED PAIN PRSNT: CPT | Mod: CPTII,S$GLB,, | Performed by: FAMILY MEDICINE

## 2023-05-30 PROCEDURE — 3044F HG A1C LEVEL LT 7.0%: CPT | Mod: CPTII,S$GLB,, | Performed by: FAMILY MEDICINE

## 2023-05-30 PROCEDURE — 3074F PR MOST RECENT SYSTOLIC BLOOD PRESSURE < 130 MM HG: ICD-10-PCS | Mod: CPTII,S$GLB,, | Performed by: FAMILY MEDICINE

## 2023-05-30 PROCEDURE — 1100F PTFALLS ASSESS-DOCD GE2>/YR: CPT | Mod: CPTII,S$GLB,, | Performed by: FAMILY MEDICINE

## 2023-05-30 PROCEDURE — 99999 PR PBB SHADOW E&M-EST. PATIENT-LVL V: CPT | Mod: PBBFAC,,, | Performed by: FAMILY MEDICINE

## 2023-05-30 PROCEDURE — 1159F PR MEDICATION LIST DOCUMENTED IN MEDICAL RECORD: ICD-10-PCS | Mod: CPTII,S$GLB,, | Performed by: FAMILY MEDICINE

## 2023-05-30 PROCEDURE — 99999 PR PBB SHADOW E&M-EST. PATIENT-LVL V: ICD-10-PCS | Mod: PBBFAC,,, | Performed by: FAMILY MEDICINE

## 2023-05-30 PROCEDURE — 3288F FALL RISK ASSESSMENT DOCD: CPT | Mod: CPTII,S$GLB,, | Performed by: FAMILY MEDICINE

## 2023-05-30 PROCEDURE — 3044F PR MOST RECENT HEMOGLOBIN A1C LEVEL <7.0%: ICD-10-PCS | Mod: CPTII,S$GLB,, | Performed by: FAMILY MEDICINE

## 2023-05-30 PROCEDURE — 1125F PR PAIN SEVERITY QUANTIFIED, PAIN PRESENT: ICD-10-PCS | Mod: CPTII,S$GLB,, | Performed by: FAMILY MEDICINE

## 2023-05-30 PROCEDURE — 3074F SYST BP LT 130 MM HG: CPT | Mod: CPTII,S$GLB,, | Performed by: FAMILY MEDICINE

## 2023-05-30 PROCEDURE — 99214 OFFICE O/P EST MOD 30 MIN: CPT | Mod: S$GLB,,, | Performed by: FAMILY MEDICINE

## 2023-05-30 RX ORDER — TRAMADOL HYDROCHLORIDE 50 MG/1
50 TABLET ORAL EVERY 6 HOURS PRN
Qty: 30 TABLET | Refills: 0 | Status: SHIPPED | OUTPATIENT
Start: 2023-05-30 | End: 2023-06-02 | Stop reason: SDUPTHER

## 2023-05-30 NOTE — PROGRESS NOTES
Subjective:       Patient ID: Lo Escalera is a 67 y.o. female.    Chief Complaint: Follow-up (3 mth ), Low-back Pain, Back Pain (Mid), and Flank Pain      HPI: 68 yo BF in for mid and lower back pain and flank pain and mediation refills .   Eating well--+BM--occas diarrhea--has colonoscopy scheduled--can ambulation same--has patient gets up has to stand for few moments before walking due to pain in the legs and back---pain in the mid and lower back area--wants CT scan --feels probably arthritis .  Back pain is very painful especially when 1st getting out of bed--able to bend but takes awhile--avoid lifting anything heavy.  No trauma ---some increase activity recently due to graduation--history of osteoarthritis in both knees spondylolisthesis of the lumbar spine--probloems with L4 and L5 .  Back pain and shoulder pain on gabapentin trazodone  Hx esophagitis gastritis ulcers cirrhosis esophageal varices portal hypertension diverticulosis hepatic steatosis        ROS:   Skin: no psoriasis, eczema, skin cancer  HEENT: No headache, ocular pain, blurred vision, diplopia,  hoarseness change in voice, no thyroid disease no epistaxis  Lung: No pneumonia, asthma, Tb, wheezing, + occasional SOB, no smoking  Heart: +chest pain,+ ankle edema,NO  palpitations, MI, eva murmur, no hypertension patient used to have high blood pressure but was taken off of medication blood pressure today 132/7, hyperlipidemia--no stent bypass arrhythmia history of hypertension--occasionally feels like heart skips a beat blood pressure was low so taken off blood pressure medicine   Abdomen: No nausea, vomiting, diarrhea, constipation, ulcers, hepatitis, status post cholecystectomy, melena, hematochezia, hematemesis recent esophagitis gastritis with transfusion 2 units of blood history of peptic ulcer disease history of esophageal varices  : no UTI, renal disease, stones  GYN hysterectomy mammogram March 2021  MS: no fractures, O/A,  lupus, rheumatoid, gout--mainly problems mid and lower back area see history of present illness--had some left shoulder pain had significant cardiac workup that was negative  Neuro: No dizziness, LOC, seizures +vertigo---several recent episodes with falling will get MRI brain and restart antivert--dizzy exercises -help with meclizine November 2020 patient was admitted to [facility] for loss of consciousness used to drink  36 yrs ago-pancreatitis  No diabetes, + anemia had to be transfused blood had upper GI bleed, + anxiety, + depression upset had to quit working because of vertigo was working at [employer]-doing pretty good with--loss mom dad oldest sister back to back   3 children unemployed, lives with      Objective:   Physical Exam:   General: Well nourished, well developed, no acute distress+ Obesity up with a cane + morbid obesity   Skin: No lesions  HEENT: Eyes PERRLA, EOM intact, nose clear , throat nonerythematous +arcus senilis no pallor to the conjunctiva  NECK: Supple, no bruits, No JVD, no nodes  Lungs: Clear, no rales, rhonchi, wheezing  Heart: Regular rate and rhythm, no murmurs, gallops, or rubs  Abdomen: flat, bowel sounds positive, no tenderness, or organomegaly--tenderness knee epigastric area on palpation no significant rebound slight guarding  MS:  Mid and lower back pain--tenderness B10-O9--dojxyezi flexion 20° extension 10° lateral flexion rotation 10° straight leg lift pulling sensation lower back no radiculopathy--some pain in the left shoulder with abduction of the arm to 90° able raise overhead but painful some pain with anterior posterior flexion shoulders  Neuro: Alert, CN intact, oriented X3 --unable do heel toe due weight and knees   Extremities: No cyanosis, clubbing, or edema negative Romberg        Assessment:       1. DDD (degenerative disc disease), lumbar    2. Lumbar spondylosis    3. Alcohol abuse, in remission    4. JHOAN (generalized anxiety disorder)     5. Essential hypertension    6. Thrombocytopenia    7. Iron deficiency anemia, unspecified iron deficiency anemia type    8. BMI 45.0-49.9, adult    9. Hypothyroidism, unspecified type    10. Esophageal varices without bleeding, unspecified esophageal varices type    11. Diverticulosis    12. Alcoholic cirrhosis of liver with ascites    13. Hepatic steatosis    14. Spondylolisthesis of lumbar region    15. Chronic left shoulder pain    16. Dorsalgia, unspecified          Plan:       DDD (degenerative disc disease), lumbar  -     Ambulatory referral/consult to Neurosurgery; Future; Expected date: 06/06/2023    Lumbar spondylosis    Alcohol abuse, in remission    JHOAN (generalized anxiety disorder)    Essential hypertension  -     CBC Auto Differential; Future; Expected date: 11/30/2023  -     Comprehensive Metabolic Panel; Future; Expected date: 11/30/2023  -     Lipid Panel; Future; Expected date: 11/30/2023    Thrombocytopenia    Iron deficiency anemia, unspecified iron deficiency anemia type    BMI 45.0-49.9, adult    Hypothyroidism, unspecified type  -     TSH; Future; Expected date: 11/30/2023    Esophageal varices without bleeding, unspecified esophageal varices type    Diverticulosis    Alcoholic cirrhosis of liver with ascites    Hepatic steatosis    Spondylolisthesis of lumbar region    Chronic left shoulder pain  -     Ambulatory referral/consult to Orthopedics; Future; Expected date: 06/06/2023    Dorsalgia, unspecified  -     MRI Lumbar Spine Without Contrast; Future; Expected date: 12/03/2023    Other orders  -     Discontinue: traMADoL (ULTRAM) 50 mg tablet; Take 1 tablet (50 mg total) by mouth every 6 (six) hours as needed.  Dispense: 30 tablet; Refill: 0          Main Reason for Visits  Mid lower back pain--tenderness X06-Q9---qylevkppnl palpation--needs open MRI of the lumbar spine due to weight and claustrophobia--needs to see neurosurgeon for possible epidural steroid injection  Left shoulder  pain--needs to see orthopedist see if would benefit from injection in the shoulder--saw DR Michaud past for knee problems   History of esophagitis gastritis gastric ulcer cirrhosis esophageal varices diverticulosis  Other medicalm issues   Morbid obesity needs to lose a lot of weight may benefit from gastric bypass   GERD--avoid alcohol smoking NSAIDs caffeine stress carbonated drinks--can raise the head of the bed on a block of wood to prevent GERD--eat small meals--lay down for 1-2 hours after eating Cont protonix --no NSAIDs due to history of having be transfused due to GI issues hx gastritis esophagitis cirrhosis esophageal varices hepatic steatosis diverticulosis portal hypertension  Anemia hematocrit 32.9 history of iron deficiency  Thrombocytopenia platelets [id number]  Lab --done May redo 6 mo    Health maintenance sign pain contract Lotrisone prescription aspirin urine drug screen mammogram COVID

## 2023-05-30 NOTE — TELEPHONE ENCOUNTER
Pharmacy called, in regards needing codes on Rx traMADoL (ULTRAM) 50 mg tablet and also to be put that is for acute chronic condition.

## 2023-05-30 NOTE — TELEPHONE ENCOUNTER
----- Message from Liz Gan sent at 5/30/2023  1:24 PM CDT -----  Contact: [facility]/[phone]  Pharmacy called, in regards needing codes on Rx traMADoL (ULTRAM) 50 mg tablet and also to be put that is for acute chronic condition. Thank you

## 2023-05-30 NOTE — TELEPHONE ENCOUNTER
----- Message from Socorro Sharma sent at 5/30/2023  2:20 PM CDT -----  Contact: Patient, [phone]  Calling because the pharmacy told her they need to talk to the office regarding Rx traMADoL (ULTRAM) 50 mg tablet. She is waiting at the pharmacy. Please call  them. Thanks.

## 2023-06-02 RX ORDER — TRAMADOL HYDROCHLORIDE 50 MG/1
50 TABLET ORAL EVERY 6 HOURS PRN
Qty: 30 TABLET | Refills: 0 | Status: SHIPPED | OUTPATIENT
Start: 2023-06-02 | End: 2023-06-12

## 2023-06-14 ENCOUNTER — TELEPHONE (OUTPATIENT)
Dept: PRIMARY CARE CLINIC | Facility: CLINIC | Age: 68
End: 2023-06-14
Payer: MEDICARE

## 2023-06-14 DIAGNOSIS — M25.512 LEFT SHOULDER PAIN, UNSPECIFIED CHRONICITY: Primary | ICD-10-CM

## 2023-06-14 NOTE — TELEPHONE ENCOUNTER
"Called patient in regards to message. Patient said that she is experiencing right leg pain "sciatica" maybe. Patient wanted to know what can she do to relieve that pain.  "

## 2023-06-14 NOTE — TELEPHONE ENCOUNTER
----- Message from Socorro Sharma sent at 6/14/2023 10:49 AM CDT -----  Contact: Patient, [phone]  Calling to speak with the nurse regarding her leg pain. Please call her. Thanks.

## 2023-06-16 PROBLEM — M54.31 SCIATICA OF RIGHT SIDE: Status: ACTIVE | Noted: 2023-06-16

## 2023-06-16 PROBLEM — M54.9 ACUTE RIGHT-SIDED BACK PAIN: Status: ACTIVE | Noted: 2023-06-16

## 2023-06-19 NOTE — TELEPHONE ENCOUNTER
Called patient and notified her of providers message. Patient verbalized understand. Patient said that she went to the ER in regards to pain.

## 2023-06-21 ENCOUNTER — TELEPHONE (OUTPATIENT)
Dept: ORTHOPEDICS | Facility: CLINIC | Age: 68
End: 2023-06-21
Payer: MEDICARE

## 2023-06-21 NOTE — TELEPHONE ENCOUNTER
CALL THE PT TO CONFIRM HER APPT AND TO INFORM HER THAT SHE NEEDED X-RAYS BEFORE COMING TO HER APPT. NO ANSWER SO I LVM EXPLAINING THIS.

## 2023-06-22 RX ORDER — GABAPENTIN 600 MG/1
TABLET ORAL
Qty: 90 TABLET | Refills: 5 | Status: SHIPPED | OUTPATIENT
Start: 2023-06-22 | End: 2023-12-21

## 2023-06-22 NOTE — TELEPHONE ENCOUNTER
No care due was identified.  [facility] Embedded Care Due Messages. Reference number: [id number].   6/22/2023 12:15:13 PM CDT

## 2023-08-19 ENCOUNTER — TELEPHONE (OUTPATIENT)
Dept: HEPATOLOGY | Facility: CLINIC | Age: 68
End: 2023-08-19
Payer: MEDICARE

## 2023-08-19 NOTE — TELEPHONE ENCOUNTER
Left a couple of voice messages for Ms. Jw that she has nothing to review today for an appt. Dr. Higginbotham suggested to reschedule and get labs and ultrasound done. I will try back on Monday

## 2023-08-20 DIAGNOSIS — K74.69 OTHER CIRRHOSIS OF LIVER: Primary | ICD-10-CM

## 2023-08-21 ENCOUNTER — TELEPHONE (OUTPATIENT)
Dept: HEPATOLOGY | Facility: CLINIC | Age: 68
End: 2023-08-21
Payer: MEDICARE

## 2023-08-22 ENCOUNTER — TELEPHONE (OUTPATIENT)
Dept: HEPATOLOGY | Facility: CLINIC | Age: 68
End: 2023-08-22
Payer: MEDICARE

## 2023-08-28 ENCOUNTER — TELEPHONE (OUTPATIENT)
Dept: HEPATOLOGY | Facility: CLINIC | Age: 68
End: 2023-08-28
Payer: MEDICARE

## 2023-08-28 NOTE — TELEPHONE ENCOUNTER
Returned call and informed Dr. Higginbotham would do testing for back pain that she can maybe try her PCP----- Message from Ambrocio Yang MA sent at 8/24/2023  4:59 PM CDT -----  Regarding: FW: Orders  Contact: [phone]    ----- Message -----  From: Sommer Sears  Sent: 8/24/2023   4:35 PM CDT  To: Anahy King Staff  Subject: Orders                                                     Name of Caller: Lo Escalera     Contact Preference:   [phone]    Nature of Call:   Would like orders added for her back to [place] if possible due to back pain

## 2023-09-06 ENCOUNTER — TELEPHONE (OUTPATIENT)
Dept: HEPATOLOGY | Facility: CLINIC | Age: 68
End: 2023-09-06
Payer: MEDICARE

## 2023-09-06 NOTE — TELEPHONE ENCOUNTER
Gave Ms. Blanchard the message and [place] [facility] [place] 2 [place]----- Message from Christine Higginbotham MD sent at 9/6/2023 12:26 PM CDT -----  MELD 3.0: 9   Labs stable

## 2023-09-19 ENCOUNTER — OFFICE VISIT (OUTPATIENT)
Dept: PRIMARY CARE CLINIC | Facility: CLINIC | Age: 68
End: 2023-09-19
Payer: MEDICARE

## 2023-09-19 VITALS
WEIGHT: 234 LBS | HEART RATE: 115 BPM | RESPIRATION RATE: 16 BRPM | BODY MASS INDEX: 44.18 KG/M2 | HEIGHT: 61 IN | DIASTOLIC BLOOD PRESSURE: 70 MMHG | SYSTOLIC BLOOD PRESSURE: 124 MMHG | OXYGEN SATURATION: 98 %

## 2023-09-19 DIAGNOSIS — S49.91XA RIGHT SHOULDER INJURY, INITIAL ENCOUNTER: Primary | ICD-10-CM

## 2023-09-19 DIAGNOSIS — M54.31 SCIATICA OF RIGHT SIDE: ICD-10-CM

## 2023-09-19 DIAGNOSIS — M17.2 POST-TRAUMATIC OSTEOARTHRITIS OF BOTH KNEES: ICD-10-CM

## 2023-09-19 PROCEDURE — 3288F FALL RISK ASSESSMENT DOCD: CPT | Mod: HCNC,CPTII,S$GLB, | Performed by: STUDENT IN AN ORGANIZED HEALTH CARE EDUCATION/TRAINING PROGRAM

## 2023-09-19 PROCEDURE — 99999 PR PBB SHADOW E&M-EST. PATIENT-LVL IV: CPT | Mod: PBBFAC,HCNC,, | Performed by: STUDENT IN AN ORGANIZED HEALTH CARE EDUCATION/TRAINING PROGRAM

## 2023-09-19 PROCEDURE — 1159F MED LIST DOCD IN RCRD: CPT | Mod: HCNC,CPTII,S$GLB, | Performed by: STUDENT IN AN ORGANIZED HEALTH CARE EDUCATION/TRAINING PROGRAM

## 2023-09-19 PROCEDURE — 3044F PR MOST RECENT HEMOGLOBIN A1C LEVEL <7.0%: ICD-10-PCS | Mod: HCNC,CPTII,S$GLB, | Performed by: STUDENT IN AN ORGANIZED HEALTH CARE EDUCATION/TRAINING PROGRAM

## 2023-09-19 PROCEDURE — 99999 PR PBB SHADOW E&M-EST. PATIENT-LVL IV: ICD-10-PCS | Mod: PBBFAC,HCNC,, | Performed by: STUDENT IN AN ORGANIZED HEALTH CARE EDUCATION/TRAINING PROGRAM

## 2023-09-19 PROCEDURE — 1101F PR PT FALLS ASSESS DOC 0-1 FALLS W/OUT INJ PAST YR: ICD-10-PCS | Mod: HCNC,CPTII,S$GLB, | Performed by: STUDENT IN AN ORGANIZED HEALTH CARE EDUCATION/TRAINING PROGRAM

## 2023-09-19 PROCEDURE — 99214 PR OFFICE/OUTPT VISIT, EST, LEVL IV, 30-39 MIN: ICD-10-PCS | Mod: HCNC,S$GLB,, | Performed by: STUDENT IN AN ORGANIZED HEALTH CARE EDUCATION/TRAINING PROGRAM

## 2023-09-19 PROCEDURE — 3074F PR MOST RECENT SYSTOLIC BLOOD PRESSURE < 130 MM HG: ICD-10-PCS | Mod: HCNC,CPTII,S$GLB, | Performed by: STUDENT IN AN ORGANIZED HEALTH CARE EDUCATION/TRAINING PROGRAM

## 2023-09-19 PROCEDURE — 3008F PR BODY MASS INDEX (BMI) DOCUMENTED: ICD-10-PCS | Mod: HCNC,CPTII,S$GLB, | Performed by: STUDENT IN AN ORGANIZED HEALTH CARE EDUCATION/TRAINING PROGRAM

## 2023-09-19 PROCEDURE — 3074F SYST BP LT 130 MM HG: CPT | Mod: HCNC,CPTII,S$GLB, | Performed by: STUDENT IN AN ORGANIZED HEALTH CARE EDUCATION/TRAINING PROGRAM

## 2023-09-19 PROCEDURE — 1125F AMNT PAIN NOTED PAIN PRSNT: CPT | Mod: HCNC,CPTII,S$GLB, | Performed by: STUDENT IN AN ORGANIZED HEALTH CARE EDUCATION/TRAINING PROGRAM

## 2023-09-19 PROCEDURE — 3078F DIAST BP <80 MM HG: CPT | Mod: HCNC,CPTII,S$GLB, | Performed by: STUDENT IN AN ORGANIZED HEALTH CARE EDUCATION/TRAINING PROGRAM

## 2023-09-19 PROCEDURE — 1125F PR PAIN SEVERITY QUANTIFIED, PAIN PRESENT: ICD-10-PCS | Mod: HCNC,CPTII,S$GLB, | Performed by: STUDENT IN AN ORGANIZED HEALTH CARE EDUCATION/TRAINING PROGRAM

## 2023-09-19 PROCEDURE — 1159F PR MEDICATION LIST DOCUMENTED IN MEDICAL RECORD: ICD-10-PCS | Mod: HCNC,CPTII,S$GLB, | Performed by: STUDENT IN AN ORGANIZED HEALTH CARE EDUCATION/TRAINING PROGRAM

## 2023-09-19 PROCEDURE — 1101F PT FALLS ASSESS-DOCD LE1/YR: CPT | Mod: HCNC,CPTII,S$GLB, | Performed by: STUDENT IN AN ORGANIZED HEALTH CARE EDUCATION/TRAINING PROGRAM

## 2023-09-19 PROCEDURE — 99214 OFFICE O/P EST MOD 30 MIN: CPT | Mod: HCNC,S$GLB,, | Performed by: STUDENT IN AN ORGANIZED HEALTH CARE EDUCATION/TRAINING PROGRAM

## 2023-09-19 PROCEDURE — 3044F HG A1C LEVEL LT 7.0%: CPT | Mod: HCNC,CPTII,S$GLB, | Performed by: STUDENT IN AN ORGANIZED HEALTH CARE EDUCATION/TRAINING PROGRAM

## 2023-09-19 PROCEDURE — 3008F BODY MASS INDEX DOCD: CPT | Mod: HCNC,CPTII,S$GLB, | Performed by: STUDENT IN AN ORGANIZED HEALTH CARE EDUCATION/TRAINING PROGRAM

## 2023-09-19 PROCEDURE — 3078F PR MOST RECENT DIASTOLIC BLOOD PRESSURE < 80 MM HG: ICD-10-PCS | Mod: HCNC,CPTII,S$GLB, | Performed by: STUDENT IN AN ORGANIZED HEALTH CARE EDUCATION/TRAINING PROGRAM

## 2023-09-19 PROCEDURE — 3288F PR FALLS RISK ASSESSMENT DOCUMENTED: ICD-10-PCS | Mod: HCNC,CPTII,S$GLB, | Performed by: STUDENT IN AN ORGANIZED HEALTH CARE EDUCATION/TRAINING PROGRAM

## 2023-09-19 RX ORDER — CYCLOBENZAPRINE HCL 10 MG
10 TABLET ORAL
COMMUNITY
Start: 2023-08-24 | End: 2023-10-10

## 2023-09-19 RX ORDER — MELOXICAM 15 MG/1
15 TABLET ORAL DAILY
Qty: 30 TABLET | Refills: 0 | Status: SHIPPED | OUTPATIENT
Start: 2023-09-19 | End: 2023-10-10

## 2023-09-19 RX ORDER — DULOXETIN HYDROCHLORIDE 30 MG/1
30 CAPSULE, DELAYED RELEASE ORAL
COMMUNITY
Start: 2023-09-18 | End: 2024-03-12 | Stop reason: SDUPTHER

## 2023-09-19 RX ORDER — HYDROCODONE BITARTRATE AND ACETAMINOPHEN 5; 325 MG/1; MG/1
1 TABLET ORAL EVERY 12 HOURS PRN
Qty: 14 TABLET | Refills: 0 | Status: SHIPPED | OUTPATIENT
Start: 2023-09-19

## 2023-09-19 RX ORDER — PREDNISONE 10 MG/1
TABLET ORAL
Qty: 9 TABLET | Refills: 0 | Status: SHIPPED | OUTPATIENT
Start: 2023-09-19 | End: 2023-12-04

## 2023-09-19 NOTE — PATIENT INSTRUCTIONS
Right shoulder injury, initial encounter  -     X-Ray Shoulder Trauma 3 view Right; Future; Expected date: 09/19/2023  -     HYDROcodone-acetaminophen (NORCO) 5-325 mg per tablet; Take 1 tablet by mouth every 12 (twelve) hours as needed for Pain.  Dispense: 14 tablet; Refill: 0  -     meloxicam (MOBIC) 15 MG tablet; Take 1 tablet (15 mg total) by mouth once daily.  Dispense: 30 tablet; Refill: 0  -     predniSONE (DELTASONE) 10 MG tablet; 20mg x 3 days, then 10mg x 3 days.  Dispense: 9 tablet; Refill: 0   - trauma to the right shoulder.   - hx of AC joint arthritis.    - has less ROM.    - if not getting better over time then would advise seeing Ortho afterward to address.    Sciatica of right side   - treating pain as above.   - work on activity and weight loss over time.     Post-traumatic osteoarthritis of both knees   - treating pain as above.   - work on activity and weight loss over time.

## 2023-09-19 NOTE — PROGRESS NOTES
Subjective:           Patient ID: Lo Escalera is a 68 y.o. female who presents today with a chief complaint of Fall (Fell out bed), Shoulder Pain (Right shoulder), and Sciatica  .    Chief Complaint:   Fall (Fell out bed), Shoulder Pain (Right shoulder), and Sciatica      History of Present Illness:    Lo Escalera is a 68 y.o. female who presents today with a chief complaint of Fall (Fell out bed), Shoulder Pain (Right shoulder), and Sciatica  .  States that she fell out of her bed a few weeks ago and hit the right shoulder on her night stand.  Was trying to take care of it at home but was not getting better.    Gets right shoulder pain with abduction on the right arm.      States she was interested in getting an xray.        Sciatica:  States has chronic sciatica.    Also have chronic bilateral knee pain.  States keeps will pop and creak frequently.      Has gotten Tramadol 50mg from PCP, last rx was 30 tabs on June 1st.  Taking Gabapentin 600mg TID for sciatic pain and is helpful.      Review of Systems   Constitutional:  Negative for activity change, fatigue, fever and unexpected weight change.   HENT:  Negative for congestion, nosebleeds, sinus pressure and sneezing.    Respiratory:  Negative for cough, shortness of breath and wheezing.    Cardiovascular:  Negative for chest pain, palpitations and leg swelling.   Gastrointestinal:  Negative for abdominal distention, constipation, diarrhea and nausea.   Genitourinary:  Negative for difficulty urinating and dysuria.   Musculoskeletal:  Positive for arthralgias, joint swelling and myalgias. Negative for back pain and gait problem.   Skin:  Negative for pallor and rash.   Neurological:  Negative for weakness, numbness and headaches.   Psychiatric/Behavioral:  Negative for agitation. The patient is not nervous/anxious.            Objective:        Vitals:    09/19/23 1515   BP: 124/70   BP Location: Right arm   Patient Position: Sitting   BP Method:  "Medium (Manual)   Pulse: (!) 115   Resp: 16   SpO2: 98%   Weight: 106.1 kg (234 lb 0.3 oz)   Height: 5' 1" (1.549 m)       Body mass index is 44.22 kg/m².      Physical Exam  Constitutional:       General: She is not in acute distress.     Appearance: Normal appearance.   HENT:      Head: Normocephalic and atraumatic.      Right Ear: External ear normal.      Left Ear: External ear normal.      Nose: No rhinorrhea.      Mouth/Throat:      Mouth: Mucous membranes are moist.   Eyes:      Extraocular Movements: Extraocular movements intact.      Conjunctiva/sclera: Conjunctivae normal.   Cardiovascular:      Rate and Rhythm: Normal rate.   Pulmonary:      Effort: Pulmonary effort is normal. No respiratory distress.   Musculoskeletal:      Right lower leg: No edema.      Left lower leg: No edema.   Skin:     Coloration: Skin is not jaundiced.      Findings: No bruising.   Neurological:      General: No focal deficit present.      Mental Status: She is alert and oriented to person, place, and time.      Motor: No weakness.      Gait: Gait normal.   Psychiatric:         Mood and Affect: Mood normal.             Lab Results   Component Value Date     09/06/2023    K 3.5 09/06/2023     09/06/2023    CO2 23 09/06/2023    BUN 13 09/06/2023    CREATININE 0.8 09/06/2023    GLUCOSE 86 11/13/2020    ANIONGAP 11 09/06/2023     Lab Results   Component Value Date    HGBA1C 5.3 02/28/2023     Lab Results   Component Value Date    BNP 13 05/01/2023    BNP 32 04/22/2023     (H) 03/12/2020       Lab Results   Component Value Date    WBC 6.62 09/06/2023    HGB 11.5 (L) 09/06/2023    HCT 36.9 (L) 09/06/2023    HCT 41 10/25/2016     (L) 09/06/2023    GRAN 3.7 09/06/2023    GRAN 55.2 09/06/2023     Lab Results   Component Value Date    CHOL 126 05/15/2020    HDL 43 05/15/2020    LDLCALC 68 05/15/2020    TRIG 75 05/15/2020          Current Outpatient Medications:     calcium carbonate (TUMS) 200 mg calcium (500 mg) " chewable tablet, Take 1 tablet by mouth once daily., Disp: , Rfl:     cyclobenzaprine (FLEXERIL) 10 MG tablet, Take 10 mg by mouth., Disp: , Rfl:     DULoxetine (CYMBALTA) 30 MG capsule, Take 30 mg by mouth., Disp: , Rfl:     ferrous sulfate (FEOSOL) 325 mg (65 mg iron) Tab tablet, TAKE 1 TABLET (325 MG TOTAL) BY MOUTH ONCE DAILY., Disp: 90 tablet, Rfl: 0    gabapentin (NEURONTIN) 600 MG tablet, TAKE 1 TABLET BY MOUTH THREE TIMES DAILY, Disp: 90 tablet, Rfl: 5    ibuprofen (ADVIL,MOTRIN) 800 MG tablet, Take 1 tablet (800 mg total) by mouth every 6 (six) hours as needed for Pain., Disp: 20 tablet, Rfl: 0    meclizine (ANTIVERT) 25 mg tablet, Take 1 tablet (25 mg total) by mouth 3 (three) times daily as needed., Disp: 60 tablet, Rfl: 5    pantoprazole (PROTONIX) 40 MG tablet, Take 1 tablet (40 mg total) by mouth 2 (two) times daily., Disp: 180 tablet, Rfl: 1    potassium 99 mg Tab, Take by mouth once daily., Disp: , Rfl:     sod picosulf-mag ox-citric ac (CLENPIQ) 10 mg-3.5 gram- 12 gram/160 mL Soln, Take As Directed., Disp: 320 mL, Rfl: 0    HYDROcodone-acetaminophen (NORCO) 5-325 mg per tablet, Take 1 tablet by mouth every 12 (twelve) hours as needed for Pain., Disp: 14 tablet, Rfl: 0    meloxicam (MOBIC) 15 MG tablet, Take 1 tablet (15 mg total) by mouth once daily., Disp: 30 tablet, Rfl: 0    predniSONE (DELTASONE) 10 MG tablet, 20mg x 3 days, then 10mg x 3 days., Disp: 9 tablet, Rfl: 0  No current facility-administered medications for this visit.    Facility-Administered Medications Ordered in Other Visits:     lactated ringers bolus 1,000 mL, 1,000 mL, Intravenous, Once, Antwon Gardner MD    LIDOcaine (PF) 10 mg/ml (1%) injection 10 mg, 1 mL, Intradermal, Once PRN, Antwon Gardner MD     Outpatient Encounter Medications as of 9/19/2023   Medication Sig Dispense Refill    calcium carbonate (TUMS) 200 mg calcium (500 mg) chewable tablet Take 1 tablet by mouth once daily.      cyclobenzaprine  (FLEXERIL) 10 MG tablet Take 10 mg by mouth.      DULoxetine (CYMBALTA) 30 MG capsule Take 30 mg by mouth.      ferrous sulfate (FEOSOL) 325 mg (65 mg iron) Tab tablet TAKE 1 TABLET (325 MG TOTAL) BY MOUTH ONCE DAILY. 90 tablet 0    gabapentin (NEURONTIN) 600 MG tablet TAKE 1 TABLET BY MOUTH THREE TIMES DAILY 90 tablet 5    ibuprofen (ADVIL,MOTRIN) 800 MG tablet Take 1 tablet (800 mg total) by mouth every 6 (six) hours as needed for Pain. 20 tablet 0    meclizine (ANTIVERT) 25 mg tablet Take 1 tablet (25 mg total) by mouth 3 (three) times daily as needed. 60 tablet 5    pantoprazole (PROTONIX) 40 MG tablet Take 1 tablet (40 mg total) by mouth 2 (two) times daily. 180 tablet 1    potassium 99 mg Tab Take by mouth once daily.      sod picosulf-mag ox-citric ac (CLENPIQ) 10 mg-3.5 gram- 12 gram/160 mL Soln Take As Directed. 320 mL 0    HYDROcodone-acetaminophen (NORCO) 5-325 mg per tablet Take 1 tablet by mouth every 12 (twelve) hours as needed for Pain. 14 tablet 0    meloxicam (MOBIC) 15 MG tablet Take 1 tablet (15 mg total) by mouth once daily. 30 tablet 0    predniSONE (DELTASONE) 10 MG tablet 20mg x 3 days, then 10mg x 3 days. 9 tablet 0    [DISCONTINUED] FLUAD QUAD 2022-23,65Y UP,,PF, 60 mcg (15 mcg x 4)/0.5 mL Syrg       [DISCONTINUED] LIDOcaine (LIDODERM) 5 % Place 1 patch onto the skin once daily. Remove & Discard patch within 12 hours or as directed by MD 15 patch 2    [DISCONTINUED] sertraline (ZOLOFT) 25 MG tablet Take 1 tablet (25 mg total) by mouth once daily. 60 tablet 0     Facility-Administered Encounter Medications as of 9/19/2023   Medication Dose Route Frequency Provider Last Rate Last Admin    lactated ringers bolus 1,000 mL  1,000 mL Intravenous Once Antwon Gardner MD        LIDOcaine (PF) 10 mg/ml (1%) injection 10 mg  1 mL Intradermal Once PRN Antwon Gardner MD              Assessment:       1. Right shoulder injury, initial encounter    2. Sciatica of right side    3.  Post-traumatic osteoarthritis of both knees           Plan:       Right shoulder injury, initial encounter  -     X-Ray Shoulder Trauma 3 view Right; Future; Expected date: 09/19/2023  -     HYDROcodone-acetaminophen (NORCO) 5-325 mg per tablet; Take 1 tablet by mouth every 12 (twelve) hours as needed for Pain.  Dispense: 14 tablet; Refill: 0  -     meloxicam (MOBIC) 15 MG tablet; Take 1 tablet (15 mg total) by mouth once daily.  Dispense: 30 tablet; Refill: 0  -     predniSONE (DELTASONE) 10 MG tablet; 20mg x 3 days, then 10mg x 3 days.  Dispense: 9 tablet; Refill: 0    Sciatica of right side    Post-traumatic osteoarthritis of both knees             Right shoulder injury, initial encounter  -     X-Ray Shoulder Trauma 3 view Right; Future; Expected date: 09/19/2023  -     HYDROcodone-acetaminophen (NORCO) 5-325 mg per tablet; Take 1 tablet by mouth every 12 (twelve) hours as needed for Pain.  Dispense: 14 tablet; Refill: 0  -     meloxicam (MOBIC) 15 MG tablet; Take 1 tablet (15 mg total) by mouth once daily.  Dispense: 30 tablet; Refill: 0  -     predniSONE (DELTASONE) 10 MG tablet; 20mg x 3 days, then 10mg x 3 days.  Dispense: 9 tablet; Refill: 0   - trauma to the right shoulder.   - hx of AC joint arthritis.    - has less ROM.    - if not getting better over time then would advise seeing Ortho afterward to address.    Sciatica of right side   - treating pain as above.   - work on activity and weight loss over time.     Post-traumatic osteoarthritis of both knees   - treating pain as above.   - work on activity and weight loss over time.

## 2023-09-20 ENCOUNTER — NURSE TRIAGE (OUTPATIENT)
Dept: ADMINISTRATIVE | Facility: CLINIC | Age: 68
End: 2023-09-20
Payer: MEDICARE

## 2023-09-20 NOTE — TELEPHONE ENCOUNTER
Pt states that she was seen in office on yesterday for shoulder pain. Requesting Xray results. Advised to receive callback when open per protocol. Verbalized understanding. Encounter routed to provider.       Reason for Disposition   [1] Caller requesting NON-URGENT health information AND [2] PCP's office is the best resource    Protocols used: Information Only Call-A-AH

## 2023-10-10 DIAGNOSIS — S49.91XA RIGHT SHOULDER INJURY, INITIAL ENCOUNTER: ICD-10-CM

## 2023-10-10 RX ORDER — CYCLOBENZAPRINE HCL 10 MG
10 TABLET ORAL
Qty: 60 TABLET | Refills: 5 | Status: SHIPPED | OUTPATIENT
Start: 2023-10-10 | End: 2024-03-12 | Stop reason: SDUPTHER

## 2023-10-10 RX ORDER — MELOXICAM 15 MG/1
15 TABLET ORAL
Qty: 30 TABLET | Refills: 5 | Status: SHIPPED | OUTPATIENT
Start: 2023-10-10

## 2023-10-10 NOTE — TELEPHONE ENCOUNTER
No care due was identified.  [facility] Embedded Care Due Messages. Reference number: [id number].   10/10/2023 10:32:54 AM CDT

## 2023-10-10 NOTE — TELEPHONE ENCOUNTER
No care due was identified.  [facility] Embedded Care Due Messages. Reference number: [id number].   10/10/2023 10:31:54 AM CDT

## 2023-11-09 RX ORDER — SODIUM PICOSULFATE, MAGNESIUM OXIDE, AND ANHYDROUS CITRIC ACID 10; 3.5; 12 MG/160ML; G/160ML; G/160ML
LIQUID ORAL
Qty: 320 ML | Refills: 0 | Status: SHIPPED | OUTPATIENT
Start: 2023-11-09

## 2023-11-09 RX ORDER — SODIUM PICOSULFATE, MAGNESIUM OXIDE, AND ANHYDROUS CITRIC ACID 12; 3.5; 1 G/175ML; G/175ML; MG/175ML
LIQUID ORAL
Status: CANCELLED | OUTPATIENT
Start: 2023-11-09

## 2023-11-21 ENCOUNTER — OFFICE VISIT (OUTPATIENT)
Dept: HEPATOLOGY | Facility: CLINIC | Age: 68
End: 2023-11-21
Payer: MEDICARE

## 2023-11-21 VITALS
HEIGHT: 61 IN | HEART RATE: 112 BPM | RESPIRATION RATE: 19 BRPM | WEIGHT: 245.69 LBS | SYSTOLIC BLOOD PRESSURE: 117 MMHG | DIASTOLIC BLOOD PRESSURE: 57 MMHG | OXYGEN SATURATION: 98 % | BODY MASS INDEX: 46.39 KG/M2

## 2023-11-21 DIAGNOSIS — K70.31 ALCOHOLIC CIRRHOSIS OF LIVER WITH ASCITES: Primary | ICD-10-CM

## 2023-11-21 DIAGNOSIS — K76.6 PORTAL HYPERTENSION: ICD-10-CM

## 2023-11-21 DIAGNOSIS — I85.10 SECONDARY ESOPHAGEAL VARICES WITHOUT BLEEDING: ICD-10-CM

## 2023-11-21 DIAGNOSIS — K76.0 HEPATIC STEATOSIS: ICD-10-CM

## 2023-11-21 PROCEDURE — 3074F SYST BP LT 130 MM HG: CPT | Mod: HCNC,CPTII,S$GLB, | Performed by: INTERNAL MEDICINE

## 2023-11-21 PROCEDURE — 3288F FALL RISK ASSESSMENT DOCD: CPT | Mod: HCNC,CPTII,S$GLB, | Performed by: INTERNAL MEDICINE

## 2023-11-21 PROCEDURE — 99214 OFFICE O/P EST MOD 30 MIN: CPT | Mod: HCNC,S$GLB,, | Performed by: INTERNAL MEDICINE

## 2023-11-21 PROCEDURE — 1101F PR PT FALLS ASSESS DOC 0-1 FALLS W/OUT INJ PAST YR: ICD-10-PCS | Mod: HCNC,CPTII,S$GLB, | Performed by: INTERNAL MEDICINE

## 2023-11-21 PROCEDURE — 3078F DIAST BP <80 MM HG: CPT | Mod: HCNC,CPTII,S$GLB, | Performed by: INTERNAL MEDICINE

## 2023-11-21 PROCEDURE — 1159F MED LIST DOCD IN RCRD: CPT | Mod: HCNC,CPTII,S$GLB, | Performed by: INTERNAL MEDICINE

## 2023-11-21 PROCEDURE — 3288F PR FALLS RISK ASSESSMENT DOCUMENTED: ICD-10-PCS | Mod: HCNC,CPTII,S$GLB, | Performed by: INTERNAL MEDICINE

## 2023-11-21 PROCEDURE — 1159F PR MEDICATION LIST DOCUMENTED IN MEDICAL RECORD: ICD-10-PCS | Mod: HCNC,CPTII,S$GLB, | Performed by: INTERNAL MEDICINE

## 2023-11-21 PROCEDURE — 3008F PR BODY MASS INDEX (BMI) DOCUMENTED: ICD-10-PCS | Mod: HCNC,CPTII,S$GLB, | Performed by: INTERNAL MEDICINE

## 2023-11-21 PROCEDURE — 3044F HG A1C LEVEL LT 7.0%: CPT | Mod: HCNC,CPTII,S$GLB, | Performed by: INTERNAL MEDICINE

## 2023-11-21 PROCEDURE — 1125F AMNT PAIN NOTED PAIN PRSNT: CPT | Mod: HCNC,CPTII,S$GLB, | Performed by: INTERNAL MEDICINE

## 2023-11-21 PROCEDURE — 1160F RVW MEDS BY RX/DR IN RCRD: CPT | Mod: HCNC,CPTII,S$GLB, | Performed by: INTERNAL MEDICINE

## 2023-11-21 PROCEDURE — 3078F PR MOST RECENT DIASTOLIC BLOOD PRESSURE < 80 MM HG: ICD-10-PCS | Mod: HCNC,CPTII,S$GLB, | Performed by: INTERNAL MEDICINE

## 2023-11-21 PROCEDURE — 1125F PR PAIN SEVERITY QUANTIFIED, PAIN PRESENT: ICD-10-PCS | Mod: HCNC,CPTII,S$GLB, | Performed by: INTERNAL MEDICINE

## 2023-11-21 PROCEDURE — 3008F BODY MASS INDEX DOCD: CPT | Mod: HCNC,CPTII,S$GLB, | Performed by: INTERNAL MEDICINE

## 2023-11-21 PROCEDURE — 3044F PR MOST RECENT HEMOGLOBIN A1C LEVEL <7.0%: ICD-10-PCS | Mod: HCNC,CPTII,S$GLB, | Performed by: INTERNAL MEDICINE

## 2023-11-21 PROCEDURE — 1101F PT FALLS ASSESS-DOCD LE1/YR: CPT | Mod: HCNC,CPTII,S$GLB, | Performed by: INTERNAL MEDICINE

## 2023-11-21 PROCEDURE — 3074F PR MOST RECENT SYSTOLIC BLOOD PRESSURE < 130 MM HG: ICD-10-PCS | Mod: HCNC,CPTII,S$GLB, | Performed by: INTERNAL MEDICINE

## 2023-11-21 PROCEDURE — 99999 PR PBB SHADOW E&M-EST. PATIENT-LVL V: ICD-10-PCS | Mod: PBBFAC,HCNC,, | Performed by: INTERNAL MEDICINE

## 2023-11-21 PROCEDURE — 99999 PR PBB SHADOW E&M-EST. PATIENT-LVL V: CPT | Mod: PBBFAC,HCNC,, | Performed by: INTERNAL MEDICINE

## 2023-11-21 PROCEDURE — 99214 PR OFFICE/OUTPT VISIT, EST, LEVL IV, 30-39 MIN: ICD-10-PCS | Mod: HCNC,S$GLB,, | Performed by: INTERNAL MEDICINE

## 2023-11-21 PROCEDURE — 1160F PR REVIEW ALL MEDS BY PRESCRIBER/CLIN PHARMACIST DOCUMENTED: ICD-10-PCS | Mod: HCNC,CPTII,S$GLB, | Performed by: INTERNAL MEDICINE

## 2023-11-21 NOTE — PROGRESS NOTES
HEPATOLOGY FOLLOW UP    Referring Physician: Jorgito Rios MD  Current Corresponding Physician: Jorgito Rios MD, Jose Hernández MD     Lo Escalera is here for follow up of Cirrhosis  I saw her in consultation 1/18/22. This is her first f/u visit with me.    HPI  Lo Escalera is a 68 y.o. female with a hx of HTN, carotid atherosclerosis, DDD and iron def anemia who has a history of heavy alcohol:  --drank heavily x 12-15 years; stopped x 36 years (stopped on her own)  -restarted 1.5 years ago - stopped 1 year ago (went to detox)     Abdo US 10/21: cirrhosis; small ascites; no HCC     Labs 12/29/21: Tbil 0.8, ALT 11, aST 30, ALKP 135, Na 141, Alb 2.9, plts 97  HCV Ab-, AKHIL-, ASMA-, AMA-, IgG normal, ferritin 41, iron sat 20%  BMI: 44     EGD 09/20: small esophageal varices; esophagitis     The patient denied any symptoms of decompensated cirrhosis, including no ascites or edema, cognitive problems that would suggest hepatic encephalopathy, or GI bleeding from varices.    Interval History  Since Lo Escalera's last visit:  --not drinking  --no ascites  --no HE    Labs 9/6/23: ALT 11, AST 21, ALKP 138, Tbil 0.5, peth <10, plts 1125  HAV IgG-, HBsAg-, HBcAb-, HBsAb+    Abdo US 11/21/23: no HCC    EGD 5/1/23: large varices; banded; not yet repeat ND- scheduled for 12/23    MELD 3.0: 9 at 9/6/2023 10:34 AM  MELD-Na: 7 at 9/6/2023 10:34 AM  Calculated from:  Serum Creatinine: 0.8 mg/dL (Using min of 1 mg/dL) at 9/6/2023 10:34 AM  Serum Sodium: 140 mmol/L (Using max of 137 mmol/L) at 9/6/2023 10:34 AM  Total Bilirubin: 0.5 mg/dL (Using min of 1 mg/dL) at 9/6/2023 10:34 AM  Serum Albumin: 3.3 g/dL at 9/6/2023 10:34 AM  INR(ratio): 1.1 at 9/6/2023 10:34 AM  Age at listing (hypothetical): 68 years  Sex: Female at 9/6/2023 10:34 AM       Outpatient Encounter Medications as of 11/21/2023   Medication Sig Dispense Refill    calcium carbonate (TUMS) 200 mg calcium (500 mg) chewable tablet Take 1  tablet by mouth once daily.      cyclobenzaprine (FLEXERIL) 10 MG tablet Take 1 tablet by mouth three times daily as needed 60 tablet 5    DULoxetine (CYMBALTA) 30 MG capsule Take 30 mg by mouth.      ferrous sulfate (FEOSOL) 325 mg (65 mg iron) Tab tablet TAKE 1 TABLET (325 MG TOTAL) BY MOUTH ONCE DAILY. 90 tablet 0    gabapentin (NEURONTIN) 600 MG tablet TAKE 1 TABLET BY MOUTH THREE TIMES DAILY 90 tablet 5    meclizine (ANTIVERT) 25 mg tablet Take 1 tablet (25 mg total) by mouth 3 (three) times daily as needed. 60 tablet 5    HYDROcodone-acetaminophen (NORCO) 5-325 mg per tablet Take 1 tablet by mouth every 12 (twelve) hours as needed for Pain. (Patient not taking: Reported on 11/21/2023) 14 tablet 0    meloxicam (MOBIC) 15 MG tablet Take 1 tablet by mouth once daily (Patient not taking: Reported on 11/21/2023) 30 tablet 5    pantoprazole (PROTONIX) 40 MG tablet Take 1 tablet (40 mg total) by mouth 2 (two) times daily. (Patient not taking: Reported on 11/21/2023) 180 tablet 1    potassium 99 mg Tab Take by mouth once daily.      predniSONE (DELTASONE) 10 MG tablet 20mg x 3 days, then 10mg x 3 days. (Patient not taking: Reported on 11/21/2023) 9 tablet 0    sod picosulf-mag ox-citric ac (CLENPIQ) 10 mg-3.5 gram- 12 gram/160 mL Soln Take As Directed. (Patient not taking: Reported on 11/21/2023) 320 mL 0    [DISCONTINUED] sod picosulf-mag ox-citric ac (CLENPIQ) 10 mg-3.5 gram- 12 gram/160 mL Soln Take As Directed. 320 mL 0     Facility-Administered Encounter Medications as of 11/21/2023   Medication Dose Route Frequency Provider Last Rate Last Admin    lactated ringers bolus 1,000 mL  1,000 mL Intravenous Once Antwon Gardner MD        LIDOcaine (PF) 10 mg/ml (1%) injection 10 mg  1 mL Intradermal Once PRN Antwon Gardner MD         Review of patient's allergies indicates:   Allergen Reactions    Codeine Itching    Iodine and iodide containing products Itching     Past Medical History:   Diagnosis  Date    Acute right-sided back pain 6/16/2023    Anemia due to chronic blood loss 03/06/2020    Anemia of chronic disorder 08/24/2020    Anxiety     Cirrhosis     Coronary artery disease     Diverticulosis     Gastric ulcer     old    GERD (gastroesophageal reflux disease)     Hepatic steatosis 09/12/2016    Hyperbilirubinemia     Hypertension     Hypothyroidism     Iron deficiency anemia due to chronic blood loss 08/24/2020    Microcytic anemia 08/24/2020    Obesity     Peptic ulcer disease 09/12/2016    UPJ (ureteropelvic junction) obstruction     Vertigo     Vertigo        Review of Systems   Constitutional: Negative.    HENT: Negative.     Eyes: Negative.    Respiratory: Negative.     Cardiovascular: Negative.    Gastrointestinal: Negative.    Genitourinary: Negative.    Musculoskeletal: Negative.    Skin: Negative.    Neurological: Negative.    Psychiatric/Behavioral: Negative.       Vitals:    11/21/23 1357   BP: (!) 117/57   Pulse: (!) 112   Resp:        Physical Exam  Vitals reviewed.   Constitutional:       Appearance: She is well-developed.   HENT:      Head: Normocephalic and atraumatic.   Eyes:      General: No scleral icterus.     Conjunctiva/sclera: Conjunctivae normal.      Pupils: Pupils are equal, round, and reactive to light.   Neck:      Thyroid: No thyromegaly.   Cardiovascular:      Rate and Rhythm: Normal rate and regular rhythm.      Heart sounds: Normal heart sounds.   Pulmonary:      Effort: Pulmonary effort is normal.      Breath sounds: Normal breath sounds. No rales.   Abdominal:      General: Bowel sounds are normal. There is no distension.      Palpations: Abdomen is soft. There is no mass.      Tenderness: There is no abdominal tenderness.   Musculoskeletal:         General: Normal range of motion.      Cervical back: Normal range of motion and neck supple.   Skin:     General: Skin is warm and dry.      Findings: No rash.   Neurological:      Mental Status: She is alert and oriented  to person, place, and time.         MELD 3.0: 9 at 9/6/2023 10:34 AM  MELD-Na: 7 at 9/6/2023 10:34 AM  Calculated from:  Serum Creatinine: 0.8 mg/dL (Using min of 1 mg/dL) at 9/6/2023 10:34 AM  Serum Sodium: 140 mmol/L (Using max of 137 mmol/L) at 9/6/2023 10:34 AM  Total Bilirubin: 0.5 mg/dL (Using min of 1 mg/dL) at 9/6/2023 10:34 AM  Serum Albumin: 3.3 g/dL at 9/6/2023 10:34 AM  INR(ratio): 1.1 at 9/6/2023 10:34 AM  Age at listing (hypothetical): 68 years  Sex: Female at 9/6/2023 10:34 AM      Lab Results   Component Value Date    GLU 96 09/06/2023    BUN 13 09/06/2023    CREATININE 0.8 09/06/2023    CALCIUM 9.2 09/06/2023     09/06/2023    K 3.5 09/06/2023     09/06/2023    PROT 7.3 09/06/2023    CO2 23 09/06/2023    ANIONGAP 11 09/06/2023    WBC 6.62 09/06/2023    RBC 4.39 09/06/2023    HGB 11.5 (L) 09/06/2023    HCT 36.9 (L) 09/06/2023    HCT 41 10/25/2016    MCV 84 09/06/2023    MCH 26.2 (L) 09/06/2023    MCHC 31.2 (L) 09/06/2023     Lab Results   Component Value Date    RDW 16.1 (H) 09/06/2023     (L) 09/06/2023    MPV 9.6 09/06/2023    GRAN 3.7 09/06/2023    GRAN 55.2 09/06/2023    LYMPH 2.4 09/06/2023    LYMPH 36.0 09/06/2023    MONO 0.5 09/06/2023    MONO 7.7 09/06/2023    EOSINOPHIL 0.3 09/06/2023    BASOPHIL 0.5 09/06/2023    EOS 0.0 09/06/2023    BASO 0.03 09/06/2023    APTT 27.1 11/05/2022    GROUPTRH O POS 07/23/2021    GROUPTRH O POS 01/27/2005    BNP 13 05/01/2023    CHOL 126 05/15/2020    TRIG 75 05/15/2020    HDL 43 05/15/2020    CHOLHDL 34.1 05/15/2020    TOTALCHOLEST 2.9 05/15/2020    ALBUMIN 3.3 (L) 09/06/2023    BILIDIR 0.2 09/06/2023    AST 21 09/06/2023    ALT 11 09/06/2023    ALKPHOS 138 (H) 09/06/2023    MG 1.7 05/12/2023    LABPROT 11.6 09/06/2023    INR 1.1 09/06/2023       Assessment and Plan:  Lo Escalera is a 66 y.o. female with Cirrhosis  The etiology of her cirrhosis is likely alcohol +/- NAFLD. She had a small amount of ascites, esophageal varices and  hypoalbuminemia. Otherwise, her liver function is quite good. I suspect a MELD score of <15. Current recommendations:  1. Cirrhosis: check meld labs every 6 months (next due 03/24); screen for HCC every 6 months with imaging and AFP (next due 05/24); vaccinate for hepatitis B  2. Esophageal varices s/p banding: repeat EGD as scheduled  3. Elevated BMI: recommend weight loss  4. Hx alcohol absue: continute to abstain; will check periodic peth tests     Return 6 months  A total of 35 mintues was spent reviewing the chart, labs, imaging, examining the patient and counseling the patient re chronic liver disease.

## 2023-11-21 NOTE — PATIENT INSTRUCTIONS
Labs every 6 months-next due 02/24  Abdo US with AFP every 6 months (02/24)  EGD now  Return 6 months  Get vaccinated for HBV

## 2023-12-04 ENCOUNTER — TELEPHONE (OUTPATIENT)
Dept: HEPATOLOGY | Facility: CLINIC | Age: 68
End: 2023-12-04
Payer: MEDICARE

## 2023-12-04 ENCOUNTER — OFFICE VISIT (OUTPATIENT)
Dept: PRIMARY CARE CLINIC | Facility: CLINIC | Age: 68
End: 2023-12-04
Payer: MEDICARE

## 2023-12-04 VITALS
BODY MASS INDEX: 46.08 KG/M2 | OXYGEN SATURATION: 97 % | WEIGHT: 244.06 LBS | HEART RATE: 106 BPM | HEIGHT: 61 IN | TEMPERATURE: 97 F | RESPIRATION RATE: 18 BRPM | SYSTOLIC BLOOD PRESSURE: 116 MMHG | DIASTOLIC BLOOD PRESSURE: 72 MMHG

## 2023-12-04 DIAGNOSIS — M17.0 OSTEOARTHRITIS OF BOTH KNEES, UNSPECIFIED OSTEOARTHRITIS TYPE: Primary | ICD-10-CM

## 2023-12-04 DIAGNOSIS — D69.6 THROMBOCYTOPENIA: ICD-10-CM
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
## 2023-12-04 DIAGNOSIS — I10 ESSENTIAL HYPERTENSION: ICD-10-CM

## 2023-12-04 DIAGNOSIS — M51.36 DDD (DEGENERATIVE DISC DISEASE), LUMBAR: ICD-10-CM

## 2023-12-04 DIAGNOSIS — I85.00 ESOPHAGEAL VARICES WITHOUT BLEEDING, UNSPECIFIED ESOPHAGEAL VARICES TYPE: ICD-10-CM

## 2023-12-04 DIAGNOSIS — K70.31 ALCOHOLIC CIRRHOSIS OF LIVER WITH ASCITES: ICD-10-CM

## 2023-12-04 DIAGNOSIS — D50.9 IRON DEFICIENCY ANEMIA, UNSPECIFIED IRON DEFICIENCY ANEMIA TYPE: ICD-10-CM

## 2023-12-04 DIAGNOSIS — I65.23 CAROTID ATHEROSCLEROSIS, BILATERAL: ICD-10-CM

## 2023-12-04 DIAGNOSIS — K76.0 HEPATIC STEATOSIS: ICD-10-CM

## 2023-12-04 DIAGNOSIS — K29.70 ESOPHAGITIS WITH GASTRITIS: ICD-10-CM

## 2023-12-04 DIAGNOSIS — K20.90 ESOPHAGITIS WITH GASTRITIS: ICD-10-CM

## 2023-12-04 DIAGNOSIS — L29.9 PRURITUS, UNSPECIFIED: ICD-10-CM

## 2023-12-04 DIAGNOSIS — E66.01 MORBID (SEVERE) OBESITY DUE TO EXCESS CALORIES: ICD-10-CM

## 2023-12-04 DIAGNOSIS — K57.90 DIVERTICULOSIS: ICD-10-CM

## 2023-12-04 DIAGNOSIS — Z79.899 ENCOUNTER FOR LONG-TERM CURRENT USE OF MEDICATION: ICD-10-CM

## 2023-12-04 PROCEDURE — 3074F SYST BP LT 130 MM HG: CPT | Mod: CPTII,S$GLB,, | Performed by: FAMILY MEDICINE

## 2023-12-04 PROCEDURE — 96372 THER/PROPH/DIAG INJ SC/IM: CPT | Mod: S$GLB,,, | Performed by: FAMILY MEDICINE

## 2023-12-04 PROCEDURE — 3008F BODY MASS INDEX DOCD: CPT | Mod: CPTII,S$GLB,, | Performed by: FAMILY MEDICINE

## 2023-12-04 PROCEDURE — 1125F PR PAIN SEVERITY QUANTIFIED, PAIN PRESENT: ICD-10-PCS | Mod: CPTII,S$GLB,, | Performed by: FAMILY MEDICINE

## 2023-12-04 PROCEDURE — 3288F FALL RISK ASSESSMENT DOCD: CPT | Mod: CPTII,S$GLB,, | Performed by: FAMILY MEDICINE

## 2023-12-04 PROCEDURE — 99999 PR PBB SHADOW E&M-EST. PATIENT-LVL V: ICD-10-PCS | Mod: PBBFAC,,, | Performed by: FAMILY MEDICINE

## 2023-12-04 PROCEDURE — 3044F PR MOST RECENT HEMOGLOBIN A1C LEVEL <7.0%: ICD-10-PCS | Mod: CPTII,S$GLB,, | Performed by: FAMILY MEDICINE

## 2023-12-04 PROCEDURE — 99214 PR OFFICE/OUTPT VISIT, EST, LEVL IV, 30-39 MIN: ICD-10-PCS | Mod: 25,S$GLB,, | Performed by: FAMILY MEDICINE

## 2023-12-04 PROCEDURE — 3288F PR FALLS RISK ASSESSMENT DOCUMENTED: ICD-10-PCS | Mod: CPTII,S$GLB,, | Performed by: FAMILY MEDICINE

## 2023-12-04 PROCEDURE — 99214 OFFICE O/P EST MOD 30 MIN: CPT | Mod: 25,S$GLB,, | Performed by: FAMILY MEDICINE

## 2023-12-04 PROCEDURE — 1125F AMNT PAIN NOTED PAIN PRSNT: CPT | Mod: CPTII,S$GLB,, | Performed by: FAMILY MEDICINE

## 2023-12-04 PROCEDURE — 3074F PR MOST RECENT SYSTOLIC BLOOD PRESSURE < 130 MM HG: ICD-10-PCS | Mod: CPTII,S$GLB,, | Performed by: FAMILY MEDICINE

## 2023-12-04 PROCEDURE — 3078F DIAST BP <80 MM HG: CPT | Mod: CPTII,S$GLB,, | Performed by: FAMILY MEDICINE

## 2023-12-04 PROCEDURE — 1100F PTFALLS ASSESS-DOCD GE2>/YR: CPT | Mod: CPTII,S$GLB,, | Performed by: FAMILY MEDICINE

## 2023-12-04 PROCEDURE — 96372 PR INJECTION,THERAP/PROPH/DIAG2ST, IM OR SUBCUT: ICD-10-PCS | Mod: S$GLB,,, | Performed by: FAMILY MEDICINE

## 2023-12-04 PROCEDURE — 99999 PR PBB SHADOW E&M-EST. PATIENT-LVL V: CPT | Mod: PBBFAC,,, | Performed by: FAMILY MEDICINE

## 2023-12-04 PROCEDURE — 1100F PR PT FALLS ASSESS DOC 2+ FALLS/FALL W/INJURY/YR: ICD-10-PCS | Mod: CPTII,S$GLB,, | Performed by: FAMILY MEDICINE

## 2023-12-04 PROCEDURE — 3008F PR BODY MASS INDEX (BMI) DOCUMENTED: ICD-10-PCS | Mod: CPTII,S$GLB,, | Performed by: FAMILY MEDICINE

## 2023-12-04 PROCEDURE — 3044F HG A1C LEVEL LT 7.0%: CPT | Mod: CPTII,S$GLB,, | Performed by: FAMILY MEDICINE

## 2023-12-04 PROCEDURE — 3078F PR MOST RECENT DIASTOLIC BLOOD PRESSURE < 80 MM HG: ICD-10-PCS | Mod: CPTII,S$GLB,, | Performed by: FAMILY MEDICINE

## 2023-12-04 RX ORDER — TRIAMCINOLONE ACETONIDE 40 MG/ML
40 INJECTION, SUSPENSION INTRA-ARTICULAR; INTRAMUSCULAR ONCE
Status: COMPLETED | OUTPATIENT
Start: 2023-12-04 | End: 2023-12-04

## 2023-12-04 RX ORDER — TRAMADOL HYDROCHLORIDE 50 MG/1
50 TABLET ORAL EVERY 6 HOURS PRN
Qty: 30 TABLET | Refills: 0 | Status: SHIPPED | OUTPATIENT
Start: 2023-12-04 | End: 2023-12-14

## 2023-12-04 RX ADMIN — TRIAMCINOLONE ACETONIDE 40 MG: 40 INJECTION, SUSPENSION INTRA-ARTICULAR; INTRAMUSCULAR at 01:12

## 2023-12-04 NOTE — PROGRESS NOTES
Subjective:       Patient ID: Lo Escalera is a 68 y.o. female.    Chief Complaint: Follow-up (6 MONTH )      HPI: 69 yo BF in for six-month follow-up in falls history of severe anemia  Patient fell out of the bed at least twice since last visit---hit left knee on the floor and right side of the face on the end table---no loss of consciousness was dazed--did not go to emergency room--has a bad habitus sleeping on the edge of the bed.   History with severe anemia last lab was in September hematocrit 36.9 was 32.4 platelets a [id number]---cma an appointment for colonoscopy  Back pain and right shoulder pain on gabapentin and trazodone  History of esophagitis gastritis ulcers cirrhosis esophageal varices portal hypertension diverticulosis hepatic steatosis had AFP was neg   Sees DR Sen--told no cancer .  Difficulty sleeping due to pain in the knees--on gabapentin trazadone         ROS:   Skin: no psoriasis, eczema, skin cancer  HEENT: No headache, ocular pain, blurred vision, diplopia,  hoarseness change in voice, no thyroid disease no epistaxis  Lung: No pneumonia, asthma, Tb, wheezing, + occasional SOB, no smoking  Heart: no chest pain,no  ankle edema,+ occas   palpitations, no  MI, eva murmur, no hypertension patient used to have high blood pressure but was taken off of medication blood pressure today 132/7, hyperlipidemia--no stent bypass arrhythmia history of hypertension--occasionally feels like heart skips a beat blood pressure was low so taken off blood pressure medicine   Abdomen: No nausea, vomiting, diarrhea, constipation, ulcers, hepatitis, status post cholecystectomy, melena, hematochezia, hematemesis recent esophagitis gastritis with transfusion 2 units of blood history of peptic ulcer disease history of esophageal varices--sees hepatologist   : no UTI, renal disease, stones  GYN hysterectomy mammogram March 2021  MS: no fractures, O/A, lupus, rheumatoid, gout--mainly problems mid and lower  back --but pain  both knees--right shoulder wants physical therapy    Neuro: No dizziness, LOC, seizures +vertigo---several recent episodes with falling will get MRI brain and restart antivert--dizzy exercises -help with meclizine November 2020 patient was admitted to [facility] for loss of consciousness used to drink  36 yrs ago-pancreatitis  No diabetes, + anemia had to be transfused blood had upper GI bleed, + anxiety, + depression upset had to quit working because of vertigo was working at [employer]-doing pretty good with--loss mom dad oldest sister back to back--anemia 36.9 was 32.4 platelets 115 on iron    3 children unemployed, lives with      Objective:   Physical Exam:   General: Well nourished, well developed, no acute distress+ Obesity up with a cane + morbid obesity   Skin: No lesions  HEENT: Eyes PERRLA, EOM intact, nose clear , throat nonerythematous +arcus senilis no pallor to the conjunctiva  NECK: Supple, no bruits, No JVD, no nodes  Lungs: Clear, no rales, rhonchi, wheezing  Heart: Regular rate and rhythm, no murmurs, gallops, or rubs  Abdomen: flat, bowel sounds positive, no tenderness, or organomegaly  MS:  Tenderness right shoulder especially on palpation or with rotation or attempting to raise right arm overhead--tenderness knees bilaterally pain with flexion extension some crepitus bilaterally left knee greater than right patient able squat nursing home down hard to arise partially due to weight has slightly unsteady gait due to problems with knees walks with a cane patient's see orthopedic  Neuro: Alert, CN intact, oriented X3 --unable do heel toe due weight and knees   Extremities: No cyanosis, clubbing, or edema negative Romberg        Assessment:       1. Osteoarthritis of both knees, unspecified osteoarthritis type    2. Fall, subsequent encounter    3. Hepatic steatosis    4. Esophagitis with gastritis    5. Diverticulosis    6. Alcoholic cirrhosis of liver with  ascites    7. Esophageal varices without bleeding, unspecified esophageal varices type    8. Iron deficiency anemia, unspecified iron deficiency anemia type    9. Morbid (severe) obesity due to excess calories    10. Essential hypertension    11. Carotid atherosclerosis, bilateral    12. Thrombocytopenia    13. DDD (degenerative disc disease), lumbar    14. Encounter for long-term current use of medication    15. Pruritus, unspecified            Plan:       Osteoarthritis of both knees, unspecified osteoarthritis type  -     X-Ray Knee 3 View Bilateral; Future; Expected date: 12/04/2023  -     Ambulatory referral/consult to Physical/Occupational Therapy; Future; Expected date: 12/11/2023  -     X-Ray Knee 3 View Left; Future; Expected date: 12/04/2023  -     X-Ray Knee 3 View Right; Future; Expected date: 12/04/2023    Fall, subsequent encounter    Hepatic steatosis    Esophagitis with gastritis    Diverticulosis    Alcoholic cirrhosis of liver with ascites  -     CBC Auto Differential; Future; Expected date: 06/04/2024  -     Comprehensive Metabolic Panel; Future; Expected date: 06/04/2024  -     Hemoglobin A1C; Future; Expected date: 06/04/2024  -     Lipid Panel; Future; Expected date: 06/04/2024  -     TSH; Future; Expected date: 06/04/2024  -     T4, Free; Future; Expected date: 06/04/2024    Esophageal varices without bleeding, unspecified esophageal varices type    Iron deficiency anemia, unspecified iron deficiency anemia type    Morbid (severe) obesity due to excess calories  -     CBC Auto Differential; Future; Expected date: 06/04/2024  -     Comprehensive Metabolic Panel; Future; Expected date: 06/04/2024  -     Hemoglobin A1C; Future; Expected date: 06/04/2024  -     Lipid Panel; Future; Expected date: 06/04/2024  -     TSH; Future; Expected date: 06/04/2024  -     T4, Free; Future; Expected date: 06/04/2024    Essential hypertension    Carotid atherosclerosis, bilateral    Thrombocytopenia    DDD  (degenerative disc disease), lumbar    Encounter for long-term current use of medication  -     Hemoglobin A1C; Future; Expected date: 06/04/2024    Pruritus, unspecified  -     TSH; Future; Expected date: 06/04/2024  -     T4, Free; Future; Expected date: 06/04/2024    Other orders  -     traMADoL (ULTRAM) 50 mg tablet; Take 1 tablet (50 mg total) by mouth every 6 (six) hours as needed.  Dispense: 30 tablet; Refill: 0  -     triamcinolone acetonide injection 40 mg            Main Reason for Visits  Right shoulder pain/bilateral knee pain left greater than right--Kenalog 40 mg IM no more prednisone just had prednisone may benefit from NSAIDs if okay with hepatology--should see orthopedist for possible knee injections also will get physical therapy at patient's request for right shoulder in both knees patient up with cane  Patient fell out of bed x2 sleeps on the edge of the bed told put pillows along the edge of the bed to sleep in the middle of the bed to prevent falls no significant findings did hit right side of her face but no problems were noted some discomfort but always has knee pain no relief with gabapentin patient told could take tramadol but can not continue to give that may need to go to chronic pain clinic if no relief from orthopedist  Left shoulder pain--needs to see orthopedist see if would benefit from injection in the shoulder--saw DR Michaud past for knee problems   History of esophagitis gastritis gastric ulcer cirrhosis esophageal varices diverticulosis--saw hepatologist had normal liver functions and AFP patient stop drinking  Other medicalm issues   Morbid obesity needs to lose a lot of weight may benefit from gastric bypass   GERD--avoid alcohol smoking NSAIDs caffeine stress carbonated drinks--can raise the head of the bed on a block of wood to prevent GERD--eat small meals--lay down for 1-2 hours after eating Cont protonix --no NSAIDs due to history of having be transfused due to GI issues  hx gastritis esophagitis cirrhosis esophageal varices hepatic steatosis diverticulosis portal hypertension  Anemia hematocrit was 32.9 now 36.9 but 115,000 platelets history of iron deficiency  Thrombocytopenia platelets [id number]  Lab --in 6 mo CBC CMP lipid T4 TSH sed rate   Ret 6 mo

## 2023-12-04 NOTE — TELEPHONE ENCOUNTER
Called Mrs. Blanchard and have message. Patient was relieved  ----- Message from Christine Higginbotham MD sent at 12/3/2023  8:15 PM CST -----  No liver cancer-let pt know

## 2023-12-11 ENCOUNTER — TELEPHONE (OUTPATIENT)
Dept: GASTROENTEROLOGY | Facility: CLINIC | Age: 68
End: 2023-12-11
Payer: MEDICARE

## 2023-12-11 DIAGNOSIS — I85.00 ESOPHAGEAL VARICES WITHOUT BLEEDING, UNSPECIFIED ESOPHAGEAL VARICES TYPE: Primary | ICD-10-CM

## 2023-12-11 PROBLEM — K44.9 HIATAL HERNIA: Status: ACTIVE | Noted: 2023-12-11

## 2023-12-11 PROBLEM — K57.30 DIVERTICULA OF COLON: Status: ACTIVE | Noted: 2023-12-11

## 2023-12-11 PROBLEM — I99.8 ANGIECTASIA: Status: ACTIVE | Noted: 2023-12-11

## 2023-12-12 ENCOUNTER — TELEPHONE (OUTPATIENT)
Dept: GASTROENTEROLOGY | Facility: CLINIC | Age: 68
End: 2023-12-12
Payer: MEDICARE

## 2023-12-12 NOTE — TELEPHONE ENCOUNTER
Patient wanted to make sure it was not another colonoscopy. I explained to her it was just the EGD.      ----- Message from Freddy Dudley MA sent at 12/12/2023  2:10 PM CST -----  Regarding: FW: questions  Contact:  [phone]    ----- Message -----  From: Jessica Hilario  Sent: 12/12/2023  11:18 AM CST  To: Kendra Kapoor Staff  Subject: questions                                        The patient called requesting return call to answer some questions she has    No further information provided      Patient can be contacted @# [phone]

## 2023-12-13 ENCOUNTER — TELEPHONE (OUTPATIENT)
Dept: PRIMARY CARE CLINIC | Facility: CLINIC | Age: 68
End: 2023-12-13
Payer: MEDICARE

## 2023-12-13 NOTE — TELEPHONE ENCOUNTER
Called pt discussed results. Pt verbalized understanding.   Patient said that she is still having abdominal pain. Patient wanted to know what would be the next step regarding abdominal pain.

## 2023-12-18 ENCOUNTER — TELEPHONE (OUTPATIENT)
Dept: GASTROENTEROLOGY | Facility: CLINIC | Age: 68
End: 2023-12-18
Payer: MEDICARE

## 2023-12-18 ENCOUNTER — NURSE TRIAGE (OUTPATIENT)
Dept: ADMINISTRATIVE | Facility: CLINIC | Age: 68
End: 2023-12-18
Payer: MEDICARE

## 2023-12-18 NOTE — TELEPHONE ENCOUNTER
Spoke with pt who reports she had EGD performed 12/11. States since having procedure she has been having intermittent abdominal pain near naval area, and back pain. Pt advised will sen message to office, and should have call back within 1 hour. If no call back can be seen in ED. Pt verbalized understanding.    Reason for Disposition   SEVERE post-op pain (e.g., excruciating, pain scale 8-10) that is not controlled with pain medications    Additional Information   Negative: Sounds like a life-threatening emergency to the triager   Negative: Bright red, wide-spread, sunburn-like rash   Negative: SEVERE headache and after spinal (epidural) anesthesia   Negative: Vomiting and persists > 4 hours   Negative: Vomiting and abdomen looks much more swollen than usual   Negative: Drinking very little and dehydration suspected (e.g., no urine > 12 hours, very dry mouth, very lightheaded)   Negative: Patient sounds very sick or weak to the triager   Negative: Sounds like a serious complication to the triager   Negative: Fever > 100.4 F (38.0 C)   Negative: Caller has URGENT question and triager unable to answer question    Protocols used: Post-Op Symptoms and [id number]-D-CI

## 2023-12-19 NOTE — TELEPHONE ENCOUNTER
No care due was identified.  [facility] Embedded Care Due Messages. Reference number: [id number].   12/19/2023 7:17:23 AM CST

## 2023-12-21 RX ORDER — GABAPENTIN 600 MG/1
TABLET ORAL
Qty: 90 TABLET | Refills: 5 | Status: SHIPPED | OUTPATIENT
Start: 2023-12-21

## 2023-12-28 ENCOUNTER — TELEPHONE (OUTPATIENT)
Dept: PRIMARY CARE CLINIC | Facility: CLINIC | Age: 68
End: 2023-12-28
Payer: MEDICARE

## 2023-12-28 NOTE — TELEPHONE ENCOUNTER
----- Message from Socorro Sharma sent at 12/28/2023  1:39 PM CST -----  Contact: Wen, [phone]  Calling to speak with the nurse regarding she was recently diagnsis with Diverticulitis in the emergency room, she is almost out of her antibiotic and still in pain. Calling for advise. Please call her. Thanks.

## 2024-01-24 ENCOUNTER — TELEPHONE (OUTPATIENT)
Dept: PRIMARY CARE CLINIC | Facility: CLINIC | Age: 69
End: 2024-01-24

## 2024-01-24 NOTE — TELEPHONE ENCOUNTER
Called patient regarding message. No ans lvm for call back.    Cartilage Graft Text: The defect edges were debeveled with a #15 scalpel blade.  Given the location of the defect, shape of the defect, the fact the defect involved a full thickness cartilage defect a cartilage graft was deemed most appropriate.  An appropriate donor site was identified, cleansed, and anesthetized. The cartilage graft was then harvested and transferred to the recipient site, oriented appropriately and then sutured into place.  The secondary defect was then repaired using a primary closure.

## 2024-01-24 NOTE — TELEPHONE ENCOUNTER
----- Message from Chelo Garcia sent at 1/24/2024  3:18 PM CST -----  Contact: [phone]  Pt is requesting a callback in regards to scheduling an appt because she is still having stomach pains and is also having right shoulder pain. The next available appt w/Dr Barajas is 02/12. The next available appt w/Dr DWAINE Hernández is 03/20. Pt does not want to see another provider. Please call to schedule a sooner appt, if possible.                 Thank you

## 2024-01-26 ENCOUNTER — TELEPHONE (OUTPATIENT)
Dept: PRIMARY CARE CLINIC | Facility: CLINIC | Age: 69
End: 2024-01-26

## 2024-01-26 NOTE — TELEPHONE ENCOUNTER
----- Message from Consuelo Cantu MA sent at 1/24/2024  5:36 PM CST -----  Contact: [phone]    ----- Message -----  From: Garland Allen  Sent: 1/24/2024   4:48 PM CST  To: Edgar Garcia Staff    Patient is returning a phone call.  Who left a message for the patient: n/a  Does patient know what this is regarding:  miss call   Would you like a call back, or a response through your [facility] portal?:   call back   Comments:      Please call pt back

## 2024-01-26 NOTE — TELEPHONE ENCOUNTER
Returned call to patient regarding message. Patient was requesting an appointment. I have patient schedule for an appointment on 1/30/2024 at 1:40pm. Patient verbalized understand.

## 2024-01-30 ENCOUNTER — OFFICE VISIT (OUTPATIENT)
Dept: PRIMARY CARE CLINIC | Facility: CLINIC | Age: 69
End: 2024-01-30
Payer: MEDICARE

## 2024-01-30 VITALS
SYSTOLIC BLOOD PRESSURE: 112 MMHG | HEART RATE: 123 BPM | OXYGEN SATURATION: 97 % | RESPIRATION RATE: 18 BRPM | HEIGHT: 61 IN | BODY MASS INDEX: 44.97 KG/M2 | WEIGHT: 238.19 LBS | TEMPERATURE: 97 F | DIASTOLIC BLOOD PRESSURE: 62 MMHG

## 2024-01-30 DIAGNOSIS — R93.6 ABNORMAL X-RAY OF SHOULDER: ICD-10-CM

## 2024-01-30 DIAGNOSIS — M25.562 CHRONIC PAIN OF BOTH KNEES: ICD-10-CM

## 2024-01-30 DIAGNOSIS — I85.00 ESOPHAGEAL VARICES WITHOUT BLEEDING, UNSPECIFIED ESOPHAGEAL VARICES TYPE: ICD-10-CM

## 2024-01-30 DIAGNOSIS — K29.70 ESOPHAGITIS WITH GASTRITIS: ICD-10-CM

## 2024-01-30 DIAGNOSIS — F41.1 GAD (GENERALIZED ANXIETY DISORDER): ICD-10-CM

## 2024-01-30 DIAGNOSIS — I10 ESSENTIAL HYPERTENSION: ICD-10-CM

## 2024-01-30 DIAGNOSIS — S39.012D STRAIN OF LUMBAR REGION, SUBSEQUENT ENCOUNTER: ICD-10-CM

## 2024-01-30 DIAGNOSIS — I70.0 AORTIC ATHEROSCLEROSIS: ICD-10-CM

## 2024-01-30 DIAGNOSIS — K57.92 DIVERTICULITIS: ICD-10-CM

## 2024-01-30 DIAGNOSIS — R10.30 LOWER ABDOMINAL PAIN: ICD-10-CM

## 2024-01-30 DIAGNOSIS — K20.90 ESOPHAGITIS WITH GASTRITIS: ICD-10-CM

## 2024-01-30 DIAGNOSIS — G89.29 CHRONIC PAIN OF BOTH KNEES: ICD-10-CM
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
## 2024-01-30 DIAGNOSIS — K76.6 PORTAL HYPERTENSION: ICD-10-CM

## 2024-01-30 DIAGNOSIS — M54.16 LUMBAR RADICULOPATHY: ICD-10-CM

## 2024-01-30 DIAGNOSIS — E66.01 MORBID (SEVERE) OBESITY DUE TO EXCESS CALORIES: ICD-10-CM

## 2024-01-30 DIAGNOSIS — M54.31 SCIATICA OF RIGHT SIDE: ICD-10-CM

## 2024-01-30 DIAGNOSIS — R10.32 LEFT LOWER QUADRANT PAIN: Primary | ICD-10-CM

## 2024-01-30 DIAGNOSIS — F33.0 MILD EPISODE OF RECURRENT MAJOR DEPRESSIVE DISORDER: ICD-10-CM

## 2024-01-30 DIAGNOSIS — M25.511 RIGHT SHOULDER PAIN, UNSPECIFIED CHRONICITY: ICD-10-CM

## 2024-01-30 DIAGNOSIS — M25.561 CHRONIC PAIN OF BOTH KNEES: ICD-10-CM

## 2024-01-30 PROCEDURE — 1159F MED LIST DOCD IN RCRD: CPT | Mod: CPTII,S$GLB,, | Performed by: FAMILY MEDICINE

## 2024-01-30 PROCEDURE — 3074F SYST BP LT 130 MM HG: CPT | Mod: CPTII,S$GLB,, | Performed by: FAMILY MEDICINE

## 2024-01-30 PROCEDURE — 3288F FALL RISK ASSESSMENT DOCD: CPT | Mod: CPTII,S$GLB,, | Performed by: FAMILY MEDICINE

## 2024-01-30 PROCEDURE — 1125F AMNT PAIN NOTED PAIN PRSNT: CPT | Mod: CPTII,S$GLB,, | Performed by: FAMILY MEDICINE

## 2024-01-30 PROCEDURE — 99999 PR PBB SHADOW E&M-EST. PATIENT-LVL V: CPT | Mod: PBBFAC,,, | Performed by: FAMILY MEDICINE

## 2024-01-30 PROCEDURE — 1100F PTFALLS ASSESS-DOCD GE2>/YR: CPT | Mod: CPTII,S$GLB,, | Performed by: FAMILY MEDICINE

## 2024-01-30 PROCEDURE — 99214 OFFICE O/P EST MOD 30 MIN: CPT | Mod: S$GLB,,, | Performed by: FAMILY MEDICINE

## 2024-01-30 PROCEDURE — 3078F DIAST BP <80 MM HG: CPT | Mod: CPTII,S$GLB,, | Performed by: FAMILY MEDICINE

## 2024-01-30 PROCEDURE — 3008F BODY MASS INDEX DOCD: CPT | Mod: CPTII,S$GLB,, | Performed by: FAMILY MEDICINE

## 2024-01-30 RX ORDER — TRAMADOL HYDROCHLORIDE 50 MG/1
50 TABLET ORAL EVERY 6 HOURS PRN
Qty: 12 TABLET | Refills: 0 | Status: SHIPPED | OUTPATIENT
Start: 2024-01-30 | End: 2024-03-12 | Stop reason: SDUPTHER

## 2024-01-30 NOTE — PROGRESS NOTES
Subjective:       Patient ID: Lo Escalera is a 68 y.o. female.    Chief Complaint: Abdominal Pain      HPI: Very poor historian --69 yo BF abdominal pain ---pain mainly in the right lower quadrant-started 1 1/2 months ago--patient was seen in the emergency room diagnosed as diverticulitis and umbilical hernia.  Txed with AB Cipro adn Flagyl --eased up. Went back to ER told had UTI and diverticulitis was not cleared up.   -pain comes down the back to the right hip down the leg to the knee-.  Pt saw orthopedist for back 1 1/2 month ago--looking at back knees and right shoulder --one time gave shot in one knee . Told try get in therapy for back and knees   Fell again thinks injured shoulder --fell out bed --was sleeping --dreaming   No N,V,D constipation --hx ulcer past Cholecystectomy no hepatitis --dark stool . Hx EGD few months ago --had esophagitis gastritis ulcers cirrhosis esophageal varices portal hypertension hepatics steatosis  History colonoscopy showing diverticulosis 2-3 months ago Told needs 2 bands put 1-2 weeks ago.bandas on the throat.         office Visit   69 yo BF in for six-month follow-up in falls history of severe anemia  Patient fell out of the bed at least twice since last visit---hit left knee on the floor and right side of the face on the end table---no loss of consciousness was dazed--did not go to emergency room--has a bad habitus sleeping on the edge of the bed.   History with severe anemia last lab was in September hematocrit 36.9 was 32.4 platelets a [id number]---dzd an appointment for colonoscopy  Back pain and right shoulder pain on gabapentin and trazodone  History of esophagitis gastritis ulcers cirrhosis esophageal varices portal hypertension diverticulosis hepatic steatosis had AFP was neg   Sees DR Sen--told no cancer .  Difficulty sleeping due to pain in the knees--on gabapentin trazadone         ROS:  No significant change except for history of present illness  Skin:  no psoriasis, eczema, skin cancer  HEENT: No headache, ocular pain, blurred vision, diplopia,  hoarseness change in voice, no thyroid disease no epistaxis  Lung: No pneumonia, asthma, Tb, wheezing, + occasional SOB, no smoking  Heart: no chest pain,no  ankle edema,+ occas   palpitations, no  MI, eva murmur, no hypertension patient used to have high blood pressure but was taken off of medication blood pressure today 132/7, hyperlipidemia--no stent bypass arrhythmia history of hypertension--occasionally feels like heart skips a beat blood pressure was low so taken off blood pressure medicine   Abdomen: No nausea, vomiting, diarrhea, constipation, ulcers, hepatitis, status post cholecystectomy, melena, hematochezia, hematemesis recent esophagitis gastritis with transfusion 2 units of blood history of peptic ulcer disease history of esophageal varices--sees hepatologist   : no UTI, renal disease, stones  GYN hysterectomy mammogram March 2021  MS: no fractures, O/A, lupus, rheumatoid, gout--mainly problems mid and lower back --but pain  both knees--right shoulder wants physical therapy    Neuro: No dizziness, LOC, seizures +vertigo---several recent episodes with falling will get MRI brain and restart antivert--dizzy exercises -help with meclizine November 2020 patient was admitted to [facility] for loss of consciousness used to drink  36 yrs ago-pancreatitis  No diabetes, + anemia had to be transfused blood had upper GI bleed, + anxiety, + depression upset had to quit working because of vertigo was working at [employer]-doing pretty good with--loss mom dad oldest sister back to back--anemia 36.9 was 32.4 platelets 115 on iron    3 children unemployed, lives with      Objective:   Physical Exam:   General: Well nourished, well developed, no acute distress+ Obesity up with a cane + morbid obesity   Skin: No lesions  HEENT: Eyes PERRLA, EOM intact, nose clear , throat nonerythematous +arcus  senilis no pallor to the conjunctiva  NECK: Supple, no bruits, No JVD, no nodes  Lungs: Clear, no rales, rhonchi, wheezing  Heart: Regular rate and rhythm, no murmurs, gallops, or rubs  Abdomen: flat, bowel sounds positive, no tenderness, or organomegaly  MS:  Tenderness right shoulder especially on palpation or with rotation or attempting to raise right arm overhead--tenderness knees bilaterally pain with flexion extension some crepitus bilaterally left knee greater than right patient able squat senior living down hard to arise partially due to weight has slightly unsteady gait due to problems with knees walks with a cane patient's see orthopedic  Neuro: Alert, CN intact, oriented X3 --unable do heel toe due weight and knees   Extremities: No cyanosis, clubbing, or edema negative Romberg        Assessment:       1. Left lower quadrant pain    2. Lower abdominal pain    3. Esophageal varices without bleeding, unspecified esophageal varices type    4. Portal hypertension    5. Esophagitis with gastritis    6. Fall, initial encounter    7. Abnormal x-ray of shoulder    8. Essential hypertension    9. JHOAN (generalized anxiety disorder)    10. Lumbar radiculopathy    11. Right shoulder pain, unspecified chronicity    12. Chronic pain of both knees    13. Strain of lumbar region, subsequent encounter    14. Sciatica of right side    15. Diverticulitis    16. Mild episode of recurrent major depressive disorder    17. Morbid (severe) obesity due to excess calories    18. Aortic atherosclerosis              Plan:       Left lower quadrant pain  -     CT Abdomen Pelvis  Without Contrast; Future; Expected date: 01/30/2024  -     Ambulatory referral/consult to Gastroenterology; Future; Expected date: 02/06/2024    Lower abdominal pain  -     POCT urine dipstick without microscope  -     Ambulatory referral/consult to Gastroenterology; Future; Expected date: 02/06/2024    Esophageal varices without bleeding, unspecified esophageal  varices type  -     Ambulatory referral/consult to Gastroenterology; Future; Expected date: 02/06/2024    Portal hypertension    Esophagitis with gastritis    Fall, initial encounter    Abnormal x-ray of shoulder    Essential hypertension  -     CBC Auto Differential; Future; Expected date: 01/30/2024  -     Comprehensive Metabolic Panel; Future; Expected date: 01/30/2024    JHOAN (generalized anxiety disorder)    Lumbar radiculopathy  -     Ambulatory referral/consult to Orthopedics; Future; Expected date: 02/06/2024    Right shoulder pain, unspecified chronicity  -     Ambulatory referral/consult to Orthopedics; Future; Expected date: 02/06/2024    Chronic pain of both knees  -     Ambulatory referral/consult to Orthopedics; Future; Expected date: 02/06/2024    Strain of lumbar region, subsequent encounter    Sciatica of right side    Diverticulitis  -     traMADoL (ULTRAM) 50 mg tablet; Take 1 tablet (50 mg total) by mouth every 6 (six) hours as needed for Pain.  Dispense: 12 tablet; Refill: 0    Mild episode of recurrent major depressive disorder    Morbid (severe) obesity due to excess calories    Aortic atherosclerosis              Main Reason for Visits  Right shoulder pain/bilateral knee pain left greater than right--Kenalog 40 mg IM no more prednisone just had prednisone may benefit from NSAIDs if okay with hepatology--should see orthopedist for possible knee injections also will get physical therapy at patient's request for right shoulder in both knees patient up with cane  Patient fell out of bed x2 sleeps on the edge of the bed told put pillows along the edge of the bed to sleep in the middle of the bed to prevent falls no significant findings did hit right side of her face but no problems were noted some discomfort but always has knee pain no relief with gabapentin patient told could take tramadol but can not continue to give that may need to go to chronic pain clinic if no relief from orthopedist  Left  shoulder pain--needs to see orthopedist see if would benefit from injection in the shoulder--saw DR Michaud past for knee problems   History of esophagitis gastritis gastric ulcer cirrhosis esophageal varices diverticulosis--saw hepatologist had normal liver functions and AFP patient stop drinking  Other medicalm issues   Morbid obesity needs to lose a lot of weight may benefit from gastric bypass   GERD--avoid alcohol smoking NSAIDs caffeine stress carbonated drinks--can raise the head of the bed on a block of wood to prevent GERD--eat small meals--lay down for 1-2 hours after eating Cont protonix --no NSAIDs due to history of having be transfused due to GI issues hx gastritis esophagitis cirrhosis esophageal varices hepatic steatosis diverticulosis portal hypertension  Anemia hematocrit was 32.9 now 36.9 but 115,000 platelets history of iron deficiency  Thrombocytopenia platelets [id number]  Lab --in 6 mo CBC CMP lipid T4 TSH sed rate   Ret 6 mo

## 2024-01-30 NOTE — PATIENT INSTRUCTIONS
Abd pain lower abd and umbilicus   CT scan abd and pelvis   Lab CBC CMP U/A   Ultram q 6 hrs prn   Gabapentin 600 tid   See GI MD for long term tx esophageal varices cirrhosis o[portal hypertension ?? Umbilical hernia and diverticulitis vs diverticulosis   May need cipro and Flaygl   Right shoulder pain   Ortho consult    Xray right shoulder LS st and sciatica and bilateral knee pain ?? Needs arthroscopy   If abdominal pain gets worse go to the emergency room   If right shoulder pain back knee pains gets worse go to the emergency

## 2024-01-31 RX ORDER — CIPROFLOXACIN 500 MG/1
500 TABLET ORAL 2 TIMES DAILY
Qty: 20 TABLET | Refills: 0 | Status: SHIPPED | OUTPATIENT
Start: 2024-01-31 | End: 2024-02-10

## 2024-01-31 RX ORDER — METRONIDAZOLE 500 MG/1
500 TABLET ORAL 2 TIMES DAILY
Qty: 14 TABLET | Refills: 0 | Status: SHIPPED | OUTPATIENT
Start: 2024-01-31 | End: 2024-02-07

## 2024-01-31 NOTE — TELEPHONE ENCOUNTER
----- Message from Jose Hernández MD sent at 1/30/2024  9:56 PM CST -----  Important pt get prescription Cipro 500 mg 1 po bid x 10 days #20 and Flagyl 500 mg 1 po bis x 7 days #14 No alcohol while on this medication I could not get order in so get nurse put order in computer and pend to me Call pt tell needs retreat mild recurrent diverticulitis   ----- Message -----  From: Interface, Rad Results In  Sent: 1/30/2024   4:48 PM CST  To: Jose Hernández MD

## 2024-01-31 NOTE — TELEPHONE ENCOUNTER
----- Message from Jennifer Zhou sent at 1/31/2024 11:01 AM CST -----  Contact: [phone]  [email or web address]     Patient is calling for Medical Advice regarding:had a call from the office does not know from who     How long has patient had these symptoms:    Pharmacy name and phone#:    Would like response via [employer]:  no     Comments:  Please give return call

## 2024-01-31 NOTE — TELEPHONE ENCOUNTER
Called patient regarding CT scan results. Pt verbalized understanding. Also pending Cipro and Flagyl.

## 2024-02-01 PROBLEM — F33.0 MILD EPISODE OF RECURRENT MAJOR DEPRESSIVE DISORDER: Status: ACTIVE | Noted: 2024-02-01

## 2024-02-26 ENCOUNTER — TELEPHONE (OUTPATIENT)
Dept: HEPATOLOGY | Facility: CLINIC | Age: 69
End: 2024-02-26
Payer: MEDICARE

## 2024-02-26 NOTE — TELEPHONE ENCOUNTER
She said ok and will follow up on 3/12----- Message from Christine Higginbotham MD sent at 2/25/2024  6:54 PM CST -----  Labs stable. Please let pt know

## 2024-02-28 ENCOUNTER — TELEPHONE (OUTPATIENT)
Dept: GASTROENTEROLOGY | Facility: CLINIC | Age: 69
End: 2024-02-28
Payer: MEDICARE

## 2024-02-28 DIAGNOSIS — I85.00 ESOPHAGEAL VARICES WITHOUT BLEEDING, UNSPECIFIED ESOPHAGEAL VARICES TYPE: Primary | ICD-10-CM

## 2024-02-28 NOTE — TELEPHONE ENCOUNTER
----- Message from Linda Zaldivar NP sent at 1/25/2024  2:12 PM CST -----  Regarding: repead egd for healing 3/2025 with Dr. Gardner

## 2024-03-12 ENCOUNTER — OFFICE VISIT (OUTPATIENT)
Dept: PRIMARY CARE CLINIC | Facility: CLINIC | Age: 69
End: 2024-03-12
Payer: MEDICARE

## 2024-03-12 ENCOUNTER — OFFICE VISIT (OUTPATIENT)
Dept: PSYCHOLOGY | Facility: CLINIC | Age: 69
End: 2024-03-12
Payer: MEDICARE

## 2024-03-12 VITALS
DIASTOLIC BLOOD PRESSURE: 76 MMHG | SYSTOLIC BLOOD PRESSURE: 130 MMHG | HEIGHT: 61 IN | OXYGEN SATURATION: 98 % | BODY MASS INDEX: 47.07 KG/M2 | RESPIRATION RATE: 18 BRPM | HEART RATE: 120 BPM

## 2024-03-12 VITALS
OXYGEN SATURATION: 100 % | HEART RATE: 112 BPM | DIASTOLIC BLOOD PRESSURE: 76 MMHG | TEMPERATURE: 98 F | SYSTOLIC BLOOD PRESSURE: 130 MMHG

## 2024-03-12 DIAGNOSIS — K57.90 DIVERTICULOSIS: ICD-10-CM

## 2024-03-12 DIAGNOSIS — F41.1 GAD (GENERALIZED ANXIETY DISORDER): ICD-10-CM

## 2024-03-12 DIAGNOSIS — K20.90 ESOPHAGITIS WITH GASTRITIS: ICD-10-CM

## 2024-03-12 DIAGNOSIS — R10.33 ABDOMINAL PAIN, PERIUMBILICAL: ICD-10-CM

## 2024-03-12 DIAGNOSIS — D50.9 IRON DEFICIENCY ANEMIA, UNSPECIFIED IRON DEFICIENCY ANEMIA TYPE: ICD-10-CM

## 2024-03-12 DIAGNOSIS — R10.11 ABDOMINAL PAIN, RIGHT UPPER QUADRANT: Primary | ICD-10-CM

## 2024-03-12 DIAGNOSIS — K76.0 HEPATIC STEATOSIS: ICD-10-CM

## 2024-03-12 DIAGNOSIS — F51.01 PRIMARY INSOMNIA: Primary | ICD-10-CM

## 2024-03-12 DIAGNOSIS — E66.01 MORBID (SEVERE) OBESITY DUE TO EXCESS CALORIES: ICD-10-CM

## 2024-03-12 DIAGNOSIS — E03.9 HYPOTHYROIDISM, UNSPECIFIED TYPE: ICD-10-CM

## 2024-03-12 DIAGNOSIS — I10 ESSENTIAL HYPERTENSION: ICD-10-CM

## 2024-03-12 DIAGNOSIS — D69.6 THROMBOCYTOPENIA: ICD-10-CM

## 2024-03-12 DIAGNOSIS — R00.0 TACHYCARDIA: ICD-10-CM

## 2024-03-12 DIAGNOSIS — K29.70 ESOPHAGITIS WITH GASTRITIS: ICD-10-CM

## 2024-03-12 DIAGNOSIS — K76.6 PORTAL HYPERTENSION: ICD-10-CM

## 2024-03-12 DIAGNOSIS — K92.2 UGIB (UPPER GASTROINTESTINAL BLEED): ICD-10-CM

## 2024-03-12 DIAGNOSIS — I85.00 ESOPHAGEAL VARICES WITHOUT BLEEDING, UNSPECIFIED ESOPHAGEAL VARICES TYPE: ICD-10-CM

## 2024-03-12 DIAGNOSIS — S49.91XA RIGHT SHOULDER INJURY, INITIAL ENCOUNTER: ICD-10-CM

## 2024-03-12 DIAGNOSIS — M51.36 DDD (DEGENERATIVE DISC DISEASE), LUMBAR: ICD-10-CM

## 2024-03-12 DIAGNOSIS — K57.92 DIVERTICULITIS: ICD-10-CM

## 2024-03-12 DIAGNOSIS — K70.31 ALCOHOLIC CIRRHOSIS OF LIVER WITH ASCITES: ICD-10-CM

## 2024-03-12 DIAGNOSIS — F33.0 MILD EPISODE OF RECURRENT MAJOR DEPRESSIVE DISORDER: ICD-10-CM

## 2024-03-12 PROCEDURE — 99214 OFFICE O/P EST MOD 30 MIN: CPT | Mod: S$GLB,,, | Performed by: FAMILY MEDICINE

## 2024-03-12 PROCEDURE — 1126F AMNT PAIN NOTED NONE PRSNT: CPT | Mod: CPTII,S$GLB,, | Performed by: FAMILY MEDICINE

## 2024-03-12 PROCEDURE — 3008F BODY MASS INDEX DOCD: CPT | Mod: CPTII,S$GLB,, | Performed by: FAMILY MEDICINE

## 2024-03-12 PROCEDURE — 1101F PT FALLS ASSESS-DOCD LE1/YR: CPT | Mod: CPTII,S$GLB,, | Performed by: FAMILY MEDICINE

## 2024-03-12 PROCEDURE — 3075F SYST BP GE 130 - 139MM HG: CPT | Mod: CPTII,S$GLB,, | Performed by: NURSE PRACTITIONER

## 2024-03-12 PROCEDURE — 3078F DIAST BP <80 MM HG: CPT | Mod: CPTII,S$GLB,, | Performed by: NURSE PRACTITIONER

## 2024-03-12 PROCEDURE — 1159F MED LIST DOCD IN RCRD: CPT | Mod: CPTII,S$GLB,, | Performed by: NURSE PRACTITIONER

## 2024-03-12 PROCEDURE — 3078F DIAST BP <80 MM HG: CPT | Mod: CPTII,S$GLB,, | Performed by: FAMILY MEDICINE

## 2024-03-12 PROCEDURE — 3288F FALL RISK ASSESSMENT DOCD: CPT | Mod: CPTII,S$GLB,, | Performed by: FAMILY MEDICINE

## 2024-03-12 PROCEDURE — 3075F SYST BP GE 130 - 139MM HG: CPT | Mod: CPTII,S$GLB,, | Performed by: FAMILY MEDICINE

## 2024-03-12 PROCEDURE — 99999 PR PBB SHADOW E&M-EST. PATIENT-LVL III: CPT | Mod: PBBFAC,,, | Performed by: NURSE PRACTITIONER

## 2024-03-12 PROCEDURE — 99215 OFFICE O/P EST HI 40 MIN: CPT | Mod: S$GLB,,, | Performed by: NURSE PRACTITIONER

## 2024-03-12 PROCEDURE — 99999 PR PBB SHADOW E&M-EST. PATIENT-LVL IV: CPT | Mod: PBBFAC,,, | Performed by: FAMILY MEDICINE

## 2024-03-12 RX ORDER — CYCLOBENZAPRINE HCL 10 MG
TABLET ORAL
Qty: 60 TABLET | Refills: 5 | Status: SHIPPED | OUTPATIENT
Start: 2024-03-12

## 2024-03-12 RX ORDER — PANTOPRAZOLE SODIUM 40 MG/1
40 TABLET, DELAYED RELEASE ORAL 2 TIMES DAILY
Qty: 180 TABLET | Refills: 1 | Status: SHIPPED | OUTPATIENT
Start: 2024-03-12

## 2024-03-12 RX ORDER — GABAPENTIN 600 MG/1
600 TABLET ORAL 3 TIMES DAILY
Qty: 90 TABLET | Refills: 5 | Status: CANCELLED | OUTPATIENT
Start: 2024-03-12

## 2024-03-12 RX ORDER — SERTRALINE HYDROCHLORIDE 50 MG/1
50 TABLET, FILM COATED ORAL DAILY
Qty: 30 TABLET | Refills: 1 | Status: SHIPPED | OUTPATIENT
Start: 2024-03-12 | End: 2024-05-11

## 2024-03-12 RX ORDER — TRAMADOL HYDROCHLORIDE 50 MG/1
50 TABLET ORAL EVERY 6 HOURS PRN
Qty: 12 TABLET | Refills: 0 | Status: SHIPPED | OUTPATIENT
Start: 2024-03-12

## 2024-03-12 RX ORDER — DULOXETIN HYDROCHLORIDE 30 MG/1
30 CAPSULE, DELAYED RELEASE ORAL DAILY
Qty: 90 CAPSULE | Refills: 3 | Status: SHIPPED | OUTPATIENT
Start: 2024-03-12 | End: 2024-03-12 | Stop reason: ALTCHOICE

## 2024-03-12 RX ORDER — HYDROXYZINE PAMOATE 50 MG/1
50 CAPSULE ORAL NIGHTLY
Qty: 30 CAPSULE | Refills: 1 | Status: SHIPPED | OUTPATIENT
Start: 2024-03-12 | End: 2024-05-11

## 2024-03-12 RX ORDER — MELOXICAM 15 MG/1
TABLET ORAL
Qty: 30 TABLET | Refills: 5 | Status: CANCELLED | OUTPATIENT
Start: 2024-03-12

## 2024-03-12 RX ORDER — MECLIZINE HYDROCHLORIDE 25 MG/1
25 TABLET ORAL 3 TIMES DAILY PRN
Qty: 60 TABLET | Refills: 5 | Status: SHIPPED | OUTPATIENT
Start: 2024-03-12

## 2024-03-12 RX ORDER — GABAPENTIN 600 MG/1
600 TABLET ORAL 3 TIMES DAILY
Qty: 90 TABLET | Refills: 5 | Status: SHIPPED | OUTPATIENT
Start: 2024-03-12 | End: 2025-03-12

## 2024-03-12 NOTE — PROGRESS NOTES
Subjective:       Patient ID: Lo Escalera is a 68 y.o. female.    Chief Complaint: Abdominal Pain      HPI: 67 yo BF still with stomach pain--seen ER 12- diverticulitis---1-7-2024 diverticulitis and umbilical hernia and UTI--treated with Cipro Flagyl   Recently seen gen surb Dr. Antwon Gardner  did EGD suppose get monthly but told looks better GERD --hx past esophagitis gastritis ulcers cirrhosis esophageal varices portal hypertension hepatics steatosis diverticulosis diverticulitis  hx Cholecystectomy  Seiing orthopedist for back right shoulder knees   Abd pain --hx umbilical hernia --hurts comes and goes --acid makes burn --pain under right breast   Needs pain meds and refioll gabapentin                 ROS:  Significant change except for history of present illness  Skin: no psoriasis, eczema, skin cancer  HEENT: No headache, ocular pain, blurred vision, diplopia,  hoarseness change in voice, no thyroid disease no epistaxis  Lung: No pneumonia, asthma, Tb, wheezing, + occasional SOB, no smoking  Heart: no chest pain,no  ankle edema,+ occas   palpitations, no  MI, eva murmur, no hypertension patient used to have high blood pressure but was taken off of medication blood pressure today 132/7, hyperlipidemia--no stent bypass arrhythmia history of hypertension--occasionally feels like heart skips a beat blood pressure was low so taken off blood pressure medicine   Abdomen: No nausea, vomiting, diarrhea, constipation, ulcers, hepatitis, status post cholecystectomy, melena, hematochezia, hematemesis recent esophagitis gastritis with transfusion 2 units of blood history of peptic ulcer disease history of esophageal varices--sees hepatologist   : no UTI, renal disease, stones  GYN hysterectomy mammogram March 2021  MS: no fractures, O/A, lupus, rheumatoid, gout--mainly problems mid and lower back --but pain  both knees--right shoulder wants physical therapy    Neuro: No dizziness, LOC, seizures  +vertigo---several recent episodes with falling will get MRI brain and restart antivert--dizzy exercises -help with meclizine November 2020 patient was admitted to [facility] for loss of consciousness used to drink  36 yrs ago-pancreatitis  No diabetes, + anemia had to be transfused blood had upper GI bleed, + anxiety, + depression upset had to quit working because of vertigo was working at [employer]-doing pretty good with--loss mom dad oldest sister back to back--anemia 36.9 was 32.4 platelets 115 on iron    3 children unemployed, lives with      Objective:   Physical Exam:   General: Well nourished, well developed, no acute distress up with a cane + morbid obesity hard arise from chair   Skin: No lesions  HEENT: Eyes PERRLA, EOM intact, nose clear , throat nonerythematous +arcus senilis no pallor to the conjunctiva  NECK: Supple, no bruits, No JVD, no nodes  Lungs: Clear, no rales, rhonchi, wheezing  Heart: Regular rate and rhythm, no murmurs, gallops, or rubs  Abdomen: flat, bowel sounds positive, no tenderness, or organomegaly--pain mainly right upper quadrant and periumbilical area   MS:  Tenderness right shoulder especially on palpation or with rotation or attempting to raise right arm overhead--tenderness knees bilaterally pain with flexion extension some crepitus bilaterally left knee greater than right patient able squat [facility] down hard to arise partially due to weight has slightly unsteady gait due to problems with knees walks with a cane patient's see orthopedic  Neuro: Alert, CN intact, oriented X3 --unable do heel toe due weight and knees   Extremities: No cyanosis, clubbing, or edema negative Romberg        Assessment:       1. Abdominal pain, right upper quadrant    2. Abdominal pain, periumbilical    3. Right shoulder injury, initial encounter    4. UGIB (upper gastrointestinal bleed)    5. Esophagitis with gastritis    6. Essential hypertension    7. JHOAN (generalized  anxiety disorder)    8. DDD (degenerative disc disease), lumbar    9. Iron deficiency anemia, unspecified iron deficiency anemia type    10. Morbid (severe) obesity due to excess calories    11. Hypothyroidism, unspecified type    12. Hepatic steatosis    13. Portal hypertension    14. Diverticulosis    15. Alcoholic cirrhosis of liver with ascites    16. Esophageal varices without bleeding, unspecified esophageal varices type    17. Thrombocytopenia    18. Diverticulitis    19. Tachycardia              Plan:       Abdominal pain, right upper quadrant  -     Ambulatory referral/consult to General Surgery; Future; Expected date: 03/19/2024    Abdominal pain, periumbilical  -     Ambulatory referral/consult to General Surgery; Future; Expected date: 03/19/2024    Right shoulder injury, initial encounter    UGIB (upper gastrointestinal bleed)  -     pantoprazole (PROTONIX) 40 MG tablet; Take 1 tablet (40 mg total) by mouth 2 (two) times daily.  Dispense: 180 tablet; Refill: 1    Esophagitis with gastritis  -     pantoprazole (PROTONIX) 40 MG tablet; Take 1 tablet (40 mg total) by mouth 2 (two) times daily.  Dispense: 180 tablet; Refill: 1    Essential hypertension    JHOAN (generalized anxiety disorder)    DDD (degenerative disc disease), lumbar    Iron deficiency anemia, unspecified iron deficiency anemia type    Morbid (severe) obesity due to excess calories    Hypothyroidism, unspecified type    Hepatic steatosis    Portal hypertension    Diverticulosis    Alcoholic cirrhosis of liver with ascites    Esophageal varices without bleeding, unspecified esophageal varices type    Thrombocytopenia    Diverticulitis  -     traMADoL (ULTRAM) 50 mg tablet; Take 1 tablet (50 mg total) by mouth every 6 (six) hours as needed for Pain.  Dispense: 12 tablet; Refill: 0    Tachycardia  -     Ambulatory referral/consult to Cardiology; Future; Expected date: 03/19/2024  -     EKG 12-lead; Future    Other orders  -     cyclobenzaprine  (FLEXERIL) 10 MG tablet; Take 1 tablet by mouth three times daily as needed  Dispense: 60 tablet; Refill: 5  -     DULoxetine (CYMBALTA) 30 MG capsule; Take 1 capsule (30 mg total) by mouth once daily.  Dispense: 90 capsule; Refill: 3  -     meclizine (ANTIVERT) 25 mg tablet; Take 1 tablet (25 mg total) by mouth 3 (three) times daily as needed.  Dispense: 60 tablet; Refill: 5  -     gabapentin (NEURONTIN) 600 MG tablet; Take 1 tablet (600 mg total) by mouth 3 (three) times daily.  Dispense: 90 tablet; Refill: 5              Main Reason for Visits  Abdominal pain patient asking about additional workup or treatment diverticulitis--but when points to her abdomen says pain in his in the right upper quadrant--and periumbilical--had recent EGD history of esophagitis gastritis esophageal varices history of gastric ulcers cirrhosis portal hypertension diverticulitis x2 given note to Dr. Crawford who see the evaluate abdominal pain suggested additional workup or treatmentand follow up with GI MD I have no other suggestions to make though weight loss would be helpful   Right shoulder pain/bilateral knee pain left greater than right-and back needs see orthopedist   Morbid obesity needs to lose a lot of weight may benefit from gastric bypass   GERD--avoid alcohol smoking NSAIDs caffeine stress carbonated drinks--can raise the head of the bed on a block of wood to prevent GERD--eat small meals--lay down for 1-2 hours after eating Cont protonix --no NSAIDs due to history of having be transfused due to GI issues hx gastritis esophagitis cirrhosis esophageal varices hepatic steatosis diverticulosis portal hypertension  Anemia hematocrit was 32.9 now 36.9 but 115,000 platelets history of iron deficiency  Thrombocytopenia platelets [id number]  Lab --in 6 mo CBC CMP lipid T4 TSH sed rate   Ret 6 mo best follow upnwith Gen surg or GI MD not me

## 2024-03-12 NOTE — PROGRESS NOTES
"Outpatient Psychiatry Follow-Up Visit ([facility])    03/12/2024    Clinical Status of Patient:  Outpatient (Ambulatory)    Chief Complaint:  Lo Escalera is a 68 y.o. female who presents today for follow-up of depression, anxiety, and AUD, .  Met with patient.     Current Medications:   Zoloft 25 mg Daily  Hydroxyzine 25-50 mg at bedtime    Past Medication Trials:  Cymbalta- at 30 mg not effective    Interval History and Content of Current Session:  Patient seen and chart reviewed. Last seen on 05/8/23    At last visit patient was d/c from Cymbalta and start on Zoloft 25 mg Daily and Hydroxyzine 50 mg at bedtime. Reports maintained sobriety for 4-5 years.  Patient reports over the last few months she's been pushing herself to be "happier" and get out more. She reports hanging out with her grand children. She reports frustration regarding her  trying to teach her things. And .  Reports being in pain in her belly and legs dur to diverticulitis and a hernia.  Reports poor sleep, f    Patient was started today by PCP on Cymbalta, it seem she has not been taking zolodt.       Denies SI/HI/AVH. Denies adverse effects from medication  Pt reports taking medications as prescribed and behaving appropriately during interview today.    Pt appears:  Appropriate attire and affect    Mood:  Improving, calm, content    Sleep:  Poor, sleep 4 hrs / night, not taking hydroxyzine    Appetite:  Normal, but generally increased    Self Rates Depression: 5/10  Self Rated Anxiety:  6 /10    Psychotherapy:  Target symptoms: depression, anxiety   Why chosen therapy is appropriate versus another modality: relevant to diagnosis, evidence based practice  Outcome monitoring methods: self-report, observation  Therapeutic intervention type: insight oriented psychotherapy, behavior modifying psychotherapy  Topics discussed/themes: relationships difficulties, symptom recognition, substance abuse  The patient's response to the intervention is " accepting, motivated. The patient's progress toward treatment goals is fair.   Duration of intervention: 20 minutes.    Review of Systems   PSYCHIATRIC: Pertinant items are noted in the narrative.        Past Medical, Family and Social History: The patient's past medical, family and social history have been reviewed and updated as appropriate within the electronic medical record - see encounter notes.    Compliance: no    Side effects: None    Risk Parameters:  Patient reports no suicidal ideation  Patient reports no homicidal ideation  Patient reports no self-injurious behavior  Patient reports no violent behavior    Exam (detailed: at least 9 elements; comprehensive: all 15 elements)   Constitutional  Vitals:  Most recent vital signs, dated less than 90 days prior to this appointment, were reviewed.   Vitals:    03/12/24 1313   BP: 130/76   Pulse: (!) 112   Temp: 98 °F (36.7 °C)   SpO2: 100%        General:  unremarkable, age appropriate     Musculoskeletal  Muscle Strength/Tone:  no spasicity, no rigidity, no cogwheeling, no flaccidity, no paratonia, no dyskinesia, no dystonia, no tremor, no tic, no choreoathetosis, no atrophy   Gait & Station:  non-ataxic     Psychiatric  Speech:  no latency; no press   Mood & Affect:  steady, happy  congruent and appropriate   Thought Process:  normal and logical   Associations:  intact, circumstantial   Thought Content:  normal, no suicidality, no homicidality, delusions, or paranoia   Insight:  intact, has awareness of illness   Judgement: behavior is adequate to circumstances, age appropriate   Orientation:  grossly intact, person, place, situation, time/date, day of week   Memory: intact for content of interview, grossly intact, able to remember recent events- Yes, able to remember remote events- Yes   Language: grossly intact, able to name, able to repeat   Attention Span & Concentration:  able to focus, completed tasks   Fund of Knowledge:  intact and appropriate to age  and level of education, familiar with aspects of current personal life, 4 of 4 recent presidents     Assessment and Diagnosis   Status/Progress: Based on the examination today, the patient's problem(s) is/are improved and adequately but not ideally controlled.  New problems have not been presented today.   Lack of compliance are complicating management of the primary condition.  There are no active rule-out diagnoses for this patient at this time.     General Impression: Lo Escalera, a 68 y.o. female, presenting for follow up of Anxiety and depression.   Presents 5/8/23- PHQ-9 17, JHOAN-7: 6. Patient has been on Cymbalta 30 mg for many years, reports medication is not effective. Increasing dose is too risky for severe hx of AUD with medical complications. D/C Cymbalta and begin low dose Sertraline 25 mg daily. Start Hydroxyzine 25-50 mg @ HS for insomnia   Presents 3/12/24- Patient has been non complaint with meds and did not follow up for 8 months but reports improvement in symptoms over the past 2 months. D/C prescribed Cymbalta from PCP from today, Start Zoloft 50 mg Daily       ICD-10-CM ICD-9-CM    1. Primary insomnia  F51.01 307.42 hydrOXYzine pamoate (VISTARIL) 50 MG Cap      2. JHOAN (generalized anxiety disorder)  F41.1 300.02 sertraline (ZOLOFT) 50 MG tablet      3. Mild episode of recurrent major depressive disorder  F33.0 296.31 sertraline (ZOLOFT) 50 MG tablet          Intervention/Counseling/Treatment Plan   Medication Management: The risks and benefits of medication were discussed with the patient.  D/C Cymbalta  Start Zoloft 50 mg daily  Hydroxyzine 50 mg @ HS   Discussed diagnosis, risk and benefits of proposed treatment above vs alternative treatment vs no treatment, and potential side effects of these treatments, and the inherent unpredictability of individual responses to these treatments. The patient expresses understanding and gives informed consent to pursue treatment at this time, believing  that the potential benefits outweigh the potential risks. Patient has no other questions. Risks/adverse effects at this time include but are not limited to: GI side effects, sexual dysfunction, activation vs sedation, triggering of suicidal ideation, and serotonin syndrome.   Patient voices understanding and agreement with this plan  Provided crisis numbers  Encouraged patient to keep future appointments  Instruct patient to call or message with questions  In the event of an emergency, including suicidal ideation, patient was advised to go to the emergency room      Return to Clinic: 1 month    Total time: 40 Minutes       I spent a total of 40 minutes on the day of the visit.This includes face to face time and non-face to face time preparing to see the patient (eg, review of tests), obtaining and/or reviewing separately obtained history, documenting clinical information in the electronic or other health record, independently interpreting results and communicating results to the patient/family/caregiver, or care coordinator.        Lilliana Perez DNP, PMHNP, FNP

## 2024-03-31 NOTE — SUBJECTIVE & OBJECTIVE
Interval History: Patient continues to improve with less abdominal pain.  Some loose BMs.  Mild chronic lower back pain.    Review of Systems   Constitutional: Negative for activity change, chills, diaphoresis and fatigue.   HENT: Negative for congestion, drooling and hearing loss.    Eyes: Negative for discharge.   Respiratory: Negative for apnea, chest tightness, shortness of breath and wheezing.    Cardiovascular: Negative for palpitations and leg swelling.   Gastrointestinal: Negative for abdominal distention, abdominal pain, blood in stool, constipation, diarrhea, nausea and vomiting.   Endocrine: Negative for cold intolerance and heat intolerance.   Genitourinary: Negative for difficulty urinating.   Musculoskeletal: Negative for arthralgias and gait problem.   Skin: Negative for rash.   Neurological: Positive for weakness. Negative for dizziness, seizures, light-headedness and numbness.   Hematological: Negative for adenopathy.   Psychiatric/Behavioral: Negative for agitation and behavioral problems.     Objective:     Vital Signs (Most Recent):  Temp: 98 °F (36.7 °C) (03/11/20 0443)  Pulse: 89 (03/11/20 0443)  Resp: 20 (03/11/20 0443)  BP: (!) 119/58 (03/11/20 0443)  SpO2: 98 % (03/11/20 0443) Vital Signs (24h Range):  Temp:  [97.8 °F (36.6 °C)-98.3 °F (36.8 °C)] 98 °F (36.7 °C)  Pulse:  [] 89  Resp:  [16-20] 20  SpO2:  [93 %-100 %] 98 %  BP: (100-147)/(53-67) 119/58     Weight: 68 kg (149 lb 14.6 oz)  Body mass index is 26.56 kg/m².    Intake/Output Summary (Last 24 hours) at 3/11/2020 0510  Last data filed at 3/10/2020 1200  Gross per 24 hour   Intake 445 ml   Output 320 ml   Net 125 ml      Physical Exam   Constitutional: She is oriented to person, place, and time. She appears well-developed and well-nourished.   HENT:   Head: Normocephalic and atraumatic.   Eyes: Pupils are equal, round, and reactive to light. Conjunctivae and EOM are normal. Right eye exhibits no discharge. Left eye exhibits no  discharge. No scleral icterus.   Neck: Normal range of motion. Neck supple.   Cardiovascular: Normal rate, regular rhythm and normal heart sounds. Exam reveals no gallop and no friction rub.   No murmur heard.  Pulmonary/Chest: Effort normal and breath sounds normal. No stridor. No respiratory distress. She has no wheezes. She has no rales.   Abdominal: Soft. Bowel sounds are normal. She exhibits no distension. There is no tenderness. There is no rebound and no guarding.   Musculoskeletal: Normal range of motion. She exhibits no edema or deformity.   Neurological: She is oriented to person, place, and time. No cranial nerve deficit. She exhibits normal muscle tone.   Skin: Skin is warm and dry. Capillary refill takes less than 2 seconds. No pallor.   Psychiatric: She has a normal mood and affect. Her behavior is normal.   Nursing note and vitals reviewed.      Significant Labs: All pertinent labs within the past 24 hours have been reviewed.    Significant Imaging: I have reviewed and interpreted all pertinent imaging results/findings within the past 24 hours.   Speaking Coherently

## 2024-04-05 DIAGNOSIS — R00.2 PALPITATIONS: Primary | ICD-10-CM

## 2024-04-08 ENCOUNTER — LAB VISIT (OUTPATIENT)
Dept: LAB | Facility: HOSPITAL | Age: 69
End: 2024-04-08
Payer: MEDICARE

## 2024-04-08 ENCOUNTER — OFFICE VISIT (OUTPATIENT)
Dept: CARDIOLOGY | Facility: CLINIC | Age: 69
End: 2024-04-08
Payer: MEDICARE

## 2024-04-08 ENCOUNTER — HOSPITAL ENCOUNTER (OUTPATIENT)
Dept: CARDIOLOGY | Facility: CLINIC | Age: 69
Discharge: HOME | End: 2024-04-08
Payer: MEDICARE

## 2024-04-08 VITALS
SYSTOLIC BLOOD PRESSURE: 132 MMHG | BODY MASS INDEX: 45.95 KG/M2 | HEIGHT: 61 IN | WEIGHT: 243.38 LBS | HEART RATE: 104 BPM | OXYGEN SATURATION: 98 % | DIASTOLIC BLOOD PRESSURE: 68 MMHG

## 2024-04-08 DIAGNOSIS — R00.0 TACHYCARDIA: ICD-10-CM

## 2024-04-08 DIAGNOSIS — I10 ESSENTIAL HYPERTENSION: ICD-10-CM

## 2024-04-08 DIAGNOSIS — R00.0 TACHYCARDIA: Primary | ICD-10-CM

## 2024-04-08 DIAGNOSIS — I65.23 CAROTID ATHEROSCLEROSIS, BILATERAL: ICD-10-CM

## 2024-04-08 DIAGNOSIS — I70.0 AORTIC ATHEROSCLEROSIS: ICD-10-CM

## 2024-04-08 DIAGNOSIS — R00.2 PALPITATIONS: ICD-10-CM

## 2024-04-08 LAB
ERYTHROCYTE [DISTWIDTH] IN BLOOD BY AUTOMATED COUNT: 14.8 % (ref 11.5–14.5)
HCT VFR BLD AUTO: 41.3 % (ref 37–48.5)
HGB BLD-MCNC: 12.8 G/DL (ref 12–16)
MCH RBC QN AUTO: 26.1 PG (ref 27–31)
MCHC RBC AUTO-ENTMCNC: 31 G/DL (ref 32–36)
MCV RBC AUTO: 84 FL (ref 82–98)
OHS QRS DURATION: 78 MS
OHS QTC CALCULATION: 430 MS
PLATELET # BLD AUTO: 163 K/UL (ref 150–450)
PMV BLD AUTO: 9.6 FL (ref 9.2–12.9)
RBC # BLD AUTO: 4.9 M/UL (ref 4–5.4)
TSH SERPL DL<=0.005 MIU/L-ACNC: 1.8 UIU/ML (ref 0.4–4)
WBC # BLD AUTO: 7.71 K/UL (ref 3.9–12.7)

## 2024-04-08 PROCEDURE — 3008F BODY MASS INDEX DOCD: CPT | Mod: CPTII,S$GLB,, | Performed by: PHYSICIAN ASSISTANT

## 2024-04-08 PROCEDURE — 1126F AMNT PAIN NOTED NONE PRSNT: CPT | Mod: CPTII,S$GLB,, | Performed by: PHYSICIAN ASSISTANT

## 2024-04-08 PROCEDURE — 1101F PT FALLS ASSESS-DOCD LE1/YR: CPT | Mod: CPTII,S$GLB,, | Performed by: PHYSICIAN ASSISTANT

## 2024-04-08 PROCEDURE — 3288F FALL RISK ASSESSMENT DOCD: CPT | Mod: CPTII,S$GLB,, | Performed by: PHYSICIAN ASSISTANT

## 2024-04-08 PROCEDURE — 93000 ELECTROCARDIOGRAM COMPLETE: CPT | Mod: S$GLB,,, | Performed by: INTERNAL MEDICINE

## 2024-04-08 PROCEDURE — 84443 ASSAY THYROID STIM HORMONE: CPT | Performed by: PHYSICIAN ASSISTANT

## 2024-04-08 PROCEDURE — 99999 PR PBB SHADOW E&M-EST. PATIENT-LVL V: CPT | Mod: PBBFAC,,, | Performed by: PHYSICIAN ASSISTANT

## 2024-04-08 PROCEDURE — 1159F MED LIST DOCD IN RCRD: CPT | Mod: CPTII,S$GLB,, | Performed by: PHYSICIAN ASSISTANT

## 2024-04-08 PROCEDURE — 3078F DIAST BP <80 MM HG: CPT | Mod: CPTII,S$GLB,, | Performed by: PHYSICIAN ASSISTANT

## 2024-04-08 PROCEDURE — 85027 COMPLETE CBC AUTOMATED: CPT | Performed by: PHYSICIAN ASSISTANT

## 2024-04-08 PROCEDURE — 99214 OFFICE O/P EST MOD 30 MIN: CPT | Mod: 25,S$GLB,, | Performed by: PHYSICIAN ASSISTANT

## 2024-04-08 PROCEDURE — 36415 COLL VENOUS BLD VENIPUNCTURE: CPT | Performed by: PHYSICIAN ASSISTANT

## 2024-04-08 PROCEDURE — 3075F SYST BP GE 130 - 139MM HG: CPT | Mod: CPTII,S$GLB,, | Performed by: PHYSICIAN ASSISTANT

## 2024-04-08 NOTE — PROGRESS NOTES
Cardiology Clinic Note  Reason for Visit: Palpitations     HPI:     PMHx:  Vertigo  History of AUD  Hypothyroidism  GERD  Hepatic steatosis  PUD/ulceration of small intestine  Anemia and thrombocytopenia   HTN  Carotid stenosis      Lo Escalera is a 68 y.o. F, who presents with complaint of elevated heart rate and palpitations. She has been noticing her HR is inappropriately elevated almost every other day for the past 2 weeks. She reports slight shortness of breath. She denies chest pain. She has chronic vertigo, not recently worsened. She thinks she may have sleep apnea, but unsure. She has chronic pain related to OA bilat knees. She had a normal stress echo in 2023.     ROS:    Pertinent ROS included in HPI and below.  PMH:     Past Medical History:   Diagnosis Date    Acute right-sided back pain 06/16/2023    Anemia due to chronic blood loss 03/06/2020    Anemia of chronic disorder 08/24/2020    Anxiety     Cirrhosis     Coronary artery disease     pt states she does not have    Diverticulosis     Gastric ulcer     old    GERD (gastroesophageal reflux disease)     Hepatic steatosis 09/12/2016    Hyperbilirubinemia     Hypertension     pt states she no longer has    Hypothyroidism     Iron deficiency anemia due to chronic blood loss 08/24/2020    Microcytic anemia 08/24/2020    Obesity     Peptic ulcer disease 09/12/2016    UPJ (ureteropelvic junction) obstruction     Vertigo     Vertigo      Past Surgical History:   Procedure Laterality Date    APPENDECTOMY      CHOLECYSTECTOMY      COLONOSCOPY N/A 07/06/2020    Procedure: COLONOSCOPY;  Surgeon: Leander Cornejo MD;  Location: [facility];  Service: Colon and Rectal;  Laterality: N/A;    COLONOSCOPY  05/01/2023    COLONOSCOPY N/A 05/01/2023    Procedure: COLONOSCOPY;  Surgeon: Anwton Gardner MD;  Location: [facility];  Service: Endoscopy;  Laterality: N/A;    COLONOSCOPY  12/11/2023    COLONOSCOPY N/A 12/11/2023    Procedure: COLONOSCOPY;   Surgeon: Antwon Gardner MD;  Location: [facility];  Service: Endoscopy;  Laterality: N/A;    EGD, WITH BANDING OF VARICES  12/11/2023    2 bands    ESOPHAGOGASTRODUODENOSCOPY N/A 06/12/2019    Procedure: ESOPHAGOGASTRODUODENOSCOPY (EGD);  Surgeon: Arben Brown MD;  Location: [facility];  Service: Endoscopy;  Laterality: N/A;    ESOPHAGOGASTRODUODENOSCOPY N/A 03/09/2020    Procedure: EGD (ESOPHAGOGASTRODUODENOSCOPY);  Surgeon: Andrea Salomon MD;  Location: [facility];  Service: Endoscopy;  Laterality: N/A;    ESOPHAGOGASTRODUODENOSCOPY N/A 09/21/2020    Procedure: EGD (ESOPHAGOGASTRODUODENOSCOPY);  Surgeon: Antwon Gardner MD;  Location: [facility];  Service: Endoscopy;  Laterality: N/A;    ESOPHAGOGASTRODUODENOSCOPY  05/01/2023    ESOPHAGOGASTRODUODENOSCOPY N/A 05/01/2023    Procedure: EGD (ESOPHAGOGASTRODUODENOSCOPY);  Surgeon: Antwon Gardner MD;  Location: [facility];  Service: Endoscopy;  Laterality: N/A;    ESOPHAGOGASTRODUODENOSCOPY N/A 12/11/2023    Procedure: EGD (ESOPHAGOGASTRODUODENOSCOPY);  Surgeon: Antwon Gardner MD;  Location: [facility];  Service: Endoscopy;  Laterality: N/A;    ESOPHAGOGASTRODUODENOSCOPY N/A 01/24/2024    Procedure: EGD (ESOPHAGOGASTRODUODENOSCOPY);  Surgeon: Antwon Gardner MD;  Location: [facility];  Service: Endoscopy;  Laterality: N/A;    ESOPHAGOGASTRODUODENOSCOPY  03/06/2024    ESOPHAGOGASTRODUODENOSCOPY N/A 3/6/2024    Procedure: EGD (ESOPHAGOGASTRODUODENOSCOPY);  Surgeon: Antwon Gardner MD;  Location: [facility];  Service: Endoscopy;  Laterality: N/A;    HYSTERECTOMY      INTRALUMINAL GASTROINTESTINAL TRACT IMAGING VIA CAPSULE N/A 01/19/2022    Procedure: IMAGING PROCEDURE, GI TRACT, INTRALUMINAL, VIA CAPSULE;  Surgeon: Antwon Gardner MD;  Location: [facility];  Service: Endoscopy;  Laterality: N/A;    OOPHORECTOMY      PANCREAS SURGERY      TONSILLECTOMY      TRANSFORAMINAL EPIDURAL INJECTION OF STEROID  Right 10/14/2021    Procedure: Injection,steroid,epidural,transforaminal approach---RIGHT L5 AND S1;  Surgeon: Cheng Tariq Jr., MD;  Location: [facility];  Service: Pain Management;  Laterality: Right;    TRANSFORAMINAL EPIDURAL INJECTION OF STEROID Right 01/13/2023    Procedure: INJECTION, STEROID, EPIDURAL, TRANSFORAMINAL APPROACH, RIGHT L4/L5 & L5-S1 CONTRAST DIRECT REF;  Surgeon: Coni Franco MD;  Location: [facility];  Service: Pain Management;  Laterality: Right;     Allergies:     Review of patient's allergies indicates:   Allergen Reactions    Codeine Itching    Iodine and iodide containing products Itching     Medications:     Current Outpatient Medications on File Prior to Visit   Medication Sig Dispense Refill    potassium 99 mg Tab Take by mouth once daily.      sod picosulf-mag ox-citric ac (CLENPIQ) 10 mg-3.5 gram- 12 gram/160 mL Soln Take As Directed. 320 mL 0    traMADoL (ULTRAM) 50 mg tablet Take 1 tablet (50 mg total) by mouth every 6 (six) hours as needed for Pain. 12 tablet 0    calcium carbonate (TUMS) 200 mg calcium (500 mg) chewable tablet Take 1 tablet by mouth daily as needed.      cyclobenzaprine (FLEXERIL) 10 MG tablet Take 1 tablet by mouth three times daily as needed 60 tablet 5    gabapentin (NEURONTIN) 600 MG tablet TAKE 1 TABLET BY MOUTH THREE TIMES DAILY 90 tablet 5    gabapentin (NEURONTIN) 600 MG tablet Take 1 tablet (600 mg total) by mouth 3 (three) times daily. 90 tablet 5    HYDROcodone-acetaminophen (NORCO) 5-325 mg per tablet Take 1 tablet by mouth every 12 (twelve) hours as needed for Pain. 14 tablet 0    hydrOXYzine pamoate (VISTARIL) 50 MG Cap Take 1 capsule (50 mg total) by mouth every evening. 30 capsule 1    meclizine (ANTIVERT) 25 mg tablet Take 1 tablet (25 mg total) by mouth 3 (three) times daily as needed. 60 tablet 5    meloxicam (MOBIC) 15 MG tablet Take 1 tablet by mouth once daily 30 tablet 5    pantoprazole (PROTONIX) 40 MG tablet Take 1  "tablet (40 mg total) by mouth 2 (two) times daily. 180 tablet 1    sertraline (ZOLOFT) 50 MG tablet Take 1 tablet (50 mg total) by mouth once daily. (Patient not taking: Reported on 4/8/2024) 30 tablet 1     Current Facility-Administered Medications on File Prior to Visit   Medication Dose Route Frequency Provider Last Rate Last Admin    0.9%  NaCl infusion   Intravenous Continuous Antwon Gardner MD        0.9%  NaCl infusion   Intravenous Continuous Antwon Gardner MD        0.9%  NaCl infusion   Intravenous Continuous Antwon Gardner MD        lactated ringers bolus 1,000 mL  1,000 mL Intravenous Once Antwon Gardner MD        LIDOcaine (PF) 10 mg/ml (1%) injection 10 mg  1 mL Intradermal Once PRN Antwon Gardner MD         Social History:     Social History     Tobacco Use    Smoking status: Never    Smokeless tobacco: Never   Substance Use Topics    Alcohol use: No     Comment: 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  However, no consumption in 20 years.      Family History:     Family History   Problem Relation Age of Onset    Hyperlipidemia Mother     Heart disease Mother     Hypertension Father     Heart disease Father     Lupus Sister     Lupus Sister     Arthritis Brother     No Known Problems Daughter     Mental illness Son         PTSD x 1 son in      Physical Exam:   /68   Pulse 104   Ht 5' 1" (1.549 m)   Wt 110.4 kg (243 lb 6.2 oz)   LMP  (LMP Unknown)   SpO2 98%   BMI 45.99 kg/m²      Physical Exam  Vitals and nursing note reviewed.   Constitutional:       Appearance: Normal appearance. She is morbidly obese.   HENT:      Head: Normocephalic and atraumatic.   Neck:      Vascular: Normal carotid pulses. No carotid bruit or hepatojugular reflux.   Cardiovascular:      Rate and Rhythm: Regular rhythm. Tachycardia present.      Chest Wall: PMI is not displaced.      Pulses:           Radial pulses are 2+ on the right side and 2+ on the left side.        " Dorsalis pedis pulses are 2+ on the right side and 2+ on the left side.        Posterior tibial pulses are 2+ on the right side and 2+ on the left side.      Heart sounds: No murmur heard.  Pulmonary:      Effort: Pulmonary effort is normal.      Breath sounds: Normal breath sounds. No wheezing, rhonchi or rales.   Abdominal:      General: Bowel sounds are normal. There is no abdominal bruit.      Palpations: Abdomen is soft. There is no pulsatile mass.      Tenderness: There is no abdominal tenderness.   Feet:      Right foot:      Skin integrity: Skin integrity normal.      Left foot:      Skin integrity: Skin integrity normal.   Skin:     Capillary Refill: Capillary refill takes less than 2 seconds.   Neurological:      General: No focal deficit present.      Mental Status: She is alert.   Psychiatric:         Mood and Affect: Mood and affect normal.         Speech: Speech normal.         Behavior: Behavior is cooperative.         Thought Content: Thought content normal.          Labs:     Blood Tests:  Lab Results   Component Value Date    BNP <10 01/08/2024     02/21/2024    K 4.6 02/21/2024     02/21/2024    CO2 24 02/21/2024    BUN 12 02/21/2024    CREATININE 0.8 02/21/2024    GLU 95 02/21/2024    HGBA1C 5.3 02/28/2023    MG 1.7 05/12/2023    AST 23 02/21/2024    ALT 10 02/21/2024    ALBUMIN 3.0 (L) 02/21/2024    PROT 7.5 02/21/2024    BILITOT 0.4 02/21/2024    WBC 7.71 04/08/2024    HGB 12.8 04/08/2024    HCT 41.3 04/08/2024    HCT 41 10/25/2016    MCV 84 04/08/2024     04/08/2024    INR 1.1 02/21/2024    TSH 1.804 04/08/2024       Lab Results   Component Value Date    CHOL 126 05/15/2020    HDL 43 05/15/2020    TRIG 75 05/15/2020       Lab Results   Component Value Date    LDLCALC 68 05/15/2020         Imaging:     Echocardiogram  None    Stress testing  Nuclear Stress Echo 3/20/2023  1. Scintigraphically negative for ischemia or infarct.  2. The global left ventricular systolic function  is normal with an LV ejection fraction of greater than 70 % and no evidence of LV dilatation. Wall motion is normal.  3. No t.i.d.    Cath Lab  None    Other  None    [id number]  Sinus tachycardia   Otherwise normal ECG   When compared with ECG of 2024 01:20,   No significant change was found     Assessment:     1. Tachycardia    2. Essential hypertension    3. Carotid atherosclerosis, bilateral    4. Aortic atherosclerosis    5. BMI 45.0-49.9, adult        Plan:     Tachycardia  Referred for sleep study   Check CBC and TSH  Recommend 48 hour holter monitor     Essential hypertension  Not currently on BP medication and normal in clinic  Previously took lisinopril NCTZ    Carotid atherosclerosis, bilateral  Aortic atherosclerosis  Does not currently take cholesterol medication or aspirin  Check FLP following next visit     BMI 45.0-49.9, adult  Weight loss through a combination of diet and exercise encouraged  Recommend 150 minutes a week (30 minutes per day, 5 days per week) of moderate intensity exercise        Signed:  Padmini Lockhart PA-C  Cardiology     2024 9:20 AM    Follow-up:     Future Appointments   Date Time Provider Department Center   2024 11:00 AM Lilliana Perez DNP [facility] PSYCH [facility]   2024  2:40 PM HOLTER, EKG [facility] ARRRO [facility]   2024  9:30 AM LAB, SBPH [facility] LAB [facility]   2024 10:00 AM SBPH US1 SBPH ULSND [facility]   2024 10:00 AM Mindi Patel PA-C [facility] ORTHO [facility]   2024  1:30 PM Vangie Escalera PA-C [facility] SLEEP [facility]   2024 11:00 AM Padmini Lockhart PA-C [facility] CARDIO [facility]   2024 11:00 AM Jose Hernández MD [facility] PRCAR [facility]

## 2024-04-24 ENCOUNTER — CLINICAL SUPPORT (OUTPATIENT)
Dept: CARDIOLOGY | Facility: HOSPITAL | Age: 69
End: 2024-04-24
Attending: PHYSICIAN ASSISTANT
Payer: MEDICARE

## 2024-04-24 DIAGNOSIS — R00.0 TACHYCARDIA: ICD-10-CM

## 2024-04-24 PROCEDURE — 93227 XTRNL ECG REC<48 HR R&I: CPT | Mod: ,,, | Performed by: INTERNAL MEDICINE

## 2024-04-24 PROCEDURE — 93225 XTRNL ECG REC<48 HRS REC: CPT

## 2024-04-29 LAB
OHS CV EVENT MONITOR DAY: 0
OHS CV HOLTER LENGTH DECIMAL HOURS: 48
OHS CV HOLTER LENGTH HOURS: 48
OHS CV HOLTER LENGTH MINUTES: 0
OHS CV HOLTER SINUS AVERAGE HR: 97
OHS CV HOLTER SINUS MAX HR: 136
OHS CV HOLTER SINUS MIN HR: 68

## 2024-04-30 DIAGNOSIS — R29.818 SUSPECTED SLEEP APNEA: Primary | ICD-10-CM

## 2024-05-08 ENCOUNTER — TELEPHONE (OUTPATIENT)
Dept: CARDIOLOGY | Facility: CLINIC | Age: 69
End: 2024-05-08
Payer: MEDICARE

## 2024-05-08 NOTE — TELEPHONE ENCOUNTER
Pt updated on Ms Lockhart' response and verbalized understanding. She is scheduled in June for a sleep study.

## 2024-05-08 NOTE — TELEPHONE ENCOUNTER
----- Message from Padmini Lockhart PA-C sent at 5/8/2024  3:21 PM CDT -----  Regarding: RE: Palpitation  Please let the patient know her heart monitor was normal. I am recommending she undergo a sleep study to rule out sleep apnea as the cause of her rapid heart rate. If she is still having shortness of breath after being treated for sleep apnea she may want to see a pulmonologist.      Thanks,  Padmini  ----- Message -----  From: Fiorella Sauceda RN  Sent: 5/8/2024   3:18 PM CDT  To: Fiorella Sauceda RN; #  Subject: FW: Palpitation                                  Pt is c/o palpitations w. occ mild SOB, not worse than before when she saw Ms Lockhart, lasting a little longer possibly that it has in the past. It feels like her heart is skipping. She had a 48 hrs holter and she would like to know the results.    Please advise,  ----- Message -----  From: Kim Solis  Sent: 5/8/2024   2:56 PM CDT  To: Fiorella Sauceda RN  Subject: Palpitation                                      Afternoon,     Pt is experiencing palpitation       Contact @ [phone]    Thanks

## 2024-05-31 DIAGNOSIS — M25.511 ACUTE PAIN OF RIGHT SHOULDER: Primary | ICD-10-CM

## 2024-06-03 DIAGNOSIS — F41.1 GAD (GENERALIZED ANXIETY DISORDER): ICD-10-CM

## 2024-06-03 DIAGNOSIS — F33.0 MILD EPISODE OF RECURRENT MAJOR DEPRESSIVE DISORDER: ICD-10-CM

## 2024-06-05 ENCOUNTER — OFFICE VISIT (OUTPATIENT)
Dept: SLEEP MEDICINE | Facility: CLINIC | Age: 69
End: 2024-06-05
Payer: MEDICARE

## 2024-06-05 VITALS
DIASTOLIC BLOOD PRESSURE: 69 MMHG | HEIGHT: 61 IN | HEART RATE: 117 BPM | WEIGHT: 243.38 LBS | SYSTOLIC BLOOD PRESSURE: 138 MMHG | BODY MASS INDEX: 45.95 KG/M2

## 2024-06-05 DIAGNOSIS — R00.0 TACHYCARDIA: ICD-10-CM

## 2024-06-05 DIAGNOSIS — R35.1 NOCTURIA: ICD-10-CM

## 2024-06-05 DIAGNOSIS — F51.09 OTHER INSOMNIA NOT DUE TO A SUBSTANCE OR KNOWN PHYSIOLOGICAL CONDITION: ICD-10-CM

## 2024-06-05 DIAGNOSIS — G47.10 HYPERSOMNOLENCE: Primary | ICD-10-CM

## 2024-06-05 DIAGNOSIS — R06.83 SNORING: ICD-10-CM

## 2024-06-05 PROCEDURE — 99999 PR PBB SHADOW E&M-EST. PATIENT-LVL III: CPT | Mod: PBBFAC,,, | Performed by: PHYSICIAN ASSISTANT

## 2024-06-05 PROCEDURE — 99213 OFFICE O/P EST LOW 20 MIN: CPT | Mod: S$GLB,,, | Performed by: PHYSICIAN ASSISTANT

## 2024-06-05 PROCEDURE — 1159F MED LIST DOCD IN RCRD: CPT | Mod: CPTII,S$GLB,, | Performed by: PHYSICIAN ASSISTANT

## 2024-06-05 PROCEDURE — 1160F RVW MEDS BY RX/DR IN RCRD: CPT | Mod: CPTII,S$GLB,, | Performed by: PHYSICIAN ASSISTANT

## 2024-06-05 PROCEDURE — 3008F BODY MASS INDEX DOCD: CPT | Mod: CPTII,S$GLB,, | Performed by: PHYSICIAN ASSISTANT

## 2024-06-05 PROCEDURE — 3075F SYST BP GE 130 - 139MM HG: CPT | Mod: CPTII,S$GLB,, | Performed by: PHYSICIAN ASSISTANT

## 2024-06-05 PROCEDURE — 3078F DIAST BP <80 MM HG: CPT | Mod: CPTII,S$GLB,, | Performed by: PHYSICIAN ASSISTANT

## 2024-06-05 NOTE — PROGRESS NOTES
Referred by Padmini Lockhart PA*     NEW PATIENT VISIT    Lo Escalera  is a pleasant 68 y.o. female  with PMH significant for HTN, CAD,  GILDARDO, hypothyroidism, DDD, PUD, cirrhosis, portal HTN, hx alcohol abuse in remission, MDD, JHOAN, BMI 45+ who presents for sleep evaluation following referral from Cardiology      C/o snoring, poor disrupted and often un-refreshing sleep, and excessive daytime sleepiness and fatigue. Reports often does not go to bed until late because she likes to watch her shows, but denies difficulty initiating sleep after her shows are finished and she is ready for bed. Does endorse frequent nocturnal waking for nocturia, with occasional difficulty returning to sleep. Does report being pretty sleepy for most of the day, always feels like she can sleep more. Does endorse occasional drowsiness when driving long distances. Denies ever falling asleep behind the wheel. Denies dream enactment behaviors. States more recently she has been having sx of tachycardia and palpitations and she was advised by her Cardiologist to have an STAN evaluation, which is why she presents today.     SLEEP SCHEDULE   Environment Watches movies before bed   Bed Time 11PM-MN   Sleep Latency Not long after her movie is over   Arousals 3   Nocturia 3   Back to sleep Up to 1.5hrs   Wake time 9-10AM   Naps None    Work        Past Medical History:   Diagnosis Date    Acute right-sided back pain 06/16/2023    Anemia due to chronic blood loss 03/06/2020    Anemia of chronic disorder 08/24/2020    Anxiety     Cirrhosis     Coronary artery disease     pt states she does not have    Diverticulosis     Gastric ulcer     old    GERD (gastroesophageal reflux disease)     Hepatic steatosis 09/12/2016    Hyperbilirubinemia     Hypertension     pt states she no longer has    Hypothyroidism     Iron deficiency anemia due to chronic blood loss 08/24/2020    Microcytic anemia 08/24/2020    Obesity     Peptic ulcer disease 09/12/2016     UPJ (ureteropelvic junction) obstruction     Vertigo     Vertigo      Patient Active Problem List   Diagnosis    Essential hypertension    Hepatic steatosis    Tachycardia    BMI 45.0-49.9, adult    Thrombocytopenia    History of Hypothyroidism    JHOAN (generalized anxiety disorder)    Esophagitis with gastritis    Alcohol abuse, in remission    Diverticulosis    Portal hypertension    Anemia    Anemia of chronic disorder    Post-traumatic osteoarthritis of both knees    Lumbar radiculopathy    Lumbar spondylosis    DDD (degenerative disc disease), lumbar    Carotid atherosclerosis, bilateral    Iron deficiency anemia    Spondylolisthesis of lumbar region    Falls    Osteoarthritis of knees, bilateral    Right shoulder pain    Aortic atherosclerosis    Hx of diverticulitis of colon    Alcoholic cirrhosis of liver with ascites    Chest pain, atypical    Morbid (severe) obesity due to excess calories    Esophageal varices without bleeding    Sciatica of right side    Acute right-sided back pain    Diverticula of colon    Angiectasia    Hiatal hernia    Lower abdominal pain    Abnormal x-ray of shoulder    Mild episode of recurrent major depressive disorder    Abdominal pain, right upper quadrant    Abdominal pain, periumbilical       Current Outpatient Medications:     calcium carbonate (TUMS) 200 mg calcium (500 mg) chewable tablet, Take 1 tablet by mouth daily as needed., Disp: , Rfl:     cyclobenzaprine (FLEXERIL) 10 MG tablet, Take 1 tablet by mouth three times daily as needed, Disp: 60 tablet, Rfl: 5    gabapentin (NEURONTIN) 600 MG tablet, TAKE 1 TABLET BY MOUTH THREE TIMES DAILY, Disp: 90 tablet, Rfl: 5    gabapentin (NEURONTIN) 600 MG tablet, Take 1 tablet (600 mg total) by mouth 3 (three) times daily., Disp: 90 tablet, Rfl: 5    HYDROcodone-acetaminophen (NORCO) 5-325 mg per tablet, Take 1 tablet by mouth every 12 (twelve) hours as needed for Pain., Disp: 14 tablet, Rfl: 0    meclizine (ANTIVERT) 25 mg tablet,  Take 1 tablet (25 mg total) by mouth 3 (three) times daily as needed., Disp: 60 tablet, Rfl: 5    meloxicam (MOBIC) 15 MG tablet, Take 1 tablet by mouth once daily, Disp: 30 tablet, Rfl: 5    pantoprazole (PROTONIX) 40 MG tablet, Take 1 tablet (40 mg total) by mouth 2 (two) times daily., Disp: 180 tablet, Rfl: 1    potassium 99 mg Tab, Take by mouth once daily., Disp: , Rfl:     sertraline (ZOLOFT) 50 MG tablet, Take 1 tablet (50 mg total) by mouth once daily. (Patient not taking: Reported on 4/8/2024), Disp: 30 tablet, Rfl: 1    sod picosulf-mag ox-citric ac (CLENPIQ) 10 mg-3.5 gram- 12 gram/160 mL Soln, Take As Directed., Disp: 320 mL, Rfl: 0    traMADoL (ULTRAM) 50 mg tablet, Take 1 tablet (50 mg total) by mouth every 6 (six) hours as needed for Pain., Disp: 12 tablet, Rfl: 0  No current facility-administered medications for this visit.    Facility-Administered Medications Ordered in Other Visits:     0.9%  NaCl infusion, , Intravenous, Continuous, Antwon Gardner MD    0.9%  NaCl infusion, , Intravenous, Continuous, Antwon Gardner MD    0.9%  NaCl infusion, , Intravenous, Continuous, Antwon Gardner MD    lactated ringers bolus 1,000 mL, 1,000 mL, Intravenous, Once, Antwon Gardner MD    LIDOcaine (PF) 10 mg/ml (1%) injection 10 mg, 1 mL, Intradermal, Once PRN, Antwon Gardner MD     There were no vitals filed for this visit.    Physical Exam:    GEN:   Well-appearing  Psych:  Appropriate affect, demonstrates insight  SKIN:  No rash on the face or bridge of the nose      LABS:   Lab Results   Component Value Date    HGB 12.8 04/08/2024    CO2 24 02/21/2024         RECORDS REVIEWED:    No previous sleep study    5/1/20 Echo:  · Normal left ventricular systolic function. The estimated ejection fraction is 65%.  · Indeterminate left ventricular diastolic function.  · No wall motion abnormalities.  · Normal right ventricular systolic function.  · Mild left atrial  enlargement.  · The estimated PA systolic pressure is 30 mmHg.  · Normal central venous pressure (3 mmHg).    ASSESSMENT    [place] Sleepiness Scale:  Sitting and readin  Watching TV:    2  Passenger in a car x 1 hr:  1  Sitting quietly after lunch:  1  Lying down to rest in PM:  1  Sitting, inactive in public:  1  Sitting+ talking to someone:  2  Stopped in traffic:   1  Total        PROBLEM DESCRIPTION/ Sx on Presentation  STATUS   Sx STAN   + snoring, denies witnessed apneas  + wakes feeling un-refreshed most of the time   New   Daytime Sx   + sleepiness when inactive   ESS  on intake  New   Insomnia   Trouble falling asleep: not long  Arousals:         3 x nightly for nocturia   Hard to get back to sleep?: up to 1.5hrs    Prior pertinent medications:  Current pertinent medications:  gabapentin 600mg TID  New   Nocturia   x 3 per sleep period  New   Other issues:       PLAN      -recommend sleep testing   -PSG ordered  -discussed trial therapy if STAN present and the patient is open to a trial of CPAP therapy  -discussed STAN with patient in detail, including possible complications of untreated STAN like heart attack/stroke  -advised on strict driving precautions; advised never to drive drowsy     Advised on plan of care. Answered all patient questions. Patient verbalized understanding and voiced agreement with plan of care.     RTC if dx of STAN made and CPAP ordered, will need follow up 31-90 days after receiving machine for compliance         The patient was given open opportunity to ask questions and/or express concerns about treatment plan. All questions/concerns were discussed.     Two patient identifiers used prior to evaluation.

## 2024-06-06 RX ORDER — SERTRALINE HYDROCHLORIDE 50 MG/1
TABLET, FILM COATED ORAL
Qty: 30 TABLET | Refills: 0 | OUTPATIENT
Start: 2024-06-06

## 2024-06-21 ENCOUNTER — HOSPITAL ENCOUNTER (OUTPATIENT)
Dept: SLEEP MEDICINE | Facility: OTHER | Age: 69
Discharge: HOME | End: 2024-06-21
Attending: PHYSICIAN ASSISTANT
Payer: MEDICARE

## 2024-06-21 DIAGNOSIS — R06.83 SNORING: ICD-10-CM

## 2024-06-21 DIAGNOSIS — R00.0 TACHYCARDIA: ICD-10-CM

## 2024-06-21 DIAGNOSIS — G47.10 HYPERSOMNOLENCE: ICD-10-CM

## 2024-06-21 DIAGNOSIS — F51.09 OTHER INSOMNIA NOT DUE TO A SUBSTANCE OR KNOWN PHYSIOLOGICAL CONDITION: ICD-10-CM

## 2024-06-21 DIAGNOSIS — R35.1 NOCTURIA: ICD-10-CM

## 2024-06-21 PROCEDURE — 95810 POLYSOM 6/> YRS 4/> PARAM: CPT

## 2024-06-22 PROBLEM — G47.10 HYPERSOMNOLENCE: Status: ACTIVE | Noted: 2024-06-22

## 2024-06-24 DIAGNOSIS — M25.562 PAIN IN BOTH KNEES, UNSPECIFIED CHRONICITY: Primary | ICD-10-CM

## 2024-06-24 DIAGNOSIS — M25.561 PAIN IN BOTH KNEES, UNSPECIFIED CHRONICITY: Primary | ICD-10-CM

## 2024-06-25 NOTE — PROCEDURES
"[facility]    Polysomnography Interpretation Report    Patient Name:  Lo Escalera  MRN#:  [id number]  :  1955  Study Date:  2024  Referring Provider:  MARIO Escalera NP    Indications for Polysomnography:  The patient is a 68 year old Female who is 5' 1" and weighs 243.0 lbs.  Her BMI equals 46.5.  [place] was - and Neck Circumference was -.  A full night polysomnogram was performed to evaluate for -.    Polysomnogram Data  A full night polysomnogram recorded the standard physiologic parameters including EEG, EOG, EMG, EKG, nasal and oral airflow.  Respiratory parameters of chest and abdominal movements were recorded with (RIP) Respiratory Inductance Plethsmography.  Oxygen saturation was recorded by pulse oximetry.    Sleep Architecture  The total recording time of the polysomnogram was 374.5 minutes.  The total sleep time was 322.5 minutes.  The patient spent 4.0% of total sleep time in Stage N1, 81.7% in Stage N2, 13.0% in Stages N3, and 1.2% in REM.  Sleep latency was 9.3 minutes.  REM latency was 4.5 minutes.  Sleep Efficiency was 86.1%.  Wake after sleep onset was 42.5 minutes.    Respiratory Events  The polysomnogram revealed a presence of 1 obstructive, - central, and - mixed apneas resulting in a Total Apnea index of 0.2 events per hour.  There were 56 hypopneas resulting in a Total Hypopnea index of 10.4 events per hour.  The combined Apnea/Hypopnea index was 10.6 events per hour.  There were a total of 8 RERA events resulting in a Respiratory Disturbance Index (RDI) of 12.1 events per hour.     Mean oxygen saturation was 97.3%.  The lowest oxygen saturation during sleep was 78.0%.  Time spent ?88% oxygen saturation was 1.3 minutes (0.4%).    Limb Activity  There were 206 limb movements recorded.  Of this total, 206 were classified as PLMs.  Of the PLMs, 52 were associated with arousals.  The Limb Movement index was 38.3 per hour while the PLM index was 38.3 per hour and " "PLM with arousals index was 9.7 per hour.    Cardiac:  single lead EKG revealed normal sinus rhythm     Oxygenation:  Hypoxemia was only observed in relation to obstructive events.    Impression:  -obstructive sleep apnea   -excessive fragmentary myoclonus was present    Recommendations:  -treatment for the STAN seen in this study should be considered  -the patient has follow up with Sleep Medicine          Cheng Andrade MD    (This Sleep Study was interpreted by a Board Certified Sleep Specialist who conducted an epoch-by-epoch review of the entire raw data recording.)  (The indication for this sleep study was reviewed and deemed appropriate by [facility] Practice Parameters or other reasons by a Board Certified Sleep Specialist.)      [facility]    Diagnostic PSG Report    Patient Name: Lo Escalera Study Date: 6/21/2024   YOB: 1955 MRN #: [id number]   Age: 68 year CORTNEY #: [id number]   Sex: Female Referring Provider: MARIO Escalera NP   Height: 5' 1" Recording Tech: Cuauhtemoc Flood RPSGT   Weight: 243.0 lbs Scoring Tech: Deion Dorsey RRT RPSGT   BMI: 46.5 Interpreting Physician: -   ESS: - Neck Circumference: -     Study Overview    Lights Off: 11:00:09 PM  Count Index   Lights On: 05:14:41 AM Awakenings: 24 4.5   Time in Bed: 374.5 min. Arousals: 125 23.3   Total Sleep Time: 322.5 min. Apneas & Hypopneas: 57 10.6    Sleep Efficiency: 86.1% Limb Movements: 206 38.3   Sleep Latency: 9.3 min. Snores: - -   Wake After Sleep Onset: 42.5 min. Desaturations: 79 14.7    REM Latency from Sleep Onset: 4.5 min. Minimum SpO2 TST: 78.0%        Sleep Architecture   % of Time in Bed  Stages Time (mins) % Sleep Time   Wake 52.5    Stage N1 13.0 4.0%   Stage N2 263.5 81.7%   Stage N3 42.0 13.0%   REM 4.0 1.2%         Arousal Summary     NREM REM Sleep Index   Respiratory Arousals 20 - 20 3.7   PLM Arousals 52 - 52 9.7   Isolated Limb Movement Arousals - - - -   Spontaneous Arousals 51 2 53 9.9   Total " 123 2 125 23.3       Limb Movement Summary     Count Index   Isolated Limb Movements - -   Periodic Limb Movements (PLMs) 206 38.3   Total Limb Movements 206 38.3         Respiratory Summary     By Sleep Stage By Body Position Total    NREM REM Supine Non-Supine    Time (min) 318.5 4.0 322.5 - 322.5           Obstructive Apnea 1 - 1 - 1   Mixed Apnea - - - - -   Central Apnea - - - - -   Central Apnea Index - - - - -   Total Apneas 1 - 1 - 1   Total Apnea Index 0.2 - 0.2 - 0.2           Hypopnea 56 - 56 - 56   Hypopnea Index 10.5 - 10.4 - 10.4           Apnea & Hypopnea 57 - 57 - 57   Apnea & Hypopnea Index 10.7 - 10.6 - 10.6           RERAs 8 - 8 - 8   RERA Index 1.5 - 1.5 - 1.5           RDI 12.2 - 12.1 - 12.1     Scoring Criteria: Hypopneas scored at 4% desaturation criteria.    Respiratory Event Durations     Apnea Hypopnea    NREM REM NREM REM   Average (seconds) 10.7 - 15.8 -   Maximum (seconds) 10.7 - 25.7 -       Oxygen Saturation Summary     Wake NREM REM TST TIB   Average SpO2 97.1% 97.3% 91.2% 97.3% 97.3%   Minimum SpO2 77.0% 78.0% 87.0% 78.0% 77.0%   Maximum SpO2 100.0% 100.0% 97.0% 100.0% 100.0%       Oxygen Saturation Distribution    Range (%) Time in range (min) Time in range (%)   90.0 - 100.0 359.5 99.0%   80.0 - 90.0 2.7 0.7%   70.0 - 80.0 0.1 0.0%   60.0 - 70.0 - -   50.0 - 60.0 - -   0.0 - 50.0 - -   Time Spent ?88% SpO2    Range (%) Time in range (min) Time in range (%)   0.0 - 88.0 1.3 0.4%          Count Index   Desaturations 79 14.7      Cardiac Summary     Wake NREM REM Sleep Total   Average Pulse Rate (BPM) 91.8 89.5 115.6 89.6 89.9   Minimum Pulse Rate (BPM) 83.0 82.0 96.0 82.0 82.0   Maximum Pulse Rate (BPM) 136.0 101.0 214.0 214.0 214.0     Pulse Rate Distribution    Range (bpm) Time in range (min) Time in range (%)   0.0 - 40.0 - -   40.0 - 60.0 - -   60.0 - 80.0 - -   80.0 - 100.0 361.6 99.5%   100.0 - 120.0 1.1 0.3%   120.0 - 140.0 0.3 0.1%   140.0 - 200.0 0.2 0.1%     EtCO2  Summary    Stage Min (mmHg) Average (mmHg) Max (mmHg)   Wake - - -   NREM(1+2+3) - - -   REM - - -     Range (mmHg) Time in range (min) Time in range (%)   20.0 - 40.0 - -   40.0 - 50.0 - -   50.0 - 55.0 - -   55.0 - 100.0 - -   Excluded data <20.0 & >65.0 375.0 100.0%     TcCO2 Summary    Stage Min (mmHg) Average (mmHg) Max (mmHg)   Wake - - -   NREM(1+2+3) - - -   REM - - -     Range (mmHg) Time in range (min) Time in range (%)   20.0 - 40.0 - -   40.0 - 50.0 - -   50.0 - 55.0 - -   55.0 - 100.0 - -   Excluded data <20.0 & >65.0 375.0 100.0%     Comments    A baseline PSG study was performed on Lo Escalera. The following was explained to the pt prior to the study: the time to bed and wake time, the set-up process timeframe and the purpose of each sensor, the reason (if sensors fall off) the technician will need to enter the room during the night, the possibility of the tech fitting a PAP mask on pt for treatment in the middle of the night, and how to call out for assistance during the night. A post-study letter was handed to the pt in the morning.

## 2024-06-26 PROCEDURE — 95810 POLYSOM 6/> YRS 4/> PARAM: CPT | Mod: 26,,, | Performed by: INTERNAL MEDICINE

## 2024-06-27 ENCOUNTER — OFFICE VISIT (OUTPATIENT)
Dept: ORTHOPEDICS | Facility: CLINIC | Age: 69
End: 2024-06-27
Payer: MEDICARE

## 2024-06-27 VITALS
HEART RATE: 116 BPM | BODY MASS INDEX: 46.03 KG/M2 | WEIGHT: 243.56 LBS | DIASTOLIC BLOOD PRESSURE: 67 MMHG | SYSTOLIC BLOOD PRESSURE: 139 MMHG

## 2024-06-27 DIAGNOSIS — M17.12 PRIMARY OSTEOARTHRITIS OF LEFT KNEE: Primary | ICD-10-CM

## 2024-06-27 DIAGNOSIS — G89.29 CHRONIC RIGHT SHOULDER PAIN: ICD-10-CM

## 2024-06-27 DIAGNOSIS — E66.01 OBESITY, CLASS III, BMI 40-49.9 (MORBID OBESITY): ICD-10-CM

## 2024-06-27 DIAGNOSIS — M25.511 CHRONIC RIGHT SHOULDER PAIN: ICD-10-CM

## 2024-06-27 PROCEDURE — 99999 PR PBB SHADOW E&M-EST. PATIENT-LVL IV: CPT | Mod: PBBFAC,,,

## 2024-06-27 RX ORDER — TRIAMCINOLONE ACETONIDE 40 MG/ML
40 INJECTION, SUSPENSION INTRA-ARTICULAR; INTRAMUSCULAR
Status: COMPLETED | OUTPATIENT
Start: 2024-06-27 | End: 2024-06-27

## 2024-06-27 RX ORDER — TRIAMCINOLONE ACETONIDE 40 MG/ML
40 INJECTION, SUSPENSION INTRA-ARTICULAR; INTRAMUSCULAR
Status: DISCONTINUED | OUTPATIENT
Start: 2024-06-27 | End: 2024-06-27 | Stop reason: HOSPADM

## 2024-06-27 RX ADMIN — TRIAMCINOLONE ACETONIDE 40 MG: 40 INJECTION, SUSPENSION INTRA-ARTICULAR; INTRAMUSCULAR at 09:06

## 2024-06-27 RX ADMIN — TRIAMCINOLONE ACETONIDE 40 MG: 40 INJECTION, SUSPENSION INTRA-ARTICULAR; INTRAMUSCULAR at 08:06

## 2024-06-27 NOTE — PROGRESS NOTES
Patient ID: Lo Escalera is a 68 y.o. female    Pain of the Right Shoulder    History of Present Illness:    Lo Escalera presents to clinic for right shoulder and b/l knee pain. Patient reports about 1 year ago she had a fall and then re-injured herself recently. Patient does note she falls off her bed multiple times. Now has a rail on the side of her bed. The pain started 1 years ago and is becoming progressively worse.  Pain is located over (points to) medial bilateral knee. She reports that the pain is a 8 /10 sharp and aching pain today. The pain is affecting ADLs and limiting desired level of activity. Denies numbness, tingling, radiation and inability to bear weight.    BMI 46 today. She endorses right shoulder injection with mild improvement.  Did experience severe pain for the first few days after her shoulder injection which caused her to go to the emergency room.  Denies PT or surgeries. No knee injections. Her left knee is the most bothersome today.     Occupation:     Ambulating: unassisted   Diabetic: no  Smoking: no  Hx of DVT/PE: no    PAST MEDICAL HISTORY:   Past Medical History:   Diagnosis Date    Acute right-sided back pain 06/16/2023    Anemia due to chronic blood loss 03/06/2020    Anemia of chronic disorder 08/24/2020    Anxiety     Cirrhosis     Coronary artery disease     pt states she does not have    Diverticulosis     Gastric ulcer     old    GERD (gastroesophageal reflux disease)     Hepatic steatosis 09/12/2016    Hyperbilirubinemia     Hypertension     pt states she no longer has    Hypothyroidism     Iron deficiency anemia due to chronic blood loss 08/24/2020    Microcytic anemia 08/24/2020    Obesity     Peptic ulcer disease 09/12/2016    UPJ (ureteropelvic junction) obstruction     Vertigo     Vertigo      PAST SURGICAL HISTORY:   Past Surgical History:   Procedure Laterality Date    APPENDECTOMY      CHOLECYSTECTOMY      COLONOSCOPY N/A 07/06/2020    Procedure:  COLONOSCOPY;  Surgeon: Leander Cornejo MD;  Location: [facility];  Service: Colon and Rectal;  Laterality: N/A;    COLONOSCOPY  05/01/2023    COLONOSCOPY N/A 05/01/2023    Procedure: COLONOSCOPY;  Surgeon: Antwon Gardner MD;  Location: [facility];  Service: Endoscopy;  Laterality: N/A;    COLONOSCOPY  12/11/2023    COLONOSCOPY N/A 12/11/2023    Procedure: COLONOSCOPY;  Surgeon: Antwon Gardner MD;  Location: [facility];  Service: Endoscopy;  Laterality: N/A;    EGD, WITH BANDING OF VARICES  12/11/2023    2 bands    ESOPHAGOGASTRODUODENOSCOPY N/A 06/12/2019    Procedure: ESOPHAGOGASTRODUODENOSCOPY (EGD);  Surgeon: Arben Brown MD;  Location: [facility];  Service: Endoscopy;  Laterality: N/A;    ESOPHAGOGASTRODUODENOSCOPY N/A 03/09/2020    Procedure: EGD (ESOPHAGOGASTRODUODENOSCOPY);  Surgeon: Andrea Salomon MD;  Location: [facility];  Service: Endoscopy;  Laterality: N/A;    ESOPHAGOGASTRODUODENOSCOPY N/A 09/21/2020    Procedure: EGD (ESOPHAGOGASTRODUODENOSCOPY);  Surgeon: Antwon Gardner MD;  Location: [facility];  Service: Endoscopy;  Laterality: N/A;    ESOPHAGOGASTRODUODENOSCOPY  05/01/2023    ESOPHAGOGASTRODUODENOSCOPY N/A 05/01/2023    Procedure: EGD (ESOPHAGOGASTRODUODENOSCOPY);  Surgeon: Antwon Gardner MD;  Location: [facility];  Service: Endoscopy;  Laterality: N/A;    ESOPHAGOGASTRODUODENOSCOPY N/A 12/11/2023    Procedure: EGD (ESOPHAGOGASTRODUODENOSCOPY);  Surgeon: Antwon Gardner MD;  Location: [facility];  Service: Endoscopy;  Laterality: N/A;    ESOPHAGOGASTRODUODENOSCOPY N/A 01/24/2024    Procedure: EGD (ESOPHAGOGASTRODUODENOSCOPY);  Surgeon: Antwon Gardner MD;  Location: [facility];  Service: Endoscopy;  Laterality: N/A;    ESOPHAGOGASTRODUODENOSCOPY  03/06/2024    ESOPHAGOGASTRODUODENOSCOPY N/A 3/6/2024    Procedure: EGD (ESOPHAGOGASTRODUODENOSCOPY);  Surgeon: Antwon Gardner MD;  Location: [facility];  Service: Endoscopy;   Laterality: N/A;    HYSTERECTOMY      INTRALUMINAL GASTROINTESTINAL TRACT IMAGING VIA CAPSULE N/A 01/19/2022    Procedure: IMAGING PROCEDURE, GI TRACT, INTRALUMINAL, VIA CAPSULE;  Surgeon: Antwon Gardner MD;  Location: [facility] ENDO;  Service: Endoscopy;  Laterality: N/A;    OOPHORECTOMY      PANCREAS SURGERY      TONSILLECTOMY      TRANSFORAMINAL EPIDURAL INJECTION OF STEROID Right 10/14/2021    Procedure: Injection,steroid,epidural,transforaminal approach---RIGHT L5 AND S1;  Surgeon: Cheng Tariq Jr., MD;  Location: [facility];  Service: Pain Management;  Laterality: Right;    TRANSFORAMINAL EPIDURAL INJECTION OF STEROID Right 01/13/2023    Procedure: INJECTION, STEROID, EPIDURAL, TRANSFORAMINAL APPROACH, RIGHT L4/L5 & L5-S1 CONTRAST DIRECT REF;  Surgeon: Coni Franco MD;  Location: [facility];  Service: Pain Management;  Laterality: Right;     FAMILY HISTORY:   Family History   Problem Relation Name Age of Onset    Hyperlipidemia Mother      Heart disease Mother      Hypertension Father      Heart disease Father      Lupus Sister x4     Lupus Sister x1     Arthritis Brother x5     No Known Problems Daughter x1     Mental illness Son x2         PTSD x 1 son in      SOCIAL HISTORY:   Social History     Occupational History    Not on file   Tobacco Use    Smoking status: Never    Smokeless tobacco: Never   Substance and Sexual Activity    Alcohol use: No     Comment: 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  However, no consumption in 20 years.     Drug use: No    Sexual activity: Not Currently        MEDICATIONS:   Current Outpatient Medications:     calcium carbonate (TUMS) 200 mg calcium (500 mg) chewable tablet, Take 1 tablet by mouth daily as needed., Disp: , Rfl:     cyclobenzaprine (FLEXERIL) 10 MG tablet, Take 1 tablet by mouth three times daily as needed, Disp: 60 tablet, Rfl: 5    gabapentin (NEURONTIN) 600 MG tablet, TAKE 1 TABLET BY MOUTH THREE TIMES DAILY, Disp: 90 tablet, Rfl: 5     gabapentin (NEURONTIN) 600 MG tablet, Take 1 tablet (600 mg total) by mouth 3 (three) times daily., Disp: 90 tablet, Rfl: 5    HYDROcodone-acetaminophen (NORCO) 5-325 mg per tablet, Take 1 tablet by mouth every 12 (twelve) hours as needed for Pain., Disp: 14 tablet, Rfl: 0    meclizine (ANTIVERT) 25 mg tablet, Take 1 tablet (25 mg total) by mouth 3 (three) times daily as needed., Disp: 60 tablet, Rfl: 5    meloxicam (MOBIC) 15 MG tablet, Take 1 tablet by mouth once daily, Disp: 30 tablet, Rfl: 5    pantoprazole (PROTONIX) 40 MG tablet, Take 1 tablet (40 mg total) by mouth 2 (two) times daily., Disp: 180 tablet, Rfl: 1    sod picosulf-mag ox-citric ac (CLENPIQ) 10 mg-3.5 gram- 12 gram/160 mL Soln, Take As Directed., Disp: 320 mL, Rfl: 0    traMADoL (ULTRAM) 50 mg tablet, Take 1 tablet (50 mg total) by mouth every 6 (six) hours as needed for Pain., Disp: 12 tablet, Rfl: 0  No current facility-administered medications for this visit.    Facility-Administered Medications Ordered in Other Visits:     0.9%  NaCl infusion, , Intravenous, Continuous, Antwon Gardner MD    0.9%  NaCl infusion, , Intravenous, Continuous, Antwon Gardner MD    0.9%  NaCl infusion, , Intravenous, Continuous, Antwon Gardner MD    lactated ringers bolus 1,000 mL, 1,000 mL, Intravenous, Once, Antwon Gardner MD    LIDOcaine (PF) 10 mg/ml (1%) injection 10 mg, 1 mL, Intradermal, Once PRN, Antwon Gardner MD  ALLERGIES:   Review of patient's allergies indicates:   Allergen Reactions    Codeine Itching    Iodine and iodide containing products Itching         Physical Exam     Vitals:    06/27/24 0828   BP: 139/67   Pulse: (!) 116     Alert and oriented to person, place and time. No acute distress. Well-groomed, not ill appearing. Pupils round and reactive, normal respiratory effort, no audible wheezing.     GENERAL:  A well-developed, well-nourished 68 y.o. female who is alert and       oriented in  no acute distress.      Gait: She  walks with an antalgic gait                 EXTREMITIES:  Examination of lower extremities reveals there is no visible mass or deformity.  Varus deformity    Left knee:  ROM 0-110    Ligamentously stable to varus/valgus stress.    Anterior and posterior drawers negative.    No pain over pes bursa.    No warmth    No erythema     Effusion No    medial, lateral joint line tenderness    Positive Patellofemoral grind/crepitus     Right knee:  ROM 0-110    Ligamentously stable to varus/valgus stress.    Anterior and posterior drawers negative.    No pain over pes bursa.    No warmth    No erythema    Effusion No    medial, lateral joint line tenderness    Positive Patellofemoral grind/crepitus     The skin over both lower extremities is normal and unremarkable.  She has a  painless range of motion of the hips and ankles bilaterally.   Sensation is intact in both lower extremities.    There are no motor deficits in the lower extremities bilaterally.   Pedal pulses are palpable distally bilaterally.    She has no calf tenderness to palpation nor edema.    Imaging:     Bilateral knee x-rays reviewed by me which show no evidence of acute fracture or dislocation.  Mild tricompartmental osteoarthritis with marginal osteophytes.  Calcification adjacent to medial femoral condyle of the right knee.  Kellgren Jian grade 2-3.    Bilateral shoulder x-rays reviewed by me which show no evidence of acute fracture or dislocation, mild DJD.    Assessment & Plan    Primary osteoarthritis of left knee  -     Ambulatory referral/consult to Orthopedics  -     triamcinolone acetonide injection 40 mg  -     Large Joint Aspiration/Injection: L knee    Obesity, Class III, BMI 40-49.9 (morbid obesity)  -     Ambulatory referral/consult to Orthopedics    Chronic right shoulder pain  -     Ambulatory referral/consult to Orthopedics         I made the decision to obtain old records of the patient including  previous notes and imaging. New imaging was ordered today of the extremity or extremities evaluated. I independently reviewed and interpreted the radiographs and/or MRIs/CT scan today as well as prior imaging.    We discussed at length different treatment options including conservative vs surgical management. These include anti-inflammatories, acetaminophen, rest, ice, heat, formal physical therapy including strengthening and stretching exercises, home exercise programs, injections, dry needling, and finally surgical intervention.      Patient here for chronic bilateral knee pain and right shoulder pain.  We discussed multiple treatment options in detail.  Unfortunately her BMI does not make her an elective surgical candidate at this time.  We discussed weight loss and its contribution to joint pain as well today.  Patient elected to proceed with left knee CSI, left knee CSI given.  Post-injection instructions reviewed  Patient deferred right knee injection and right shoulder injection today.  If the pain becomes too bothersome, she will call clinic for an appointment.  She will see how the left knee injection helps her  May consider viscosupplementation if no improvement with steroid  Continue weight loss and activity as tolerated    Follow up:  Injections as needed  X-rays next visit:  None    All questions were answered and patient is agreeable to the above plan.

## 2024-06-27 NOTE — PROCEDURES
Large Joint Aspiration/Injection: L knee    Date/Time: 6/27/2024 8:00 AM    Performed by: Mindi Patel PA-C  Authorized by: Mindi Patel PA-C    Consent Done?:  Yes (Verbal)  Indications:  Pain  Site marked: the procedure site was marked    Timeout: prior to procedure the correct patient, procedure, and site was verified    Prep: patient was prepped and draped in usual sterile fashion      Local anesthesia used?: Yes    Local anesthetic:  Bupivacaine 0.25% without epinephrine and topical anesthetic  Anesthetic total (ml):  4      Details:  Needle Size:  22 G  Ultrasonic Guidance for needle placement?: No    Approach:  Anterolateral  Location:  Knee  Site:  L knee  Medications:  40 mg triamcinolone acetonide 40 mg/mL  Patient tolerance:  Patient tolerated the procedure well with no immediate complications

## 2024-07-01 ENCOUNTER — TELEPHONE (OUTPATIENT)
Dept: SLEEP MEDICINE | Facility: CLINIC | Age: 69
End: 2024-07-01
Payer: MEDICARE

## 2024-07-01 NOTE — TELEPHONE ENCOUNTER
Patient stated they will call back after being informed of sleep study results, patient declined current order as of now, but was informed they'll have 4 months til orders .         ----- Message from Vangie Escalera PA-C sent at 2024 10:27 AM CDT -----  Please call to inform patient of recent sleep study results.    ----------------------------------------------------------------------------------------------------      I just wanted to let you know that I have reviewed your sleep study. It showed that you do have some trouble breathing in your sleep (episodes of struggling to breathe lasting longer than 10 seconds happened at least 10 times an hour during your test). This is considered to be mild Obstructive Sleep Apnea (STAN).     It would be reasonable to try treating your sleep apnea with CPAP to see how much your quality of sleep improves.      If you wish to proceed with a trial of CPAP, let me know and I will order a machine and supplies for you to get started.     Please let me know what concerns or questions you may have.     Vangie

## 2024-07-16 ENCOUNTER — TELEPHONE (OUTPATIENT)
Dept: SLEEP MEDICINE | Facility: CLINIC | Age: 69
End: 2024-07-16
Payer: MEDICARE

## 2024-07-16 DIAGNOSIS — G47.33 OSA (OBSTRUCTIVE SLEEP APNEA): Primary | ICD-10-CM

## 2024-07-16 NOTE — TELEPHONE ENCOUNTER
Staff left message to schedule patient compliance appointment.         ----- Message from Mara Mcfarland sent at 7/16/2024  4:25 PM CDT -----  Regarding: Cpap machine  Type: Patient Call Back    Who called:    What is the request in detail: p/t is calling b/c she is ready to start the process to get her cpap machine. Please call to assist.     Can the clinic reply by [facility]? No     Would the patient rather a call back or a response via [facility]?     Best call back number: [phone] (home)       Additional Information:

## 2024-07-16 NOTE — TELEPHONE ENCOUNTER
Returned patient call. Patient states she thought about it and she is ready to pursue trial of CPAP for her mild STAN. CPAP and supplies ordered. She will need follow up visit 31-90 days after she received CPAP machine for compliance visit.     Patient voiced understanding and is in agreement with plan of care.    CPAP and supplies ordered.    Vangie

## 2024-07-27 DIAGNOSIS — K57.92 DIVERTICULITIS: ICD-10-CM

## 2024-07-27 NOTE — TELEPHONE ENCOUNTER
No care due was identified.  [facility] Embedded Care Due Messages. Reference number: [id number].   7/27/2024 4:16:25 PM CDT

## 2024-07-29 RX ORDER — TRAMADOL HYDROCHLORIDE 50 MG/1
50 TABLET ORAL EVERY 6 HOURS PRN
Qty: 12 TABLET | Refills: 0 | OUTPATIENT
Start: 2024-07-29

## 2024-08-14 ENCOUNTER — TELEPHONE (OUTPATIENT)
Dept: PRIMARY CARE CLINIC | Facility: CLINIC | Age: 69
End: 2024-08-14
Payer: MEDICARE

## 2024-08-14 ENCOUNTER — OFFICE VISIT (OUTPATIENT)
Dept: PRIMARY CARE CLINIC | Facility: CLINIC | Age: 69
End: 2024-08-14
Payer: MEDICARE

## 2024-08-14 VITALS
WEIGHT: 259.38 LBS | RESPIRATION RATE: 18 BRPM | HEIGHT: 61 IN | SYSTOLIC BLOOD PRESSURE: 138 MMHG | HEART RATE: 120 BPM | OXYGEN SATURATION: 97 % | DIASTOLIC BLOOD PRESSURE: 66 MMHG | BODY MASS INDEX: 48.97 KG/M2

## 2024-08-14 DIAGNOSIS — R10.31 RIGHT LOWER QUADRANT PAIN: ICD-10-CM

## 2024-08-14 DIAGNOSIS — S49.91XA RIGHT SHOULDER INJURY, INITIAL ENCOUNTER: ICD-10-CM

## 2024-08-14 DIAGNOSIS — K76.0 HEPATIC STEATOSIS: ICD-10-CM

## 2024-08-14 DIAGNOSIS — K57.92 DIVERTICULITIS: ICD-10-CM

## 2024-08-14 DIAGNOSIS — E03.9 HYPOTHYROIDISM, UNSPECIFIED TYPE: ICD-10-CM

## 2024-08-14 DIAGNOSIS — R10.31 RIGHT LOWER QUADRANT ABDOMINAL PAIN: Primary | ICD-10-CM

## 2024-08-14 DIAGNOSIS — M54.31 SCIATICA OF RIGHT SIDE: ICD-10-CM

## 2024-08-14 PROCEDURE — 99999 PR PBB SHADOW E&M-EST. PATIENT-LVL IV: CPT | Mod: PBBFAC,,, | Performed by: INTERNAL MEDICINE

## 2024-08-14 PROCEDURE — 3288F FALL RISK ASSESSMENT DOCD: CPT | Mod: CPTII,S$GLB,, | Performed by: INTERNAL MEDICINE

## 2024-08-14 PROCEDURE — 3078F DIAST BP <80 MM HG: CPT | Mod: CPTII,S$GLB,, | Performed by: INTERNAL MEDICINE

## 2024-08-14 PROCEDURE — 1126F AMNT PAIN NOTED NONE PRSNT: CPT | Mod: CPTII,S$GLB,, | Performed by: INTERNAL MEDICINE

## 2024-08-14 PROCEDURE — 1100F PTFALLS ASSESS-DOCD GE2>/YR: CPT | Mod: CPTII,S$GLB,, | Performed by: INTERNAL MEDICINE

## 2024-08-14 PROCEDURE — 99214 OFFICE O/P EST MOD 30 MIN: CPT | Mod: S$GLB,,, | Performed by: INTERNAL MEDICINE

## 2024-08-14 PROCEDURE — 3075F SYST BP GE 130 - 139MM HG: CPT | Mod: CPTII,S$GLB,, | Performed by: INTERNAL MEDICINE

## 2024-08-14 PROCEDURE — 1160F RVW MEDS BY RX/DR IN RCRD: CPT | Mod: CPTII,S$GLB,, | Performed by: INTERNAL MEDICINE

## 2024-08-14 PROCEDURE — 3044F HG A1C LEVEL LT 7.0%: CPT | Mod: CPTII,S$GLB,, | Performed by: INTERNAL MEDICINE

## 2024-08-14 PROCEDURE — 1159F MED LIST DOCD IN RCRD: CPT | Mod: CPTII,S$GLB,, | Performed by: INTERNAL MEDICINE

## 2024-08-14 PROCEDURE — 3008F BODY MASS INDEX DOCD: CPT | Mod: CPTII,S$GLB,, | Performed by: INTERNAL MEDICINE

## 2024-08-14 NOTE — PROGRESS NOTES
Subjective:       Patient ID: Lo Escalera is a 69 y.o. female.    Chief Complaint: Abdominal Pain    HPI   a patient with history of diverticulitis in the past hypothyroidism lumbar spine disc disease with sciatica with chronic lower back pain liver cirrhosis fatty liver from history of ETOH she quit several years ago patient visit today mainly complained of lower back pain with sciatica to the right leg she also having abdominal pain in the lower abdomen on both sides more on the right than left she deny fever chill nausea vomiting no diarrhea no blood in his stool or mucus pain is 7 to 8/10 on and off a she denies short of breath or chest pain  Review of Systems   Constitutional:  Negative for unexpected weight change.   Respiratory:  Negative for shortness of breath.    Cardiovascular:  Negative for chest pain.   Gastrointestinal:  Positive for abdominal pain.   Musculoskeletal:  Positive for back pain.   Psychiatric/Behavioral:  Negative for dysphoric mood.        Objective:      Physical Exam  Vitals and nursing note reviewed.   Constitutional:       General: She is not in acute distress.     Appearance: She is well-developed.   HENT:      Head: Normocephalic and atraumatic.      Right Ear: External ear normal.      Left Ear: External ear normal.      Nose: Nose normal.   Eyes:      Extraocular Movements: Extraocular movements intact.      Conjunctiva/sclera: Conjunctivae normal.      Pupils: Pupils are equal, round, and reactive to light.   Neck:      Thyroid: No thyromegaly.   Cardiovascular:      Rate and Rhythm: Normal rate and regular rhythm.      Heart sounds: Normal heart sounds. No murmur heard.     No friction rub. No gallop.   Pulmonary:      Effort: Pulmonary effort is normal. No respiratory distress.      Breath sounds: Normal breath sounds. No wheezing.   Abdominal:      General: Bowel sounds are normal. There is no distension.      Palpations: Abdomen is soft.      Tenderness: There is no  abdominal tenderness.   Musculoskeletal:         General: No tenderness or deformity. Normal range of motion.      Cervical back: Normal range of motion and neck supple.   Lymphadenopathy:      Cervical: No cervical adenopathy.   Skin:     General: Skin is warm and dry.      Findings: No erythema or rash.   Neurological:      Mental Status: She is alert and oriented to person, place, and time.   Psychiatric:         Mood and Affect: Mood normal.         Thought Content: Thought content normal.         Judgment: Judgment normal.         Assessment:       1. Right lower quadrant abdominal pain    2. Sciatica of right side    3. Right lower quadrant pain    4. Hypothyroidism, unspecified type    5. Hepatic steatosis    6. Diverticulitis    7. Right shoulder injury, initial encounter        Plan:       Right lower quadrant abdominal pain  Comments:  will ewait for labs CT scan later today before tx  Orders:  -     CBC Auto Differential; Future; Expected date: 08/14/2024  -     Comprehensive Metabolic Panel; Future; Expected date: 08/14/2024  -     Cancel: Urinalysis; Future; Expected date: 08/14/2024  -     Sedimentation rate; Future; Expected date: 08/14/2024  -     HYDROcodone-acetaminophen (NORCO) 5-325 mg per tablet; Take 1 tablet by mouth every 12 (twelve) hours as needed for Pain.  Dispense: 14 tablet; Refill: 0    Sciatica of right side  -     gabapentin (NEURONTIN) 600 MG tablet; Take 1 tablet (600 mg total) by mouth 2 (two) times daily.  Dispense: 60 tablet; Refill: 0    Right lower quadrant pain  -     CT Abdomen Pelvis  Without Contrast; Future; Expected date: 08/14/2024    Hypothyroidism, unspecified type  -     TSH; Future; Expected date: 08/14/2024  -     T4, Free; Future; Expected date: 08/14/2024    Hepatic steatosis  -     Lipid Panel; Future; Expected date: 08/14/2024    Diverticulitis  Comments:  await CT scan results and labs before tx  Orders:  -     CBC Auto Differential; Future; Expected date:  08/14/2024  -     CT Abdomen Pelvis  Without Contrast; Future; Expected date: 08/14/2024    Right shoulder injury, initial encounter  -     HYDROcodone-acetaminophen (NORCO) 5-325 mg per tablet; Take 1 tablet by mouth every 12 (twelve) hours as needed for Pain.  Dispense: 14 tablet; Refill: 0        Medication List with Changes/Refills   New Medications    CIPROFLOXACIN HCL (CIPRO) 500 MG TABLET    Take 1 tablet (500 mg total) by mouth 2 (two) times daily.    METRONIDAZOLE (FLAGYL) 500 MG TABLET    Take 1 tablet (500 mg total) by mouth 3 (three) times daily.   Current Medications    CALCIUM CARBONATE (TUMS) 200 MG CALCIUM (500 MG) CHEWABLE TABLET    Take 1 tablet by mouth daily as needed.    CYCLOBENZAPRINE (FLEXERIL) 10 MG TABLET    Take 1 tablet by mouth three times daily as needed    MECLIZINE (ANTIVERT) 25 MG TABLET    Take 1 tablet (25 mg total) by mouth 3 (three) times daily as needed.    MELOXICAM (MOBIC) 15 MG TABLET    Take 1 tablet by mouth once daily    PANTOPRAZOLE (PROTONIX) 40 MG TABLET    Take 1 tablet (40 mg total) by mouth 2 (two) times daily.   Changed and/or Refilled Medications    Modified Medication Previous Medication    GABAPENTIN (NEURONTIN) 600 MG TABLET gabapentin (NEURONTIN) 600 MG tablet       Take 1 tablet (600 mg total) by mouth 2 (two) times daily.    Take 1 tablet (600 mg total) by mouth 3 (three) times daily.    HYDROCODONE-ACETAMINOPHEN (NORCO) 5-325 MG PER TABLET HYDROcodone-acetaminophen (NORCO) 5-325 mg per tablet       Take 1 tablet by mouth every 12 (twelve) hours as needed for Pain.    Take 1 tablet by mouth every 12 (twelve) hours as needed for Pain.   Discontinued Medications    GABAPENTIN (NEURONTIN) 600 MG TABLET    TAKE 1 TABLET BY MOUTH THREE TIMES DAILY    SOD PICOSULF-MAG OX-CITRIC AC (CLENPIQ) 10 MG-3.5 GRAM- 12 GRAM/160 ML SOLN    Take As Directed.    TRAMADOL (ULTRAM) 50 MG TABLET    Take 1 tablet (50 mg total) by mouth every 6 (six) hours as needed for Pain.

## 2024-08-14 NOTE — TELEPHONE ENCOUNTER
"----- Message from Azul Arana sent at 8/12/2024 12:10 PM CDT -----  Contact: Pt  [phone]  Caller is requesting an earlier appointment then we can schedule.  Caller is requesting a message be sent to the provider.    If this is for urgent care symptoms, did you offer other providers at this location, providers at other locations, or [facility]? (yes, no, n/a):  na    If this is for the patients physical, did you offer to schedule next available and put on wait list, or to see NP or PA for their physical?  (yes, no, n/a):  na    When is the next available appointment with their provider:  9/2024    Reason for the appointment:  Herina is getting bigger, and painful    Patient preference of timeframe to be scheduled:  ASAP    Would the patient like a call back, or a response through their [facility] portal?:   Call back    Comments:  Patient states she would like to see "first available DrSarabjit"  Patent states she needs to be seen ASAP"    Please call and advise.    Thank You  "

## 2024-08-15 ENCOUNTER — TELEPHONE (OUTPATIENT)
Dept: PRIMARY CARE CLINIC | Facility: CLINIC | Age: 69
End: 2024-08-15
Payer: MEDICARE

## 2024-08-17 RX ORDER — HYDROCODONE BITARTRATE AND ACETAMINOPHEN 5; 325 MG/1; MG/1
1 TABLET ORAL EVERY 12 HOURS PRN
Qty: 14 TABLET | Refills: 0 | Status: SHIPPED | OUTPATIENT
Start: 2024-08-17

## 2024-08-17 RX ORDER — GABAPENTIN 600 MG/1
600 TABLET ORAL 2 TIMES DAILY
Qty: 60 TABLET | Refills: 0 | Status: SHIPPED | OUTPATIENT
Start: 2024-08-17

## 2024-08-19 ENCOUNTER — TELEPHONE (OUTPATIENT)
Dept: PRIMARY CARE CLINIC | Facility: CLINIC | Age: 69
End: 2024-08-19
Payer: MEDICARE

## 2024-08-19 NOTE — TELEPHONE ENCOUNTER
----- Message from Deion Guo MD sent at 8/17/2024  5:33 PM CDT -----  Let patient know I sent pain medication and antibiotic to her pharmacy

## 2024-08-23 ENCOUNTER — TELEPHONE (OUTPATIENT)
Dept: SURGERY | Facility: CLINIC | Age: 69
End: 2024-08-23
Payer: MEDICARE

## 2024-08-26 ENCOUNTER — TELEPHONE (OUTPATIENT)
Dept: PRIMARY CARE CLINIC | Facility: CLINIC | Age: 69
End: 2024-08-26
Payer: MEDICARE

## 2024-08-26 NOTE — TELEPHONE ENCOUNTER
----- Message from Stephen Joyce sent at 8/26/2024  8:41 AM CDT -----  Contact: [phone]@[email or web address]  Pharmacy is calling to clarify an RX.yes     RX name:  gabapentin (NEURONTIN) 600 MG tablet    What do they need to clarify:  Need to know if patient med is being decreased     Comments: Please call [facility] pharmacy

## 2024-08-26 NOTE — TELEPHONE ENCOUNTER
Called and spoke with patient pharmacy [facility] in regards to the patient medication Gabapentin 600 mg tablets. Pharmacist wanted to know if the patient medication is suppose to be taken 2 or 3 times a day. Pharmacist was informed the prescription says 2 times a day.

## 2024-08-27 ENCOUNTER — OFFICE VISIT (OUTPATIENT)
Dept: SURGERY | Facility: CLINIC | Age: 69
End: 2024-08-27
Payer: MEDICARE

## 2024-08-27 ENCOUNTER — TELEPHONE (OUTPATIENT)
Dept: SURGERY | Facility: CLINIC | Age: 69
End: 2024-08-27

## 2024-08-27 ENCOUNTER — LAB VISIT (OUTPATIENT)
Dept: LAB | Facility: HOSPITAL | Age: 69
End: 2024-08-27
Payer: MEDICARE

## 2024-08-27 VITALS
SYSTOLIC BLOOD PRESSURE: 142 MMHG | WEIGHT: 261 LBS | HEIGHT: 61 IN | HEART RATE: 115 BPM | BODY MASS INDEX: 49.28 KG/M2 | DIASTOLIC BLOOD PRESSURE: 75 MMHG

## 2024-08-27 DIAGNOSIS — K57.92 DIVERTICULITIS: ICD-10-CM

## 2024-08-27 LAB
ALBUMIN SERPL BCP-MCNC: 3.4 G/DL (ref 3.5–5.2)
ALP SERPL-CCNC: 100 U/L (ref 55–135)
ALT SERPL W/O P-5'-P-CCNC: 10 U/L (ref 10–44)
ANION GAP SERPL CALC-SCNC: 9 MMOL/L (ref 8–16)
AST SERPL-CCNC: 24 U/L (ref 10–40)
BASOPHILS # BLD AUTO: 0.05 K/UL (ref 0–0.2)
BASOPHILS NFR BLD: 0.7 % (ref 0–1.9)
BILIRUB SERPL-MCNC: 0.5 MG/DL (ref 0.1–1)
BUN SERPL-MCNC: 8 MG/DL (ref 8–23)
CALCIUM SERPL-MCNC: 9.5 MG/DL (ref 8.7–10.5)
CHLORIDE SERPL-SCNC: 105 MMOL/L (ref 95–110)
CO2 SERPL-SCNC: 27 MMOL/L (ref 23–29)
CREAT SERPL-MCNC: 1 MG/DL (ref 0.5–1.4)
CRP SERPL-MCNC: 16.8 MG/L (ref 0–8.2)
DIFFERENTIAL METHOD BLD: ABNORMAL
EOSINOPHIL # BLD AUTO: 0 K/UL (ref 0–0.5)
EOSINOPHIL NFR BLD: 0.4 % (ref 0–8)
ERYTHROCYTE [DISTWIDTH] IN BLOOD BY AUTOMATED COUNT: 15.9 % (ref 11.5–14.5)
EST. GFR  (NO RACE VARIABLE): >60 ML/MIN/1.73 M^2
GLUCOSE SERPL-MCNC: 95 MG/DL (ref 70–110)
HCT VFR BLD AUTO: 36.9 % (ref 37–48.5)
HGB BLD-MCNC: 11.2 G/DL (ref 12–16)
IMM GRANULOCYTES # BLD AUTO: 0.03 K/UL (ref 0–0.04)
IMM GRANULOCYTES NFR BLD AUTO: 0.4 % (ref 0–0.5)
LYMPHOCYTES # BLD AUTO: 2 K/UL (ref 1–4.8)
LYMPHOCYTES NFR BLD: 29.2 % (ref 18–48)
MCH RBC QN AUTO: 25.2 PG (ref 27–31)
MCHC RBC AUTO-ENTMCNC: 30.4 G/DL (ref 32–36)
MCV RBC AUTO: 83 FL (ref 82–98)
MONOCYTES # BLD AUTO: 0.5 K/UL (ref 0.3–1)
MONOCYTES NFR BLD: 7.9 % (ref 4–15)
NEUTROPHILS # BLD AUTO: 4.1 K/UL (ref 1.8–7.7)
NEUTROPHILS NFR BLD: 61.4 % (ref 38–73)
NRBC BLD-RTO: 0 /100 WBC
PLATELET # BLD AUTO: 203 K/UL (ref 150–450)
PMV BLD AUTO: 9.6 FL (ref 9.2–12.9)
POTASSIUM SERPL-SCNC: 3.5 MMOL/L (ref 3.5–5.1)
PROT SERPL-MCNC: 7.5 G/DL (ref 6–8.4)
RBC # BLD AUTO: 4.44 M/UL (ref 4–5.4)
SODIUM SERPL-SCNC: 141 MMOL/L (ref 136–145)
WBC # BLD AUTO: 6.67 K/UL (ref 3.9–12.7)

## 2024-08-27 PROCEDURE — 99999 PR PBB SHADOW E&M-EST. PATIENT-LVL IV: CPT | Mod: PBBFAC,,, | Performed by: NURSE PRACTITIONER

## 2024-08-27 PROCEDURE — 85025 COMPLETE CBC W/AUTO DIFF WBC: CPT | Performed by: NURSE PRACTITIONER

## 2024-08-27 PROCEDURE — 80053 COMPREHEN METABOLIC PANEL: CPT | Performed by: NURSE PRACTITIONER

## 2024-08-27 PROCEDURE — 86140 C-REACTIVE PROTEIN: CPT | Performed by: NURSE PRACTITIONER

## 2024-08-27 PROCEDURE — 36415 COLL VENOUS BLD VENIPUNCTURE: CPT | Performed by: NURSE PRACTITIONER

## 2024-08-27 RX ORDER — AMOXICILLIN AND CLAVULANATE POTASSIUM 875; 125 MG/1; MG/1
1 TABLET, FILM COATED ORAL EVERY 12 HOURS
Qty: 14 TABLET | Refills: 0 | Status: SHIPPED | OUTPATIENT
Start: 2024-08-27 | End: 2024-09-03

## 2024-08-27 NOTE — PATIENT INSTRUCTIONS
Start miralax 1-2 capfuls per day if you are constipated  64 oz of water per day  Start the augmenting antibiotics for the next two weeks  Very Little fiber diet

## 2024-08-27 NOTE — PROGRESS NOTES
CRS Office Visit History and Physical    Referring Md:   Rachna Huerta,   [address]  [address]  [place] [address]    SUBJECTIVE:     Chief Complaint: diverticulitis    History of Present Illness:  The patient is a new patient to this practice.   Course is as follows:  Patient is a 69 y.o. female presents with diverticulitis.  12/2023 CT scan slight inflammatory changes to suggest diverticulitis as well as on 1/8/24, 1/30/2024, 8/14/2024 and 8/17/2024. She states   She reports she had diverticulitis many years ago, she reports she was admitted for 27 days at that time.  She has been on ciprofloxaxin, metronidazole. I couldn't find evidence of being prescribed augmentin or avelox for this.  She states since 12/2023 she hasn't been 100%. She is not interested in a surgery.  Today she reports she has been eating diet high in fiber.   She is having BMs, almost like diarrhea  No rectal bleeding, fevers or chills  Today she reports her pain level is an 8      Last Colonoscopy: 12/2023 - A single non-bleeding colonic angioectasia.                          - Diverticulosis in the sigmoid colon and in the                          descending colon.                          - No specimens collected.   Family history of colorectal cancer or IBD: no.    Review of patient's allergies indicates:   Allergen Reactions    Codeine Itching    Iodine and iodide containing products Itching       Past Medical History:   Diagnosis Date    Acute right-sided back pain 06/16/2023    Anemia due to chronic blood loss 03/06/2020    Anemia of chronic disorder 08/24/2020    Anxiety     Cirrhosis     Coronary artery disease     pt states she does not have    Diverticulosis     Gastric ulcer     old    GERD (gastroesophageal reflux disease)     Hepatic steatosis 09/12/2016    Hyperbilirubinemia     Hypertension     pt states she no longer has    Hypothyroidism     Iron deficiency anemia due to chronic blood loss 08/24/2020    Microcytic  anemia 08/24/2020    Obesity     Peptic ulcer disease 09/12/2016    UPJ (ureteropelvic junction) obstruction     Vertigo     Vertigo      Past Surgical History:   Procedure Laterality Date    APPENDECTOMY      CHOLECYSTECTOMY      COLONOSCOPY N/A 07/06/2020    Procedure: COLONOSCOPY;  Surgeon: Leander Cornejo MD;  Location: [facility];  Service: Colon and Rectal;  Laterality: N/A;    COLONOSCOPY  05/01/2023    COLONOSCOPY N/A 05/01/2023    Procedure: COLONOSCOPY;  Surgeon: Antwon Gardner MD;  Location: [facility];  Service: Endoscopy;  Laterality: N/A;    COLONOSCOPY  12/11/2023    COLONOSCOPY N/A 12/11/2023    Procedure: COLONOSCOPY;  Surgeon: Antwon Gardner MD;  Location: [facility];  Service: Endoscopy;  Laterality: N/A;    EGD, WITH BANDING OF VARICES  12/11/2023    2 bands    ESOPHAGOGASTRODUODENOSCOPY N/A 06/12/2019    Procedure: ESOPHAGOGASTRODUODENOSCOPY (EGD);  Surgeon: Arben Brown MD;  Location: [facility];  Service: Endoscopy;  Laterality: N/A;    ESOPHAGOGASTRODUODENOSCOPY N/A 03/09/2020    Procedure: EGD (ESOPHAGOGASTRODUODENOSCOPY);  Surgeon: Andrea Salomon MD;  Location: [facility];  Service: Endoscopy;  Laterality: N/A;    ESOPHAGOGASTRODUODENOSCOPY N/A 09/21/2020    Procedure: EGD (ESOPHAGOGASTRODUODENOSCOPY);  Surgeon: Antwon Gardner MD;  Location: [facility];  Service: Endoscopy;  Laterality: N/A;    ESOPHAGOGASTRODUODENOSCOPY  05/01/2023    ESOPHAGOGASTRODUODENOSCOPY N/A 05/01/2023    Procedure: EGD (ESOPHAGOGASTRODUODENOSCOPY);  Surgeon: Antwon Gardner MD;  Location: [facility];  Service: Endoscopy;  Laterality: N/A;    ESOPHAGOGASTRODUODENOSCOPY N/A 12/11/2023    Procedure: EGD (ESOPHAGOGASTRODUODENOSCOPY);  Surgeon: Antwon Gardner MD;  Location: [facility];  Service: Endoscopy;  Laterality: N/A;    ESOPHAGOGASTRODUODENOSCOPY N/A 01/24/2024    Procedure: EGD (ESOPHAGOGASTRODUODENOSCOPY);  Surgeon: Antwon Gardner MD;  [place] [facility]  [place];  Service: Endoscopy;  Laterality: N/A;    ESOPHAGOGASTRODUODENOSCOPY  03/06/2024    ESOPHAGOGASTRODUODENOSCOPY N/A 3/6/2024    Procedure: EGD (ESOPHAGOGASTRODUODENOSCOPY);  Surgeon: Antwon Gardner MD;  Location: [facility] ENDO;  Service: Endoscopy;  Laterality: N/A;    HYSTERECTOMY      INTRALUMINAL GASTROINTESTINAL TRACT IMAGING VIA CAPSULE N/A 01/19/2022    Procedure: IMAGING PROCEDURE, GI TRACT, INTRALUMINAL, VIA CAPSULE;  Surgeon: Antwon Gardner MD;  Location: [facility] ENDO;  Service: Endoscopy;  Laterality: N/A;    OOPHORECTOMY      PANCREAS SURGERY      TONSILLECTOMY      TRANSFORAMINAL EPIDURAL INJECTION OF STEROID Right 10/14/2021    Procedure: Injection,steroid,epidural,transforaminal approach---RIGHT L5 AND S1;  Surgeon: Cheng Tariq Jr., MD;  Location: [facility] PAIN MGMT;  Service: Pain Management;  Laterality: Right;    TRANSFORAMINAL EPIDURAL INJECTION OF STEROID Right 01/13/2023    Procedure: INJECTION, STEROID, EPIDURAL, TRANSFORAMINAL APPROACH, RIGHT L4/L5 & L5-S1 CONTRAST DIRECT REF;  Surgeon: Coni Franco MD;  Location: [facility] PAIN MGT;  Service: Pain Management;  Laterality: Right;     Family History   Problem Relation Name Age of Onset    Hyperlipidemia Mother      Heart disease Mother      Hypertension Father      Heart disease Father      Lupus Sister x4     Lupus Sister x1     Arthritis Brother x5     No Known Problems Daughter x1     Mental illness Son x2         PTSD x 1 son in      Social History     Tobacco Use    Smoking status: Never     Passive exposure: Never    Smokeless tobacco: Never   Substance Use Topics    Alcohol use: No     Comment: 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  However, no consumption in 20 years.     Drug use: No        Review of Systems:  ROS    OBJECTIVE:     Vital Signs (Most Recent)  LMP  (LMP Unknown)     Physical Exam:  General: Black or  female in no distress   Neuro: Alert and oriented to person, place, and time.  Moves all  extremities.     HEENT: No icterus.  Trachea midline  Skin: Warm dry and intact, No visible rashes, no jaundice    Labs reviewed today:  Lab Results   Component Value Date    WBC 9.75 08/19/2024    HGB 10.8 (L) 08/19/2024    HCT 34.1 (L) 08/19/2024     08/19/2024    CHOL 157 08/14/2024    TRIG 86 08/14/2024    HDL 50 08/14/2024    ALT 14 08/19/2024    AST 29 08/19/2024     08/19/2024    K 3.1 (L) 08/19/2024     08/19/2024    CREATININE 1.0 08/19/2024    BUN 13 08/19/2024    CO2 20 (L) 08/19/2024    TSH 3.294 08/14/2024    INR 1.1 02/21/2024    GLUF 124 (H) 03/27/2022    HGBA1C 5.4 06/27/2024       Imaging reviewed today:  See HPI    Endoscopy reviewed today:  See HPI      ASSESSMENT/PLAN:     Diagnoses and all orders for this visit:    Diverticulitis  -     Ambulatory referral/consult to Gastroenterology    68 yo F here with diverticulitis. It sounds like this has been ongoing since 12/2023. Multiple CT scans show this.   Chart review shows she may have only gotten cipro and flagyl combo. Also has not been following liquid/LRD.   Today her pain is 8/10, but she is afebrile, eating and staying hydrated.  For now labs for a baseline  C scope 12/2023 single non bleeding AVM, no specimens  Augmentin  Lowfiber/liquid diet discussed  I instructed pt to call me if she is not better in 2 days  ER precautions discussed.  Given this has been ongoing would recommend she f/u w surgeon in the coming weeks    The patient was instructed to:  Increase water intake to at least 8-10 glasses of water per day.  Take a daily fiber supplement (Konsyl, Benefiber, Metamucil) and increase dietary intake to 20-30 grams/day.  Avoid straining or spending >5min/event with bowel movements.  Follow-up in clinic in 6-8 weeks.    NATALY AlatorreC  Colon and Rectal Surgery

## 2024-08-30 ENCOUNTER — PATIENT OUTREACH (OUTPATIENT)
Dept: ADMINISTRATIVE | Facility: HOSPITAL | Age: 69
End: 2024-08-30
Payer: MEDICARE

## 2024-08-30 NOTE — PROGRESS NOTES
Health Maintenance Due   Topic Date Due    RSV Vaccine (Age 60+ and Pregnant patients) (1 - 1-dose 60+ series) Never done    Mammogram  04/05/2024    DEXA Scan  11/17/2024        Chart review done.   HM updated.   Immunizations reviewed & updated.   Care Everywhere updated.  DIS reviewed

## 2024-09-13 DIAGNOSIS — M54.31 SCIATICA OF RIGHT SIDE: ICD-10-CM

## 2024-09-13 RX ORDER — GABAPENTIN 600 MG/1
600 TABLET ORAL 2 TIMES DAILY
Qty: 60 TABLET | Refills: 2 | Status: SHIPPED | OUTPATIENT
Start: 2024-09-13

## 2024-09-13 NOTE — TELEPHONE ENCOUNTER
No care due was identified.  [facility] Embedded Care Due Messages. Reference number: [id number].   9/13/2024 8:48:32 AM CDT

## 2024-09-13 NOTE — TELEPHONE ENCOUNTER
----- Message from Garland Allen sent at 9/12/2024 12:10 PM CDT -----  Contact: [phone]  Requesting an RX refill or new RX.    Is this a refill or new RX: refill    RX name and strength (copy/paste from chart):  gabapentin (NEURONTIN) 600 MG tablet    Is this a 30 day or 90 day RX:     Pharmacy name and phone # (copy/paste from chart):    [facility]  [place] - [address]  [address] [address]  Phone: [phone] Fax: [phone]    The doctors have asked that we provide their patients with the following 2 reminders -- prescription refills can take up to 72 hours, and a friendly reminder that in the future you can use your [facility] account to request refills: call back

## 2024-09-25 DIAGNOSIS — M54.31 SCIATICA OF RIGHT SIDE: ICD-10-CM

## 2024-09-25 NOTE — TELEPHONE ENCOUNTER
No care due was identified.  [facility] Embedded Care Due Messages. Reference number: [id number].   9/25/2024 1:54:56 PM CDT

## 2024-09-25 NOTE — TELEPHONE ENCOUNTER
----- Message from Letha Cherry sent at 9/25/2024  1:48 PM CDT -----  She wants you know if you can send a new script for the gabapentin (NEURONTIN) 600 MG tablet for the quantity that she usually get:     [facility] [address] [facility] [facility] [address], [place] - [address]   Phone: [phone] Fax: [phone]

## 2024-09-30 RX ORDER — GABAPENTIN 600 MG/1
600 TABLET ORAL 2 TIMES DAILY
Qty: 60 TABLET | Refills: 2 | Status: SHIPPED | OUTPATIENT
Start: 2024-09-30 | End: 2024-10-02 | Stop reason: SDUPTHER

## 2024-10-02 ENCOUNTER — PATIENT OUTREACH (OUTPATIENT)
Dept: ADMINISTRATIVE | Facility: HOSPITAL | Age: 69
End: 2024-10-02
Payer: MEDICARE

## 2024-10-02 ENCOUNTER — OFFICE VISIT (OUTPATIENT)
Dept: PRIMARY CARE CLINIC | Facility: CLINIC | Age: 69
End: 2024-10-02
Payer: MEDICARE

## 2024-10-02 VITALS
RESPIRATION RATE: 17 BRPM | SYSTOLIC BLOOD PRESSURE: 130 MMHG | HEART RATE: 99 BPM | BODY MASS INDEX: 46.81 KG/M2 | WEIGHT: 247.94 LBS | DIASTOLIC BLOOD PRESSURE: 66 MMHG | OXYGEN SATURATION: 96 % | HEIGHT: 61 IN

## 2024-10-02 DIAGNOSIS — M54.31 SCIATICA OF RIGHT SIDE: ICD-10-CM

## 2024-10-02 DIAGNOSIS — I10 ESSENTIAL HYPERTENSION: Primary | ICD-10-CM

## 2024-10-02 DIAGNOSIS — M51.361 DEGENERATION OF INTERVERTEBRAL DISC OF LUMBAR REGION WITH LOWER EXTREMITY PAIN: ICD-10-CM

## 2024-10-02 DIAGNOSIS — S49.91XA RIGHT SHOULDER INJURY, INITIAL ENCOUNTER: ICD-10-CM

## 2024-10-02 DIAGNOSIS — E03.9 HYPOTHYROIDISM, UNSPECIFIED TYPE: ICD-10-CM

## 2024-10-02 DIAGNOSIS — K74.60 CIRRHOSIS OF LIVER WITH ASCITES, UNSPECIFIED HEPATIC CIRRHOSIS TYPE: ICD-10-CM

## 2024-10-02 DIAGNOSIS — M54.16 LUMBAR RADICULOPATHY: ICD-10-CM

## 2024-10-02 DIAGNOSIS — R10.31 RIGHT LOWER QUADRANT ABDOMINAL PAIN: ICD-10-CM

## 2024-10-02 DIAGNOSIS — K76.0 HEPATIC STEATOSIS: ICD-10-CM

## 2024-10-02 DIAGNOSIS — R18.8 CIRRHOSIS OF LIVER WITH ASCITES, UNSPECIFIED HEPATIC CIRRHOSIS TYPE: ICD-10-CM

## 2024-10-02 DIAGNOSIS — Z12.31 BREAST CANCER SCREENING BY MAMMOGRAM: Primary | ICD-10-CM

## 2024-10-02 PROCEDURE — 3078F DIAST BP <80 MM HG: CPT | Mod: CPTII,S$GLB,, | Performed by: FAMILY MEDICINE

## 2024-10-02 PROCEDURE — 3044F HG A1C LEVEL LT 7.0%: CPT | Mod: CPTII,S$GLB,, | Performed by: FAMILY MEDICINE

## 2024-10-02 PROCEDURE — 99214 OFFICE O/P EST MOD 30 MIN: CPT | Mod: S$GLB,,, | Performed by: FAMILY MEDICINE

## 2024-10-02 PROCEDURE — 99999 PR PBB SHADOW E&M-EST. PATIENT-LVL IV: CPT | Mod: PBBFAC,,, | Performed by: FAMILY MEDICINE

## 2024-10-02 PROCEDURE — 1159F MED LIST DOCD IN RCRD: CPT | Mod: CPTII,S$GLB,, | Performed by: FAMILY MEDICINE

## 2024-10-02 PROCEDURE — 1101F PT FALLS ASSESS-DOCD LE1/YR: CPT | Mod: CPTII,S$GLB,, | Performed by: FAMILY MEDICINE

## 2024-10-02 PROCEDURE — 3288F FALL RISK ASSESSMENT DOCD: CPT | Mod: CPTII,S$GLB,, | Performed by: FAMILY MEDICINE

## 2024-10-02 PROCEDURE — 3075F SYST BP GE 130 - 139MM HG: CPT | Mod: CPTII,S$GLB,, | Performed by: FAMILY MEDICINE

## 2024-10-02 PROCEDURE — 1125F AMNT PAIN NOTED PAIN PRSNT: CPT | Mod: CPTII,S$GLB,, | Performed by: FAMILY MEDICINE

## 2024-10-02 PROCEDURE — 3008F BODY MASS INDEX DOCD: CPT | Mod: CPTII,S$GLB,, | Performed by: FAMILY MEDICINE

## 2024-10-02 RX ORDER — GABAPENTIN 600 MG/1
600 TABLET ORAL 2 TIMES DAILY
Qty: 90 TABLET | Refills: 5 | Status: SHIPPED | OUTPATIENT
Start: 2024-10-02

## 2024-10-02 RX ORDER — HYDROCODONE BITARTRATE AND ACETAMINOPHEN 5; 325 MG/1; MG/1
1 TABLET ORAL EVERY 12 HOURS PRN
Qty: 14 TABLET | Refills: 0 | Status: SHIPPED | OUTPATIENT
Start: 2024-10-02

## 2024-10-02 NOTE — PROGRESS NOTES
Health Maintenance Due   Topic Date Due    RSV Vaccine (Age 60+ and Pregnant patients) (1 - Risk 60-74 years 1-dose series) Never done    Mammogram  04/05/2024    COVID-19 Vaccine (6 - 2024-25 season) 09/01/2024    DEXA Scan  11/17/2024     Immunizations - reviewed and updated   Care Everywhere - triggered   Care Teams - updated   Outreach - Pre visit chart review done. Patient has an appointment with PCP today, 10/2/2024  Breast cancer screening due, order placed. Added to appointment notes to make staff aware

## 2024-10-02 NOTE — PROGRESS NOTES
Subjective:       Patient ID: Lo Escalera is a 69 y.o. female.    Chief Complaint: Follow-up (6 month ), sciatic pain, and Obesity      HPI: 68 yo BF--patient seen 08/17/2024 in emergency room for diverticulitis treated with antibiotics went back to the emergency room [id number] for abdominal pain--hiatal hernia and and diverticulitis was treated again with antibiotics and referred to gastroenterologist  Saw DR Gardner --past for EGD with GERD esophagitis gastritis ulcers cirrhosis esophageal varices portal hypertension hepatics steatosis  Eating well--+BM--ambulating well  Hiatal hernia/GERD on Protonix  Pain knees stomach back  and sciatica on gabapentin Flexeril meloxicam Toradol  Sleep apnea patient to get CPAP machine  CT scan abdomen 09/08/2024 shows no significant abnormalities other than hepatics cirrhosis and diverticulosis with no diverticulitis patient will be referred to GI for colonoscopy and hepatology for hepatics steatosis and cirrhosis of the liver                  ROS:    Skin: no psoriasis, eczema, skin cancer  HEENT: No headache, ocular pain, blurred vision, diplopia,  hoarseness change in voice, no thyroid disease no epistaxis  Lung: No pneumonia, asthma, Tb, wheezing, + occasional SOB, no smoking history sleep apnea supposed to get a CPAP machine  Heart: no chest pain,no  ankle edema,+ occas   palpitations, no  MI, eva murmur, no hypertension patient used to have high blood pressure but was taken off of medication blood pressure today 132/7, hyperlipidemia--no stent bypass arrhythmia history of hypertension--occasionally feels like heart skips a beat blood pressure was low so taken off blood pressure medicine   Abdomen: No nausea, vomiting, diarrhea, constipation, ulcers, hepatitis, status post cholecystectomy, melena, hematochezia, hematemesis recent esophagitis gastritis with transfusion 2 units of blood history of peptic ulcer disease history of esophageal varices--sees hepatologist  --recent diverticulitis 8/17/2024 and [id number] times treated with antibiotics referred to gastroenterologist  : no UTI, renal disease, stones  GYN hysterectomy mammogram March 2021  MS: no fractures, O/A, lupus, rheumatoid, gout--mainly problems mid and lower back --but pain  both knees--right shoulder wants physical therapy    Neuro: No dizziness, LOC, seizures +vertigo---several recent episodes with falling will get MRI brain and restart antivert--dizzy exercises -help with meclizine November 2020 patient was admitted to [facility] for loss of consciousness used to drink  36 yrs ago-pancreatitis  No diabetes, + anemia had to be transfused blood had upper GI bleed, + anxiety, + depression upset had to quit working because of vertigo was working at [employer]-doing pretty good with--loss mom dad oldest sister back to back--anemia 36.9 was 32.4 platelets 115 on iron    3 children unemployed, lives with      Objective:   Physical Exam:   General: Well nourished, well developed, no acute distress up with a cane + morbid obesity hard arise from chair   Skin: No lesions  HEENT: Eyes PERRLA, EOM intact, nose clear , throat nonerythematous +arcus senilis no pallor to the conjunctiva  NECK: Supple, no bruits, No JVD, no nodes  Lungs: Clear, no rales, rhonchi, wheezing  Heart: Regular rate and rhythm, no murmurs, gallops, or rubs  Abdomen: flat, bowel sounds positive, no tenderness, or organomegaly--pain mainly right upper quadrant and periumbilical area   MS:  Tenderness right shoulder especially on palpation or with rotation or attempting to raise right arm overhead--tenderness knees bilaterally pain with flexion extension some crepitus bilaterally left knee greater than right patient able squat FCI down hard to arise partially due to weight has slightly unsteady gait due to problems with knees walks with a cane patient's see orthopedic  Neuro: Alert, CN intact, oriented X3 --unable do heel  toe due weight and knees   Extremities: No cyanosis, clubbing, or edema negative Romberg        Assessment:       1. Essential hypertension    2. Sciatica of right side    3. Right lower quadrant abdominal pain    4. Right shoulder injury, initial encounter    5. Hepatic steatosis    6. Hypothyroidism, unspecified type    7. Degeneration of intervertebral disc of lumbar region with lower extremity pain    8. Lumbar radiculopathy    9. Cirrhosis of liver with ascites, unspecified hepatic cirrhosis type              Plan:       Essential hypertension    Sciatica of right side  -     gabapentin (NEURONTIN) 600 MG tablet; Take 1 tablet (600 mg total) by mouth 2 (two) times daily.  Dispense: 90 tablet; Refill: 5    Right lower quadrant abdominal pain  Comments:  will ewait for labs CT scan later today before tx  Orders:  -     HYDROcodone-acetaminophen (NORCO) 5-325 mg per tablet; Take 1 tablet by mouth every 12 (twelve) hours as needed for Pain.  Dispense: 14 tablet; Refill: 0    Right shoulder injury, initial encounter  -     HYDROcodone-acetaminophen (NORCO) 5-325 mg per tablet; Take 1 tablet by mouth every 12 (twelve) hours as needed for Pain.  Dispense: 14 tablet; Refill: 0    Hepatic steatosis  -     Ambulatory referral/consult to Hepatology; Future; Expected date: 10/09/2024    Hypothyroidism, unspecified type    Degeneration of intervertebral disc of lumbar region with lower extremity pain    Lumbar radiculopathy    Cirrhosis of liver with ascites, unspecified hepatic cirrhosis type  -     Ambulatory referral/consult to Hepatology; Future; Expected date: 10/09/2024              Main Reason for Visits  Abdominal pain 08/17/2024 for diverticulitis return to emergency room [id number] for abdominal pain referred to GI for colonoscopy abdominal ultrasound showed hepatics cirrhosis and diverticulosis patient will be referred to hepatologist for hepatics steatosis and cirrhosis  Morbid obesity patient would like to try  Wegovy will discuss with hepatologist prior to treatment with this medication due to the risk of pancreatitis and GI problems  Wants to see psychiatrist anxiety and depression  Other medical issues  Right shoulder pain/bilateral knee pain left greater than right-and back needs see orthopedist   Morbid obesity needs to lose a lot of weight may benefit from gastric bypass   GERD--avoid alcohol smoking NSAIDs caffeine stress carbonated drinks--can raise the head of the bed on a block of wood to prevent GERD--eat small meals--lay down for 1-2 hours after eating Cont protonix --no NSAIDs due to history of having be transfused due to GI issues hx gastritis esophagitis cirrhosis esophageal varices hepatic steatosis diverticulosis portal hypertension  Anemia hematocrit was 32.9 now 36.9 but 115,000 platelets history of iron deficiency  Thrombocytopenia platelets [id number]  Lab --in 6 mo CBC CMP lipid T4 TSH sed rate   Ret 6 mo best follow upnwith Gen surg or GI MD not me

## 2024-10-16 DIAGNOSIS — M54.31 SCIATICA OF RIGHT SIDE: ICD-10-CM

## 2024-10-16 RX ORDER — GABAPENTIN 600 MG/1
600 TABLET ORAL 2 TIMES DAILY
Qty: 90 TABLET | Refills: 5 | Status: SHIPPED | OUTPATIENT
Start: 2024-10-16

## 2024-10-16 NOTE — TELEPHONE ENCOUNTER
No care due was identified.  [facility] Embedded Care Due Messages. Reference number: [id number].   10/16/2024 2:58:21 PM CDT

## 2024-10-16 NOTE — TELEPHONE ENCOUNTER
----- Message from Ivelisse sent at 10/16/2024  2:47 PM CDT -----  Contact: [phone] Coral  Pt is calling in regards to her gabapentin (NEURONTIN) 600 MG tablet. Pt stated the pharmacy only gave her 60 pills instead of 90 pills. Pt is asking if the doctor can call them to fill the rest of the medication. Please call and advise. Thank you

## 2024-10-22 ENCOUNTER — OFFICE VISIT (OUTPATIENT)
Dept: SURGERY | Facility: CLINIC | Age: 69
End: 2024-10-22
Payer: MEDICARE

## 2024-10-22 VITALS
HEIGHT: 61 IN | BODY MASS INDEX: 45.79 KG/M2 | WEIGHT: 242.5 LBS | DIASTOLIC BLOOD PRESSURE: 74 MMHG | HEART RATE: 110 BPM | SYSTOLIC BLOOD PRESSURE: 136 MMHG

## 2024-10-22 DIAGNOSIS — K57.92 DIVERTICULITIS: Primary | ICD-10-CM

## 2024-10-22 PROCEDURE — 99999 PR PBB SHADOW E&M-EST. PATIENT-LVL III: CPT | Mod: PBBFAC,,, | Performed by: COLON & RECTAL SURGERY

## 2024-10-22 PROCEDURE — 99214 OFFICE O/P EST MOD 30 MIN: CPT | Mod: S$GLB,,, | Performed by: COLON & RECTAL SURGERY

## 2024-10-22 PROCEDURE — 1160F RVW MEDS BY RX/DR IN RCRD: CPT | Mod: CPTII,S$GLB,, | Performed by: COLON & RECTAL SURGERY

## 2024-10-22 PROCEDURE — 1101F PT FALLS ASSESS-DOCD LE1/YR: CPT | Mod: CPTII,S$GLB,, | Performed by: COLON & RECTAL SURGERY

## 2024-10-22 PROCEDURE — 1125F AMNT PAIN NOTED PAIN PRSNT: CPT | Mod: CPTII,S$GLB,, | Performed by: COLON & RECTAL SURGERY

## 2024-10-22 PROCEDURE — 3044F HG A1C LEVEL LT 7.0%: CPT | Mod: CPTII,S$GLB,, | Performed by: COLON & RECTAL SURGERY

## 2024-10-22 PROCEDURE — 3078F DIAST BP <80 MM HG: CPT | Mod: CPTII,S$GLB,, | Performed by: COLON & RECTAL SURGERY

## 2024-10-22 PROCEDURE — 3075F SYST BP GE 130 - 139MM HG: CPT | Mod: CPTII,S$GLB,, | Performed by: COLON & RECTAL SURGERY

## 2024-10-22 PROCEDURE — 3288F FALL RISK ASSESSMENT DOCD: CPT | Mod: CPTII,S$GLB,, | Performed by: COLON & RECTAL SURGERY

## 2024-10-22 PROCEDURE — 1159F MED LIST DOCD IN RCRD: CPT | Mod: CPTII,S$GLB,, | Performed by: COLON & RECTAL SURGERY

## 2024-10-22 PROCEDURE — 3008F BODY MASS INDEX DOCD: CPT | Mod: CPTII,S$GLB,, | Performed by: COLON & RECTAL SURGERY

## 2024-10-22 NOTE — PROGRESS NOTES
CRS Office Visit Follow-up  Referring Md:   No referring provider defined for this encounter.    SUBJECTIVE:     Chief Complaint: diverticulitis    History of Present Illness:  Patient is a 69 y.o. female presents with diverticulitis. The patient is a established patient to this practice.     Course is as follows:  PMHx of GERD/PUD, HTN, CAD, cirrhosis, diverticulitis, hypothyroidism, hepatic steatosis, obesity, vertigo, and anxiety.    She presents for evaluation for recurrent diverticular disease.  First episode of diverticulitis was well over 10 years ago and resulted in a 27 day hospital stay.  Since that time, she has had intermittent episodes treated mostly with outpatient antibiotics.  Past abdominal surgery of a pancreatic debridement for through a upper transverse incision many years ago.  She also had a hysterectomy through a Pfannenstiel incision.  Her activity level is limited secondary to knee pain.  No prior heart attack or stroke.  Known history esophageal varices requiring banding.  She quit alcohol 3 years ago.    Last Colonoscopy: 12/2023 - A single non-bleeding colonic angioectasia.                          - Diverticulosis in the sigmoid colon and in the                          descending colon.                          - No specimens collected.   Family history of colorectal cancer or IBD: no.    Review of Systems:  Review of Systems   Constitutional:  Negative for chills, diaphoresis, fever, malaise/fatigue and weight loss.   HENT:  Negative for congestion.    Respiratory:  Negative for shortness of breath.    Cardiovascular:  Negative for chest pain and leg swelling.   Gastrointestinal:  Positive for abdominal pain. Negative for blood in stool, constipation, nausea and vomiting.   Genitourinary:  Negative for dysuria.   Musculoskeletal:  Negative for back pain and myalgias.   Skin:  Negative for rash.   Neurological:  Negative for dizziness and weakness.   Endo/Heme/Allergies:  Does not  "bruise/bleed easily.   Psychiatric/Behavioral:  Negative for depression.        OBJECTIVE:     Vital Signs (Most Recent)  /74 (BP Location: Left arm, Patient Position: Sitting)   Pulse 110   Ht 5' 1" (1.549 m)   Wt 110 kg (242 lb 8.1 oz)   LMP  (LMP Unknown)   BMI 45.82 kg/m²     Physical Exam:  General: Black or  female in no distress   Neuro: alert and oriented x 4.  Moves all extremities.     HEENT: no icterus.  Trachea midline  Respiratory: respirations are even and unlabored  Cardiac: regular rate  Abdomen:  Large ever transverse incision well healed.  Obese.  Soft, nontender  Extremities: Warm dry and intact  Skin: no rashes  Anorectal:  Deferred    Labs: H&H 12 and 38.  Alb 3.6.  normal renal function with Creatinine of 1.1    Imaging:   CT abd pelvis 6/9/2019: Distal colonic diverticulosis with mild adjacent inflammatory changes suggesting mild diverticulitis   CTA abd pelvis 12/19/2023: Slight inflammatory change and wall thickening in a region of diverticular disease in the distal descending and sigmoid colon concerning for early/developing acute diverticulitis.   CT abd pelvis 1/8/24: Slight wall thickening and questionable inflammatory change in a region of diverticular disease in the distal descending/sigmoid colon with slight indistinctness of a few diverticula.   CT abd/pelvis 1/30/24:  Diverticulosis coli of the sigmoid colon with bowel wall thickening and trace adjacent fat stranding.  Findings are suggestive of mild acute diverticulitis without evidence of perforation or abscess.  CT abd pelvis 8/14/24: Findings most consistent with acute diverticulitis involving the distal descending colon       ASSESSMENT/PLAN:     Diagnoses and all orders for this visit:    Diverticulitis        69 y.o. female with recurrent uncomplicated diverticulitis.  PMHx of GERD/PUD, HTN, CAD, cirrhosis, diverticulitis, hypothyroidism, hepatic steatosis, obesity, vertigo, and anxiety.   She had 1 " episode of significant complicated diverticulitis requiring a prolonged hospital stay 10 years ago.    Discussed 2 options:  1) ongoing conservative therapy.  Discussed that most of her episodes have been responsive to the antibiotics.  She is likely to have recurrent episodes but they are similar to her prior episodes.  2) laparoscopic possible open segmental resection.  We discussed that this may be challenging secondary to her history of necrotizing pancreatitis.  We discussed the risks of anastomotic leak, bleeding, change in her bowel habits.  She may be a good candidate for robotic sigmoid colectomy given her abdominal size    Since she is not having significant were improvement on her quality of life, recommended against resection at this time.  If her symptoms start to worsen over time, she will let me know and we can then further discuss likely robotic resection    ALEX Rubio MD, FACS, FASCRS  Staff Surgeon  Colon & Rectal Surgery

## 2024-10-31 ENCOUNTER — OFFICE VISIT (OUTPATIENT)
Dept: OPTOMETRY | Facility: CLINIC | Age: 69
End: 2024-10-31
Payer: MEDICARE

## 2024-10-31 DIAGNOSIS — H52.203 HYPEROPIA OF BOTH EYES WITH ASTIGMATISM AND PRESBYOPIA: ICD-10-CM

## 2024-10-31 DIAGNOSIS — H43.823 VITREOMACULAR ADHESION OF BOTH EYES: ICD-10-CM

## 2024-10-31 DIAGNOSIS — H53.15 VISUAL DISTORTIONS OF SHAPE AND SIZE: ICD-10-CM

## 2024-10-31 DIAGNOSIS — H52.03 HYPEROPIA OF BOTH EYES WITH ASTIGMATISM AND PRESBYOPIA: ICD-10-CM

## 2024-10-31 DIAGNOSIS — H52.4 HYPEROPIA OF BOTH EYES WITH ASTIGMATISM AND PRESBYOPIA: ICD-10-CM

## 2024-10-31 DIAGNOSIS — H25.13 CATARACT, NUCLEAR SCLEROTIC, BOTH EYES: Primary | ICD-10-CM

## 2024-10-31 PROCEDURE — 1101F PT FALLS ASSESS-DOCD LE1/YR: CPT | Mod: CPTII,S$GLB,, | Performed by: OPTOMETRIST

## 2024-10-31 PROCEDURE — 1126F AMNT PAIN NOTED NONE PRSNT: CPT | Mod: CPTII,S$GLB,, | Performed by: OPTOMETRIST

## 2024-10-31 PROCEDURE — 99999 PR PBB SHADOW E&M-EST. PATIENT-LVL II: CPT | Mod: PBBFAC,,, | Performed by: OPTOMETRIST

## 2024-10-31 PROCEDURE — 92134 CPTRZ OPH DX IMG PST SGM RTA: CPT | Mod: S$GLB,,, | Performed by: OPTOMETRIST

## 2024-10-31 PROCEDURE — 1160F RVW MEDS BY RX/DR IN RCRD: CPT | Mod: CPTII,S$GLB,, | Performed by: OPTOMETRIST

## 2024-10-31 PROCEDURE — 1159F MED LIST DOCD IN RCRD: CPT | Mod: CPTII,S$GLB,, | Performed by: OPTOMETRIST

## 2024-10-31 PROCEDURE — 92004 COMPRE OPH EXAM NEW PT 1/>: CPT | Mod: S$GLB,,, | Performed by: OPTOMETRIST

## 2024-10-31 PROCEDURE — 3288F FALL RISK ASSESSMENT DOCD: CPT | Mod: CPTII,S$GLB,, | Performed by: OPTOMETRIST

## 2024-10-31 PROCEDURE — 3044F HG A1C LEVEL LT 7.0%: CPT | Mod: CPTII,S$GLB,, | Performed by: OPTOMETRIST

## 2024-11-05 ENCOUNTER — OFFICE VISIT (OUTPATIENT)
Dept: OPHTHALMOLOGY | Facility: CLINIC | Age: 69
End: 2024-11-05
Payer: MEDICARE

## 2024-11-05 DIAGNOSIS — H25.11 NUCLEAR SCLEROTIC CATARACT OF RIGHT EYE: Primary | ICD-10-CM

## 2024-11-05 DIAGNOSIS — H25.13 CATARACT, NUCLEAR SCLEROTIC, BOTH EYES: ICD-10-CM

## 2024-11-05 DIAGNOSIS — H25.12 NUCLEAR SCLEROTIC CATARACT OF LEFT EYE: ICD-10-CM

## 2024-11-05 PROCEDURE — 3288F FALL RISK ASSESSMENT DOCD: CPT | Mod: CPTII,S$GLB,, | Performed by: OPHTHALMOLOGY

## 2024-11-05 PROCEDURE — 92136 OPHTHALMIC BIOMETRY: CPT | Mod: RT,S$GLB,, | Performed by: OPHTHALMOLOGY

## 2024-11-05 PROCEDURE — 99999 PR PBB SHADOW E&M-EST. PATIENT-LVL II: CPT | Mod: PBBFAC,,, | Performed by: OPHTHALMOLOGY

## 2024-11-05 PROCEDURE — 1101F PT FALLS ASSESS-DOCD LE1/YR: CPT | Mod: CPTII,S$GLB,, | Performed by: OPHTHALMOLOGY

## 2024-11-05 PROCEDURE — 1126F AMNT PAIN NOTED NONE PRSNT: CPT | Mod: CPTII,S$GLB,, | Performed by: OPHTHALMOLOGY

## 2024-11-05 PROCEDURE — 3044F HG A1C LEVEL LT 7.0%: CPT | Mod: CPTII,S$GLB,, | Performed by: OPHTHALMOLOGY

## 2024-11-05 PROCEDURE — 1159F MED LIST DOCD IN RCRD: CPT | Mod: CPTII,S$GLB,, | Performed by: OPHTHALMOLOGY

## 2024-11-05 PROCEDURE — 99204 OFFICE O/P NEW MOD 45 MIN: CPT | Mod: S$GLB,,, | Performed by: OPHTHALMOLOGY

## 2024-11-05 RX ORDER — KETOROLAC TROMETHAMINE 5 MG/ML
1 SOLUTION OPHTHALMIC 3 TIMES DAILY
Qty: 5 ML | Refills: 3 | Status: SHIPPED | OUTPATIENT
Start: 2024-11-05

## 2024-11-05 RX ORDER — OFLOXACIN 3 MG/ML
1 SOLUTION/ DROPS OPHTHALMIC 3 TIMES DAILY
Qty: 5 ML | Refills: 3 | Status: SHIPPED | OUTPATIENT
Start: 2024-11-05

## 2024-11-05 RX ORDER — PREDNISOLONE ACETATE 10 MG/ML
1 SUSPENSION/ DROPS OPHTHALMIC 3 TIMES DAILY
Qty: 5 ML | Refills: 3 | Status: SHIPPED | OUTPATIENT
Start: 2024-11-05

## 2024-11-05 NOTE — PROGRESS NOTES
HPI    Referred by Dr. 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  Pt. Have trouble seeing at distance and reading small print at near.  Pt. Denies floaters, flashes,  pain or discomfort.    Last edited by Vanessa Castellon on 11/5/2024 10:08 AM.            Assessment /Plan     For exam results, see Encounter Report.    Nuclear sclerotic cataract of right eye    Cataract, nuclear sclerotic, both eyes  -     Ambulatory referral/consult to Ophthalmology  -     IOL Master - OD - Right Eye    Nuclear sclerotic cataract of left eye                        Visually significant nuclear sclerotic cataract    - Cataracts are interfering with activities of daily living, including night time driving.  - Pt desires cataract surgery for visual rehabilitation.   - Risks / benefits/ alternatives were discussed and patient agrees to proceed with surgery.   - IOL options discussed as well, according to patient's goals and concomitant ocular pathology.  - Target: plano.      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

## 2024-11-09 ENCOUNTER — HOSPITAL ENCOUNTER (OUTPATIENT)
Dept: RADIOLOGY | Facility: HOSPITAL | Age: 69
Discharge: HOME | End: 2024-11-09
Attending: FAMILY MEDICINE
Payer: MEDICARE

## 2024-11-09 DIAGNOSIS — Z12.31 BREAST CANCER SCREENING BY MAMMOGRAM: ICD-10-CM

## 2024-11-09 PROCEDURE — 77063 BREAST TOMOSYNTHESIS BI: CPT | Mod: TC

## 2024-11-15 ENCOUNTER — TELEPHONE (OUTPATIENT)
Dept: PRIMARY CARE CLINIC | Facility: CLINIC | Age: 69
End: 2024-11-15
Payer: MEDICARE

## 2024-11-15 DIAGNOSIS — R42 DIZZINESS: Primary | ICD-10-CM

## 2024-11-15 NOTE — TELEPHONE ENCOUNTER
Spoke with the pharmacy and I informed them that prescription states that the patient should take gabapentin 2 times daily. Pt was given a 90 day supple on 10/16 and is currently out of medications. Pt stated that Dr. Hernández was suppose to change her order to more than 2 times daily.

## 2024-11-15 NOTE — TELEPHONE ENCOUNTER
----- Message from Christal sent at 11/15/2024  4:06 PM CST -----  Contact: [phone]  [email or web address]     Patient is calling for Medical Advice regarding:pt is calling she is at the pharmacy and has questions about her gabapentin (NEURONTIN) 600 MG tablet 90 tablet. She is questioning the dosage.  Or the pharmacy. Please call her back and advise.      How long has patient had these symptoms:    Pharmacy name and phone#:  [id number] - [place] - [address] [place] [address]  Phone: [phone] Fax: [phone]    Patient wants a call back or thru [facility]:callback    Comments:    Please advise patient replies from provider may take up to 48 hours.

## 2024-11-25 DIAGNOSIS — M54.31 SCIATICA OF RIGHT SIDE: ICD-10-CM

## 2024-11-25 RX ORDER — GABAPENTIN 600 MG/1
600 TABLET ORAL 3 TIMES DAILY
Qty: 90 TABLET | Refills: 5 | Status: SHIPPED | OUTPATIENT
Start: 2024-11-25

## 2024-11-25 NOTE — TELEPHONE ENCOUNTER
Called and spoke with patient, she states her gabapentin was sent wrong and she was running out. It is suppose to be 1 po tid for a 30 day supply.

## 2024-11-25 NOTE — TELEPHONE ENCOUNTER
----- Message from Sakina sent at 11/25/2024 10:40 AM CST -----  Contact: [phone] Patient  Second attempt to contact    Type: Returning a call    Who left a message? Shanda    When did the practice call? 11/15/2024    Does patient know what this is regarding: gabapentin RX    Would the patient rather a call back or a response via [facility]? Call back    Comments: ASAP very important

## 2024-11-25 NOTE — TELEPHONE ENCOUNTER
No care due was identified.  [facility] Embedded Care Due Messages. Reference number: [id number].   11/25/2024 11:10:17 AM CST

## 2024-11-26 ENCOUNTER — TELEPHONE (OUTPATIENT)
Dept: PRIMARY CARE CLINIC | Facility: CLINIC | Age: 69
End: 2024-11-26
Payer: MEDICARE

## 2024-11-26 NOTE — TELEPHONE ENCOUNTER
----- Message from Jose Hernández MD sent at 11/21/2024  8:13 PM CST -----  Call tell pt mammogram WNL no new tx

## 2024-12-03 ENCOUNTER — NURSE TRIAGE (OUTPATIENT)
Dept: ADMINISTRATIVE | Facility: CLINIC | Age: 69
End: 2024-12-03
Payer: MEDICARE

## 2024-12-03 NOTE — TELEPHONE ENCOUNTER
"Pt is calling regarding a bad cough with associated chest and back pain. Dry cough. Temp 99.2. cough started around 3-4am this morning. She is reporting some chest pain when not coughing as well as "severe" chest pain with coughing. Pt also c/o SOB unrelated to coughing while resting.    Dispo- go to Ed now. Pt advised and VU.  Reason for Disposition   [1] MODERATE difficulty breathing (e.g., speaks in phrases, SOB even at rest, pulse 100-120) AND [2] still present when not coughing    Additional Information   Negative: SEVERE difficulty breathing (e.g., struggling for each breath, speaks in single words)   Negative: Bluish (or gray) lips or face now   Negative: [1] Rapid onset of cough AND [2] has hives   Negative: Coughing started suddenly after medicine, an allergic food or bee sting   Negative: [1] Difficulty breathing AND [2] exposure to flames, smoke, or fumes   Negative: [1] Stridor AND [2] difficulty breathing   Negative: Sounds like a life-threatening emergency to the triager    Protocols used: Cough - Acute Non-Productive-A-AH    "

## 2024-12-04 NOTE — H&P
History    Chief complaint:  Painless progressive vision loss    Present Ilness/Diagnosis: Nuclear sclerotic Cataract    Past Medical History:  has a past medical history of Acute right-sided back pain (06/16/2023), Anemia due to chronic blood loss (03/06/2020), Anemia of chronic disorder (08/24/2020), Anxiety, Cirrhosis, Coronary artery disease, Diverticulosis, Gastric ulcer, GERD (gastroesophageal reflux disease), Hepatic steatosis (09/12/2016), Hyperbilirubinemia, Hypertension, Hypothyroidism, Iron deficiency anemia due to chronic blood loss (08/24/2020), Microcytic anemia (08/24/2020), Obesity, Peptic ulcer disease (09/12/2016), UPJ (ureteropelvic junction) obstruction, Vertigo, and Vertigo.    Family History/Social History: refer to chart    Allergies:   Review of patient's allergies indicates:   Allergen Reactions    Codeine Itching    Iodine and iodide containing products Itching       Current Medications: No current facility-administered medications for this encounter.    Current Outpatient Medications:     cyclobenzaprine (FLEXERIL) 10 MG tablet, Take 1 tablet by mouth three times daily as needed, Disp: 60 tablet, Rfl: 5    gabapentin (NEURONTIN) 600 MG tablet, Take 1 tablet (600 mg total) by mouth 3 (three) times daily., Disp: 90 tablet, Rfl: 5    HYDROcodone-acetaminophen (NORCO) 5-325 mg per tablet, Take 1 tablet by mouth every 12 (twelve) hours as needed for Pain. (Patient not taking: Reported on 10/22/2024), Disp: 14 tablet, Rfl: 0    ketorolac 0.5% (ACULAR) 0.5 % Drop, Place 1 drop into the right eye 3 (three) times daily., Disp: 5 mL, Rfl: 3    meclizine (ANTIVERT) 25 mg tablet, Take 1 tablet (25 mg total) by mouth 3 (three) times daily as needed., Disp: 60 tablet, Rfl: 5    meloxicam (MOBIC) 15 MG tablet, Take 1 tablet by mouth once daily (Patient not taking: Reported on 10/22/2024), Disp: 30 tablet, Rfl: 5    ofloxacin (OCUFLOX) 0.3 % ophthalmic solution, Place 1 drop into the right eye 3 (three)  times daily., Disp: 5 mL, Rfl: 3    pantoprazole (PROTONIX) 40 MG tablet, Take 1 tablet (40 mg total) by mouth 2 (two) times daily., Disp: 180 tablet, Rfl: 1    prednisoLONE acetate (PRED FORTE) 1 % DrpS, Place 1 drop into the right eye 3 (three) times daily., Disp: 5 mL, Rfl: 3    Facility-Administered Medications Ordered in Other Encounters:     0.9%  NaCl infusion, , Intravenous, Continuous, Antwon Gardner MD    0.9%  NaCl infusion, , Intravenous, Continuous, Antwon Gardner MD    0.9%  NaCl infusion, , Intravenous, Continuous, Antwon Gardner MD    lactated ringers bolus 1,000 mL, 1,000 mL, Intravenous, Once, Antwon Gardner MD    LIDOcaine (PF) 10 mg/ml (1%) injection 10 mg, 1 mL, Intradermal, Once PRN, Antwon Gardner MD    ASA Score: II     Mallampati Score: II     Plan for Sedation: Moderate Sedation     Patient or Family History of Anesthesia problems : No    Physical Exam    BP: Vital signs stable  General: No apparent distress  HEENT: nuclear sclerotic cataract  Lungs: adequate respirations  Heart: + pulses  Abdomen: soft  Rectal/pelvic: deferred    Impression: Visually significant Cataract.    See previous clinic notes for surgical indications.    Plan: Phacoemulsification with implantation of Intraocular lens

## 2024-12-05 ENCOUNTER — TELEPHONE (OUTPATIENT)
Dept: OPHTHALMOLOGY | Facility: CLINIC | Age: 69
End: 2024-12-05
Payer: MEDICARE

## 2024-12-05 ENCOUNTER — NURSE TRIAGE (OUTPATIENT)
Dept: ADMINISTRATIVE | Facility: CLINIC | Age: 69
End: 2024-12-05
Payer: MEDICARE

## 2024-12-05 NOTE — TELEPHONE ENCOUNTER
M  arrival time of 10:30 for sx on 12/9/24 with Dr. Figueroa @ [place]. Nothing  to eat or drink after midnight  on Sunday and start eyedrops on Saturday.

## 2024-12-05 NOTE — TELEPHONE ENCOUNTER
Patient's call transferred from [facility] due to patient's symptoms. Patient contacted the [facility] on 12/03/24 for c/o SOB and Coughing. Patient states she was advised to Go to ED but did not adhere to care advice. Patient currently c/o Coughing, Heavy Chest Pain > 5 minutes when not coughing ratted 7/10 and Back Pain rated 8/10 at this time.     Patient advised to Call EMS/911 for immediate medical attention. Patient also advised to contact the [facility] for any worsening symptoms. Patient states understanding of care advice.     Reason for Disposition   Chest pain lasting longer than 5 minutes and pain is crushing, pressure-like, or heavy    Additional Information   Negative: SEVERE difficulty breathing (e.g., struggling for each breath, speaks in single words)   Negative: Difficult to awaken or acting confused (e.g., disoriented, slurred speech)   Negative: Shock suspected (e.g., cold/pale/clammy skin, too weak to stand, low BP, rapid pulse)   Negative: Passed out (e.g., fainted, lost consciousness, blacked out and was not responding)   Negative: Chest pain lasting longer than 5 minutes and over 44 years old   Negative: Chest pain lasting longer than 5 minutes, over 30 years old, and at least one cardiac risk factor (e.g., diabetes mellitus, high blood pressure, high cholesterol, obesity with BMI 30 or higher, smoker, or strong family history of heart disease)   Negative: Chest pain lasting longer than 5 minutes and history of heart disease (i.e., angina, heart attack, heart failure, bypass surgery, takes nitroglycerin)    Protocols used: Chest Pain-A-OH

## 2024-12-06 ENCOUNTER — TELEPHONE (OUTPATIENT)
Dept: OPHTHALMOLOGY | Facility: CLINIC | Age: 69
End: 2024-12-06
Payer: MEDICARE

## 2024-12-06 NOTE — PRE-PROCEDURE INSTRUCTIONS
Patient reviewed on 12/6/2024.  Okay to proceed at [facility]. The following pre-procedure instructions and arrival time have been reviewed with patient via phone.  Patient verbalized an understanding.  Pt to be accompanied by her daughter day of procedure as responsible .    Dear Lo    Below you will find basic pre-procedure instructions in preparation for your procedure on 12/9/24 with Dr. Figueroa          You should receive your arrival time within 24-48 hours of your scheduled procedure date from the  at your surgeon's office.    -Nothing to eat or drink after midnight the night before your procedure until after your procedure, except AM meds with small sips of water.     - HOLD all oral Diabetic medications night before and morning of procedure  - HOLD all Insulin morning of procedure  - HOLD all Fluid pills morning of procedure  - HOLD all non-insulin shots until after surgery (Ozempic, Mounjaro, Trulicity, Victoza, Byetta, Wegovy and Adlyxin) (7 days prior)  - HOLD all vitamins, minerals and herbal supplements morning of procedure   - TAKE all B/P meds, EXCEPT those that contain a fluid pill (ex. Lasix, Hydroclorothiazide/HCTZ, Spirnolactone)  - USE inhalers as needed and bring AM of surgery  - USE EYE DROPS as directed  -TAKE blood thinner meds AM of surgery unless otherwise instructed by your provider to not take     - Shower and wash face with antibacterial soap (ex. Dial) for 3 mins PM prior and AM of surgery  - No powder, lotions, creams, oils, gels, ointments, makeup,  or jewelry    - Wear comfortable clothing (button up shirt)     (Patient is required to have a responsible ride to transport home, ride may not leave while patient is in surgery)     -- [facility], 2nd floor Surgery Center, located   @ [address] [place] [address]  2nd Floor Registration      If you have any questions or concerns please feel free to contact your surgeon's  office.        Please reply to this message as receipt of delivery.    Catina, LPN [facility]  Pre-Admit - Anesthesia Dept

## 2024-12-09 ENCOUNTER — HOSPITAL ENCOUNTER (OUTPATIENT)
Facility: HOSPITAL | Age: 69
Discharge: HOME | End: 2024-12-09
Attending: OPHTHALMOLOGY | Admitting: OPHTHALMOLOGY
Payer: MEDICARE

## 2024-12-09 VITALS
OXYGEN SATURATION: 95 % | HEIGHT: 60 IN | HEART RATE: 75 BPM | DIASTOLIC BLOOD PRESSURE: 70 MMHG | TEMPERATURE: 98 F | RESPIRATION RATE: 19 BRPM | BODY MASS INDEX: 49.67 KG/M2 | SYSTOLIC BLOOD PRESSURE: 141 MMHG | WEIGHT: 253 LBS

## 2024-12-09 DIAGNOSIS — H25.10 AGE-RELATED NUCLEAR CATARACT: ICD-10-CM

## 2024-12-09 DIAGNOSIS — H25.11 NUCLEAR SCLEROTIC CATARACT OF RIGHT EYE: Primary | ICD-10-CM

## 2024-12-09 PROCEDURE — 36000706: Performed by: OPHTHALMOLOGY

## 2024-12-09 PROCEDURE — 94761 N-INVAS EAR/PLS OXIMETRY MLT: CPT

## 2024-12-09 PROCEDURE — V2632 POST CHMBR INTRAOCULAR LENS: HCPCS | Performed by: OPHTHALMOLOGY

## 2024-12-09 PROCEDURE — 66984 XCAPSL CTRC RMVL W/O ECP: CPT | Mod: RT,,, | Performed by: OPHTHALMOLOGY

## 2024-12-09 PROCEDURE — 27201423 OPTIME MED/SURG SUP & DEVICES STERILE SUPPLY: Performed by: OPHTHALMOLOGY

## 2024-12-09 PROCEDURE — 71000015 HC POSTOP RECOV 1ST HR: Performed by: OPHTHALMOLOGY

## 2024-12-09 PROCEDURE — 99152 MOD SED SAME PHYS/QHP 5/>YRS: CPT | Mod: ,,, | Performed by: OPHTHALMOLOGY

## 2024-12-09 PROCEDURE — 94799 UNLISTED PULMONARY SVC/PX: CPT

## 2024-12-09 PROCEDURE — 99900035 HC TECH TIME PER 15 MIN (STAT)

## 2024-12-09 PROCEDURE — 25000003 PHARM REV CODE 250: Performed by: OPHTHALMOLOGY

## 2024-12-09 PROCEDURE — 36000707: Performed by: OPHTHALMOLOGY

## 2024-12-09 PROCEDURE — 63600175 PHARM REV CODE 636 W HCPCS: Performed by: OPHTHALMOLOGY

## 2024-12-09 DEVICE — LENS EYHANCE +21.0D: Type: IMPLANTABLE DEVICE | Site: EYE | Status: FUNCTIONAL

## 2024-12-09 RX ORDER — MOXIFLOXACIN 5 MG/ML
1 SOLUTION/ DROPS OPHTHALMIC
Status: COMPLETED | OUTPATIENT
Start: 2024-12-09 | End: 2024-12-09

## 2024-12-09 RX ORDER — TETRACAINE HYDROCHLORIDE 5 MG/ML
1 SOLUTION OPHTHALMIC EVERY 5 MIN PRN
Status: COMPLETED | OUTPATIENT
Start: 2024-12-09 | End: 2024-12-09

## 2024-12-09 RX ORDER — CYCLOP/TROP/PROPA/PHEN/KET/WAT 1-1-0.1%
1 DROPS (EA) OPHTHALMIC (EYE)
Status: DISCONTINUED | OUTPATIENT
Start: 2024-12-09 | End: 2024-12-09

## 2024-12-09 RX ORDER — PROPARACAINE HYDROCHLORIDE 5 MG/ML
SOLUTION/ DROPS OPHTHALMIC
Status: DISCONTINUED | OUTPATIENT
Start: 2024-12-09 | End: 2024-12-09 | Stop reason: HOSPADM

## 2024-12-09 RX ORDER — MIDAZOLAM HYDROCHLORIDE 1 MG/ML
2 INJECTION, SOLUTION INTRAMUSCULAR; INTRAVENOUS
Status: COMPLETED | OUTPATIENT
Start: 2024-12-09 | End: 2024-12-09

## 2024-12-09 RX ORDER — PROPARACAINE HYDROCHLORIDE 5 MG/ML
1 SOLUTION/ DROPS OPHTHALMIC
Status: DISCONTINUED | OUTPATIENT
Start: 2024-12-09 | End: 2024-12-09 | Stop reason: HOSPADM

## 2024-12-09 RX ORDER — LIDOCAINE HYDROCHLORIDE 40 MG/ML
INJECTION, SOLUTION RETROBULBAR
Status: DISCONTINUED | OUTPATIENT
Start: 2024-12-09 | End: 2024-12-09 | Stop reason: HOSPADM

## 2024-12-09 RX ORDER — PREDNISOLONE ACETATE 10 MG/ML
SUSPENSION/ DROPS OPHTHALMIC
Status: DISCONTINUED | OUTPATIENT
Start: 2024-12-09 | End: 2024-12-09 | Stop reason: HOSPADM

## 2024-12-09 RX ORDER — FENTANYL CITRATE 50 UG/ML
25 INJECTION, SOLUTION INTRAMUSCULAR; INTRAVENOUS EVERY 5 MIN PRN
Status: DISCONTINUED | OUTPATIENT
Start: 2024-12-09 | End: 2024-12-09 | Stop reason: HOSPADM

## 2024-12-09 RX ORDER — ACETAMINOPHEN 325 MG/1
650 TABLET ORAL EVERY 4 HOURS PRN
Status: DISCONTINUED | OUTPATIENT
Start: 2024-12-09 | End: 2024-12-09 | Stop reason: HOSPADM

## 2024-12-09 RX ORDER — LIDOCAINE HYDROCHLORIDE 10 MG/ML
INJECTION, SOLUTION EPIDURAL; INFILTRATION; INTRACAUDAL; PERINEURAL
Status: DISCONTINUED | OUTPATIENT
Start: 2024-12-09 | End: 2024-12-09 | Stop reason: HOSPADM

## 2024-12-09 RX ORDER — MOXIFLOXACIN 5 MG/ML
SOLUTION/ DROPS OPHTHALMIC
Status: DISCONTINUED | OUTPATIENT
Start: 2024-12-09 | End: 2024-12-09 | Stop reason: HOSPADM

## 2024-12-09 RX ORDER — TROPICAMIDE 10 MG/ML
1 SOLUTION/ DROPS OPHTHALMIC EVERY 5 MIN PRN
Status: COMPLETED | OUTPATIENT
Start: 2024-12-09 | End: 2024-12-09

## 2024-12-09 RX ORDER — PHENYLEPHRINE HYDROCHLORIDE 25 MG/ML
1 SOLUTION/ DROPS OPHTHALMIC EVERY 5 MIN PRN
Status: COMPLETED | OUTPATIENT
Start: 2024-12-09 | End: 2024-12-09

## 2024-12-09 RX ADMIN — MOXIFLOXACIN 1 DROP: 5 SOLUTION/ DROPS OPHTHALMIC at 12:12

## 2024-12-09 RX ADMIN — ACETAMINOPHEN 650 MG: 325 TABLET ORAL at 12:12

## 2024-12-09 RX ADMIN — MOXIFLOXACIN OPHTHALMIC 1 DROP: 5 SOLUTION/ DROPS OPHTHALMIC at 10:12

## 2024-12-09 RX ADMIN — TETRACAINE HYDROCHLORIDE 1 DROP: 5 SOLUTION OPHTHALMIC at 10:12

## 2024-12-09 RX ADMIN — MIDAZOLAM HYDROCHLORIDE 1 MG: 1 INJECTION, SOLUTION INTRAMUSCULAR; INTRAVENOUS at 12:12

## 2024-12-09 RX ADMIN — PHENYLEPHRINE HYDROCHLORIDE 1 DROP: 25 SOLUTION/ DROPS OPHTHALMIC at 10:12

## 2024-12-09 RX ADMIN — MIDAZOLAM HYDROCHLORIDE 2 MG: 1 INJECTION, SOLUTION INTRAMUSCULAR; INTRAVENOUS at 12:12

## 2024-12-09 RX ADMIN — TROPICAMIDE 1 DROP: 10 SOLUTION/ DROPS OPHTHALMIC at 10:12

## 2024-12-09 RX ADMIN — PROPARACAINE HYDROCHLORIDE 1 DROP: 5 SOLUTION/ DROPS OPHTHALMIC at 12:12

## 2024-12-09 RX ADMIN — FENTANYL CITRATE 25 MCG: 50 INJECTION, SOLUTION INTRAMUSCULAR; INTRAVENOUS at 12:12

## 2024-12-09 NOTE — DISCHARGE SUMMARY
[facility]  Discharge Note  Short Stay    Procedure(s) (LRB):  EXTRACTION, CATARACT, WITH IOL INSERTION (Right)  BRIEF DISCHARGE NOTE:    Date of discharge: 12/09/2024    Reason for hospitalization -  Cataract surgery     Final Diagnosis - Visually significant Cataract    Procedures and treatment provided - Status post phacoemulsification with placement of intraocular lens     Diet - Advance to regular as tolerated    Activity - as tolerated    Disposition at the end of the case - Good.    Discharge: to home    The patient tolerated the procedure well and knows to follow up with me tomorrow in the eye clinic, sooner if needed.    Patient and family instructions (as appropriate) - Given to patient on discharge    Elidia Figueroa MD

## 2024-12-09 NOTE — OP NOTE
DATE OF PROCEDURE: 12/09/2024    SURGEON: VIVIANE WALLACE MD    PREOPERATIVE DIAGNOSIS:  Senile nuclear sclerotic cataract right eye.     POSTOPERATIVE DIAGNOSIS: Senile nuclear sclerotic cataract right eye.     PROCEDURE PERFORMED:  Phacoemulsification with placement of intraocular lens, right eye.    IMPLANT:  DIBOO  21.0    Anesthesia: Moderate sedation with topical Lidocaine. Patient ID confirmed and re-evaluated the patient and anesthesia plan confirming it is suitable for the patient's condition and procedure.     COMPLICATIONS: none    ESTIMATED BLOOD LOSS: <1cc    SPECIMENS: none    INDICATIONS FOR PROCEDURE:   The patient has a history of painless progressive vision loss.  The patient has described difficulties with activities of daily living, which specifically include driving, which is secondary to cataract formation and progression. After we had a thorough discussion about risks, benefits, and alternatives to cataract surgery, the patient agreed to proceed with phacoemulsification and implantation of a lens in the right eye.  These risks include, but are not limited to, hemorrhage, pain, infection, need for additional surgery, need for glasses or contacts, loss of vision, or even loss of the eye.    PROCEDURE IN DETAIL:  The patient was met in the preop holding area.  Consent was confirmed to be signed.  The operative site was marked.  The patient was brought into the operating room by the anesthesia team and placed under monitored anesthesia care.  The right eye was prepped and draped in a sterile ophthalmic fashion.  A Colin speculum was placed into the right eye.   A paracentesis site was made and 1% preservative-free lidocaine was injected into the anterior chamber.  Viscoelastic  material was injected into the anterior chamber.  A keratome blade was used to make a clear corneal incision.  A cystotome was used to initiate the continuous curvilinear capsulorrhexis which was completed with Utrata  forceps.  BSS on a knott cannula was used to perform hydrodissection.  The phacoemulsification tip was introduced into the eye and the nucleus was removed in a standard divide-and-conquer fashion.  Remaining cortical material was removed from the eye using irrigation-aspiration.  The capsular bag was filled with viscoelastic material and the intraocular lens was injected and positioned into place. Remaining viscoelastic material was removed from the eye using irrigation and aspiration.  The corneal wounds were hydrated.  The eye was filled to physiologic pressure. The wounds were found to be watertight. Drops of Vigamox and prednisilone were placed into the eye.  The eye was washed, dried, and shielded.  The patient tolerated the procedure well and knows to follow up with me tomorrow morning, sooner if needed.      Under my direct supervision, intravenous moderate sedation was administered during the course of this procedure, with continuous monitoring of hemodynamic parameters.  Monitoring of the patient's vital signs and respiratory status was provided by trained nursing staff during the entire course of the procedure and under my supervision and recorded in the patient's medical record.  The total time for sedation was 15 minutes.

## 2024-12-09 NOTE — PLAN OF CARE
Patient given discharge instructions with verbalization of all instructions. IV discontinued. Patient discharged to lobby with .

## 2024-12-10 ENCOUNTER — OFFICE VISIT (OUTPATIENT)
Dept: OPHTHALMOLOGY | Facility: CLINIC | Age: 69
End: 2024-12-10
Payer: MEDICARE

## 2024-12-10 DIAGNOSIS — Z98.41 STATUS POST CATARACT EXTRACTION AND INSERTION OF INTRAOCULAR LENS, RIGHT: Primary | ICD-10-CM

## 2024-12-10 DIAGNOSIS — H25.12 NUCLEAR SCLEROSIS OF LEFT EYE: ICD-10-CM

## 2024-12-10 DIAGNOSIS — Z96.1 STATUS POST CATARACT EXTRACTION AND INSERTION OF INTRAOCULAR LENS, RIGHT: Primary | ICD-10-CM

## 2024-12-10 PROCEDURE — 3044F HG A1C LEVEL LT 7.0%: CPT | Mod: CPTII,S$GLB,, | Performed by: OPHTHALMOLOGY

## 2024-12-10 PROCEDURE — 3288F FALL RISK ASSESSMENT DOCD: CPT | Mod: CPTII,S$GLB,, | Performed by: OPHTHALMOLOGY

## 2024-12-10 PROCEDURE — 99024 POSTOP FOLLOW-UP VISIT: CPT | Mod: S$GLB,,, | Performed by: OPHTHALMOLOGY

## 2024-12-10 PROCEDURE — 1126F AMNT PAIN NOTED NONE PRSNT: CPT | Mod: CPTII,S$GLB,, | Performed by: OPHTHALMOLOGY

## 2024-12-10 PROCEDURE — 99999 PR PBB SHADOW E&M-EST. PATIENT-LVL II: CPT | Mod: PBBFAC,,, | Performed by: OPHTHALMOLOGY

## 2024-12-10 PROCEDURE — 1159F MED LIST DOCD IN RCRD: CPT | Mod: CPTII,S$GLB,, | Performed by: OPHTHALMOLOGY

## 2024-12-10 PROCEDURE — 1101F PT FALLS ASSESS-DOCD LE1/YR: CPT | Mod: CPTII,S$GLB,, | Performed by: OPHTHALMOLOGY

## 2024-12-10 NOTE — PROGRESS NOTES
HPI    Referred by Dr. Carmona    S/p Phacoemulsification with placement of intraocular lens, right eye.   12/09/2024  Cataract, nuclear sclerotic, left  Visual distortions of shape and size  Vitreomacular adhesion of both eyes  Hyperopia of both eyes with astigmatism and presbyopi      Gtt's:   Ofloxacin TID OD  Pred forte TID OD  Ketorolac TID OD    Patient is here for 1 day post op of right eye.  Pt. States vision is doing better.  Pt. Have been seeing flickering light peripherally.  Pt. Denies pain or discomfort.  Last edited by Vanessa Castellon on 12/10/2024  2:08 PM.            Assessment /Plan     For exam results, see Encounter Report.    Status post cataract extraction and insertion of intraocular lens, right    Nuclear sclerosis of left eye                       Slit Lamp Exam  L/L - normal  C/s - quiet  Cornea - clear  A/C - 1+ cell  Lens - PCIOL    POD #1 s/p phaco/IOL  - doing well  - continue the following drops:    vigamox or ocuflox TID x 1 wk then stop  Pred forte TID x  4 wks  Ketorolac TID until runs out    Versus:    Combination drop - 1 drop TID x total of 1 month    Appropriate precautions and post op medications reviewed.  Patient instructed to call or come in if symptoms of redness, decreased vision, or pain are experienced.    -f/up 1-2 wks, sooner PRN. Or 4 wks with optom for mrx if needed.

## 2024-12-11 DIAGNOSIS — H25.12 NUCLEAR SCLEROSIS OF LEFT EYE: Primary | ICD-10-CM

## 2024-12-11 RX ORDER — KETOROLAC TROMETHAMINE 5 MG/ML
1 SOLUTION OPHTHALMIC 3 TIMES DAILY
Qty: 5 ML | Refills: 3 | Status: SHIPPED | OUTPATIENT
Start: 2024-12-11

## 2024-12-11 RX ORDER — OFLOXACIN 3 MG/ML
1 SOLUTION/ DROPS OPHTHALMIC 3 TIMES DAILY
Qty: 5 ML | Refills: 3 | Status: SHIPPED | OUTPATIENT
Start: 2024-12-11

## 2024-12-11 RX ORDER — PREDNISOLONE ACETATE 10 MG/ML
1 SUSPENSION/ DROPS OPHTHALMIC 3 TIMES DAILY
Qty: 5 ML | Refills: 3 | Status: SHIPPED | OUTPATIENT
Start: 2024-12-11

## 2024-12-20 ENCOUNTER — TELEPHONE (OUTPATIENT)
Dept: OPHTHALMOLOGY | Facility: CLINIC | Age: 69
End: 2024-12-20
Payer: MEDICARE

## 2024-12-20 NOTE — TELEPHONE ENCOUNTER
----- Message from John sent at 12/20/2024 12:34 PM CST -----  Regarding: appointment access  Contact: [phone]  Pt has an appointment scheduled for 12/20/2024 pt can tmake appointment due to death in pt family. Please contact pt with next available appointment .     Lo Escalera   [phone]

## 2024-12-20 NOTE — TELEPHONE ENCOUNTER
----- Message from Priyanka sent at 12/20/2024  3:28 PM CST -----  Regarding: Post op r/s  Type:  Patient Returning Call    Who Called:KEHINDE MADDEN    Who Left Message for Patient:Viktoriya Yusuf MA    Does the patient know what this is regarding?:Post Op r/s      Would the patient rather a call back or a response via [facility]? Call back    Best Call Back Number:[phone]    Additional Information:

## 2024-12-26 ENCOUNTER — TELEPHONE (OUTPATIENT)
Dept: OPHTHALMOLOGY | Facility: CLINIC | Age: 69
End: 2024-12-26
Payer: MEDICARE

## 2024-12-26 NOTE — TELEPHONE ENCOUNTER
Pt. Wanted to reschedule missed appointment and to informed us that OD eyelid is droopy. Pt denies decrease of vision. Informed pt if eye gets swollen to g[place]ar call us.    ----- Message from John sent at 12/26/2024  4:39 PM CST -----  Regarding: pt advice  Contact: [phone]  Pt is calling to speak with a nurse about pt eye. Pt had surgery and now pt eye looks a little strange. Please contact pt with appointment information.     Lo Escalera   [phone]     Ady contact pt

## 2025-01-02 ENCOUNTER — TELEPHONE (OUTPATIENT)
Dept: OPHTHALMOLOGY | Facility: CLINIC | Age: 70
End: 2025-01-02
Payer: MEDICARE

## 2025-01-02 NOTE — TELEPHONE ENCOUNTER
[facility] with  arrival time of 9:30 for sx on 1/8/25 with Dr. Figueroa @ [place].

## 2025-01-03 NOTE — H&P
History    Chief complaint:  Painless progressive vision loss    Present Ilness/Diagnosis: Nuclear sclerotic Cataract    Past Medical History:  has a past medical history of Acute right-sided back pain (06/16/2023), Anemia due to chronic blood loss (03/06/2020), Anemia of chronic disorder (08/24/2020), Anxiety, Cirrhosis, Coronary artery disease, Diverticulosis, Gastric ulcer, GERD (gastroesophageal reflux disease), Hepatic steatosis (09/12/2016), Hyperbilirubinemia, Hypertension, Hypothyroidism, Iron deficiency anemia due to chronic blood loss (08/24/2020), Microcytic anemia (08/24/2020), Obesity, Peptic ulcer disease (09/12/2016), UPJ (ureteropelvic junction) obstruction, Vertigo, and Vertigo.    Family History/Social History: refer to chart    Allergies:   Review of patient's allergies indicates:   Allergen Reactions    Codeine Itching    Iodine and iodide containing products Itching       Current Medications: No current facility-administered medications for this encounter.    Current Outpatient Medications:     albuterol (PROAIR HFA) 90 mcg/actuation inhaler, Inhale 1-2 puffs into the lungs every 6 (six) hours as needed for Wheezing or Shortness of Breath. Rescue, Disp: 6.7 g, Rfl: 0    cyclobenzaprine (FLEXERIL) 10 MG tablet, Take 1 tablet by mouth three times daily as needed, Disp: 60 tablet, Rfl: 5    gabapentin (NEURONTIN) 600 MG tablet, Take 1 tablet (600 mg total) by mouth 3 (three) times daily., Disp: 90 tablet, Rfl: 5    HYDROcodone-acetaminophen (NORCO) 5-325 mg per tablet, Take 1 tablet by mouth every 12 (twelve) hours as needed for Pain. (Patient not taking: Reported on 12/10/2024), Disp: 14 tablet, Rfl: 0    ketorolac 0.5% (ACULAR) 0.5 % Drop, Place 1 drop into the right eye 3 (three) times daily., Disp: 5 mL, Rfl: 3    ketorolac 0.5% (ACULAR) 0.5 % Drop, Place 1 drop into the left eye 3 (three) times daily., Disp: 5 mL, Rfl: 3    meloxicam (MOBIC) 15 MG tablet, Take 1 tablet by mouth once daily  (Patient not taking: Reported on 12/10/2024), Disp: 30 tablet, Rfl: 5    ofloxacin (OCUFLOX) 0.3 % ophthalmic solution, Place 1 drop into the right eye 3 (three) times daily., Disp: 5 mL, Rfl: 3    ofloxacin (OCUFLOX) 0.3 % ophthalmic solution, Place 1 drop into the left eye 3 (three) times daily., Disp: 5 mL, Rfl: 3    pantoprazole (PROTONIX) 40 MG tablet, Take 1 tablet (40 mg total) by mouth 2 (two) times daily., Disp: 180 tablet, Rfl: 1    prednisoLONE acetate (PRED FORTE) 1 % DrpS, Place 1 drop into the right eye 3 (three) times daily., Disp: 5 mL, Rfl: 3    prednisoLONE acetate (PRED FORTE) 1 % DrpS, Place 1 drop into the left eye 3 (three) times daily., Disp: 5 mL, Rfl: 3    Facility-Administered Medications Ordered in Other Encounters:     0.9%  NaCl infusion, , Intravenous, Continuous, Antwon Gardner MD    0.9%  NaCl infusion, , Intravenous, Continuous, Antwon Gardner MD    0.9%  NaCl infusion, , Intravenous, Continuous, Antwon Gardner MD    lactated ringers bolus 1,000 mL, 1,000 mL, Intravenous, Once, Antwon Gardner MD    LIDOcaine (PF) 10 mg/ml (1%) injection 10 mg, 1 mL, Intradermal, Once PRN, Antwon Gardner MD    ASA Score: II     Mallampati Score: II     Plan for Sedation: Moderate Sedation     Patient or Family History of Anesthesia problems : No    Physical Exam    BP: Vital signs stable  General: No apparent distress  HEENT: nuclear sclerotic cataract  Lungs: adequate respirations  Heart: + pulses  Abdomen: soft  Rectal/pelvic: deferred    Impression: Visually significant Cataract.    See previous clinic notes for surgical indications.    Plan: Phacoemulsification with implantation of Intraocular lens

## 2025-01-06 ENCOUNTER — OFFICE VISIT (OUTPATIENT)
Dept: OPHTHALMOLOGY | Facility: CLINIC | Age: 70
End: 2025-01-06
Payer: MEDICARE

## 2025-01-06 DIAGNOSIS — Z96.1 STATUS POST CATARACT EXTRACTION AND INSERTION OF INTRAOCULAR LENS, RIGHT: Primary | ICD-10-CM

## 2025-01-06 DIAGNOSIS — H25.12 NUCLEAR SCLEROTIC CATARACT OF LEFT EYE: ICD-10-CM

## 2025-01-06 DIAGNOSIS — Z98.41 STATUS POST CATARACT EXTRACTION AND INSERTION OF INTRAOCULAR LENS, RIGHT: Primary | ICD-10-CM

## 2025-01-06 PROCEDURE — 92136 OPHTHALMIC BIOMETRY: CPT | Mod: 26,LT,S$GLB, | Performed by: OPHTHALMOLOGY

## 2025-01-06 PROCEDURE — 99999 PR PBB SHADOW E&M-EST. PATIENT-LVL II: CPT | Mod: PBBFAC,,, | Performed by: OPHTHALMOLOGY

## 2025-01-06 PROCEDURE — 1101F PT FALLS ASSESS-DOCD LE1/YR: CPT | Mod: CPTII,S$GLB,, | Performed by: OPHTHALMOLOGY

## 2025-01-06 PROCEDURE — 3288F FALL RISK ASSESSMENT DOCD: CPT | Mod: CPTII,S$GLB,, | Performed by: OPHTHALMOLOGY

## 2025-01-06 PROCEDURE — 1126F AMNT PAIN NOTED NONE PRSNT: CPT | Mod: CPTII,S$GLB,, | Performed by: OPHTHALMOLOGY

## 2025-01-06 PROCEDURE — 99024 POSTOP FOLLOW-UP VISIT: CPT | Mod: S$GLB,,, | Performed by: OPHTHALMOLOGY

## 2025-01-07 NOTE — PRE-PROCEDURE INSTRUCTIONS
The following was discussed with pt via phone. No portal available. Pt verbalized understanding. Pt to be accompanied by her .    - Nothing to eat or drink after midnight the night before your surgery, except AM meds with small sips of water    - HOLD all Diabetic meds AM of surgery  - HOLD all Insulin AM of surgery  - HOLD all Fluid pills AM of surgery  - HOLD all non-insulin shots until after surgery (Ozempic, Mounjaro, Trulicity, Victoza, Byetta, Wegovy and Adlyxin) (up to 7 days prior)  - HOLD all vits and herbal meds AM of surgery    - TAKE blood thinner meds AM of surgery (unless otherwise directed)  - TAKE all B/P meds, EXCEPT those that contain a fluid pill  - USE inhalers as needed and bring AM of surgery  - USE eye drops as directed    - Shower and wash face with dial soap for 3 mins PM prior and AM of surgery  - No powder, lotions, creams, (makeup),  or jewelry    - Wear comfortable clothing (button up shirt)     (Patients ride may not leave while patient is in surgery)     -- 2nd floor surgery ctr at [facility] @ [address], [place] [address]

## 2025-01-08 ENCOUNTER — HOSPITAL ENCOUNTER (OUTPATIENT)
Facility: HOSPITAL | Age: 70
Discharge: HOME | End: 2025-01-08
Attending: OPHTHALMOLOGY | Admitting: OPHTHALMOLOGY
Payer: MEDICARE

## 2025-01-08 VITALS
DIASTOLIC BLOOD PRESSURE: 75 MMHG | HEART RATE: 81 BPM | TEMPERATURE: 97 F | RESPIRATION RATE: 18 BRPM | SYSTOLIC BLOOD PRESSURE: 117 MMHG | OXYGEN SATURATION: 99 %

## 2025-01-08 DIAGNOSIS — H25.12 NUCLEAR SCLEROTIC CATARACT OF LEFT EYE: Primary | ICD-10-CM

## 2025-01-08 DIAGNOSIS — H25.12 AGE-RELATED NUCLEAR CATARACT, LEFT: ICD-10-CM

## 2025-01-08 PROCEDURE — 27201423 OPTIME MED/SURG SUP & DEVICES STERILE SUPPLY: Performed by: OPHTHALMOLOGY

## 2025-01-08 PROCEDURE — 71000015 HC POSTOP RECOV 1ST HR: Performed by: OPHTHALMOLOGY

## 2025-01-08 PROCEDURE — 36000707: Performed by: OPHTHALMOLOGY

## 2025-01-08 PROCEDURE — 36000706: Performed by: OPHTHALMOLOGY

## 2025-01-08 PROCEDURE — 94761 N-INVAS EAR/PLS OXIMETRY MLT: CPT

## 2025-01-08 PROCEDURE — V2632 POST CHMBR INTRAOCULAR LENS: HCPCS | Performed by: OPHTHALMOLOGY

## 2025-01-08 PROCEDURE — 99152 MOD SED SAME PHYS/QHP 5/>YRS: CPT | Performed by: OPHTHALMOLOGY

## 2025-01-08 PROCEDURE — 25000003 PHARM REV CODE 250: Performed by: OPHTHALMOLOGY

## 2025-01-08 PROCEDURE — 99900035 HC TECH TIME PER 15 MIN (STAT)

## 2025-01-08 PROCEDURE — 66984 XCAPSL CTRC RMVL W/O ECP: CPT | Mod: 79,HCNC,LT, | Performed by: OPHTHALMOLOGY

## 2025-01-08 PROCEDURE — 63600175 PHARM REV CODE 636 W HCPCS: Performed by: OPHTHALMOLOGY

## 2025-01-08 PROCEDURE — 99152 MOD SED SAME PHYS/QHP 5/>YRS: CPT | Mod: HCNC,,, | Performed by: OPHTHALMOLOGY

## 2025-01-08 DEVICE — TECNIS SIMPLICITY TECNIS EYHANCE U 20.5D
Type: IMPLANTABLE DEVICE | Site: EYE | Status: FUNCTIONAL
Brand: TECNIS SIMPLICITY

## 2025-01-08 RX ORDER — FENTANYL CITRATE 50 UG/ML
25 INJECTION, SOLUTION INTRAMUSCULAR; INTRAVENOUS EVERY 5 MIN PRN
Status: DISCONTINUED | OUTPATIENT
Start: 2025-01-08 | End: 2025-01-08 | Stop reason: HOSPADM

## 2025-01-08 RX ORDER — MOXIFLOXACIN 5 MG/ML
1 SOLUTION/ DROPS OPHTHALMIC
Status: COMPLETED | OUTPATIENT
Start: 2025-01-08 | End: 2025-01-08

## 2025-01-08 RX ORDER — LIDOCAINE HYDROCHLORIDE 10 MG/ML
INJECTION, SOLUTION EPIDURAL; INFILTRATION; INTRACAUDAL; PERINEURAL
Status: DISCONTINUED | OUTPATIENT
Start: 2025-01-08 | End: 2025-01-08 | Stop reason: HOSPADM

## 2025-01-08 RX ORDER — CYCLOP/TROP/PROPA/PHEN/KET/WAT 1-1-0.1%
1 DROPS (EA) OPHTHALMIC (EYE) EVERY 5 MIN PRN
Status: COMPLETED | OUTPATIENT
Start: 2025-01-08 | End: 2025-01-08

## 2025-01-08 RX ORDER — LIDOCAINE HYDROCHLORIDE 40 MG/ML
INJECTION, SOLUTION RETROBULBAR
Status: DISCONTINUED | OUTPATIENT
Start: 2025-01-08 | End: 2025-01-08 | Stop reason: HOSPADM

## 2025-01-08 RX ORDER — TETRACAINE HYDROCHLORIDE 5 MG/ML
1 SOLUTION OPHTHALMIC EVERY 5 MIN PRN
Status: DISCONTINUED | OUTPATIENT
Start: 2025-01-08 | End: 2025-01-08

## 2025-01-08 RX ORDER — TETRACAINE HYDROCHLORIDE 5 MG/ML
SOLUTION OPHTHALMIC
Status: DISCONTINUED | OUTPATIENT
Start: 2025-01-08 | End: 2025-01-08 | Stop reason: HOSPADM

## 2025-01-08 RX ORDER — PROPARACAINE HYDROCHLORIDE 5 MG/ML
1 SOLUTION/ DROPS OPHTHALMIC
Status: DISCONTINUED | OUTPATIENT
Start: 2025-01-08 | End: 2025-01-08 | Stop reason: HOSPADM

## 2025-01-08 RX ORDER — MIDAZOLAM HYDROCHLORIDE 1 MG/ML
2 INJECTION, SOLUTION INTRAMUSCULAR; INTRAVENOUS
Status: COMPLETED | OUTPATIENT
Start: 2025-01-08 | End: 2025-01-08

## 2025-01-08 RX ORDER — PREDNISOLONE ACETATE 10 MG/ML
SUSPENSION/ DROPS OPHTHALMIC
Status: DISCONTINUED | OUTPATIENT
Start: 2025-01-08 | End: 2025-01-08 | Stop reason: HOSPADM

## 2025-01-08 RX ORDER — ACETAMINOPHEN 325 MG/1
650 TABLET ORAL EVERY 4 HOURS PRN
Status: DISCONTINUED | OUTPATIENT
Start: 2025-01-08 | End: 2025-01-08 | Stop reason: HOSPADM

## 2025-01-08 RX ORDER — MOXIFLOXACIN 5 MG/ML
SOLUTION/ DROPS OPHTHALMIC
Status: DISCONTINUED | OUTPATIENT
Start: 2025-01-08 | End: 2025-01-08 | Stop reason: HOSPADM

## 2025-01-08 RX ORDER — ALPRAZOLAM 0.5 MG/1
0.5 TABLET, ORALLY DISINTEGRATING ORAL
Status: COMPLETED | OUTPATIENT
Start: 2025-01-08 | End: 2025-01-08

## 2025-01-08 RX ADMIN — MIDAZOLAM HYDROCHLORIDE 2 MG: 1 INJECTION, SOLUTION INTRAMUSCULAR; INTRAVENOUS at 11:01

## 2025-01-08 RX ADMIN — MOXIFLOXACIN 1 DROP: 5 SOLUTION/ DROPS OPHTHALMIC at 11:01

## 2025-01-08 RX ADMIN — FENTANYL CITRATE 25 MCG: 50 INJECTION, SOLUTION INTRAMUSCULAR; INTRAVENOUS at 11:01

## 2025-01-08 RX ADMIN — MIDAZOLAM HYDROCHLORIDE 1 MG: 1 INJECTION, SOLUTION INTRAMUSCULAR; INTRAVENOUS at 11:01

## 2025-01-08 RX ADMIN — Medication 1 DROP: at 10:01

## 2025-01-08 RX ADMIN — ALPRAZOLAM 0.5 MG: 0.5 TABLET, ORALLY DISINTEGRATING ORAL at 10:01

## 2025-01-08 RX ADMIN — MOXIFLOXACIN OPHTHALMIC 1 DROP: 5 SOLUTION/ DROPS OPHTHALMIC at 10:01

## 2025-01-08 NOTE — PLAN OF CARE
"Dr Figueroa notified. Pt reports feeling "very nervous" as pt c/o that she did not receive adequate sedation with previous eye procedure. Pt states " I never felt relaxed ... I was wide awake last time." Verbal order received.  "

## 2025-01-08 NOTE — DISCHARGE INSTRUCTIONS
Dr. Figueroa     Cataract Post-Operative Instructions       Day of surgery:     -Resume drops THREE times daily into the operative eye.     -Do not rub your eye     -Wear protective sunglasses during the day.     -Resume moderate activity.     -Bathe/shower/wash face normally     -Do not apply makeup around the operative eye for 1 week.     -You should expect:     Blurry vision and halos for 24-48 hours     Dilated pupil for 24-48 hours     Scratchy feeling in the eye for 1-2 days     Curved shadow in your peripheral vision for 2-3 weeks     Occasional flickering of lights for up to 1 week     -If you experience severe pain or nausea, call Dr. Figueroa or the on-call doctor at [phone].       Plan to see Dr. Figueroa at:     [facility] [address]    [address] [place]  [address]    **Most patients can drive the next morning.  If you do not feel comfortable driving, please arrange for transportation. **

## 2025-01-08 NOTE — PLAN OF CARE
Lo Escalera has met all discharge criteria from Phase II. Vital Signs are stable, ambulating  without difficulty. Discharge instructions given, patient verbalized understanding. Discharged from facility via wheelchair in stable condition.

## 2025-01-08 NOTE — OP NOTE
DATE OF PROCEDURE: 01/08/2025    SURGEON: VIVIANE WALLACE MD    PREOPERATIVE DIAGNOSIS:  Senile nuclear sclerotic cataract left eye.     POSTOPERATIVE DIAGNOSIS: Senile nuclear sclerotic cataract left eye.     PROCEDURE PERFORMED:  Phacoemulsification with placement of intraocular lens, left eye.    IMPLANT:  DIB00 20.5    Anesthesia: Moderate sedation with topical Lidocaine. Patient ID confirmed and re-evaluated the patient and anesthesia plan confirming it is suitable for the patient's condition and procedure.     COMPLICATIONS: none    ESTIMATED BLOOD LOSS: <1cc    SPECIMENS: none    INDICATIONS FOR PROCEDURE:   The patient has a history of painless progressive vision loss.  The patient has described difficulties with activities of daily living, which specifically include driving, which is secondary to cataract formation and progression. After we had a thorough discussion about risks, benefits, and alternatives to cataract surgery, the patient agreed to proceed with phacoemulsification and implantation of a lens in the left eye.  These risks include, but are not limited to, hemorrhage, pain, infection, need for additional surgery, need for glasses or contacts, loss of vision, or even loss of the eye.    PROCEDURE IN DETAIL:  The patient was met in the preop holding area.  Consent was confirmed to be signed.  The operative site was marked.  The patient was brought into the operating room by the anesthesia team and placed under monitored anesthesia care.  The left eye was prepped and draped in a sterile ophthalmic fashion.  A Colin speculum was placed into the left eye.   A paracentesis site was made and 1% preservative-free lidocaine was injected into the anterior chamber.  Viscoelastic  material was injected into the anterior chamber.  A keratome blade was used to make a clear corneal incision.  A cystotome was used to initiate the continuous curvilinear capsulorrhexis which was completed with Utrata forceps.   BSS on a knott cannula was used to perform hydrodissection.  The phacoemulsification tip was introduced into the eye and the nucleus was removed in a standard divide-and-conquer fashion.  Remaining cortical material was removed from the eye using irrigation-aspiration.  The capsular bag was filled with viscoelastic material and the intraocular lens was injected and positioned into place. Remaining viscoelastic material was removed from the eye using irrigation and aspiration.  The corneal wounds were hydrated.  The eye was filled to physiologic pressure. The wounds were found to be watertight. Drops of Vigamox and prednisilone were placed into the eye.  The eye was washed, dried, and shielded.  The patient tolerated the procedure well and knows to follow up with me tomorrow morning, sooner if needed.      Under my direct supervision, intravenous moderate sedation was administered during the course of this procedure, with continuous monitoring of hemodynamic parameters.  Monitoring of the patient's vital signs and respiratory status was provided by trained nursing staff during the entire course of the procedure and under my supervision and recorded in the patient's medical record.  The total time for sedation was 14 minutes.

## 2025-01-09 ENCOUNTER — OFFICE VISIT (OUTPATIENT)
Dept: OPHTHALMOLOGY | Facility: CLINIC | Age: 70
End: 2025-01-09
Payer: MEDICARE

## 2025-01-09 DIAGNOSIS — Z98.42 STATUS POST CATARACT EXTRACTION AND INSERTION OF INTRAOCULAR LENS OF LEFT EYE: Primary | ICD-10-CM

## 2025-01-09 DIAGNOSIS — Z96.1 STATUS POST CATARACT EXTRACTION AND INSERTION OF INTRAOCULAR LENS OF LEFT EYE: Primary | ICD-10-CM

## 2025-01-09 DIAGNOSIS — Z78.0 MENOPAUSE: ICD-10-CM

## 2025-01-09 PROCEDURE — 1101F PT FALLS ASSESS-DOCD LE1/YR: CPT | Mod: HCNC,CPTII,S$GLB, | Performed by: OPHTHALMOLOGY

## 2025-01-09 PROCEDURE — 99024 POSTOP FOLLOW-UP VISIT: CPT | Mod: HCNC,S$GLB,, | Performed by: OPHTHALMOLOGY

## 2025-01-09 PROCEDURE — 3288F FALL RISK ASSESSMENT DOCD: CPT | Mod: HCNC,CPTII,S$GLB, | Performed by: OPHTHALMOLOGY

## 2025-01-09 PROCEDURE — 99999 PR PBB SHADOW E&M-EST. PATIENT-LVL II: CPT | Mod: PBBFAC,HCNC,, | Performed by: OPHTHALMOLOGY

## 2025-01-09 PROCEDURE — 1159F MED LIST DOCD IN RCRD: CPT | Mod: HCNC,CPTII,S$GLB, | Performed by: OPHTHALMOLOGY

## 2025-01-09 PROCEDURE — 1126F AMNT PAIN NOTED NONE PRSNT: CPT | Mod: HCNC,CPTII,S$GLB, | Performed by: OPHTHALMOLOGY

## 2025-01-13 DIAGNOSIS — Z00.00 ENCOUNTER FOR MEDICARE ANNUAL WELLNESS EXAM: ICD-10-CM

## 2025-01-13 NOTE — PROGRESS NOTES
HPI    Referred by Dr. Carmona    S/p Phacoemulsification with placement of intraocular lens, left eye.   01/08/2025  S/p Phacoemulsification with placement of intraocular lens, right eye.   12/09/2024  Visual distortions of shape and size  Vitreomacular adhesion of both eyes  Hyperopia of both eyes with astigmatism and presbyopi      Eye Meds:  PF TID OS  Ofloxacin TID OS  Ketorolac TID OS    Patient is here today for 1 day phaco OS. Patient states she sees halos   around bright lights. No pain, but eye feels sore.   Last edited by Siobhan Florian on 1/9/2025  4:05 PM.            Assessment /Plan     For exam results, see Encounter Report.    Status post cataract extraction and insertion of intraocular lens of left eye      PO week #1 s/p phaco/IOL -    - doing well, no issues    Continue combo drops for a total of 1 month versus: d/c abx gtt, continue PF/ketorolocTID for total of 1 month    - f/up 3-4 wks for MRx, DFE with optom, sooner PRN.

## 2025-01-13 NOTE — PROGRESS NOTES
HPI    Referred by Dr. Carmona    Cataract, nuclear sclerotic, left (surgery scheduled 01/08/2025)  S/p Phacoemulsification with placement of intraocular lens, right eye.   12/09/2024  Visual distortions of shape and size  Vitreomacular adhesion of both eyes  Hyperopia of both eyes with astigmatism and presbyopi      Gtt's:   Ofloxacin TID OD  Pred forte TID OD  Ketorolac TID OD    Patient is here for 3 week post op of right eye.  Pt. States vision is doing well.  Pt. Denies pain or discomfort.  Last edited by Vanessa Castellon on 1/6/2025  3:38 PM.            Assessment /Plan     For exam results, see Encounter Report.    Status post cataract extraction and insertion of intraocular lens, right    Nuclear sclerotic cataract of left eye  -     IOL Master - OU - Both Eyes    PO week #1 s/p phaco/IOL -    - doing well, no issues    Continue combo drops for a total of 1 month versus: d/c abx gtt, continue PF/ketorolocTID for total of 1 month      Visually significant nuclear sclerotic cataract    - Cataracts are interfering with activities of daily living, including night time driving.  - Pt desires cataract surgery for visual rehabilitation.   - Risks / benefits/ alternatives were discussed and patient agrees to proceed with surgery.   - IOL options discussed as well, according to patient's goals and concomitant ocular pathology.  - Target: plano.       Anxiety    DIBOO 20.5 OS

## 2025-02-13 ENCOUNTER — TELEPHONE (OUTPATIENT)
Dept: OPTOMETRY | Facility: CLINIC | Age: 70
End: 2025-02-13
Payer: MEDICARE

## 2025-02-21 ENCOUNTER — TELEPHONE (OUTPATIENT)
Dept: OPTOMETRY | Facility: CLINIC | Age: 70
End: 2025-02-21
Payer: MEDICARE

## 2025-03-06 ENCOUNTER — OFFICE VISIT (OUTPATIENT)
Dept: NEUROLOGY | Facility: CLINIC | Age: 70
End: 2025-03-06
Payer: MEDICARE

## 2025-03-06 VITALS
WEIGHT: 265.56 LBS | HEIGHT: 60 IN | DIASTOLIC BLOOD PRESSURE: 67 MMHG | SYSTOLIC BLOOD PRESSURE: 134 MMHG | BODY MASS INDEX: 52.14 KG/M2 | HEART RATE: 126 BPM

## 2025-03-06 DIAGNOSIS — M54.9 DORSALGIA, UNSPECIFIED: Primary | ICD-10-CM

## 2025-03-06 DIAGNOSIS — E66.01 OBESITY, CLASS III, BMI 40-49.9 (MORBID OBESITY): ICD-10-CM

## 2025-03-06 DIAGNOSIS — R42 DIZZINESS: ICD-10-CM

## 2025-03-06 DIAGNOSIS — G60.9 NEUROPATHY, IDIOPATHIC: ICD-10-CM

## 2025-03-06 DIAGNOSIS — Z86.39 H/O: OBESITY: ICD-10-CM

## 2025-03-06 PROCEDURE — 3288F FALL RISK ASSESSMENT DOCD: CPT | Mod: CPTII,S$GLB,, | Performed by: INTERNAL MEDICINE

## 2025-03-06 PROCEDURE — 3075F SYST BP GE 130 - 139MM HG: CPT | Mod: CPTII,S$GLB,, | Performed by: INTERNAL MEDICINE

## 2025-03-06 PROCEDURE — 1101F PT FALLS ASSESS-DOCD LE1/YR: CPT | Mod: CPTII,S$GLB,, | Performed by: INTERNAL MEDICINE

## 2025-03-06 PROCEDURE — 1159F MED LIST DOCD IN RCRD: CPT | Mod: CPTII,S$GLB,, | Performed by: INTERNAL MEDICINE

## 2025-03-06 PROCEDURE — 3078F DIAST BP <80 MM HG: CPT | Mod: CPTII,S$GLB,, | Performed by: INTERNAL MEDICINE

## 2025-03-06 PROCEDURE — 99999 PR PBB SHADOW E&M-EST. PATIENT-LVL IV: CPT | Mod: PBBFAC,HCNC,, | Performed by: INTERNAL MEDICINE

## 2025-03-06 PROCEDURE — 1125F AMNT PAIN NOTED PAIN PRSNT: CPT | Mod: CPTII,S$GLB,, | Performed by: INTERNAL MEDICINE

## 2025-03-06 PROCEDURE — 3008F BODY MASS INDEX DOCD: CPT | Mod: CPTII,S$GLB,, | Performed by: INTERNAL MEDICINE

## 2025-03-06 PROCEDURE — 99204 OFFICE O/P NEW MOD 45 MIN: CPT | Mod: S$GLB,,, | Performed by: INTERNAL MEDICINE

## 2025-03-06 RX ORDER — PREGABALIN 150 MG/1
150 CAPSULE ORAL 2 TIMES DAILY
Qty: 120 CAPSULE | Refills: 2 | Status: SHIPPED | OUTPATIENT
Start: 2025-03-06 | End: 2025-06-04

## 2025-03-06 RX ORDER — PERPHENAZINE 16 MG
600 TABLET ORAL DAILY
Qty: 60 EACH | Refills: 3 | Status: SHIPPED | OUTPATIENT
Start: 2025-03-06

## 2025-03-06 RX ORDER — DULOXETIN HYDROCHLORIDE 30 MG/1
30 CAPSULE, DELAYED RELEASE ORAL
COMMUNITY
Start: 2024-12-17

## 2025-03-06 NOTE — PROGRESS NOTES
GENERAL NEUROLOGY VISIT   03/06/2025  History:    Patient is a 69 y.o. female with past medical history of CAD, hypertension, hypothyroidism, cirrhosis, obesity presenting to the clinic for evaluation of dizziness.  History obtained from patient and chart review.    Patient states that she has been experiencing on and off dizziness for the last few years and has worsened more recently.  She was having recurrent falls until end of last year which has now improved over the last couple of months.  She experiences a room spinning sensation in particular settings.  States when she enjoys dark rooms, closes her eyes were is wearing a short, she has sudden onset brief vertiginous symptoms which spontaneously resolves.  Occasionally has milder symptoms with sudden head movement.  Denies tinnitus, ear pain or ear fullness.  Denies associated focal neurological deficits.  Does have numbness and tingling down her legs, along with significant lower back pain.  Denies any bowel or bladder incontinence.  Has been normal tingling in both hands as well.  Patient has known lumbar spondylosis and only on gabapentin 600 mg t.i.d. as well as Cymbalta 30 mg daily with symptoms are poorly controlled.  Last L-spine imaging was obtained in 2021.    Past Medical History:   Diagnosis Date    Acute right-sided back pain 06/16/2023    Anemia due to chronic blood loss 03/06/2020    Anemia of chronic disorder 08/24/2020    Anxiety     Cirrhosis     Coronary artery disease     pt states she does not have    Diverticulosis     Gastric ulcer     old    GERD (gastroesophageal reflux disease)     Hepatic steatosis 09/12/2016    Hyperbilirubinemia     Hypertension     pt states she no longer has    Hypothyroidism     Iron deficiency anemia due to chronic blood loss 08/24/2020    Microcytic anemia 08/24/2020    Obesity     Peptic ulcer disease 09/12/2016    UPJ (ureteropelvic junction) obstruction     Vertigo     Vertigo        Past Surgical History:    Procedure Laterality Date    APPENDECTOMY      CATARACT EXTRACTION W/  INTRAOCULAR LENS IMPLANT Right 12/9/2024    Procedure: EXTRACTION, CATARACT, WITH IOL INSERTION;  Surgeon: Elidia Figueroa MD;  Location: [facility] OR;  Service: Ophthalmology;  Laterality: Right;    CATARACT EXTRACTION W/  INTRAOCULAR LENS IMPLANT Left 1/8/2025    Procedure: EXTRACTION, CATARACT, WITH IOL INSERTION;  Surgeon: Elidia Figueroa MD;  Location: [facility] OR;  Service: Ophthalmology;  Laterality: Left;    CHOLECYSTECTOMY      COLONOSCOPY N/A 07/06/2020    Procedure: COLONOSCOPY;  Surgeon: Leander Cornejo MD;  Location: [facility];  Service: Colon and Rectal;  Laterality: N/A;    COLONOSCOPY  05/01/2023    COLONOSCOPY N/A 05/01/2023    Procedure: COLONOSCOPY;  Surgeon: Antwon Gardner MD;  Location: [facility];  Service: Endoscopy;  Laterality: N/A;    COLONOSCOPY  12/11/2023    COLONOSCOPY N/A 12/11/2023    Procedure: COLONOSCOPY;  Surgeon: Antwon Gardner MD;  Location: [facility];  Service: Endoscopy;  Laterality: N/A;    EGD, WITH BANDING OF VARICES  12/11/2023    2 bands    ESOPHAGOGASTRODUODENOSCOPY N/A 06/12/2019    Procedure: ESOPHAGOGASTRODUODENOSCOPY (EGD);  Surgeon: Arben Brown MD;  Location: [facility];  Service: Endoscopy;  Laterality: N/A;    ESOPHAGOGASTRODUODENOSCOPY N/A 03/09/2020    Procedure: EGD (ESOPHAGOGASTRODUODENOSCOPY);  Surgeon: Andrea Salomon MD;  Location: [facility];  Service: Endoscopy;  Laterality: N/A;    ESOPHAGOGASTRODUODENOSCOPY N/A 09/21/2020    Procedure: EGD (ESOPHAGOGASTRODUODENOSCOPY);  Surgeon: Antwon Gardner MD;  Location: [facility];  Service: Endoscopy;  Laterality: N/A;    ESOPHAGOGASTRODUODENOSCOPY  05/01/2023    ESOPHAGOGASTRODUODENOSCOPY N/A 05/01/2023    Procedure: EGD (ESOPHAGOGASTRODUODENOSCOPY);  Surgeon: Antwon Gardner MD;  Location: [facility] ENDO;  Service: Endoscopy;  Laterality: N/A;    ESOPHAGOGASTRODUODENOSCOPY N/A 12/11/2023    Procedure: EGD  (ESOPHAGOGASTRODUODENOSCOPY);  Surgeon: Antwon Gardner MD;  Location: [facility] ENDO;  Service: Endoscopy;  Laterality: N/A;    ESOPHAGOGASTRODUODENOSCOPY N/A 01/24/2024    Procedure: EGD (ESOPHAGOGASTRODUODENOSCOPY);  Surgeon: Antwon Gardner MD;  Location: [facility] ENDO;  Service: Endoscopy;  Laterality: N/A;    ESOPHAGOGASTRODUODENOSCOPY  03/06/2024    ESOPHAGOGASTRODUODENOSCOPY N/A 3/6/2024    Procedure: EGD (ESOPHAGOGASTRODUODENOSCOPY);  Surgeon: Antwon Gardner MD;  Location: [facility] ENDO;  Service: Endoscopy;  Laterality: N/A;    HYSTERECTOMY      INTRALUMINAL GASTROINTESTINAL TRACT IMAGING VIA CAPSULE N/A 01/19/2022    Procedure: IMAGING PROCEDURE, GI TRACT, INTRALUMINAL, VIA CAPSULE;  Surgeon: Antwon Gardner MD;  Location: [facility] ENDO;  Service: Endoscopy;  Laterality: N/A;    OOPHORECTOMY      PANCREAS SURGERY      TONSILLECTOMY      TRANSFORAMINAL EPIDURAL INJECTION OF STEROID Right 10/14/2021    Procedure: Injection,steroid,epidural,transforaminal approach---RIGHT L5 AND S1;  Surgeon: Cheng Tariq Jr., MD;  Location: [facility] PAIN [facility];  Service: Pain Management;  Laterality: Right;    TRANSFORAMINAL EPIDURAL INJECTION OF STEROID Right 01/13/2023    Procedure: INJECTION, STEROID, EPIDURAL, TRANSFORAMINAL APPROACH, RIGHT L4/L5 & L5-S1 CONTRAST DIRECT REF;  Surgeon: Coni Franco MD;  Location: [facility] [place];  Service: Pain Management;  Laterality: Right;       Social History[1]    Review of patient's allergies indicates:   Allergen Reactions    Codeine Itching    Iodine and iodide containing products Itching       Medications Ordered Prior to Encounter[2]     Family history:  Noncontributory    Review Of Systems     Constitutional Negative for fevers, chills, weigh loss   HEENT Negative for hearing loss, dysphagia, sore throat, diplopia   Respiratory Negative for shortness of breath, cough    Cardiovascular Negative for chest pain, palpitations    Gastrointestinal  Negative for constipation, diarrhea, early satiety    Skin Negative for rashes    Musculoskeletal Negative for joint pains, myalgias.   Neurological See Above    Psychological Negative for sleep disturbances.    Heme/Lymph Negative for easy bruising, easy bleeding    Endocrine Negative for polyuria, polydypsia     Physical Exam:     Physical Examination  /67 (BP Location: Left arm, Patient Position: Sitting)   Pulse (!) 126   Ht 5' (1.524 m)   Wt 120.5 kg (265 lb 8.7 oz)   LMP  (LMP Unknown)   BMI 51.86 kg/m²   Body mass index is 51.86 kg/m².      Neurological Exam  Mental Status:   Alert and oriented to name, date, location  Recent/remote memory, registration, attention span/concentration, fund of knowledge intact by history.    CN:   II, III, IV, VI: PERRL, EOMI, no nystagmus, fundus not visualized due to inadequate dilation.V: intact to fine touch, temperature, pain.VII: symmetrical facial movement, nice smile, no drooping.VIII: grossly intact to hearing XII: tongue midline       Motor:    ShAb ElbEx ElbFle WrE WrFle Interos  HipFl KnExt KnFlex FtDors FtPlant   Left 5 5 5 5 5 5 5 5 5 5 5 5   Right 5 5 5 5 5 5 5 5 5 5 5 5   Tone: Normal tone in UE and LE, no atrophy, no fasciculation, no tremor no pronator drift.                Reflexes:    Bicep Tricep Brachioradial Patellar Achilles   Left 2+ 2+ 2+ 2+ 1+   Right 2+ 2+ 2+ 2+ 1+   No Clonus, down going toes b/l.    Sensation: on both UEs and LEs    Reduced in all modalities below the knees bilaterally    Coordination:    Tremor: Absent.    Gait:    Rhomberg positive, wide based gait      Impression:   #ambulatory dysfunction  Symptoms and presentation consistent with sensory ataxia.  Given the description of the symptoms, central as well as vestibular etiology are ruled out. Patient has reduced vibratory sense below the knees bilaterally.  Has known lumbar spondylosis with some overriding glove and stocking distribution of polyneuropathy as well.   We will obtain repeat imaging of the L-spine as well as EMG of bilateral lower extremities.  PT and OT ordered as well.  Encouraged weight loss.  We will switch from gabapentin to Lyrica.     Plan:   Dorsalgia, unspecified  -     Folate; Future; Expected date: 03/06/2025  -     Hemoglobin A1C; Future; Expected date: 03/06/2025  -     Immunofixation Electrophoresis; Future; Expected date: 03/06/2025  -     Protein Electrophoresis, Serum; Future; Expected date: 03/06/2025  -     Vitamin B6; Future; Expected date: 03/06/2025  -     Vitamin B12; Future; Expected date: 03/06/2025  -     Vitamin B1; Future; Expected date: 03/06/2025  -     TSH; Future; Expected date: 03/06/2025  -     MRI Lumbar Spine Without Contrast; Future; Expected date: 03/06/2025  -     EMG W/ ULTRASOUND AND NERVE CONDUCTION TEST 2 Extremities; Future  -     Ambulatory referral/consult to Physical/Occupational Therapy; Future; Expected date: 03/13/2025  -     pregabalin (LYRICA) 150 MG capsule; Take 1 capsule (150 mg total) by mouth 2 (two) times daily.  Dispense: 120 capsule; Refill: 2    Obesity, Class III, BMI 40-49.9 (morbid obesity)  Assessment & Plan:  Managed by PCP.     Other orders  -     alpha lipoic acid 600 mg Cap; Take 600 mg by mouth once daily.  Dispense: 60 each; Refill: 3      RTC 3 months or sooner if needed    Time spent on this encounter: 45 minutes. This includes face to face time and non-face to face time preparing to see the patient (eg, review of tests), obtaining and/or reviewing separately obtained history, documenting clinical information in the electronic or other health record, independently interpreting results and communicating results to the patient/family/caregiver, or care coordinator.     Disclaimer: This note was prepared using a voice recognition system and is likely to have sound alike errors.  Please call me with any questions.    Astrid Negron MD  Neurology         [1]   Social History  Socioeconomic History     Marital status:    Tobacco Use    Smoking status: Never     Passive exposure: Never    Smokeless tobacco: Never   Substance and Sexual Activity    Alcohol use: No     Comment: 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  However, no consumption in 20 years.     Drug use: No    Sexual activity: Not Currently     Social Drivers of Health     Financial Resource Strain: Medium Risk (6/28/2021)    Overall Financial Resource Strain (CARDIA)     Difficulty of Paying Living Expenses: Somewhat hard   Food Insecurity: No Food Insecurity (6/28/2021)    Hunger Vital Sign     Worried About Running Out of Food in the Last Year: Never true     Ran Out of Food in the Last Year: Never true   Transportation Needs: No Transportation Needs (6/28/2021)    PRAPARE - Transportation     Lack of Transportation (Medical): No     Lack of Transportation (Non-Medical): No   Physical Activity: Inactive (6/28/2021)    Exercise Vital Sign     Days of Exercise per Week: 0 days     Minutes of Exercise per Session: 0 min   Stress: Stress Concern Present (6/28/2021)    [employer] [place] [employer] - Occupational Stress Questionnaire     Feeling of Stress : To some extent   Housing Stability: Low Risk  (6/28/2021)    Housing Stability Vital Sign     Unable to Pay for Housing in the Last Year: No     Number of Places Lived in the Last Year: 1     Unstable Housing in the Last Year: No   [2]   Current Outpatient Medications on File Prior to Visit   Medication Sig Dispense Refill    cyclobenzaprine (FLEXERIL) 10 MG tablet Take 1 tablet by mouth three times daily as needed 60 tablet 5    DULoxetine (CYMBALTA) 30 MG capsule Take 30 mg by mouth. Takes sometimes      gabapentin (NEURONTIN) 600 MG tablet Take 1 tablet (600 mg total) by mouth 3 (three) times daily. 90 tablet 5    ketorolac 0.5% (ACULAR) 0.5 % Drop Place 1 drop into the left eye 3 (three) times daily. 5 mL 3    pantoprazole (PROTONIX) 40 MG tablet Take 1 tablet (40 mg total) by mouth 2 (two)  times daily. 180 tablet 1    albuterol (PROAIR HFA) 90 mcg/actuation inhaler Inhale 1-2 puffs into the lungs every 6 (six) hours as needed for Wheezing or Shortness of Breath. Rescue (Patient not taking: Reported on 3/6/2025) 6.7 g 0    meloxicam (MOBIC) 15 MG tablet Take 1 tablet by mouth once daily (Patient not taking: Reported on 3/6/2025) 30 tablet 5    ofloxacin (OCUFLOX) 0.3 % ophthalmic solution Place 1 drop into the left eye 3 (three) times daily. (Patient not taking: Reported on 3/6/2025) 5 mL 3    prednisoLONE acetate (PRED FORTE) 1 % DrpS Place 1 drop into the left eye 3 (three) times daily. (Patient not taking: Reported on 3/6/2025) 5 mL 3     Current Facility-Administered Medications on File Prior to Visit   Medication Dose Route Frequency Provider Last Rate Last Admin    0.9%  NaCl infusion   Intravenous Continuous Antwon Gardner MD        0.9%  NaCl infusion   Intravenous Continuous Antwon Gardner MD        0.9%  NaCl infusion   Intravenous Continuous Antwon Gardner MD        lactated ringers bolus 1,000 mL  1,000 mL Intravenous Once Antwon Gardner MD        LIDOcaine (PF) 10 mg/ml (1%) injection 10 mg  1 mL Intradermal Once PRN Antwon Gardner MD

## 2025-03-07 ENCOUNTER — TELEPHONE (OUTPATIENT)
Dept: NEUROLOGY | Facility: CLINIC | Age: 70
End: 2025-03-07
Payer: MEDICARE

## 2025-03-07 DIAGNOSIS — G60.9 NEUROPATHY, IDIOPATHIC: Primary | ICD-10-CM

## 2025-03-20 ENCOUNTER — TELEPHONE (OUTPATIENT)
Dept: OPTOMETRY | Facility: CLINIC | Age: 70
End: 2025-03-20
Payer: MEDICARE

## 2025-03-24 ENCOUNTER — RESULTS FOLLOW-UP (OUTPATIENT)
Dept: PRIMARY CARE CLINIC | Facility: CLINIC | Age: 70
End: 2025-03-24

## 2025-03-24 ENCOUNTER — OFFICE VISIT (OUTPATIENT)
Dept: PRIMARY CARE CLINIC | Facility: CLINIC | Age: 70
End: 2025-03-24
Payer: MEDICARE

## 2025-03-24 ENCOUNTER — TELEPHONE (OUTPATIENT)
Dept: PRIMARY CARE CLINIC | Facility: CLINIC | Age: 70
End: 2025-03-24
Payer: MEDICARE

## 2025-03-24 VITALS
RESPIRATION RATE: 14 BRPM | HEIGHT: 60 IN | WEIGHT: 268.5 LBS | BODY MASS INDEX: 52.71 KG/M2 | TEMPERATURE: 99 F | DIASTOLIC BLOOD PRESSURE: 72 MMHG | SYSTOLIC BLOOD PRESSURE: 136 MMHG | HEART RATE: 98 BPM | OXYGEN SATURATION: 97 %

## 2025-03-24 DIAGNOSIS — K20.90 ESOPHAGITIS WITH GASTRITIS: ICD-10-CM

## 2025-03-24 DIAGNOSIS — D69.6 THROMBOCYTOPENIA: ICD-10-CM

## 2025-03-24 DIAGNOSIS — K76.6 PORTAL HYPERTENSION: ICD-10-CM

## 2025-03-24 DIAGNOSIS — K70.31 ALCOHOLIC CIRRHOSIS OF LIVER WITH ASCITES: Primary | ICD-10-CM

## 2025-03-24 DIAGNOSIS — I85.00 ESOPHAGEAL VARICES WITHOUT BLEEDING, UNSPECIFIED ESOPHAGEAL VARICES TYPE: ICD-10-CM

## 2025-03-24 DIAGNOSIS — K92.2 UGIB (UPPER GASTROINTESTINAL BLEED): ICD-10-CM

## 2025-03-24 DIAGNOSIS — K76.0 HEPATIC STEATOSIS: ICD-10-CM

## 2025-03-24 DIAGNOSIS — E03.9 HYPOTHYROIDISM, UNSPECIFIED TYPE: ICD-10-CM

## 2025-03-24 DIAGNOSIS — K29.70 ESOPHAGITIS WITH GASTRITIS: ICD-10-CM

## 2025-03-24 DIAGNOSIS — F10.11 ALCOHOL ABUSE, IN REMISSION: ICD-10-CM

## 2025-03-24 DIAGNOSIS — E66.01 MORBID (SEVERE) OBESITY DUE TO EXCESS CALORIES: ICD-10-CM

## 2025-03-24 DIAGNOSIS — Z87.19 HISTORY OF DIVERTICULITIS: ICD-10-CM

## 2025-03-24 DIAGNOSIS — D63.8 ANEMIA OF CHRONIC DISORDER: ICD-10-CM

## 2025-03-24 DIAGNOSIS — K44.9 HIATAL HERNIA: ICD-10-CM

## 2025-03-24 DIAGNOSIS — F41.1 GAD (GENERALIZED ANXIETY DISORDER): ICD-10-CM

## 2025-03-24 PROCEDURE — 99999 PR PBB SHADOW E&M-EST. PATIENT-LVL IV: CPT | Mod: PBBFAC,HCNC,, | Performed by: FAMILY MEDICINE

## 2025-03-24 RX ORDER — PANTOPRAZOLE SODIUM 40 MG/1
40 TABLET, DELAYED RELEASE ORAL 2 TIMES DAILY
Qty: 180 TABLET | Refills: 1 | Status: SHIPPED | OUTPATIENT
Start: 2025-03-24

## 2025-03-24 RX ORDER — GABAPENTIN 600 MG/1
600 TABLET ORAL 3 TIMES DAILY
COMMUNITY
Start: 2025-03-23 | End: 2025-03-24 | Stop reason: SDUPTHER

## 2025-03-24 RX ORDER — GABAPENTIN 600 MG/1
600 TABLET ORAL 3 TIMES DAILY
Qty: 90 TABLET | Refills: 5 | Status: SHIPPED | OUTPATIENT
Start: 2025-03-24

## 2025-03-24 NOTE — TELEPHONE ENCOUNTER
----- Message from Garland sent at 3/24/2025 10:45 AM CDT -----  Contact: [facility] [phone]  Pharmacy is calling to clarify an RX.RX name:  pregabalin (LYRICA) 150 MG capsule and GabapentinWhat do they need to clarify:  needs to know what one she should be taking Comments: [facility] [place] - [address] [place]) [place] [address]Kaewf: [phone] Fax: [phone]

## 2025-03-24 NOTE — PROGRESS NOTES
Subjective:       Patient ID: Lo Escalera is a 69 y.o. female.    Chief Complaint: Follow-up (3 month f/u)      HPI:70 yo BF in for 3 month checkup--weight gain 15-20 lbs   Hx diverticulitis treated with antibiotics---history hiatal hernia--saw gastroenterologist Dr Gardner--history gastroscope showing GERD esophagitis gastritis-ulcers cirrhosis esophageal varices portal hypertension and hepatics steatosis  Patient could not go to son's wedding because she was having problems from alcohol--was in the hospital  Sleep apnea--does not have CPAP machine  Saw  Neurology--had dorsalgia--B1 B6 B12 folate TSH hemoglobin A1c all basically normal except B6--2 -had protein in the immunoelectrophoresis  Weight gain 15 20 lb patient wants to try Wegovy  History of hepatics steatosis and cirrhosis of the liver with history of GERD esophagitis gastritis and ulcer.  Esophageal varices portal hypertension--had some esophageal banding  Patient needs to do lab CBCs CMP lipids T4  History bronchospasm on albuterol  GERD multiple GI issues see above patient on Protonix needs refills  Neuropathy---pain and back and both legs patient wants to be on gabapentin 600 mg t.i.d. needs refills                        ROS:    Skin: no psoriasis, eczema, skin cancer  HEENT: No headache, ocular pain, blurred vision, diplopia,  hoarseness change in voice, no thyroid disease no epistaxis  Lung: No pneumonia, asthma, Tb, wheezing, + occasional SOB, no smoking history sleep apnea supposed to get a CPAP machine  Heart: no chest pain,no  ankle edema,+ occas   palpitations, no  MI, eva murmur, no hypertension patient used to have high blood pressure but was taken off of medication blood pressure today 132/7, hyperlipidemia--no stent bypass arrhythmia history of hypertension--occasionally feels like heart skips a beat blood pressure was low so taken off blood pressure medicine   Abdomen: No nausea, vomiting, diarrhea, constipation, ulcers,  hepatitis, status post cholecystectomy, melena, hematochezia, hematemesis recent esophagitis gastritis with transfusion 2 units of blood history of peptic ulcer disease history of esophageal varices--sees hepatologist --recent diverticulitis 8/17/2024 and [id number] times treated with antibiotics referred to gastroenterologist  : no UTI, renal disease, stones  GYN hysterectomy mammogram March 2021  MS: no fractures, O/A, lupus, rheumatoid, gout--mainly problems mid and lower back --but pain  both knees--right shoulder wants physical therapy    Neuro: No dizziness, LOC, seizures +vertigo---several recent episodes with falling will get MRI brain and restart antivert--dizzy exercises -help with meclizine November 2020 patient was admitted to [facility] for loss of consciousness used to drink  36 yrs ago-pancreatitis  No diabetes, + anemia had to be transfused blood had upper GI bleed, + anxiety, + depression upset had to quit working because of vertigo was working at [employer]-doing pretty good with--loss mom dad oldest sister back to back--anemia 36.9 was 32.4 platelets 115 on iron    3 children unemployed, lives with      Objective:   Physical Exam:   General: Well nourished, well developed, no acute distress up with a cane + morbid obesity hard arise from chair   Skin: No lesions  HEENT: Eyes PERRLA, EOM intact, nose clear , throat nonerythematous +arcus senilis no pallor to the conjunctiva  NECK: Supple, no bruits, No JVD, no nodes  Lungs: Clear, no rales, rhonchi, wheezing  Heart: Regular rate and rhythm, no murmurs, gallops, or rubs  Abdomen: flat, bowel sounds positive, no tenderness, or organomegaly--pain mainly right upper quadrant and periumbilical area   MS:  Tenderness right shoulder especially on palpation or with rotation or attempting to raise right arm overhead--tenderness knees bilaterally pain with flexion extension some crepitus bilaterally left knee greater than right  patient able squat FCI down hard to arise partially due to weight has slightly unsteady gait due to problems with knees walks with a cane patient's see orthopedic  Neuro: Alert, CN intact, oriented X3 --unable do heel toe due weight and knees   Extremities: No cyanosis, clubbing, or edema negative Romberg        Assessment:       1. Alcoholic cirrhosis of liver with ascites    2. UGIB (upper gastrointestinal bleed)    3. Esophagitis with gastritis    4. Alcohol abuse, in remission    5. Anemia of chronic disorder    6. Esophageal varices without bleeding, unspecified esophageal varices type    7. JHOAN (generalized anxiety disorder)    8. Hepatic steatosis    9. Hiatal hernia    10. Hypothyroidism, unspecified type    11. Morbid (severe) obesity due to excess calories    12. Portal hypertension    13. Thrombocytopenia                Plan:       Alcoholic cirrhosis of liver with ascites    UGIB (upper gastrointestinal bleed)    Esophagitis with gastritis    Alcohol abuse, in remission    Anemia of chronic disorder    Esophageal varices without bleeding, unspecified esophageal varices type    JHOAN (generalized anxiety disorder)    Hepatic steatosis    Hiatal hernia    Hypothyroidism, unspecified type    Morbid (severe) obesity due to excess calories    Portal hypertension    Thrombocytopenia                Main Reason for Visits  See gastroenterologist---needs EGD and colonoscopy---history of esophagitis/gastritis/gastric ulcers/cirrhosis/portal hypertension/esophageal varices-hepatics steatosis--history diverticulitis  Patient did have some esophageal banding  Hepatologist---patient with history of cirrhosis esophageal varices needs to be seen by hepatology--will get CBCs CMP lipids ammonia level T4  Morbid obesity patient would like to try Wegovy will discuss with hepatologist prior to treatment with this medication due to the risk of pancreatitis and GI problems and hepatology for  Patient with dorsalgia and  neuropathy on gabapentin 600 t.i.d.  GERD on Protonix  Wants to see psychiatrist anxiety and depression  Morbid obesity needs to lose a lot of weight may benefit from gastric bypass   GERD--avoid alcohol smoking NSAIDs caffeine stress carbonated drinks--can raise the head of the bed on a block of wood to prevent GERD--eat small meals--lay down for 1-2 hours after eating Cont protonix --no NSAIDs due to history of having be transfused due to GI issues hx gastritis esophagitis cirrhosis esophageal varices hepatic steatosis diverticulosis portal hypertension  Anemia hematocrit was 32.9 now 36.9 but 115,000 platelets history of iron deficiency  Thrombocytopenia platelets [id number]  Lab --in 6 mo CBC CMP lipid T4 ammonia level  Ret 6 mo best follow upnwith Gen surg or GI MD not me

## 2025-03-25 ENCOUNTER — TELEPHONE (OUTPATIENT)
Dept: HEPATOLOGY | Facility: CLINIC | Age: 70
End: 2025-03-25
Payer: MEDICARE

## 2025-03-25 NOTE — TELEPHONE ENCOUNTER
Called the patient to schedule a hepatology consult from referral.  No answer, left message with call back # [phone].

## 2025-03-25 NOTE — TELEPHONE ENCOUNTER
----- Message from Opal sent at 3/24/2025  2:40 PM CDT -----  Alcoholic cirrhosis of liver with ascites [K70.31] pleaser call patient back to schedule

## 2025-04-30 ENCOUNTER — HOSPITAL ENCOUNTER (OUTPATIENT)
Facility: HOSPITAL | Age: 70
Discharge: HOME | End: 2025-05-01
Attending: STUDENT IN AN ORGANIZED HEALTH CARE EDUCATION/TRAINING PROGRAM | Admitting: STUDENT IN AN ORGANIZED HEALTH CARE EDUCATION/TRAINING PROGRAM
Payer: MEDICARE

## 2025-04-30 ENCOUNTER — OFFICE VISIT (OUTPATIENT)
Dept: GASTROENTEROLOGY | Facility: CLINIC | Age: 70
End: 2025-04-30
Payer: MEDICARE

## 2025-04-30 VITALS
SYSTOLIC BLOOD PRESSURE: 122 MMHG | WEIGHT: 270.75 LBS | BODY MASS INDEX: 53.16 KG/M2 | DIASTOLIC BLOOD PRESSURE: 66 MMHG | HEIGHT: 60 IN | HEART RATE: 104 BPM

## 2025-04-30 DIAGNOSIS — D63.8 ANEMIA OF CHRONIC DISORDER: ICD-10-CM

## 2025-04-30 DIAGNOSIS — I27.20 PULMONARY HYPERTENSION: ICD-10-CM

## 2025-04-30 DIAGNOSIS — R07.9 CHEST PAIN: ICD-10-CM

## 2025-04-30 DIAGNOSIS — K76.6 PORTAL HYPERTENSIVE GASTROPATHY: ICD-10-CM

## 2025-04-30 DIAGNOSIS — R10.13 EPIGASTRIC PAIN: Primary | ICD-10-CM

## 2025-04-30 DIAGNOSIS — R10.12 LUQ PAIN: Primary | ICD-10-CM

## 2025-04-30 DIAGNOSIS — I85.00 ESOPHAGEAL VARICES WITHOUT BLEEDING, UNSPECIFIED ESOPHAGEAL VARICES TYPE: ICD-10-CM

## 2025-04-30 DIAGNOSIS — K21.00 GASTROESOPHAGEAL REFLUX DISEASE WITH ESOPHAGITIS WITHOUT HEMORRHAGE: ICD-10-CM

## 2025-04-30 DIAGNOSIS — R79.89 ELEVATED TROPONIN: ICD-10-CM

## 2025-04-30 DIAGNOSIS — K31.89 PORTAL HYPERTENSIVE GASTROPATHY: ICD-10-CM

## 2025-04-30 DIAGNOSIS — Z87.19 HISTORY OF DIVERTICULITIS: ICD-10-CM

## 2025-04-30 DIAGNOSIS — K70.31 ALCOHOLIC CIRRHOSIS OF LIVER WITH ASCITES: ICD-10-CM

## 2025-04-30 DIAGNOSIS — K44.9 HIATAL HERNIA: ICD-10-CM

## 2025-04-30 DIAGNOSIS — R06.02 SHORTNESS OF BREATH: ICD-10-CM

## 2025-04-30 LAB
ABSOLUTE EOSINOPHIL (OHS): 0.09 K/UL
ABSOLUTE MONOCYTE (OHS): 0.65 K/UL (ref 0.3–1)
ABSOLUTE NEUTROPHIL COUNT (OHS): 4.17 K/UL (ref 1.8–7.7)
ALBUMIN SERPL BCP-MCNC: 2.8 G/DL (ref 3.5–5.2)
ALP SERPL-CCNC: 119 UNIT/L (ref 40–150)
ALT SERPL W/O P-5'-P-CCNC: 11 UNIT/L (ref 10–44)
ANION GAP (OHS): 6 MMOL/L (ref 8–16)
AST SERPL-CCNC: 22 UNIT/L (ref 11–45)
BASOPHILS # BLD AUTO: 0.04 K/UL
BASOPHILS NFR BLD AUTO: 0.6 %
BILIRUB SERPL-MCNC: 0.3 MG/DL (ref 0.1–1)
BILIRUB UR QL STRIP.AUTO: NEGATIVE
BNP SERPL-MCNC: 36 PG/ML (ref 0–99)
BUN SERPL-MCNC: 11 MG/DL (ref 8–23)
CALCIUM SERPL-MCNC: 8.7 MG/DL (ref 8.7–10.5)
CHLORIDE SERPL-SCNC: 111 MMOL/L (ref 95–110)
CLARITY UR: CLEAR
CO2 SERPL-SCNC: 25 MMOL/L (ref 23–29)
COLOR UR AUTO: YELLOW
CREAT SERPL-MCNC: 0.7 MG/DL (ref 0.5–1.4)
ERYTHROCYTE [DISTWIDTH] IN BLOOD BY AUTOMATED COUNT: 16.6 % (ref 11.5–14.5)
GFR SERPLBLD CREATININE-BSD FMLA CKD-EPI: >60 ML/MIN/1.73/M2
GLUCOSE SERPL-MCNC: 92 MG/DL (ref 70–110)
GLUCOSE UR QL STRIP: NEGATIVE
HCT VFR BLD AUTO: 36.4 % (ref 37–48.5)
HCV AB SERPL QL IA: NORMAL
HGB BLD-MCNC: 11.1 GM/DL (ref 12–16)
HGB UR QL STRIP: NEGATIVE
HIV 1+2 AB+HIV1 P24 AG SERPL QL IA: NORMAL
HOLD SPECIMEN: NORMAL
IMM GRANULOCYTES # BLD AUTO: 0.04 K/UL (ref 0–0.04)
IMM GRANULOCYTES NFR BLD AUTO: 0.6 % (ref 0–0.5)
KETONES UR QL STRIP: NEGATIVE
LEUKOCYTE ESTERASE UR QL STRIP: NEGATIVE
LIPASE SERPL-CCNC: 10 U/L (ref 4–60)
LYMPHOCYTES # BLD AUTO: 1.84 K/UL (ref 1–4.8)
MCH RBC QN AUTO: 25.5 PG (ref 27–31)
MCHC RBC AUTO-ENTMCNC: 30.5 G/DL (ref 32–36)
MCV RBC AUTO: 84 FL (ref 82–98)
NITRITE UR QL STRIP: NEGATIVE
NUCLEATED RBC (/100WBC) (OHS): 0 /100 WBC
OHS QRS DURATION: 76 MS
OHS QTC CALCULATION: 430 MS
PH UR STRIP: 7 [PH]
PLATELET # BLD AUTO: 160 K/UL (ref 150–450)
PMV BLD AUTO: 10.2 FL (ref 9.2–12.9)
POTASSIUM SERPL-SCNC: 3.7 MMOL/L (ref 3.5–5.1)
PROT SERPL-MCNC: 7.1 GM/DL (ref 6–8.4)
PROT UR QL STRIP: NEGATIVE
RBC # BLD AUTO: 4.35 M/UL (ref 4–5.4)
RELATIVE EOSINOPHIL (OHS): 1.3 %
RELATIVE LYMPHOCYTE (OHS): 26.9 % (ref 18–48)
RELATIVE MONOCYTE (OHS): 9.5 % (ref 4–15)
RELATIVE NEUTROPHIL (OHS): 61.1 % (ref 38–73)
SODIUM SERPL-SCNC: 142 MMOL/L (ref 136–145)
SP GR UR STRIP: >=1.03
TROPONIN I SERPL HS-MCNC: 18 NG/L
TROPONIN I SERPL HS-MCNC: 19 NG/L
UROBILINOGEN UR STRIP-ACNC: NEGATIVE EU/DL
WBC # BLD AUTO: 6.83 K/UL (ref 3.9–12.7)

## 2025-04-30 PROCEDURE — 81003 URINALYSIS AUTO W/O SCOPE: CPT | Mod: HCNC | Performed by: PHYSICIAN ASSISTANT

## 2025-04-30 PROCEDURE — 99999 PR PBB SHADOW E&M-EST. PATIENT-LVL V: CPT | Mod: PBBFAC,HCNC,,

## 2025-04-30 PROCEDURE — 84484 ASSAY OF TROPONIN QUANT: CPT | Mod: HCNC | Performed by: STUDENT IN AN ORGANIZED HEALTH CARE EDUCATION/TRAINING PROGRAM

## 2025-04-30 PROCEDURE — 96376 TX/PRO/DX INJ SAME DRUG ADON: CPT | Mod: HCNC

## 2025-04-30 PROCEDURE — 85025 COMPLETE CBC W/AUTO DIFF WBC: CPT | Mod: HCNC | Performed by: PHYSICIAN ASSISTANT

## 2025-04-30 PROCEDURE — 86803 HEPATITIS C AB TEST: CPT | Mod: HCNC | Performed by: PHYSICIAN ASSISTANT

## 2025-04-30 PROCEDURE — 93010 ELECTROCARDIOGRAM REPORT: CPT | Mod: HCNC,,, | Performed by: INTERNAL MEDICINE

## 2025-04-30 PROCEDURE — 80053 COMPREHEN METABOLIC PANEL: CPT | Mod: HCNC | Performed by: PHYSICIAN ASSISTANT

## 2025-04-30 PROCEDURE — 96374 THER/PROPH/DIAG INJ IV PUSH: CPT | Mod: HCNC

## 2025-04-30 PROCEDURE — 25000242 PHARM REV CODE 250 ALT 637 W/ HCPCS: Mod: HCNC | Performed by: PHYSICIAN ASSISTANT

## 2025-04-30 PROCEDURE — 99285 EMERGENCY DEPT VISIT HI MDM: CPT | Mod: 25,HCNC

## 2025-04-30 PROCEDURE — 94640 AIRWAY INHALATION TREATMENT: CPT | Mod: HCNC

## 2025-04-30 PROCEDURE — G0378 HOSPITAL OBSERVATION PER HR: HCPCS | Mod: HCNC

## 2025-04-30 PROCEDURE — 93005 ELECTROCARDIOGRAM TRACING: CPT | Mod: HCNC

## 2025-04-30 PROCEDURE — 63600175 PHARM REV CODE 636 W HCPCS: Mod: HCNC | Performed by: STUDENT IN AN ORGANIZED HEALTH CARE EDUCATION/TRAINING PROGRAM

## 2025-04-30 PROCEDURE — 94761 N-INVAS EAR/PLS OXIMETRY MLT: CPT | Mod: HCNC

## 2025-04-30 PROCEDURE — 25500020 PHARM REV CODE 255: Mod: HCNC | Performed by: STUDENT IN AN ORGANIZED HEALTH CARE EDUCATION/TRAINING PROGRAM

## 2025-04-30 PROCEDURE — 25000003 PHARM REV CODE 250: Mod: HCNC | Performed by: PHYSICIAN ASSISTANT

## 2025-04-30 PROCEDURE — 84484 ASSAY OF TROPONIN QUANT: CPT | Mod: HCNC | Performed by: PHYSICIAN ASSISTANT

## 2025-04-30 PROCEDURE — 87389 HIV-1 AG W/HIV-1&-2 AB AG IA: CPT | Mod: HCNC | Performed by: PHYSICIAN ASSISTANT

## 2025-04-30 PROCEDURE — 96375 TX/PRO/DX INJ NEW DRUG ADDON: CPT | Mod: HCNC,59

## 2025-04-30 PROCEDURE — 83880 ASSAY OF NATRIURETIC PEPTIDE: CPT | Mod: HCNC | Performed by: PHYSICIAN ASSISTANT

## 2025-04-30 PROCEDURE — 83690 ASSAY OF LIPASE: CPT | Mod: HCNC | Performed by: PHYSICIAN ASSISTANT

## 2025-04-30 RX ORDER — TALC
6 POWDER (GRAM) TOPICAL NIGHTLY PRN
Status: DISCONTINUED | OUTPATIENT
Start: 2025-04-30 | End: 2025-05-01 | Stop reason: HOSPADM

## 2025-04-30 RX ORDER — METHOCARBAMOL 750 MG/1
750 TABLET, FILM COATED ORAL
Status: COMPLETED | OUTPATIENT
Start: 2025-04-30 | End: 2025-04-30

## 2025-04-30 RX ORDER — SODIUM CHLORIDE 0.9 % (FLUSH) 0.9 %
10 SYRINGE (ML) INJECTION
Status: DISCONTINUED | OUTPATIENT
Start: 2025-04-30 | End: 2025-05-01 | Stop reason: HOSPADM

## 2025-04-30 RX ORDER — KETOROLAC TROMETHAMINE 30 MG/ML
10 INJECTION, SOLUTION INTRAMUSCULAR; INTRAVENOUS
Status: DISCONTINUED | OUTPATIENT
Start: 2025-04-30 | End: 2025-04-30

## 2025-04-30 RX ORDER — MORPHINE SULFATE 4 MG/ML
4 INJECTION, SOLUTION INTRAMUSCULAR; INTRAVENOUS
Refills: 0 | Status: COMPLETED | OUTPATIENT
Start: 2025-04-30 | End: 2025-04-30

## 2025-04-30 RX ORDER — PANTOPRAZOLE SODIUM 40 MG/1
40 TABLET, DELAYED RELEASE ORAL 2 TIMES DAILY
Status: DISCONTINUED | OUTPATIENT
Start: 2025-05-01 | End: 2025-05-01 | Stop reason: HOSPADM

## 2025-04-30 RX ORDER — GABAPENTIN 300 MG/1
600 CAPSULE ORAL 3 TIMES DAILY
Status: DISCONTINUED | OUTPATIENT
Start: 2025-05-01 | End: 2025-05-01 | Stop reason: HOSPADM

## 2025-04-30 RX ORDER — DIPHENHYDRAMINE HYDROCHLORIDE 50 MG/ML
25 INJECTION, SOLUTION INTRAMUSCULAR; INTRAVENOUS
Status: COMPLETED | OUTPATIENT
Start: 2025-04-30 | End: 2025-04-30

## 2025-04-30 RX ORDER — ACETAMINOPHEN 500 MG
1000 TABLET ORAL EVERY 8 HOURS PRN
Status: DISCONTINUED | OUTPATIENT
Start: 2025-04-30 | End: 2025-05-01

## 2025-04-30 RX ORDER — ONDANSETRON HYDROCHLORIDE 2 MG/ML
4 INJECTION, SOLUTION INTRAVENOUS EVERY 8 HOURS PRN
Status: DISCONTINUED | OUTPATIENT
Start: 2025-04-30 | End: 2025-05-01 | Stop reason: HOSPADM

## 2025-04-30 RX ORDER — IPRATROPIUM BROMIDE AND ALBUTEROL SULFATE 2.5; .5 MG/3ML; MG/3ML
3 SOLUTION RESPIRATORY (INHALATION)
Status: COMPLETED | OUTPATIENT
Start: 2025-04-30 | End: 2025-04-30

## 2025-04-30 RX ORDER — GUAIFENESIN AND DEXTROMETHORPHAN HYDROBROMIDE 10; 100 MG/5ML; MG/5ML
10 SYRUP ORAL EVERY 4 HOURS PRN
Status: DISCONTINUED | OUTPATIENT
Start: 2025-04-30 | End: 2025-05-01

## 2025-04-30 RX ORDER — MORPHINE SULFATE 4 MG/ML
4 INJECTION, SOLUTION INTRAMUSCULAR; INTRAVENOUS EVERY 4 HOURS PRN
Status: DISCONTINUED | OUTPATIENT
Start: 2025-04-30 | End: 2025-05-01 | Stop reason: HOSPADM

## 2025-04-30 RX ORDER — METHOCARBAMOL 750 MG/1
750 TABLET, FILM COATED ORAL 3 TIMES DAILY PRN
Status: DISCONTINUED | OUTPATIENT
Start: 2025-04-30 | End: 2025-05-01

## 2025-04-30 RX ORDER — ONDANSETRON 4 MG/1
4 TABLET, ORALLY DISINTEGRATING ORAL
Status: DISPENSED | OUTPATIENT
Start: 2025-04-30 | End: 2025-05-01

## 2025-04-30 RX ADMIN — MORPHINE SULFATE 4 MG: 4 INJECTION INTRAVENOUS at 03:04

## 2025-04-30 RX ADMIN — IOHEXOL 100 ML: 350 INJECTION, SOLUTION INTRAVENOUS at 04:04

## 2025-04-30 RX ADMIN — METHOCARBAMOL 750 MG: 750 TABLET ORAL at 11:04

## 2025-04-30 RX ADMIN — IPRATROPIUM BROMIDE AND ALBUTEROL SULFATE 3 ML: 2.5; .5 SOLUTION RESPIRATORY (INHALATION) at 10:04

## 2025-04-30 RX ADMIN — DIPHENHYDRAMINE HYDROCHLORIDE 25 MG: 50 INJECTION, SOLUTION INTRAMUSCULAR; INTRAVENOUS at 04:04

## 2025-04-30 RX ADMIN — MORPHINE SULFATE 4 MG: 4 INJECTION INTRAVENOUS at 06:04

## 2025-04-30 NOTE — FIRST PROVIDER EVALUATION
" Emergency Department TeleTriage Encounter Note      CHIEF COMPLAINT    Chief Complaint   Patient presents with    Multiple complaints      Pt endorses abdominal pain, nausea, cough, shortness of breath and back pain, symptoms started a week ago.        VITAL SIGNS   Initial Vitals [04/30/25 1223]   BP Pulse Resp Temp SpO2   (!) 126/57 95 (!) 22 98.4 °F (36.9 °C) 100 %      MAP       --            ALLERGIES    Review of patient's allergies indicates:   Allergen Reactions    Codeine Itching    Iodine and iodide containing products Itching       PROVIDER TRIAGE NOTE  Patient presents with cough, chest pain, SOB, abdominal pain, and nausea for 1 week. She also reports intermittent numbness in left upper lip/side of face.       ORDERS  Labs Reviewed   HEPATITIS C ANTIBODY   HEP C VIRUS HOLD SPECIMEN   HIV 1 / 2 ANTIBODY       ED Orders (720h ago, onward)      Start Ordered     Status Ordering Provider    04/30/25 1227 04/30/25 1226  Hepatitis C Antibody  STAT        Placed in "And" Linked Group    Ordered OBBRII ALVA    04/30/25 1227 04/30/25 1226  HCV Virus Hold Specimen  STAT        Placed in "And" Linked Group    Ordered OBERBRII CASTORENA    04/30/25 1227 04/30/25 1226  HIV 1/2 Ag/Ab (4th Gen)  STAT         Ordered BRII LOWE    04/30/25 1226 04/30/25 1225  EKG 12-lead  Once         Final result FRANCOIS SNELL              Virtual Visit Note: The provider triage portion of this emergency department evaluation and documentation was performed via [employer], a HIPAA-compliant telemedicine application, in concert with a tele-presenter in the room. A face to face patient evaluation with one of my colleagues will occur once the patient is placed in an emergency department room.      DISCLAIMER: This note was prepared with [employer] voice recognition transcription software. Garbled syntax, mangled pronouns, and other bizarre constructions may be attributed to that software system.    "

## 2025-04-30 NOTE — ED PROVIDER NOTES
"Encounter Date: 4/30/2025       History     Chief Complaint   Patient presents with    Multiple complaints      Pt endorses abdominal pain, nausea, cough, shortness of breath and back pain, symptoms started a week ago.      HPI    70 y/o F PMH CAD, HTN, multiple abdominal surgeries who presents to the ED for evaluation of multiple complaints.  Patient notes epigastric abdominal with fatigue and nausea recently. She was sent from GI clinic because "her breathing was off."  She also notes some chronic issues with numbness around her left face, none currently.  Denies any CP, syncope, trauma, headache, or neck pain.      Review of patient's allergies indicates:   Allergen Reactions    Codeine Itching    Iodine and iodide containing products Itching     Past Medical History:   Diagnosis Date    Acute right-sided back pain 06/16/2023    Anemia due to chronic blood loss 03/06/2020    Anemia of chronic disorder 08/24/2020    Anxiety     Cirrhosis     Coronary artery disease     pt states she does not have    Diverticulosis     Gastric ulcer     old    GERD (gastroesophageal reflux disease)     Hepatic steatosis 09/12/2016    Hyperbilirubinemia     Hypertension     pt states she no longer has    Hypothyroidism     Iron deficiency anemia due to chronic blood loss 08/24/2020    Microcytic anemia 08/24/2020    Obesity     Peptic ulcer disease 09/12/2016    UPJ (ureteropelvic junction) obstruction     Vertigo     Vertigo      Past Surgical History:   Procedure Laterality Date    APPENDECTOMY      CATARACT EXTRACTION W/  INTRAOCULAR LENS IMPLANT Right 12/9/2024    Procedure: EXTRACTION, CATARACT, WITH IOL INSERTION;  Surgeon: Elidia Figueroa MD;  Location: [facility] OR;  Service: Ophthalmology;  Laterality: Right;    CATARACT EXTRACTION W/  INTRAOCULAR LENS IMPLANT Left 1/8/2025    Procedure: EXTRACTION, CATARACT, WITH IOL INSERTION;  Surgeon: Elidia Figueroa MD;  Location: [facility] OR;  Service: Ophthalmology;  Laterality: Left; "    CHOLECYSTECTOMY      COLONOSCOPY N/A 07/06/2020    Procedure: COLONOSCOPY;  Surgeon: Leander Cornejo MD;  Location: [facility];  Service: Colon and Rectal;  Laterality: N/A;    COLONOSCOPY  05/01/2023    COLONOSCOPY N/A 05/01/2023    Procedure: COLONOSCOPY;  Surgeon: Antwon Gardner MD;  Location: [facility];  Service: Endoscopy;  Laterality: N/A;    COLONOSCOPY  12/11/2023    COLONOSCOPY N/A 12/11/2023    Procedure: COLONOSCOPY;  Surgeon: Antwon Gardner MD;  Location: [facility];  Service: Endoscopy;  Laterality: N/A;    EGD, WITH BANDING OF VARICES  12/11/2023    2 bands    ESOPHAGOGASTRODUODENOSCOPY N/A 06/12/2019    Procedure: ESOPHAGOGASTRODUODENOSCOPY (EGD);  Surgeon: Arben Brown MD;  Location: [facility];  Service: Endoscopy;  Laterality: N/A;    ESOPHAGOGASTRODUODENOSCOPY N/A 03/09/2020    Procedure: EGD (ESOPHAGOGASTRODUODENOSCOPY);  Surgeon: Andrea Salomon MD;  Location: [facility];  Service: Endoscopy;  Laterality: N/A;    ESOPHAGOGASTRODUODENOSCOPY N/A 09/21/2020    Procedure: EGD (ESOPHAGOGASTRODUODENOSCOPY);  Surgeon: Antwon Gardner MD;  Location: [facility];  Service: Endoscopy;  Laterality: N/A;    ESOPHAGOGASTRODUODENOSCOPY  05/01/2023    ESOPHAGOGASTRODUODENOSCOPY N/A 05/01/2023    Procedure: EGD (ESOPHAGOGASTRODUODENOSCOPY);  Surgeon: Antwon Gardner MD;  Location: [facility];  Service: Endoscopy;  Laterality: N/A;    ESOPHAGOGASTRODUODENOSCOPY N/A 12/11/2023    Procedure: EGD (ESOPHAGOGASTRODUODENOSCOPY);  Surgeon: Antwon Gardner MD;  Location: [facility];  Service: Endoscopy;  Laterality: N/A;    ESOPHAGOGASTRODUODENOSCOPY N/A 01/24/2024    Procedure: EGD (ESOPHAGOGASTRODUODENOSCOPY);  Surgeon: Antwon Gardner MD;  Location: [facility];  Service: Endoscopy;  Laterality: N/A;    ESOPHAGOGASTRODUODENOSCOPY  03/06/2024    ESOPHAGOGASTRODUODENOSCOPY N/A 3/6/2024    Procedure: EGD (ESOPHAGOGASTRODUODENOSCOPY);  Surgeon: Kendra  Antwon AMADOR MD;  Location: [facility] ENDO;  Service: Endoscopy;  Laterality: N/A;    HYSTERECTOMY      INTRALUMINAL GASTROINTESTINAL TRACT IMAGING VIA CAPSULE N/A 01/19/2022    Procedure: IMAGING PROCEDURE, GI TRACT, INTRALUMINAL, VIA CAPSULE;  Surgeon: Antwon Gardner MD;  Location: [facility] ENDO;  Service: Endoscopy;  Laterality: N/A;    OOPHORECTOMY      PANCREAS SURGERY      TONSILLECTOMY      TRANSFORAMINAL EPIDURAL INJECTION OF STEROID Right 10/14/2021    Procedure: Injection,steroid,epidural,transforaminal approach---RIGHT L5 AND S1;  Surgeon: Cheng Tariq Jr., MD;  Location: [facility] PAIN MGMT;  Service: Pain Management;  Laterality: Right;    TRANSFORAMINAL EPIDURAL INJECTION OF STEROID Right 01/13/2023    Procedure: INJECTION, STEROID, EPIDURAL, TRANSFORAMINAL APPROACH, RIGHT L4/L5 & L5-S1 CONTRAST DIRECT REF;  Surgeon: Coni Franco MD;  Location: [facility] PAIN MGT;  Service: Pain Management;  Laterality: Right;     Family History   Problem Relation Name Age of Onset    Hyperlipidemia Mother      Heart disease Mother      Hypertension Father      Heart disease Father      Lupus Sister x4     Lupus Sister x1     Arthritis Brother x5     No Known Problems Daughter x1     Mental illness Son x2         PTSD x 1 son in      Social History[1]  Review of Systems   Gastrointestinal:  Positive for abdominal pain.       Physical Exam     Initial Vitals [04/30/25 1223]   BP Pulse Resp Temp SpO2   (!) 126/57 95 (!) 22 98.4 °F (36.9 °C) 100 %      MAP       --         Physical Exam    Nursing note and vitals reviewed.  Constitutional: She appears well-nourished.   HENT:   Head: Atraumatic.   Eyes: EOM are normal.   Neck: Neck supple.   Cardiovascular:  Normal rate and regular rhythm.           Pulmonary/Chest: Breath sounds normal. No respiratory distress.   Abdominal:   Mild epigastric TTP   Musculoskeletal:         General: No edema. Normal range of motion.      Cervical back: Neck supple.      Neurological: She is alert and oriented to person, place, and time.   5/5 strength all extremities, sensation intact, facial sensation intact b/l, no facial droop, no cerebellar signs, tongue midline, normal speech   Skin: Skin is warm and dry.   Psychiatric: She has a normal mood and affect. Thought content normal.         ED Course   Procedures  Labs Reviewed   COMPREHENSIVE METABOLIC PANEL - Abnormal       Result Value    Sodium 142      Potassium 3.7      Chloride 111 (*)     CO2 25      Glucose 92      BUN 11      Creatinine 0.7      Calcium 8.7      Protein Total 7.1      Albumin 2.8 (*)     Bilirubin Total 0.3            AST 22      ALT 11      Anion Gap 6 (*)     eGFR >60     TROPONIN I HIGH SENSITIVITY - Abnormal    Troponin High Sensitive 19 (*)    URINALYSIS, REFLEX TO URINE CULTURE - Abnormal    Color, UA Yellow      Appearance, UA Clear      pH, UA 7.0      Spec Grav UA >=1.030 (*)     Protein, UA Negative      Glucose, UA Negative      Ketones, UA Negative      Bilirubin, UA Negative      Blood, UA Negative      Nitrites, UA Negative      Urobilinogen, UA Negative      Leukocyte Esterase, UA Negative     CBC WITH DIFFERENTIAL - Abnormal    WBC 6.83      RBC 4.35      HGB 11.1 (*)     HCT 36.4 (*)     MCV 84      MCH 25.5 (*)     MCHC 30.5 (*)     RDW 16.6 (*)     Platelet Count 160      MPV 10.2      Nucleated RBC 0      Neut % 61.1      Lymph % 26.9      Mono % 9.5      Eos % 1.3      Basophil % 0.6      Imm Grans % 0.6 (*)     Neut # 4.17      Lymph # 1.84      Mono # 0.65      Eos # 0.09      Baso # 0.04      Imm Grans # 0.04     TROPONIN I HIGH SENSITIVITY - Abnormal    Troponin High Sensitive 18 (*)    HEPATITIS C ANTIBODY - Normal    Hep C Ab Interp Non-Reactive     HIV 1 / 2 ANTIBODY - Normal    HIV 1/2 Ag/Ab Non-Reactive     B-TYPE NATRIURETIC PEPTIDE - Normal    BNP 36     LIPASE - Normal    Lipase Level 10     CBC W/ AUTO DIFFERENTIAL    Narrative:     The following orders were  created for panel order CBC auto differential.  Procedure                               Abnormality         Status                     ---------                               -----------         ------                     CBC with Differential[[id number]]       Abnormal            Final result                 Please view results for these tests on the individual orders.   GREY TOP URINE HOLD    Extra Tube Hold for add-ons.          ECG Results              EKG 12-lead (Final result)        Collection Time Result Time QRS Duration OHS QTC Calculation    04/30/25 12:31:15 04/30/25 12:39:45 76 430                     Final result by Interface, Lab In [facility] (04/30/25 12:39:50)                   Narrative:    Test Reason : R06.02,    Vent. Rate :  93 BPM     Atrial Rate :  93 BPM     P-R Int : 128 ms          QRS Dur :  76 ms      QT Int : 346 ms       P-R-T Axes :  45  -6  10 degrees    QTcB Int : 430 ms    Normal sinus rhythm  Low septal forces  Borderline Abnormal ECG  When compared with ECG of 08-Apr-2024 10:46,  Confirmed by Kenyon Roy ([id number]) on 4/30/2025 12:39:38 PM    Referred By:            Confirmed By: Kenyon Roy                                  Imaging Results              CT Head Without Contrast (Final result)  Result time 04/30/25 19:53:23      Final result by Chato Tyler MD (04/30/25 19:53:23)                   Impression:      Allowing for some artifacts, there are pre-existing findings but no scan evidence of acute intracranial abnormality at this time.    If there remains strong clinical suspicion for acute intracranial abnormality, MRI and/or short-term follow-up head CT would be recommended.      Electronically signed by: Chato Tyler  Date:    04/30/2025  Time:    19:53               Narrative:    EXAMINATION:  CT HEAD WITHOUT CONTRAST    CLINICAL HISTORY:  Transient ischemic attack (TIA);    TECHNIQUE:  Low dose axial images were obtained through the head.  Coronal and sagittal  reformations were also performed. Contrast was not administered.    COMPARISON:  Head CT 03/27/2022.    FINDINGS:  Artifacts related to beam hardening and/or motion degrade portions of the scan.    Allowing for the artifacts, there is no scan evidence of depressed calvarial fracture, acute intracranial hemorrhage, discrete acute large vascular territory brain infarct, discrete intracranial mass or mass effect, pathologic extra-axial fluid collection, pneumocephalus, acute hydrocephalus, brain edema, or other acute intracranial abnormality.    There are some intracranial atherosclerotic calcifications.  There are minimal low-attenuation changes in portions of the cerebral white matter, likely related to chronic small vessel disease.    There is some mild pre-existing cerebral parenchymal volume loss, mainly involving the temporal and frontal lobes, similar to the comparison study.  Also similar to the comparison study, there is a proportionate degree of presumed ex vacuo prominence of the ventricles, cisterns, sulci, and fissures.    Opacification of a left ethmoid air cell.  New since the comparison scan.  Orbits and skull base demonstrate no adverse interval changes.  There is absence of the native ocular lenses, likely postsurgical.    Partially visualized moderate-to-large torus palatinus.     images: Corpulent habitus                                       CT Abdomen Pelvis With IV Contrast NO Oral Contrast (Final result)  Result time 04/30/25 17:48:06      Final result by Maxim Aj MD (04/30/25 17:48:06)                   Impression:      1. No acute process or CT findings to explain patient's symptoms of epigastric pain.  2. Hepatic cirrhosis with splenomegaly.  3. Colonic diverticulosis without acute diverticulitis.  4. Cholecystectomy and hysterectomy.  5. Other incidental/nonemergent findings in the body of the report.      Electronically signed by: Maxim Aj  MD  Date:    04/30/2025  Time:    17:48               Narrative:    EXAMINATION:  CT ABDOMEN PELVIS WITH IV CONTRAST    CLINICAL HISTORY:  Epigastric pain;    TECHNIQUE:  Low dose axial images, sagittal and coronal reformations were obtained from the lung bases to the pubic symphysis following the IV administration of 100 mL of Omnipaque 350 .  Oral contrast was not given.    COMPARISON:  CT abdomen most recent 9/8/24, abdominal ultrasound 11/21/2023    FINDINGS:  Base of the heart is within normal limits.  Imaged lung bases show minimal dependent atelectasis without consolidation or pleural effusion.    Cholecystectomy.  No significant biliary ductal dilatation.  Liver is normal in size with subtle nodular contour suggesting sequela of underlying cirrhosis, similar to prior.  No discrete hepatic lesion seen.  Portal vasculature appears patent.    Pancreas mildly atrophic.  No discrete pancreatic mass or adjacent inflammatory change.  Spleen is enlarged similar to prior.  Few scattered subcentimeter hypoattenuating splenic parenchymal foci which are too small to characterize but grossly stable.  Stomach, duodenum and bilateral adrenal glands are within normal limits.    Bilateral kidneys are normal in size and location with relatively symmetric normal enhancement.  Suspected minimal cortical scarring bilaterally.  No hydronephrosis or significant perinephric stranding.  Ureters are normal in course and caliber.  Urinary bladder is well distended without wall thickening or adjacent inflammatory change.  Uterus surgically absent.  No adnexal mass or significant volume free pelvic fluid.  Pelvic phleboliths noted.    Several scattered colonic diverticula without evidence of acute diverticulitis.  Appendix not localized; however, no pericecal inflammatory change.  Terminal ileum is within normal limits.  No evidence of bowel obstruction or acute bowel inflammation.  No bowel pneumatosis or portal venous gas.    Grossly  stable few prominent jeancarlos hepatis lymph nodes, nonspecific.  No ascites, free air or additional lymphadenopathy by CT criteria elsewhere in the abdomen or pelvis.    Minimal scattered calcific atherosclerosis of the abdominal aorta.  No aortic aneurysm or dissection.    Extraperitoneal soft tissues and osseous structures appear stable without acute findings.                                       X-Ray Chest AP Portable (Final result)  Result time 04/30/25 15:02:22      Final result by Maxim Aj MD (04/30/25 15:02:22)                   Impression:      No radiographic evidence of pneumonia or other source of cough/shortness of breath, noting that early/mild viral pneumonia or nonspecific bronchitis may be radiographically occult.      Electronically signed by: Maxim Aj MD  Date:    04/30/2025  Time:    15:02               Narrative:    EXAMINATION:  XR CHEST AP PORTABLE    CLINICAL HISTORY:  Shortness of breath    TECHNIQUE:  Single frontal view of the chest was performed.    COMPARISON:  Chest radiograph 12/05/2024, CT abdomen and pelvis 09/08/2024, chest CT 03/27/2022    FINDINGS:  No detrimental change.The lungs are well expanded and clear, with normal appearance of pulmonary vasculature and no pleural effusion or pneumothorax.    The cardiac silhouette is normal in size. The hilar and mediastinal contours are midline and within normal limits for age noting similar calcification at the aortic knob.    No acute osseous process seen.  PA and lateral views can be obtained.                                       Medications   ondansetron disintegrating tablet 4 mg (0 mg Oral Hold 4/30/25 1300)   morphine injection 4 mg (4 mg Intravenous Given 4/30/25 1528)   diphenhydrAMINE injection 25 mg (25 mg Intravenous Given 4/30/25 1621)   iohexoL (OMNIPAQUE 350) injection 100 mL (100 mLs Intravenous Given 4/30/25 1646)   morphine injection 4 mg (4 mg Intravenous Given 4/30/25 1851)     Medical Decision Making  Amount  and/or Complexity of Data Reviewed  Labs: ordered. Decision-making details documented in ED Course.  Radiology: ordered and independent interpretation performed. Decision-making details documented in ED Course.  ECG/medicine tests: ordered and independent interpretation performed. Decision-making details documented in ED Course.    Risk  Prescription drug management.                     EKG:  Rate 93  NSR  No STEMI               70 y/o F here with several complaints.  VSS in ED, exam as above.  EKG no STEMI.  Normal WBC, normal lytes, normal creatinine.  Trops elevated at 19 ->18 flat  UA with no UTI  CXR no acute findings.  CTB with no findings, she is neuro intact.  CT abd with no acute findings.  Will place in [facility] for elevated trops, GI eval, pain control.  Discussed with [facility] PA who will admit.    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  However, no consumption in 20 years.     Drug use: No        Bharathi Chowdary MD  04/30/25 [id number]

## 2025-04-30 NOTE — ED TRIAGE NOTES
Patient identifiers for Lo Escalera 69 y.o. female checked and correct.    Patient arrives to ED via POV from gastro clinic. Patient was at appointment today for abdominal pain and nausea. Patient has hx of diverticulosis, abdominal hernia, and gastric ulcers. Endorsing abdominal pain and nausea. Patient also endorsing intermittent SOB and nonproductive cough over the last few weeks. Patient also endorses intermittent left lower lip numbness over the last few days.    Chief Complaint   Patient presents with    Multiple complaints      Pt endorses abdominal pain, nausea, cough, shortness of breath and back pain, symptoms started a week ago.      Past Medical History:   Diagnosis Date    Acute right-sided back pain 06/16/2023    Anemia due to chronic blood loss 03/06/2020    Anemia of chronic disorder 08/24/2020    Anxiety     Cirrhosis     Coronary artery disease     pt states she does not have    Diverticulosis     Gastric ulcer     old    GERD (gastroesophageal reflux disease)     Hepatic steatosis 09/12/2016    Hyperbilirubinemia     Hypertension     pt states she no longer has    Hypothyroidism     Iron deficiency anemia due to chronic blood loss 08/24/2020    Microcytic anemia 08/24/2020    Obesity     Peptic ulcer disease 09/12/2016    UPJ (ureteropelvic junction) obstruction     Vertigo     Vertigo      Allergies reported:   Review of patient's allergies indicates:   Allergen Reactions    Codeine Itching    Iodine and iodide containing products Itching         LOC: Patient is awake, alert, and aware of environment with an appropriate affect. Patient is oriented x 4 and speaking appropriately.  APPEARANCE: Patient resting comfortably and in no acute distress. Patient is clean and well groomed, patient's clothing is properly fastened.  HEENT: WDL  SKIN: The skin is warm and dry. Patient has normal skin turgor and moist mucus membranes.   MUSCULOSKELETAL: Patient is moving all extremities well, no obvious  deformities noted. Pulses intact.   RESPIRATORY: Airway is open and patent. Respirations are spontaneous and non-labored with normal effort and rate. Intermittent SOB. +nonproductive cough  CARDIAC: Patient has a normal rate and rhythm.  No peripheral edema noted. Denies chest pain and SOB at time of assessment.   ABDOMEN: Distention noted. Soft and tender upon palpation. Abdominal pain 9/10 and nausea  NEUROLOGICAL: pupils 2 mm, PERRL. Facial expression is symmetrical. Hand grasps are equal bilaterally. Normal sensation in all extremities when touched with finger. +left lower lip numbness intermittently

## 2025-04-30 NOTE — PROGRESS NOTES
Gastroenterology Clinic Consultation Note    Reason for Visit:  The primary encounter diagnosis was LUQ pain. Diagnoses of Esophageal varices without bleeding, unspecified esophageal varices type, Hiatal hernia, Gastroesophageal reflux disease with esophagitis without hemorrhage, Portal hypertensive gastropathy, Anemia of chronic disorder, Alcoholic cirrhosis of liver with ascites, and History of diverticulitis were also pertinent to this visit.    PCP:   Jose Hernández   [address] / [place] [id number]      Initial HPI   This is a 69 y.o. female presenting for left upper quadrant pain, esophageal varices, hiatal hernia, GERD with esophagitis, portal hypertensive gastropathy, anemia, history of diverticulitis  Patient in clinic for above complaints.  Last EGD was in 2024 showing a medium post variceal banding scar was found in the lower third of the esophagus. Adjacent mucosal findings include ulcerations, medium-sized hiatal hernia was present, mild portal hypertensive gastropathy was found in the gastric body.  Repeat EGD in 1 year.  Last colonoscopy was in 2023 repeat 5 years. VCE done in 2022 showed abnormal mucosa in the duodenum, Polyp(s) in the small bowel, Multiple angioectasias without bleeding in the small bowel, Small bowel ulcer.  In clinic today for complaints of left upper quadrant pain with associated chest pain that radiates to back.  The left upper quadrant pain is not new she was seen by GI in 2023 for same complaint she reports alternating bowel habits between constipation and diarrhea.  Of note she is extremely short of breath stopping sentences to take deep breaths she reports she was diagnosed with bronchitis recently.  She reports non-productive cough that may be contributing to her chest hurting.  Recent lab work significant for anemia this is not new she has been worked up in the past with the same. Denies unintentional weight loss, fever, chills, nausea, vomiting,   esophageal reflux, regurgitation, hematemesis, difficulties swallowing, blood in stool, and melena.      ROS:  Review of Systems   Constitutional:  Positive for malaise/fatigue. Negative for chills, fever and weight loss.   Respiratory:  Positive for cough and shortness of breath. Negative for hemoptysis.    Cardiovascular:  Positive for chest pain.   Gastrointestinal:  Positive for abdominal pain, constipation and diarrhea. Negative for blood in stool, heartburn, melena, nausea and vomiting.   Genitourinary:  Negative for hematuria.   Neurological:  Positive for weakness. Negative for dizziness.        Medical History:  has a past medical history of Acute right-sided back pain (06/16/2023), Anemia due to chronic blood loss (03/06/2020), Anemia of chronic disorder (08/24/2020), Anxiety, Cirrhosis, Coronary artery disease, Diverticulosis, Gastric ulcer, GERD (gastroesophageal reflux disease), Hepatic steatosis (09/12/2016), Hyperbilirubinemia, Hypertension, Hypothyroidism, Iron deficiency anemia due to chronic blood loss (08/24/2020), Microcytic anemia (08/24/2020), Obesity, Peptic ulcer disease (09/12/2016), UPJ (ureteropelvic junction) obstruction, Vertigo, and Vertigo.    Surgical History:  has a past surgical history that includes Hysterectomy; Appendectomy; Cholecystectomy; Tonsillectomy; Esophagogastroduodenoscopy (N/A, 06/12/2019); Pancreas surgery; Esophagogastroduodenoscopy (N/A, 03/09/2020); Colonoscopy (N/A, 07/06/2020); Esophagogastroduodenoscopy (N/A, 09/21/2020); Oophorectomy; Transforaminal epidural injection of steroid (Right, 10/14/2021); Intraluminal gastrointestinal tract imaging via capsule (N/A, 01/19/2022); Transforaminal epidural injection of steroid (Right, 01/13/2023); Esophagogastroduodenoscopy (05/01/2023); Colonoscopy (05/01/2023); Colonoscopy (N/A, 05/01/2023); Esophagogastroduodenoscopy (N/A, 05/01/2023); egd, with banding of varices (12/11/2023); Colonoscopy (12/11/2023);  Esophagogastroduodenoscopy (N/A, 12/11/2023); Colonoscopy (N/A, 12/11/2023); Esophagogastroduodenoscopy (N/A, 01/24/2024); Esophagogastroduodenoscopy (03/06/2024); Esophagogastroduodenoscopy (N/A, 3/6/2024); Cataract extraction w/  intraocular lens implant (Right, 12/9/2024); and Cataract extraction w/  intraocular lens implant (Left, 1/8/2025).    Family History: family history includes Arthritis in her brother; Heart disease in her father and mother; Hyperlipidemia in her mother; Hypertension in her father; Lupus in her sister and sister; Mental illness in her son; No Known Problems in her daughter..       Review of patient's allergies indicates:   Allergen Reactions    Codeine Itching    Iodine and iodide containing products Itching       Medications Ordered Prior to Encounter[1]      Objective Findings:    Vital Signs:  /66 (BP Location: Right arm, Patient Position: Sitting)   Pulse 104   Ht 5' (1.524 m)   Wt 122.8 kg (270 lb 11.6 oz)   LMP  (LMP Unknown)   BMI 52.87 kg/m²   Body mass index is 52.87 kg/m².    Physical Exam:  Physical Exam  Vitals and nursing note reviewed.   Constitutional:       General: She is not in acute distress.     Appearance: Normal appearance. She is ill-appearing.   HENT:      Head: Normocephalic and atraumatic.      Right Ear: External ear normal.      Left Ear: External ear normal.      Nose: Nose normal.   Eyes:      General: No scleral icterus.     Extraocular Movements: Extraocular movements intact.   Cardiovascular:      Rate and Rhythm: Normal rate.   Pulmonary:      Effort: No respiratory distress.      Comments: +work of breathing, SOB   Abdominal:      General: There is no distension.      Palpations: Abdomen is soft.      Tenderness: There is no guarding.   Musculoskeletal:         General: Normal range of motion.      Cervical back: Normal range of motion.   Skin:     General: Skin is warm.   Neurological:      Mental Status: She is alert and oriented to person,  place, and time.      Motor: Weakness present.   Psychiatric:         Mood and Affect: Mood normal.         Behavior: Behavior is cooperative.         Thought Content: Thought content normal.           Labs:  Lab Results   Component Value Date    WBC 6.83 04/30/2025    HGB 11.1 (L) 04/30/2025    HCT 36.4 (L) 04/30/2025     04/30/2025    CRP 42.8 (H) 05/01/2025    CHOL 148 03/24/2025    TRIG 110 03/24/2025    HDL 47 03/24/2025    ALKPHOS 119 04/30/2025    LIPASE 10 04/30/2025    ALT 11 04/30/2025    AST 22 04/30/2025     04/30/2025    K 3.7 04/30/2025     (H) 04/30/2025    CREATININE 0.7 04/30/2025    BUN 11 04/30/2025    CO2 25 04/30/2025    TSH 2.647 03/06/2025    INR 1.1 02/21/2024    GLUF 124 (H) 03/27/2022    HGBA1C 5.6 03/06/2025       Imaging reviewed:   CT ABDOMEN PELVIS WITH IV CONTRAST     CLINICAL HISTORY:  LLQ abdominal pain;     TECHNIQUE:  Axial CT images with sagittal and coronal reformats were obtained of the abdomen and pelvis from the hemidiaphragms through the symphysis pubis after the administration of 100mL Omnipaque 350.     COMPARISON:  CT abdomen and pelvis from 08/17/2024.     FINDINGS:  Lung Bases: Clear.     Heart: Heart size is normal.  No pericardial effusion.     Liver: There is a nodular contour of the liver, with enlargement of the caudate lobe, compatible with morphologic changes of hepatic cirrhosis.  There are no focal hepatic lesions.  The portal vasculature is patent.     Biliary tract: No intrahepatic or extrahepatic biliary ductal dilatation.     Gallbladder: No radiodense gallstone. No wall thickening or pericholecystic fluid.     Pancreas: Normal. No pancreatic ductal dilatation.     Spleen: Normal size without focal lesion.     Adrenals: Unremarkable.     Kidneys and urinary collecting systems: Normal.  No hydronephrosis or urolithiasis.     Lymph nodes: None enlarged.     Stomach and bowel: The stomach is normal.  Loops of small and large bowel are normal  in caliber without evidence for inflammation or obstruction.  There is colonic diverticulosis without evidence of acute diverticulitis.  Previously seen diverticulitis of the descending colon has essentially resolved.  The appendix is not definitively seen.     Peritoneum and mesentery: No ascites or free intraperitoneal air.  No abdominal fluid collection.     Vasculature: No aneurysm or significant atherosclerosis.     Urinary bladder: No wall thickening.     Reproductive organs: The uterus is absent.     Body wall: No abnormality.     Musculoskeletal: No aggressive osseous lesion.  There are degenerative changes of the spine.     Impression:     1. No acute abnormality of the abdomen or pelvis.  2. Hepatic cirrhosis.  3. Colonic diverticulosis without acute diverticulitis.        Electronically signed by:Edgard Mora  Date:                                            09/08/2024      Endoscopy reviewed:   E 2022  Findings:       Images of the esophagus, stomach and small bowel were obtained from        the swallowed capsule and reviewed.        The gastric passage time of the capsule enteroscope was 1-hour        8-minutes.        The small bowel passage time of the capsule enteroscope was 4-hours        5-minutes.        An area of mucosa in the duodenum (probable area of the papilla) was        prominence.        A medium-sized (between 5 - 20 mm) sessile polyp(s) with no bleeding        was/were found in the small bowel at 2-hours 6-minutes.        Multiple small angioectasias without bleeding were seen in the small        bowel at 2-hours 38-minutes at 4-hours 33-minutes at 5-hours        3-minutes. They were diffuse in extent.        One non-bleeding cratered small bowel ulcer with pigmented material        was found in the small bowel at 4-hours 49-minutes. The lesion was 6        mm in largest dimension.   Impression:            - Abnormal mucosa in the duodenum.                          - Polyp(s) in the  small bowel.                          - Multiple angioectasias without bleeding in the                          small bowel.                          - Small bowel ulcer.   Recommendation:        - Refer to Dr. Blanchard for evaluation for lower DBE                          at the next available appointment.                          - Recommend an iron supplement.   Antwon Gardner MD   1/27/2022 12:34:59 PM       Findings:       A medium post variceal banding scar was found in the lower third of        the esophagus. Adjacent mucosal findings include ulcerations.        A medium-sized hiatal hernia was present.        Mild portal hypertensive gastropathy was found in the gastric body.        The examined duodenum was normal.   Impression:            - Scar in the lower third of the esophagus.                          - Medium-sized hiatal hernia.                          - Portal hypertensive gastropathy.                          - Normal examined duodenum.                          - No specimens collected.   Recommendation:        - Discharge patient to home.                          - Resume previous diet.                          - Continue present medications.                          - Repeat upper endoscopy in 1 year for                          surveillance.                          - Patient has a contact number available for                          emergencies. The signs and symptoms of potential                          delayed complications were discussed with the                          patient. Return to normal activities tomorrow.                          Written discharge instructions were provided to                          the patient.   Antwon Gardner MD   3/6/2024 10:59:08 AM     Pathology:   1. Random gastric biopsy:       -  Gastric oxyntic and antral mucosa with minimal chronic superficial gastritis       -  Routine H&E stain is negative for Helicobacter pylori     Comment:   Negative for atrophy or intestinal metaplasia.  Negative for dysplasia or malignancy.     Colonoscopy:   Findings:       The perianal and digital rectal examinations were normal.        A single medium-sized angioectasia without bleeding was found in the        ascending colon.        Multiple small-mouthed diverticula were found in the sigmoid colon        and descending colon.   Impression:            - A single non-bleeding colonic angioectasia.                          - Diverticulosis in the sigmoid colon and in the                          descending colon.                          - No specimens collected.   Recommendation:        - Discharge patient to home.                          - Resume previous diet.                          - Continue present medications.                          - Repeat colonoscopy in 5 years for surveillance.                          - Patient has a contact number available for                          emergencies. The signs and symptoms of potential                          delayed complications were discussed with the                          patient. Return to normal activities tomorrow.                          Written discharge instructions were provided to                          the patient.   Antwon Gardner MD   12/11/2023 9:24:29 AM       Assessment:  1. LUQ pain    2. Esophageal varices without bleeding, unspecified esophageal varices type    3. Hiatal hernia    4. Gastroesophageal reflux disease with esophagitis without hemorrhage    5. Portal hypertensive gastropathy    6. Anemia of chronic disorder    7. Alcoholic cirrhosis of liver with ascites    8. History of diverticulitis         Plan:  1. EGD for 1 year follow-up of portal hypertensive gastropathy, esophageal varices  2. Suggest going to ER for current shortness of breath + vague chest pain  3. Continue Protonix twice per day  Return to clinic based on EGD findings      Thank you for allowing me to  participate in this patient's care.    Sincerely,     EREN COLIN-C  Gastroenterology Department  [facility] [phone]           [1]   Current Facility-Administered Medications on File Prior to Visit   Medication Dose Route Frequency Provider Last Rate Last Admin    0.9%  NaCl infusion   Intravenous Continuous Antwon Gardner MD        0.9%  NaCl infusion   Intravenous Continuous Antwon Gardner MD        0.9%  NaCl infusion   Intravenous Continuous Antwon Gardner MD        lactated ringers bolus 1,000 mL  1,000 mL Intravenous Once Antwon Gardner MD        LIDOcaine (PF) 10 mg/ml (1%) injection 10 mg  1 mL Intradermal Once PRN Antwon Gardner MD         Current Outpatient Medications on File Prior to Visit   Medication Sig Dispense Refill    albuterol (PROAIR HFA) 90 mcg/actuation inhaler Inhale 1-2 puffs into the lungs every 6 (six) hours as needed for Wheezing or Shortness of Breath. Rescue (Patient not taking: Reported on 3/24/2025) 6.7 g 0    alpha lipoic acid 600 mg Cap Take 600 mg by mouth once daily. (Patient not taking: Reported on 3/24/2025) 60 each 3    cyclobenzaprine (FLEXERIL) 10 MG tablet Take 1 tablet by mouth three times daily as needed 60 tablet 5    DULoxetine (CYMBALTA) 30 MG capsule Take 30 mg by mouth. Takes sometimes (Patient not taking: Reported on 3/24/2025)      gabapentin (NEURONTIN) 600 MG tablet Take 1 tablet (600 mg total) by mouth 3 (three) times daily. 90 tablet 5    ketorolac 0.5% (ACULAR) 0.5 % Drop Place 1 drop into the left eye 3 (three) times daily. (Patient not taking: Reported on 3/24/2025) 5 mL 3    meloxicam (MOBIC) 15 MG tablet Take 1 tablet by mouth once daily (Patient not taking: Reported on 3/24/2025) 30 tablet 5    ofloxacin (OCUFLOX) 0.3 % ophthalmic solution Place 1 drop into the left eye 3 (three) times daily. (Patient not taking: Reported on 3/24/2025) 5 mL 3     pantoprazole (PROTONIX) 40 MG tablet Take 1 tablet (40 mg total) by mouth 2 (two) times daily. 180 tablet 1    prednisoLONE acetate (PRED FORTE) 1 % DrpS Place 1 drop into the left eye 3 (three) times daily. (Patient not taking: Reported on 3/24/2025) 5 mL 3    pregabalin (LYRICA) 150 MG capsule Take 1 capsule (150 mg total) by mouth 2 (two) times daily. (Patient not taking: Reported on 3/24/2025) 120 capsule 2

## 2025-05-01 VITALS
RESPIRATION RATE: 18 BRPM | DIASTOLIC BLOOD PRESSURE: 69 MMHG | WEIGHT: 268.94 LBS | SYSTOLIC BLOOD PRESSURE: 115 MMHG | HEART RATE: 84 BPM | OXYGEN SATURATION: 97 % | BODY MASS INDEX: 52.8 KG/M2 | TEMPERATURE: 98 F | HEIGHT: 60 IN

## 2025-05-01 LAB
ASCENDING AORTA: 3.3 CM
AV AREA BY CONTINUOUS VTI: 2.3 CM2
AV INDEX (PROSTH): 0.81
AV LVOT MEAN GRADIENT: 5 MMHG
AV LVOT PEAK GRADIENT: 9 MMHG
AV MEAN GRADIENT: 7 MMHG
AV PEAK GRADIENT: 14 MMHG
AV VALVE AREA BY VELOCITY RATIO: 2.2 CM²
AV VALVE AREA: 2.3 CM2
AV VELOCITY RATIO: 0.79
BSA FOR ECHO PROCEDURE: 2.27 M2
CRP SERPL-MCNC: 42.8 MG/L
CV ECHO LV RWT: 0.61 CM
DOP CALC AO PEAK VEL: 1.9 M/S
DOP CALC AO VTI: 37.4 CM
DOP CALC LVOT AREA: 2.8 CM2
DOP CALC LVOT DIAMETER: 1.9 CM
DOP CALC LVOT PEAK VEL: 1.5 M/S
DOP CALC LVOT STROKE VOLUME: 85.9 CM3
DOP CALCLVOT PEAK VEL VTI: 30.3 CM
E WAVE DECELERATION TIME: 422 MS
E/A RATIO: 0.94
E/E' RATIO: 6 M/S
ECHO EF ESTIMATED: 61 %
ECHO LV POSTERIOR WALL: 1 CM (ref 0.6–1.1)
ERYTHROCYTE [SEDIMENTATION RATE] IN BLOOD BY PHOTOMETRIC METHOD: 57 MM/HR
FRACTIONAL SHORTENING: 30.3 % (ref 28–44)
INTERVENTRICULAR SEPTUM: 1.2 CM (ref 0.6–1.1)
LA MAJOR: 5 CM
LA MINOR: 5.3 CM
LA WIDTH: 3.8 CM
LEFT ATRIUM SIZE: 3 CM
LEFT ATRIUM VOLUME INDEX MOD: 26 ML/M2
LEFT ATRIUM VOLUME INDEX: 24 ML/M2
LEFT ATRIUM VOLUME MOD: 55 ML
LEFT ATRIUM VOLUME: 50 CM3
LEFT INTERNAL DIMENSION IN SYSTOLE: 2.3 CM (ref 2.1–4)
LEFT VENTRICLE DIASTOLIC VOLUME INDEX: 21.23 ML/M2
LEFT VENTRICLE DIASTOLIC VOLUME: 45 ML
LEFT VENTRICLE MASS INDEX: 51.5 G/M2
LEFT VENTRICLE SYSTOLIC VOLUME INDEX: 8.5 ML/M2
LEFT VENTRICLE SYSTOLIC VOLUME: 18 ML
LEFT VENTRICULAR INTERNAL DIMENSION IN DIASTOLE: 3.3 CM (ref 3.5–6)
LEFT VENTRICULAR MASS: 109.1 G
LV LATERAL E/E' RATIO: 5.3
LV SEPTAL E/E' RATIO: 6.3
MV A" WAVE DURATION": 98.95 MS
MV PEAK A VEL: 0.67 M/S
MV PEAK E VEL: 0.63 M/S
OHS CV RV/LV RATIO: 0.88 CM
PISA TR MAX VEL: 3.8 M/S
PULM VEIN A" WAVE DURATION": 98.95 MS
PULM VEIN S/D RATIO: 1.47
PULMONIC VEIN PEAK A VELOCITY: 0.3 M/S
PV PEAK D VEL: 0.3 M/S
PV PEAK S VEL: 0.44 M/S
RA MAJOR: 4.56 CM
RA PRESSURE ESTIMATED: 3 MMHG
RA WIDTH: 1.79 CM
RIGHT VENTRICLE DIASTOLIC BASEL DIMENSION: 2.9 CM
RV TB RVSP: 7 MMHG
SARS-COV-2 RDRP RESP QL NAA+PROBE: POSITIVE
SINUS: 3.33 CM
STJ: 3 CM
TDI LATERAL: 0.12 M/S
TDI SEPTAL: 0.1 M/S
TDI: 0.11 M/S
TRICUSPID ANNULAR PLANE SYSTOLIC EXCURSION: 1.4 CM
TROPONIN I SERPL HS-MCNC: 23 NG/L
TV PEAK GRADIENT: 57 MMHG
TV REST PULMONARY ARTERY PRESSURE: 61 MMHG
Z-SCORE OF LEFT VENTRICULAR DIMENSION IN END DIASTOLE: -7.08
Z-SCORE OF LEFT VENTRICULAR DIMENSION IN END SYSTOLE: -4.55

## 2025-05-01 PROCEDURE — 94640 AIRWAY INHALATION TREATMENT: CPT | Mod: HCNC,XB

## 2025-05-01 PROCEDURE — 99214 OFFICE O/P EST MOD 30 MIN: CPT | Mod: HCNC,GC,, | Performed by: INTERNAL MEDICINE

## 2025-05-01 PROCEDURE — 25000242 PHARM REV CODE 250 ALT 637 W/ HCPCS: Mod: HCNC | Performed by: PHYSICIAN ASSISTANT

## 2025-05-01 PROCEDURE — 96374 THER/PROPH/DIAG INJ IV PUSH: CPT | Mod: 59,HCNC

## 2025-05-01 PROCEDURE — 25000003 PHARM REV CODE 250: Mod: HCNC | Performed by: PHYSICIAN ASSISTANT

## 2025-05-01 PROCEDURE — 84484 ASSAY OF TROPONIN QUANT: CPT | Mod: HCNC | Performed by: STUDENT IN AN ORGANIZED HEALTH CARE EDUCATION/TRAINING PROGRAM

## 2025-05-01 PROCEDURE — 85652 RBC SED RATE AUTOMATED: CPT | Mod: HCNC | Performed by: PHYSICIAN ASSISTANT

## 2025-05-01 PROCEDURE — 25000003 PHARM REV CODE 250: Mod: HCNC | Performed by: EMERGENCY MEDICINE

## 2025-05-01 PROCEDURE — 63600175 PHARM REV CODE 636 W HCPCS: Mod: HCNC | Performed by: PHYSICIAN ASSISTANT

## 2025-05-01 PROCEDURE — 96375 TX/PRO/DX INJ NEW DRUG ADDON: CPT | Mod: HCNC

## 2025-05-01 PROCEDURE — G0378 HOSPITAL OBSERVATION PER HR: HCPCS | Mod: HCNC

## 2025-05-01 PROCEDURE — 25500020 PHARM REV CODE 255: Mod: HCNC | Performed by: PHYSICIAN ASSISTANT

## 2025-05-01 PROCEDURE — 94761 N-INVAS EAR/PLS OXIMETRY MLT: CPT | Mod: HCNC

## 2025-05-01 PROCEDURE — 86140 C-REACTIVE PROTEIN: CPT | Mod: HCNC | Performed by: PHYSICIAN ASSISTANT

## 2025-05-01 PROCEDURE — 96376 TX/PRO/DX INJ SAME DRUG ADON: CPT | Mod: HCNC,59

## 2025-05-01 PROCEDURE — U0002 COVID-19 LAB TEST NON-CDC: HCPCS | Mod: HCNC | Performed by: PHYSICIAN ASSISTANT

## 2025-05-01 RX ORDER — DIPHENHYDRAMINE HYDROCHLORIDE 50 MG/ML
25 INJECTION, SOLUTION INTRAMUSCULAR; INTRAVENOUS
Status: COMPLETED | OUTPATIENT
Start: 2025-05-01 | End: 2025-05-01

## 2025-05-01 RX ORDER — ACETAMINOPHEN 325 MG/1
650 TABLET ORAL EVERY 8 HOURS
Status: DISCONTINUED | OUTPATIENT
Start: 2025-05-01 | End: 2025-05-01 | Stop reason: HOSPADM

## 2025-05-01 RX ORDER — IPRATROPIUM BROMIDE AND ALBUTEROL SULFATE 2.5; .5 MG/3ML; MG/3ML
3 SOLUTION RESPIRATORY (INHALATION)
Status: COMPLETED | OUTPATIENT
Start: 2025-05-01 | End: 2025-05-01

## 2025-05-01 RX ORDER — DIPHENHYDRAMINE HCL 25 MG
25 CAPSULE ORAL EVERY 4 HOURS PRN
Status: DISCONTINUED | OUTPATIENT
Start: 2025-05-01 | End: 2025-05-01 | Stop reason: HOSPADM

## 2025-05-01 RX ORDER — PROMETHAZINE HYDROCHLORIDE AND DEXTROMETHORPHAN HYDROBROMIDE 6.25; 15 MG/5ML; MG/5ML
5 SYRUP ORAL EVERY 6 HOURS PRN
Qty: 118 ML | Refills: 0 | Status: SHIPPED | OUTPATIENT
Start: 2025-05-01 | End: 2025-05-11

## 2025-05-01 RX ORDER — BENZONATATE 100 MG/1
100 CAPSULE ORAL 3 TIMES DAILY PRN
Status: DISCONTINUED | OUTPATIENT
Start: 2025-05-01 | End: 2025-05-01 | Stop reason: HOSPADM

## 2025-05-01 RX ORDER — LIDOCAINE 50 MG/G
1 PATCH TOPICAL
Status: DISCONTINUED | OUTPATIENT
Start: 2025-05-01 | End: 2025-05-01 | Stop reason: HOSPADM

## 2025-05-01 RX ORDER — GUAIFENESIN AND DEXTROMETHORPHAN HYDROBROMIDE 10; 100 MG/5ML; MG/5ML
10 SYRUP ORAL EVERY 6 HOURS
Status: DISCONTINUED | OUTPATIENT
Start: 2025-05-01 | End: 2025-05-01 | Stop reason: HOSPADM

## 2025-05-01 RX ORDER — DIPHENHYDRAMINE HCL 25 MG
25 CAPSULE ORAL
Status: COMPLETED | OUTPATIENT
Start: 2025-05-01 | End: 2025-05-01

## 2025-05-01 RX ORDER — METHOCARBAMOL 500 MG/1
500 TABLET, FILM COATED ORAL 3 TIMES DAILY
Status: DISCONTINUED | OUTPATIENT
Start: 2025-05-01 | End: 2025-05-01 | Stop reason: HOSPADM

## 2025-05-01 RX ORDER — METHOCARBAMOL 750 MG/1
750 TABLET, FILM COATED ORAL 3 TIMES DAILY PRN
Qty: 15 TABLET | Refills: 0 | Status: SHIPPED | OUTPATIENT
Start: 2025-05-01 | End: 2025-05-06

## 2025-05-01 RX ADMIN — MORPHINE SULFATE 4 MG: 4 INJECTION INTRAVENOUS at 03:05

## 2025-05-01 RX ADMIN — ACETAMINOPHEN 650 MG: 325 TABLET ORAL at 01:05

## 2025-05-01 RX ADMIN — DIPHENHYDRAMINE HYDROCHLORIDE 25 MG: 25 CAPSULE ORAL at 01:05

## 2025-05-01 RX ADMIN — BENZONATATE 100 MG: 100 CAPSULE ORAL at 05:05

## 2025-05-01 RX ADMIN — IOHEXOL 75 ML: 350 INJECTION, SOLUTION INTRAVENOUS at 02:05

## 2025-05-01 RX ADMIN — METHOCARBAMOL 500 MG: 500 TABLET ORAL at 02:05

## 2025-05-01 RX ADMIN — PANTOPRAZOLE SODIUM 40 MG: 40 TABLET, DELAYED RELEASE ORAL at 08:05

## 2025-05-01 RX ADMIN — DIPHENHYDRAMINE HYDROCHLORIDE 25 MG: 25 CAPSULE ORAL at 05:05

## 2025-05-01 RX ADMIN — Medication 6 MG: at 02:05

## 2025-05-01 RX ADMIN — GABAPENTIN 600 MG: 300 CAPSULE ORAL at 02:05

## 2025-05-01 RX ADMIN — GABAPENTIN 600 MG: 300 CAPSULE ORAL at 08:05

## 2025-05-01 RX ADMIN — METHOCARBAMOL 500 MG: 500 TABLET ORAL at 08:05

## 2025-05-01 RX ADMIN — IPRATROPIUM BROMIDE AND ALBUTEROL SULFATE 3 ML: 2.5; .5 SOLUTION RESPIRATORY (INHALATION) at 08:05

## 2025-05-01 RX ADMIN — MORPHINE SULFATE 4 MG: 4 INJECTION INTRAVENOUS at 05:05

## 2025-05-01 RX ADMIN — GUAIFENESIN AND DEXTROMETHORPHAN 10 ML: 100; 10 SYRUP ORAL at 02:05

## 2025-05-01 RX ADMIN — DIPHENHYDRAMINE HYDROCHLORIDE 25 MG: 50 INJECTION, SOLUTION INTRAMUSCULAR; INTRAVENOUS at 01:05

## 2025-05-01 RX ADMIN — GUAIFENESIN AND DEXTROMETHORPHAN 10 ML: 100; 10 SYRUP ORAL at 01:05

## 2025-05-01 RX ADMIN — GUAIFENESIN AND DEXTROMETHORPHAN 10 ML: 100; 10 SYRUP ORAL at 08:05

## 2025-05-01 RX ADMIN — LIDOCAINE 1 PATCH: 50 PATCH CUTANEOUS at 08:05

## 2025-05-01 RX ADMIN — ACETAMINOPHEN 650 MG: 325 TABLET ORAL at 08:05

## 2025-05-01 NOTE — DISCHARGE INSTRUCTIONS
You tested positive for COVID.  Read the attachment for more information.   You should have repeat blood tests - CRP and ESR ln 2 weeks.  Avoid strenuous activities in setting of inflammatory state and confirming inflammatory labs return to normal once illness is done. Then you can return to normal activities.     In addition to your prescription muscle relaxer and cough syrup, you can take 650 mg of Tylenol every 6 hours as needed for pain.    You should follow-up in Cardiology Clinic for re-evaluation in the next week or so.    Follow-up with your primary care doctor for re-evaluation after your hospital stay.    Return to the ER immediately if you develop significant worsening shortness of breath, fainting, different type of chest pain

## 2025-05-01 NOTE — PROGRESS NOTES
"ED Observation Unit  Progress Note      HPI   "70 y/o F PMH CAD, HTN, multiple abdominal surgeries who presents to the ED for evaluation of multiple complaints.  Patient notes epigastric abdominal with fatigue and nausea recently. She was sent from GI clinic because "her breathing was off."  She also notes some chronic issues with numbness around her left face, none currently.  Denies any CP, syncope, trauma, headache, or neck pain."     EDOU:  I was able to clarify some details about light patient presented to the ED. she was out of breath when she presented to the GI clinic.  Patient had walked from the parking garage to the clinic.  She states that she has been feeling more short of breath with activity for about a week.  Additionally, she has had a dry cough for about 2 weeks.  She was diagnosed with bronchitis.  She clarifies that her pain is in the lower chest wall, slightly in the epigastric area, but also along the sides of her abdomen and across her back.  She sometimes has mid left chest pain as well, but this is also worse with coughing.  She has had some nausea, but no vomiting.  No leg swelling.   Her troponins were noted to be slightly elevated although flat.  Chest x-ray without consolidation, volume overload or other acute finding.    Interval History   Patient has no complaints.  Seen by cardiology who felt that patient's chest pain was MSK in nature and related to persistent coughing over the past couple of weeks.  Her ESR and CRP were slightly elevated.  Echo revealed pulmonary hypertension, Normal EF, diastolic dysfunction present.  PE study was also obtained and revealed no evidence of VTE, pneumonia or other acute abnormality.  Will plan for discharge.    PMHx   Past Medical History:   Diagnosis Date    Acute right-sided back pain 06/16/2023    Anemia due to chronic blood loss 03/06/2020    Anemia of chronic disorder 08/24/2020    Anxiety     Cirrhosis     Coronary artery disease     pt states " she does not have    Diverticulosis     Gastric ulcer     old    GERD (gastroesophageal reflux disease)     Hepatic steatosis 09/12/2016    Hyperbilirubinemia     Hypertension     pt states she no longer has    Hypothyroidism     Iron deficiency anemia due to chronic blood loss 08/24/2020    Microcytic anemia 08/24/2020    Obesity     Peptic ulcer disease 09/12/2016    UPJ (ureteropelvic junction) obstruction     Vertigo     Vertigo       Past Surgical History:   Procedure Laterality Date    APPENDECTOMY      CATARACT EXTRACTION W/  INTRAOCULAR LENS IMPLANT Right 12/9/2024    Procedure: EXTRACTION, CATARACT, WITH IOL INSERTION;  Surgeon: Elidia Figueroa MD;  Location: [facility] OR;  Service: Ophthalmology;  Laterality: Right;    CATARACT EXTRACTION W/  INTRAOCULAR LENS IMPLANT Left 1/8/2025    Procedure: EXTRACTION, CATARACT, WITH IOL INSERTION;  Surgeon: Elidia Figueroa MD;  Location: [facility] OR;  Service: Ophthalmology;  Laterality: Left;    CHOLECYSTECTOMY      COLONOSCOPY N/A 07/06/2020    Procedure: COLONOSCOPY;  Surgeon: Leander Cornejo MD;  Location: [facility];  Service: Colon and Rectal;  Laterality: N/A;    COLONOSCOPY  05/01/2023    COLONOSCOPY N/A 05/01/2023    Procedure: COLONOSCOPY;  Surgeon: Antwon Gardner MD;  Location: [facility];  Service: Endoscopy;  Laterality: N/A;    COLONOSCOPY  12/11/2023    COLONOSCOPY N/A 12/11/2023    Procedure: COLONOSCOPY;  Surgeon: Antwon Gardner MD;  Location: [facility];  Service: Endoscopy;  Laterality: N/A;    EGD, WITH BANDING OF VARICES  12/11/2023    2 bands    ESOPHAGOGASTRODUODENOSCOPY N/A 06/12/2019    Procedure: ESOPHAGOGASTRODUODENOSCOPY (EGD);  Surgeon: Arben Brown MD;  Location: [facility];  Service: Endoscopy;  Laterality: N/A;    ESOPHAGOGASTRODUODENOSCOPY N/A 03/09/2020    Procedure: EGD (ESOPHAGOGASTRODUODENOSCOPY);  Surgeon: Andrea Saolmon MD;  Location: [facility];  Service: Endoscopy;  Laterality: N/A;     ESOPHAGOGASTRODUODENOSCOPY N/A 09/21/2020    Procedure: EGD (ESOPHAGOGASTRODUODENOSCOPY);  Surgeon: Antwon Gardner MD;  Location: [facility];  Service: Endoscopy;  Laterality: N/A;    ESOPHAGOGASTRODUODENOSCOPY  05/01/2023    ESOPHAGOGASTRODUODENOSCOPY N/A 05/01/2023    Procedure: EGD (ESOPHAGOGASTRODUODENOSCOPY);  Surgeon: Antwon Gardner MD;  Location: [facility];  Service: Endoscopy;  Laterality: N/A;    ESOPHAGOGASTRODUODENOSCOPY N/A 12/11/2023    Procedure: EGD (ESOPHAGOGASTRODUODENOSCOPY);  Surgeon: Antwon Gardner MD;  Location: [facility];  Service: Endoscopy;  Laterality: N/A;    ESOPHAGOGASTRODUODENOSCOPY N/A 01/24/2024    Procedure: EGD (ESOPHAGOGASTRODUODENOSCOPY);  Surgeon: Antwon Gardner MD;  Location: [facility];  Service: Endoscopy;  Laterality: N/A;    ESOPHAGOGASTRODUODENOSCOPY  03/06/2024    ESOPHAGOGASTRODUODENOSCOPY N/A 3/6/2024    Procedure: EGD (ESOPHAGOGASTRODUODENOSCOPY);  Surgeon: Antwon Gardner MD;  Location: [facility];  Service: Endoscopy;  Laterality: N/A;    HYSTERECTOMY      INTRALUMINAL GASTROINTESTINAL TRACT IMAGING VIA CAPSULE N/A 01/19/2022    Procedure: IMAGING PROCEDURE, GI TRACT, INTRALUMINAL, VIA CAPSULE;  Surgeon: Antwon Gardner MD;  Location: [facility];  Service: Endoscopy;  Laterality: N/A;    OOPHORECTOMY      PANCREAS SURGERY      TONSILLECTOMY      TRANSFORAMINAL EPIDURAL INJECTION OF STEROID Right 10/14/2021    Procedure: Injection,steroid,epidural,transforaminal approach---RIGHT L5 AND S1;  Surgeon: Cheng Tariq Jr., MD;  Location: [facility];  Service: Pain Management;  Laterality: Right;    TRANSFORAMINAL EPIDURAL INJECTION OF STEROID Right 01/13/2023    Procedure: INJECTION, STEROID, EPIDURAL, TRANSFORAMINAL APPROACH, RIGHT L4/L5 & L5-S1 CONTRAST DIRECT REF;  Surgeon: Coni Franco MD;  Location: [facility];  Service: Pain Management;  Laterality: Right;        Family Hx   Family History    Problem Relation Name Age of Onset    Hyperlipidemia Mother      Heart disease Mother      Hypertension Father      Heart disease Father      Lupus Sister x4     Lupus Sister x1     Arthritis Brother x5     No Known Problems Daughter x1     Mental illness Son x2         PTSD x 1 son in         Social Hx   Social History     Socioeconomic History    Marital status:    Tobacco Use    Smoking status: Never     Passive exposure: Never    Smokeless tobacco: Never   Substance and Sexual Activity    Alcohol use: No     Comment: 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  However, no consumption in 20 years.     Drug use: No    Sexual activity: Not Currently     Social Drivers of Health     Financial Resource Strain: Medium Risk (6/28/2021)    Overall Financial Resource Strain (CARDIA)     Difficulty of Paying Living Expenses: Somewhat hard   Food Insecurity: No Food Insecurity (6/28/2021)    Hunger Vital Sign     Worried About Running Out of Food in the Last Year: Never true     Ran Out of Food in the Last Year: Never true   Transportation Needs: No Transportation Needs (6/28/2021)    PRAPARE - Transportation     Lack of Transportation (Medical): No     Lack of Transportation (Non-Medical): No   Physical Activity: Inactive (6/28/2021)    Exercise Vital Sign     Days of Exercise per Week: 0 days     Minutes of Exercise per Session: 0 min   Stress: Stress Concern Present (6/28/2021)    [employer] [place] [employer] - Occupational Stress Questionnaire     Feeling of Stress : To some extent   Housing Stability: Low Risk  (6/28/2021)    Housing Stability Vital Sign     Unable to Pay for Housing in the Last Year: No     Number of Places Lived in the Last Year: 1     Unstable Housing in the Last Year: No        Vital Signs   Vitals:    05/01/25 1228 05/01/25 1228 05/01/25 1554 05/01/25 1651   BP:  105/63  115/69   BP Location:    Left arm   Patient Position:       Pulse:  86  84   Resp: 16  20 18   Temp:  97.6 °F (36.4 °C)   97.9 °F (36.6 °C)   TempSrc:  Oral  Oral   SpO2:  97%  97%   Weight:       Height:            Review of Systems  Review of Systems   Respiratory:  Positive for cough and shortness of breath.    Cardiovascular:  Positive for chest pain.       Brief Physical Exam/Reassessment   Physical Exam  Vitals reviewed.   Constitutional:       General: She is not in acute distress.     Appearance: She is not diaphoretic.   HENT:      Head: Normocephalic and atraumatic.   Cardiovascular:      Rate and Rhythm: Normal rate and regular rhythm.      Heart sounds: Heart sounds not distant. No murmur heard.     No friction rub. No gallop.   Pulmonary:      Effort: Pulmonary effort is normal. No respiratory distress.      Breath sounds: Normal breath sounds. No decreased breath sounds, wheezing, rhonchi or rales.      Comments: Dry cough noted, bronchospasm   Chest:      Chest wall: Tenderness present.   Musculoskeletal:      Cervical back: Neck supple.   Skin:     General: Skin is warm and dry.   Neurological:      Mental Status: She is alert.   Psychiatric:         Mood and Affect: Mood and affect normal.         Labs/Imaging   Labs Reviewed   COMPREHENSIVE METABOLIC PANEL - Abnormal       Result Value    Sodium 142      Potassium 3.7      Chloride 111 (*)     CO2 25      Glucose 92      BUN 11      Creatinine 0.7      Calcium 8.7      Protein Total 7.1      Albumin 2.8 (*)     Bilirubin Total 0.3            AST 22      ALT 11      Anion Gap 6 (*)     eGFR >60     TROPONIN I HIGH SENSITIVITY - Abnormal    Troponin High Sensitive 19 (*)    URINALYSIS, REFLEX TO URINE CULTURE - Abnormal    Color, UA Yellow      Appearance, UA Clear      pH, UA 7.0      Spec Grav UA >=1.030 (*)     Protein, UA Negative      Glucose, UA Negative      Ketones, UA Negative      Bilirubin, UA Negative      Blood, UA Negative      Nitrites, UA Negative      Urobilinogen, UA Negative      Leukocyte Esterase, UA Negative     CBC WITH DIFFERENTIAL -  Abnormal    WBC 6.83      RBC 4.35      HGB 11.1 (*)     HCT 36.4 (*)     MCV 84      MCH 25.5 (*)     MCHC 30.5 (*)     RDW 16.6 (*)     Platelet Count 160      MPV 10.2      Nucleated RBC 0      Neut % 61.1      Lymph % 26.9      Mono % 9.5      Eos % 1.3      Basophil % 0.6      Imm Grans % 0.6 (*)     Neut # 4.17      Lymph # 1.84      Mono # 0.65      Eos # 0.09      Baso # 0.04      Imm Grans # 0.04     TROPONIN I HIGH SENSITIVITY - Abnormal    Troponin High Sensitive 18 (*)    SARS-COV-2 RNA AMPLIFICATION, QUAL - Abnormal    SARS COV-2 Molecular Positive (*)    TROPONIN I HIGH SENSITIVITY - Abnormal    Troponin High Sensitive 23 (*)    SEDIMENTATION RATE - Abnormal    Sed Rate 57 (*)    C-REACTIVE PROTEIN - Abnormal    CRP 42.8 (*)    HEPATITIS C ANTIBODY - Normal    Hep C Ab Interp Non-Reactive     HIV 1 / 2 ANTIBODY - Normal    HIV 1/2 Ag/Ab Non-Reactive     B-TYPE NATRIURETIC PEPTIDE - Normal    BNP 36     LIPASE - Normal    Lipase Level 10     CBC W/ AUTO DIFFERENTIAL    Narrative:     The following orders were created for panel order CBC auto differential.  Procedure                               Abnormality         Status                     ---------                               -----------         ------                     CBC with Differential[[id number]]       Abnormal            Final result                 Please view results for these tests on the individual orders.   GREY TOP URINE HOLD    Extra Tube Hold for add-ons.     TROPONIN I HIGH SENSITIVITY      Imaging Results              CTA Chest Non-Coronary (PE Studies) (Final result)  Result time 05/01/25 16:06:21      Final result by Bello Martinez MD (05/01/25 16:06:21)                   Impression:      1. No pulmonary embolism is identified.  2. No acute pulmonary process      Electronically signed by: Bello Childs  Date:    05/01/2025  Time:    16:06               Narrative:    EXAMINATION:  CTA CHEST NON CORONARY  (PE STUDIES)    CLINICAL HISTORY:  Pulmonary embolism (PE) suspected, high prob;    TECHNIQUE:  CTA of the chest was obtained. Images were reformatted and reviewed in coronal and sagittal planes. Volume-rendered 3D reconstructed images were acquired by post processing for a better visualization of the vascular anomalies, on an independent [employer] workstation with concurrent supervision and archived for permanent records.  Images were acquired during the arterial phase after 75 cc of non ionic intravenous contrast administration.    Suboptimal assessment of the abdominal solid organs due to lack of a venous phase.    COMPARISON:  None.    FINDINGS:  Diagnostic quality: Adequate.    Pulmonary arteries: The pulmonary arteries are well visualized through the segmental level. No pulmonary embolism is identified.    Right heart strain: None.    Lungs and pleura: Normal.    Mediastinum and melecio: No mediastinal or hilar adenopathy.    Vessels: No atherosclerotic changes in the aorta and coronary arteries.    Heart and pericardium: Heart size is normal. No pericardial effusion.    Upper abdomen: Normal.    Bones: T9-T10 significant endplate sclerosis.                                       CT Head Without Contrast (Final result)  Result time 04/30/25 19:53:23      Final result by Chato Tyler MD (04/30/25 19:53:23)                   Impression:      Allowing for some artifacts, there are pre-existing findings but no scan evidence of acute intracranial abnormality at this time.    If there remains strong clinical suspicion for acute intracranial abnormality, MRI and/or short-term follow-up head CT would be recommended.      Electronically signed by: Chato Tyler  Date:    04/30/2025  Time:    19:53               Narrative:    EXAMINATION:  CT HEAD WITHOUT CONTRAST    CLINICAL HISTORY:  Transient ischemic attack (TIA);    TECHNIQUE:  Low dose axial images were obtained through the head.  Coronal and sagittal reformations  were also performed. Contrast was not administered.    COMPARISON:  Head CT 03/27/2022.    FINDINGS:  Artifacts related to beam hardening and/or motion degrade portions of the scan.    Allowing for the artifacts, there is no scan evidence of depressed calvarial fracture, acute intracranial hemorrhage, discrete acute large vascular territory brain infarct, discrete intracranial mass or mass effect, pathologic extra-axial fluid collection, pneumocephalus, acute hydrocephalus, brain edema, or other acute intracranial abnormality.    There are some intracranial atherosclerotic calcifications.  There are minimal low-attenuation changes in portions of the cerebral white matter, likely related to chronic small vessel disease.    There is some mild pre-existing cerebral parenchymal volume loss, mainly involving the temporal and frontal lobes, similar to the comparison study.  Also similar to the comparison study, there is a proportionate degree of presumed ex vacuo prominence of the ventricles, cisterns, sulci, and fissures.    Opacification of a left ethmoid air cell.  New since the comparison scan.  Orbits and skull base demonstrate no adverse interval changes.  There is absence of the native ocular lenses, likely postsurgical.    Partially visualized moderate-to-large torus palatinus.     images: Corpulent habitus                                       CT Abdomen Pelvis With IV Contrast NO Oral Contrast (Final result)  Result time 04/30/25 17:48:06      Final result by Maxim Aj MD (04/30/25 17:48:06)                   Impression:      1. No acute process or CT findings to explain patient's symptoms of epigastric pain.  2. Hepatic cirrhosis with splenomegaly.  3. Colonic diverticulosis without acute diverticulitis.  4. Cholecystectomy and hysterectomy.  5. Other incidental/nonemergent findings in the body of the report.      Electronically signed by: Maxim Aj MD  Date:    04/30/2025  Time:    17:48                Narrative:    EXAMINATION:  CT ABDOMEN PELVIS WITH IV CONTRAST    CLINICAL HISTORY:  Epigastric pain;    TECHNIQUE:  Low dose axial images, sagittal and coronal reformations were obtained from the lung bases to the pubic symphysis following the IV administration of 100 mL of Omnipaque 350 .  Oral contrast was not given.    COMPARISON:  CT abdomen most recent 9/8/24, abdominal ultrasound 11/21/2023    FINDINGS:  Base of the heart is within normal limits.  Imaged lung bases show minimal dependent atelectasis without consolidation or pleural effusion.    Cholecystectomy.  No significant biliary ductal dilatation.  Liver is normal in size with subtle nodular contour suggesting sequela of underlying cirrhosis, similar to prior.  No discrete hepatic lesion seen.  Portal vasculature appears patent.    Pancreas mildly atrophic.  No discrete pancreatic mass or adjacent inflammatory change.  Spleen is enlarged similar to prior.  Few scattered subcentimeter hypoattenuating splenic parenchymal foci which are too small to characterize but grossly stable.  Stomach, duodenum and bilateral adrenal glands are within normal limits.    Bilateral kidneys are normal in size and location with relatively symmetric normal enhancement.  Suspected minimal cortical scarring bilaterally.  No hydronephrosis or significant perinephric stranding.  Ureters are normal in course and caliber.  Urinary bladder is well distended without wall thickening or adjacent inflammatory change.  Uterus surgically absent.  No adnexal mass or significant volume free pelvic fluid.  Pelvic phleboliths noted.    Several scattered colonic diverticula without evidence of acute diverticulitis.  Appendix not localized; however, no pericecal inflammatory change.  Terminal ileum is within normal limits.  No evidence of bowel obstruction or acute bowel inflammation.  No bowel pneumatosis or portal venous gas.    Grossly stable few prominent jeancarlos hepatis lymph nodes,  nonspecific.  No ascites, free air or additional lymphadenopathy by CT criteria elsewhere in the abdomen or pelvis.    Minimal scattered calcific atherosclerosis of the abdominal aorta.  No aortic aneurysm or dissection.    Extraperitoneal soft tissues and osseous structures appear stable without acute findings.                                       X-Ray Chest AP Portable (Final result)  Result time 04/30/25 15:02:22      Final result by Maxim Aj MD (04/30/25 15:02:22)                   Impression:      No radiographic evidence of pneumonia or other source of cough/shortness of breath, noting that early/mild viral pneumonia or nonspecific bronchitis may be radiographically occult.      Electronically signed by: Maxim Aj MD  Date:    04/30/2025  Time:    15:02               Narrative:    EXAMINATION:  XR CHEST AP PORTABLE    CLINICAL HISTORY:  Shortness of breath    TECHNIQUE:  Single frontal view of the chest was performed.    COMPARISON:  Chest radiograph 12/05/2024, CT abdomen and pelvis 09/08/2024, chest CT 03/27/2022    FINDINGS:  No detrimental change.The lungs are well expanded and clear, with normal appearance of pulmonary vasculature and no pleural effusion or pneumothorax.    The cardiac silhouette is normal in size. The hilar and mediastinal contours are midline and within normal limits for age noting similar calcification at the aortic knob.    No acute osseous process seen.  PA and lateral views can be obtained.                                       I reviewed all labs, imaging, EKGs.     Plan   COVID-19  Shortness of breath  Atypical chest pain     - symptoms explained by diagnosis of COVID-19.  Vital signs have been stable, afebrile.  saturating greater than 95% on room air.  -troponin of 19-18 to 23.  Her chest pain appears atypical, and is worse with coughing and inspiration.  Echocardiogram ordered for regional wall motion abnormalities.  Stress test canceled.  -patient with history of  cirrhosis, appears well compensated.  Tylenol 650 mg Q 8 hours, lidocaine patch, and Robaxin ordered.  -duo neb x1, scheduled antitussive  -regular diet   -evaluated by Cardiology- despite elevated inflammatory markers, they feel that patient's symptoms are not related to acute myocarditis, but are MSK in nature.  -plan for discharge    I have discussed this case with KEITH Kay.

## 2025-05-01 NOTE — H&P
"ED Observation Unit  History and Physical    I assumed care of this patient from the Main ED at onset of observation time, 2031 on 05/01/2025.     History of Present Illness:    "70 y/o F PMH CAD, HTN, multiple abdominal surgeries who presents to the ED for evaluation of multiple complaints.  Patient notes epigastric abdominal with fatigue and nausea recently. She was sent from GI clinic because "her breathing was off."  She also notes some chronic issues with numbness around her left face, none currently.  Denies any CP, syncope, trauma, headache, or neck pain."    EDOU:  I was able to clarify some details about light patient presented to the ED. she was out of breath when she presented to the GI clinic.  Patient had walked from the parking garage to the clinic.  She states that she has been feeling more short of breath with activity for about a week.  Additionally, she has had a dry cough for about 2 weeks.  She was diagnosed with bronchitis.  She clarifies that her pain is in the lower chest wall, slightly in the epigastric area, but also along the sides of her abdomen and across her back.  She sometimes has mid left chest pain as well, but this is also worse with coughing.  She has had some nausea, but no vomiting.  No leg swelling.   Her troponins were noted to be slightly elevated although flat.  Chest x-ray without consolidation, volume overload or other acute finding.       I reviewed the ED Provider Note dated 05/01/2025  prior to my evaluation of this patient.  I reviewed all labs and imaging performed in the Main ED, prior to patient being placed in Observation. Patient was placed in the ED Observation Unit for Shortness of breath.    PMHx   Past Medical History:   Diagnosis Date    Acute right-sided back pain 06/16/2023    Anemia due to chronic blood loss 03/06/2020    Anemia of chronic disorder 08/24/2020    Anxiety     Cirrhosis     Coronary artery disease     pt states she does not have    " Diverticulosis     Gastric ulcer     old    GERD (gastroesophageal reflux disease)     Hepatic steatosis 09/12/2016    Hyperbilirubinemia     Hypertension     pt states she no longer has    Hypothyroidism     Iron deficiency anemia due to chronic blood loss 08/24/2020    Microcytic anemia 08/24/2020    Obesity     Peptic ulcer disease 09/12/2016    UPJ (ureteropelvic junction) obstruction     Vertigo     Vertigo       Past Surgical History:   Procedure Laterality Date    APPENDECTOMY      CATARACT EXTRACTION W/  INTRAOCULAR LENS IMPLANT Right 12/9/2024    Procedure: EXTRACTION, CATARACT, WITH IOL INSERTION;  Surgeon: Elidia Figueroa MD;  Location: [facility] OR;  Service: Ophthalmology;  Laterality: Right;    CATARACT EXTRACTION W/  INTRAOCULAR LENS IMPLANT Left 1/8/2025    Procedure: EXTRACTION, CATARACT, WITH IOL INSERTION;  Surgeon: Elidia Figueroa MD;  Location: [facility] OR;  Service: Ophthalmology;  Laterality: Left;    CHOLECYSTECTOMY      COLONOSCOPY N/A 07/06/2020    Procedure: COLONOSCOPY;  Surgeon: Leander Cornejo MD;  Location: [facility];  Service: Colon and Rectal;  Laterality: N/A;    COLONOSCOPY  05/01/2023    COLONOSCOPY N/A 05/01/2023    Procedure: COLONOSCOPY;  Surgeon: Antwon Gardner MD;  Location: [facility];  Service: Endoscopy;  Laterality: N/A;    COLONOSCOPY  12/11/2023    COLONOSCOPY N/A 12/11/2023    Procedure: COLONOSCOPY;  Surgeon: Antwon Gardner MD;  Location: [facility];  Service: Endoscopy;  Laterality: N/A;    EGD, WITH BANDING OF VARICES  12/11/2023    2 bands    ESOPHAGOGASTRODUODENOSCOPY N/A 06/12/2019    Procedure: ESOPHAGOGASTRODUODENOSCOPY (EGD);  Surgeon: Arben Brown MD;  Location: [facility];  Service: Endoscopy;  Laterality: N/A;    ESOPHAGOGASTRODUODENOSCOPY N/A 03/09/2020    Procedure: EGD (ESOPHAGOGASTRODUODENOSCOPY);  Surgeon: Andrea Salomon MD;  Location: [facility];  Service: Endoscopy;  Laterality: N/A;    ESOPHAGOGASTRODUODENOSCOPY N/A 09/21/2020     Procedure: EGD (ESOPHAGOGASTRODUODENOSCOPY);  Surgeon: Antwon Gardner MD;  Location: [facility];  Service: Endoscopy;  Laterality: N/A;    ESOPHAGOGASTRODUODENOSCOPY  05/01/2023    ESOPHAGOGASTRODUODENOSCOPY N/A 05/01/2023    Procedure: EGD (ESOPHAGOGASTRODUODENOSCOPY);  Surgeon: Antwon Gardner MD;  Location: [facility];  Service: Endoscopy;  Laterality: N/A;    ESOPHAGOGASTRODUODENOSCOPY N/A 12/11/2023    Procedure: EGD (ESOPHAGOGASTRODUODENOSCOPY);  Surgeon: Antwon Gardner MD;  Location: [facility];  Service: Endoscopy;  Laterality: N/A;    ESOPHAGOGASTRODUODENOSCOPY N/A 01/24/2024    Procedure: EGD (ESOPHAGOGASTRODUODENOSCOPY);  Surgeon: Antwon Gardner MD;  Location: [facility];  Service: Endoscopy;  Laterality: N/A;    ESOPHAGOGASTRODUODENOSCOPY  03/06/2024    ESOPHAGOGASTRODUODENOSCOPY N/A 3/6/2024    Procedure: EGD (ESOPHAGOGASTRODUODENOSCOPY);  Surgeon: Antwon Gardner MD;  Location: [facility];  Service: Endoscopy;  Laterality: N/A;    HYSTERECTOMY      INTRALUMINAL GASTROINTESTINAL TRACT IMAGING VIA CAPSULE N/A 01/19/2022    Procedure: IMAGING PROCEDURE, GI TRACT, INTRALUMINAL, VIA CAPSULE;  Surgeon: Antwon Gardner MD;  Location: [facility];  Service: Endoscopy;  Laterality: N/A;    OOPHORECTOMY      PANCREAS SURGERY      TONSILLECTOMY      TRANSFORAMINAL EPIDURAL INJECTION OF STEROID Right 10/14/2021    Procedure: Injection,steroid,epidural,transforaminal approach---RIGHT L5 AND S1;  Surgeon: Cheng Tariq Jr., MD;  Location: [facility];  Service: Pain Management;  Laterality: Right;    TRANSFORAMINAL EPIDURAL INJECTION OF STEROID Right 01/13/2023    Procedure: INJECTION, STEROID, EPIDURAL, TRANSFORAMINAL APPROACH, RIGHT L4/L5 & L5-S1 CONTRAST DIRECT REF;  Surgeon: Coni Franco MD;  Location: [facility];  Service: Pain Management;  Laterality: Right;        Family Hx   Family History   Problem Relation Name Age of Onset     Hyperlipidemia Mother      Heart disease Mother      Hypertension Father      Heart disease Father      Lupus Sister x4     Lupus Sister x1     Arthritis Brother x5     No Known Problems Daughter x1     Mental illness Son x2         PTSD x 1 son in         Social Hx   Social History     Socioeconomic History    Marital status:    Tobacco Use    Smoking status: Never     Passive exposure: Never    Smokeless tobacco: Never   Substance and Sexual Activity    Alcohol use: No     Comment: 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  However, no consumption in 20 years.     Drug use: No    Sexual activity: Not Currently     Social Drivers of Health     Financial Resource Strain: Medium Risk (6/28/2021)    Overall Financial Resource Strain (CARDIA)     Difficulty of Paying Living Expenses: Somewhat hard   Food Insecurity: No Food Insecurity (6/28/2021)    Hunger Vital Sign     Worried About Running Out of Food in the Last Year: Never true     Ran Out of Food in the Last Year: Never true   Transportation Needs: No Transportation Needs (6/28/2021)    PRAPARE - Transportation     Lack of Transportation (Medical): No     Lack of Transportation (Non-Medical): No   Physical Activity: Inactive (6/28/2021)    Exercise Vital Sign     Days of Exercise per Week: 0 days     Minutes of Exercise per Session: 0 min   Stress: Stress Concern Present (6/28/2021)    [employer] - Occupational Stress Questionnaire     Feeling of Stress : To some extent   Housing Stability: Low Risk  (6/28/2021)    Housing Stability Vital Sign     Unable to Pay for Housing in the Last Year: No     Number of Places Lived in the Last Year: 1     Unstable Housing in the Last Year: No        Vital Signs   Vitals:    04/30/25 2208 04/30/25 2216 04/30/25 2220 04/30/25 2324   BP:    115/67   BP Location:       Patient Position:       Pulse: 95 95 95 69   Resp: 19 20 18    Temp:    98.6 °F (37 °C)   TempSrc:    Oral   SpO2: (!) 94% 95% 96% 95%    Weight:       Height:            Review of Systems  Review of Systems   Constitutional:  Negative for fever.   Respiratory:  Positive for cough and shortness of breath.    Cardiovascular:  Positive for chest pain. Negative for leg swelling.       Physical Exam  Physical Exam  Vitals reviewed.   Constitutional:       General: She is not in acute distress.     Appearance: She is obese. She is not diaphoretic.   HENT:      Head: Normocephalic and atraumatic.   Cardiovascular:      Rate and Rhythm: Normal rate and regular rhythm.      Heart sounds: Heart sounds not distant. No murmur heard.     No friction rub. No gallop.   Pulmonary:      Effort: Pulmonary effort is normal. No respiratory distress.      Breath sounds: Normal breath sounds. No decreased breath sounds, wheezing, rhonchi or rales.   Chest:      Chest wall: No tenderness.   Abdominal:      General: There is no distension.      Palpations: Abdomen is soft.      Tenderness: There is generalized abdominal tenderness. There is no guarding.   Musculoskeletal:      Cervical back: Neck supple.      Right lower leg: No edema.      Left lower leg: No edema.   Skin:     General: Skin is warm and dry.   Neurological:      Mental Status: She is alert.   Psychiatric:         Mood and Affect: Mood and affect normal.         Medications:   Scheduled Meds:   diphenhydrAMINE  25 mg Oral ED 1 Time    gabapentin  600 mg Oral TID    ondansetron  4 mg Oral ED 1 Time    pantoprazole  40 mg Oral BID     Continuous Infusions:  PRN Meds:.  Current Facility-Administered Medications:     acetaminophen, 1,000 mg, Oral, Q8H PRN    dextromethorphan-guaiFENesin  mg/5 ml, 10 mL, Oral, Q4H PRN    melatonin, 6 mg, Oral, Nightly PRN    methocarbamoL, 750 mg, Oral, TID PRN    morphine, 4 mg, Intravenous, Q4H PRN    ondansetron, 4 mg, Intravenous, Q8H PRN    sodium chloride 0.9%, 10 mL, Intravenous, PRN      Assessment/Plan:  Shortness of breath   Chest pain   Abdominal pain  History  of esophageal varices  History of gastric ulcer    -troponins slightly elevated but flat 19>18  -chest x-ray without consolidation  -EKG without acute ischemic patterns  -CT abdomen/pelvis without acute findings  -will give DuoNeb treatments  -nuclear medicine stress test ordered  -NPO at midnight  -GI consult PRN if patient has persistent or uncontrolled abdominal pain given her history of gastric ulcers and esophageal varices  -patient requested COVID swab    Case was discussed with the ED provider, Dr. Chowdary.

## 2025-05-01 NOTE — ASSESSMENT & PLAN NOTE
Patient presented due to symptoms such as chest pain, epigastric, and MAURER associated with a Covid-19 infection. Symptoms have been ongoing for 2 weeks now. Patient reports no angina like symptoms prior to illness. It is not positional in nature. Patient exam notable for reproducible chest pain concerning for MSK strain in setting of cough. Echo revealed a normal EF with elevated PA pressure. Troponin mildly elevated at 20 > 18 > 19. EKG with no acute changes.     Low suspicion for myocarditis in setting of above findings of normal Echo with normal EF (elevated PA pressure can occur in setting of pulmonary illness, possible component of PHTN 2/2 to obesity), Troponin level that remained flat, and no acute EKG changes. Patient pain reproducible on exam. ESR and CRP elevated in setting of inflammatory state and is non specific     Recommendations:   - Pain control for MSK straining in setting of cough   - Supportive care   - Repeat CRP and ESR lab work in 2 weeks, at this time we have a low suspicion of myocarditis. Patient advised to avoid strenuous activities in setting of inflammatory state and confirming inflammatory labs return to normal once illness is done. Then can return to normal activities   - Refer to Cardiology to have PA pressure elevation worked up once illness resolves

## 2025-05-01 NOTE — CONSULTS
"[facility]  Cardiology  Consult Note    Patient Name: Lo Escalera  MRN: [id number]  Admission Date: 4/30/2025  Hospital Length of Stay: 0 days  Code Status: Full Code   Attending Provider: No att. providers found   Consulting Provider: Griselda Bermudez DO  Primary Care Physician: Jose Hernández MD  Principal Problem:Shortness of breath    Patient information was obtained from patient, past medical records, and ER records.     Inpatient consult to Cardiology  Consult performed by: Griselda Bermudez DO  Consult ordered by: Ivelisse Kay PA-C        Subjective:         HPI:   70 y/o female with past medical history of vertigo, alcohol abuse in remission, morbid obesity, hypothyroidism, GERD, hepatic steatosis, PUD/ulceration of small intestine, cirrhosis, UPJ obstruction, microcytic anemia/GILDARDO/AICD, thrombocytopenia, hypertension, carotid disease but no hemodynamically significant stenosis who is presenting due to reported SOB. She was on the way to the GI clinic when she noticed that her breathing worsened on exertion. She reports an associated epigastric lower chest pain that seems to extend to the sides of her abdomen and back. Patient reports that her symptoms started about 2 weeks ago when she started coughing "a whole bunch". Patient reports that her cough progressively worsened and was associated with chest pain everytime she had a coughing episode. She reports that the pain is not positional ion nature. It is reproducible when she touches it. And prior to her illness she reports she did not have MAURER, or tightness in the chest. Patient reports that she did not take any medications to help the pain.     In the ED, patient was HDS and afebrile. She tested positive for Covid-19. Patient troponin HS was flat was noted to be abnormal at 20 > 18. EKG changes showed chronic changes no new acute changes. And inflammatory markers were elevated ESR and Crp. Cardiology was consulted in the setting of " concern for myocarditis due to elevated troponin and reported chest pain in setting of viral illness.     Past Medical History:   Diagnosis Date    Acute right-sided back pain 06/16/2023    Anemia due to chronic blood loss 03/06/2020    Anemia of chronic disorder 08/24/2020    Anxiety     Cirrhosis     Coronary artery disease     pt states she does not have    Diverticulosis     Gastric ulcer     old    GERD (gastroesophageal reflux disease)     Hepatic steatosis 09/12/2016    Hyperbilirubinemia     Hypertension     pt states she no longer has    Hypothyroidism     Iron deficiency anemia due to chronic blood loss 08/24/2020    Microcytic anemia 08/24/2020    Obesity     Peptic ulcer disease 09/12/2016    UPJ (ureteropelvic junction) obstruction     Vertigo     Vertigo        Past Surgical History:   Procedure Laterality Date    APPENDECTOMY      CATARACT EXTRACTION W/  INTRAOCULAR LENS IMPLANT Right 12/9/2024    Procedure: EXTRACTION, CATARACT, WITH IOL INSERTION;  Surgeon: Elidia Figueroa MD;  Location: [facility] OR;  Service: Ophthalmology;  Laterality: Right;    CATARACT EXTRACTION W/  INTRAOCULAR LENS IMPLANT Left 1/8/2025    Procedure: EXTRACTION, CATARACT, WITH IOL INSERTION;  Surgeon: Elidia Figueroa MD;  Location: [facility] OR;  Service: Ophthalmology;  Laterality: Left;    CHOLECYSTECTOMY      COLONOSCOPY N/A 07/06/2020    Procedure: COLONOSCOPY;  Surgeon: Leander Cornejo MD;  Location: [facility];  Service: Colon and Rectal;  Laterality: N/A;    COLONOSCOPY  05/01/2023    COLONOSCOPY N/A 05/01/2023    Procedure: COLONOSCOPY;  Surgeon: Antwon Gardner MD;  Location: [facility];  Service: Endoscopy;  Laterality: N/A;    COLONOSCOPY  12/11/2023    COLONOSCOPY N/A 12/11/2023    Procedure: COLONOSCOPY;  Surgeon: Antwon Gardner MD;  Location: [facility];  Service: Endoscopy;  Laterality: N/A;    EGD, WITH BANDING OF VARICES  12/11/2023    2 bands    ESOPHAGOGASTRODUODENOSCOPY N/A 06/12/2019     Procedure: ESOPHAGOGASTRODUODENOSCOPY (EGD);  Surgeon: Arben Brown MD;  Location: [facility];  Service: Endoscopy;  Laterality: N/A;    ESOPHAGOGASTRODUODENOSCOPY N/A 03/09/2020    Procedure: EGD (ESOPHAGOGASTRODUODENOSCOPY);  Surgeon: Andrea Salomon MD;  Location: [facility];  Service: Endoscopy;  Laterality: N/A;    ESOPHAGOGASTRODUODENOSCOPY N/A 09/21/2020    Procedure: EGD (ESOPHAGOGASTRODUODENOSCOPY);  Surgeon: Antwon Gardner MD;  Location: [facility];  Service: Endoscopy;  Laterality: N/A;    ESOPHAGOGASTRODUODENOSCOPY  05/01/2023    ESOPHAGOGASTRODUODENOSCOPY N/A 05/01/2023    Procedure: EGD (ESOPHAGOGASTRODUODENOSCOPY);  Surgeon: Antwon Gardner MD;  Location: [facility];  Service: Endoscopy;  Laterality: N/A;    ESOPHAGOGASTRODUODENOSCOPY N/A 12/11/2023    Procedure: EGD (ESOPHAGOGASTRODUODENOSCOPY);  Surgeon: Antwon Gardner MD;  Location: [facility];  Service: Endoscopy;  Laterality: N/A;    ESOPHAGOGASTRODUODENOSCOPY N/A 01/24/2024    Procedure: EGD (ESOPHAGOGASTRODUODENOSCOPY);  Surgeon: Antwon Gardner MD;  Location: [facility];  Service: Endoscopy;  Laterality: N/A;    ESOPHAGOGASTRODUODENOSCOPY  03/06/2024    ESOPHAGOGASTRODUODENOSCOPY N/A 3/6/2024    Procedure: EGD (ESOPHAGOGASTRODUODENOSCOPY);  Surgeon: Antwon Gardner MD;  Location: [facility];  Service: Endoscopy;  Laterality: N/A;    HYSTERECTOMY      INTRALUMINAL GASTROINTESTINAL TRACT IMAGING VIA CAPSULE N/A 01/19/2022    Procedure: IMAGING PROCEDURE, GI TRACT, INTRALUMINAL, VIA CAPSULE;  Surgeon: Antwon Gardner MD;  Location: [facility];  Service: Endoscopy;  Laterality: N/A;    OOPHORECTOMY      PANCREAS SURGERY      TONSILLECTOMY      TRANSFORAMINAL EPIDURAL INJECTION OF STEROID Right 10/14/2021    Procedure: Injection,steroid,epidural,transforaminal approach---RIGHT L5 AND S1;  Surgeon: Cheng Tariq Jr., MD;  Location: [facility];  Service: Pain Management;  Laterality:  Right;    TRANSFORAMINAL EPIDURAL INJECTION OF STEROID Right 01/13/2023    Procedure: INJECTION, STEROID, EPIDURAL, TRANSFORAMINAL APPROACH, RIGHT L4/L5 & L5-S1 CONTRAST DIRECT REF;  Surgeon: Coni Franco MD;  Location: [facility];  Service: Pain Management;  Laterality: Right;       Review of patient's allergies indicates:   Allergen Reactions    Codeine Itching    Iodine and iodide containing products Itching       Current Facility-Administered Medications on File Prior to Encounter   Medication    0.9%  NaCl infusion    0.9%  NaCl infusion    0.9%  NaCl infusion    lactated ringers bolus 1,000 mL    LIDOcaine (PF) 10 mg/ml (1%) injection 10 mg     Current Outpatient Medications on File Prior to Encounter   Medication Sig    albuterol (PROAIR HFA) 90 mcg/actuation inhaler Inhale 1-2 puffs into the lungs every 6 (six) hours as needed for Wheezing or Shortness of Breath. Rescue (Patient not taking: Reported on 3/24/2025)    alpha lipoic acid 600 mg Cap Take 600 mg by mouth once daily. (Patient not taking: Reported on 3/24/2025)    cyclobenzaprine (FLEXERIL) 10 MG tablet Take 1 tablet by mouth three times daily as needed    DULoxetine (CYMBALTA) 30 MG capsule Take 30 mg by mouth. Takes sometimes (Patient not taking: Reported on 3/24/2025)    gabapentin (NEURONTIN) 600 MG tablet Take 1 tablet (600 mg total) by mouth 3 (three) times daily.    ketorolac 0.5% (ACULAR) 0.5 % Drop Place 1 drop into the left eye 3 (three) times daily. (Patient not taking: Reported on 3/24/2025)    meloxicam (MOBIC) 15 MG tablet Take 1 tablet by mouth once daily (Patient not taking: Reported on 3/24/2025)    ofloxacin (OCUFLOX) 0.3 % ophthalmic solution Place 1 drop into the left eye 3 (three) times daily. (Patient not taking: Reported on 3/24/2025)    pantoprazole (PROTONIX) 40 MG tablet Take 1 tablet (40 mg total) by mouth 2 (two) times daily.    prednisoLONE acetate (PRED FORTE) 1 % DrpS Place 1 drop into the left eye 3 (three)  times daily. (Patient not taking: Reported on 3/24/2025)    pregabalin (LYRICA) 150 MG capsule Take 1 capsule (150 mg total) by mouth 2 (two) times daily. (Patient not taking: Reported on 3/24/2025)     Family History       Problem Relation (Age of Onset)    Arthritis Brother    Heart disease Mother, Father    Hyperlipidemia Mother    Hypertension Father    Lupus Sister, Sister    Mental illness Son    No Known Problems Daughter          Tobacco Use    Smoking status: Never     Passive exposure: Never    Smokeless tobacco: Never   Substance and Sexual Activity    Alcohol use: No     Comment: 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  However, no consumption in 20 years.     Drug use: No    Sexual activity: Not Currently     ROS  Objective:     Vital Signs (Most Recent):  Temp: 97.6 °F (36.4 °C) (05/01/25 1228)  Pulse: 86 (05/01/25 1228)  Resp: 20 (05/01/25 1554)  BP: 105/63 (05/01/25 1228)  SpO2: 97 % (05/01/25 1228) Vital Signs (24h Range):  Temp:  [97.6 °F (36.4 °C)-98.6 °F (37 °C)] 97.6 °F (36.4 °C)  Pulse:  [] 86  Resp:  [15-20] 20  SpO2:  [94 %-99 %] 97 %  BP: ()/(63-84) 105/63     Weight: 122 kg (268 lb 15.4 oz)  Body mass index is 52.53 kg/m².    SpO2: 97 %       No intake or output data in the 24 hours ending 05/01/25 1603    Lines/Drains/Airways       Peripheral Intravenous Line  Duration                  Peripheral IV - Single Lumen 05/01/25 1019 20 G Left;Posterior Hand <1 day                     Physical Exam  Constitutional:       Appearance: She is obese. She is ill-appearing.   HENT:      Head: Normocephalic and atraumatic.   Cardiovascular:      Rate and Rhythm: Normal rate and regular rhythm.      Pulses: Normal pulses.      Heart sounds: Normal heart sounds. No murmur heard.  Pulmonary:      Effort: Pulmonary effort is normal.      Breath sounds: Wheezing present.      Comments: Patient with repeated bouts of barking coughs when told to make a deep inspiratory effort   Abdominal:      Palpations: Abdomen  is soft.      Tenderness: There is abdominal tenderness.   Musculoskeletal:         General: Tenderness (TTP located in epigastric, costochondral, and back) present.   Neurological:      General: No focal deficit present.      Mental Status: She is alert and oriented to person, place, and time.          Significant Labs: All pertinent lab results from the last 24 hours have been reviewed.      Assessment and Plan:     * Shortness of breath  Patient presented due to symptoms such as chest pain, epigastric, and MAURER associated with a Covid-19 infection. Symptoms have been ongoing for 2 weeks now. Patient reports no angina like symptoms prior to illness. It is not positional in nature. Patient exam notable for reproducible chest pain concerning for MSK strain in setting of cough. Echo revealed a normal EF with elevated PA pressure. Troponin mildly elevated at 20 > 18 > 19. EKG with no acute changes.     Low suspicion for myocarditis in setting of above findings of normal Echo with normal EF (elevated PA pressure can occur in setting of pulmonary illness, possible component of PHTN 2/2 to obesity), Troponin level that remained flat, and no acute EKG changes. Patient pain reproducible on exam. ESR and CRP elevated in setting of inflammatory state and is non specific     Recommendations:   - Pain control for MSK straining in setting of cough   - Supportive care   - Repeat CRP and ESR lab work in 2 weeks, at this time we have a low suspicion of myocarditis. Patient advised to avoid strenuous activities in setting of inflammatory state and confirming inflammatory labs return to normal once illness is done. Then can return to normal activities   - Refer to Cardiology to have PA pressure elevation worked up once illness resolves         VTE Risk Mitigation (From admission, onward)           Ordered     IP VTE HIGH RISK PATIENT  Once         04/30/25 3824     Place sequential compression device  Until discontinued          04/30/25 2252     Place KARISSA hose  Until discontinued         04/30/25 2252                    Thank you for your consult. I will sign off. Please contact us if you have any additional questions.    Griselda Bermudez,   Cardiology   [facility]

## 2025-05-01 NOTE — CARE UPDATE
EDOU UPDATE    -Discussed case with Dr. Dahl  -ESR and CRP ordered  -echocardiogram revealing moderate pulmonary hypertension, 61 mmHg.  Normal IVC.  Normal EF, diastolic dysfunction present.  -BNP on admission within normal limits  -possible upgrade/ cardiology consult    Ivelisse Kay PA-C  [facility]

## 2025-05-01 NOTE — DISCHARGE SUMMARY
"ED Observation Unit  Discharge Summary        History of Present Illness:    "70 y/o F PMH CAD, HTN, multiple abdominal surgeries who presents to the ED for evaluation of multiple complaints.  Patient notes epigastric abdominal with fatigue and nausea recently. She was sent from GI clinic because "her breathing was off."  She also notes some chronic issues with numbness around her left face, none currently.  Denies any CP, syncope, trauma, headache, or neck pain."     EDOU:  I was able to clarify some details about light patient presented to the ED. she was out of breath when she presented to the GI clinic.  Patient had walked from the parking garage to the clinic.  She states that she has been feeling more short of breath with activity for about a week.  Additionally, she has had a dry cough for about 2 weeks.  She was diagnosed with bronchitis.  She clarifies that her pain is in the lower chest wall, slightly in the epigastric area, but also along the sides of her abdomen and across her back.  She sometimes has mid left chest pain as well, but this is also worse with coughing.  She has had some nausea, but no vomiting.  No leg swelling.   Her troponins were noted to be slightly elevated although flat.  Chest x-ray without consolidation, volume overload or other acute finding.    Observation Course:    Patient was placed in observation for cardiac workup.  She was found to be COVID positive.  Stress testing was contraindicated given acute infection.  She has slightly elevated but flat troponins.  Inflammatory markers were also noted to be slightly elevated.  Cardiology was consulted.  They did not feel that patient's symptoms or inflammatory markers indicated acute myocarditis.  They felt that patient's symptoms were MSK in nature and related to persistent cough.  PE study was obtained and was negative for PE, pneumonia.  Echocardiogram revealed normal EF, diastolic dysfunction, pulmonary hypertension.  Normal wall " motion.  Patient's abdominal pain was also felt to be MSK in nature and also related to cough.  There was no hypoxia.  Patient was ultimately felt to be stable for discharge.  She was discharged with muscle relaxer, cough suppressant and also advised to take Tylenol for her MSK pain.  She has a current prescription for albuterol.  Referral placed to Cardiology for pulmonary hypertension.  Close follow-up with PCP.  Patient also instructed to repeat ESR and CRP per cardiology recommendations.  Limit strenuous activity given concern for possible inflammatory process.  ED return precautions given.  Patient voiced understanding.    Consultants:    Cardiology    Final Diagnosis:  COVID-19 infection, musculoskeletal pain, chest pain, pulmonary hypertension, bronchitis    Discharge Condition: Good    Disposition: Home or Self Care     Time spent on the discharge of the patient including review of hospital course with the patient. reviewing discharge medications and arranging follow-up care 35 minutes.  Patient was seen and examined on the date of discharge and determined to be suitable for discharge.    Follow Up:  Future Appointments   Date Time Provider Department Center   7/7/2025  1:40 PM Astrid Negron MD [facility]   9/29/2025  2:00 PM Jose Hernández MD [facility]

## 2025-05-01 NOTE — SUBJECTIVE & OBJECTIVE
Past Medical History:   Diagnosis Date    Acute right-sided back pain 06/16/2023    Anemia due to chronic blood loss 03/06/2020    Anemia of chronic disorder 08/24/2020    Anxiety     Cirrhosis     Coronary artery disease     pt states she does not have    Diverticulosis     Gastric ulcer     old    GERD (gastroesophageal reflux disease)     Hepatic steatosis 09/12/2016    Hyperbilirubinemia     Hypertension     pt states she no longer has    Hypothyroidism     Iron deficiency anemia due to chronic blood loss 08/24/2020    Microcytic anemia 08/24/2020    Obesity     Peptic ulcer disease 09/12/2016    UPJ (ureteropelvic junction) obstruction     Vertigo     Vertigo        Past Surgical History:   Procedure Laterality Date    APPENDECTOMY      CATARACT EXTRACTION W/  INTRAOCULAR LENS IMPLANT Right 12/9/2024    Procedure: EXTRACTION, CATARACT, WITH IOL INSERTION;  Surgeon: Elidia Figueroa MD;  Location: [facility] OR;  Service: Ophthalmology;  Laterality: Right;    CATARACT EXTRACTION W/  INTRAOCULAR LENS IMPLANT Left 1/8/2025    Procedure: EXTRACTION, CATARACT, WITH IOL INSERTION;  Surgeon: Elidia Figueroa MD;  Location: [facility] OR;  Service: Ophthalmology;  Laterality: Left;    CHOLECYSTECTOMY      COLONOSCOPY N/A 07/06/2020    Procedure: COLONOSCOPY;  Surgeon: Leander Cornejo MD;  Location: [facility];  Service: Colon and Rectal;  Laterality: N/A;    COLONOSCOPY  05/01/2023    COLONOSCOPY N/A 05/01/2023    Procedure: COLONOSCOPY;  Surgeon: Antwon Gardner MD;  Location: [facility];  Service: Endoscopy;  Laterality: N/A;    COLONOSCOPY  12/11/2023    COLONOSCOPY N/A 12/11/2023    Procedure: COLONOSCOPY;  Surgeon: Antwon Gardner MD;  Location: [facility];  Service: Endoscopy;  Laterality: N/A;    EGD, WITH BANDING OF VARICES  12/11/2023    2 bands    ESOPHAGOGASTRODUODENOSCOPY N/A 06/12/2019    Procedure: ESOPHAGOGASTRODUODENOSCOPY (EGD);  Surgeon: Arben Brown MD;  Location: [facility];  Service:  Endoscopy;  Laterality: N/A;    ESOPHAGOGASTRODUODENOSCOPY N/A 03/09/2020    Procedure: EGD (ESOPHAGOGASTRODUODENOSCOPY);  Surgeon: Andrea Salomon MD;  Location: [facility];  Service: Endoscopy;  Laterality: N/A;    ESOPHAGOGASTRODUODENOSCOPY N/A 09/21/2020    Procedure: EGD (ESOPHAGOGASTRODUODENOSCOPY);  Surgeon: Antwon Gardner MD;  Location: [facility];  Service: Endoscopy;  Laterality: N/A;    ESOPHAGOGASTRODUODENOSCOPY  05/01/2023    ESOPHAGOGASTRODUODENOSCOPY N/A 05/01/2023    Procedure: EGD (ESOPHAGOGASTRODUODENOSCOPY);  Surgeon: Antwon Gardner MD;  Location: [facility];  Service: Endoscopy;  Laterality: N/A;    ESOPHAGOGASTRODUODENOSCOPY N/A 12/11/2023    Procedure: EGD (ESOPHAGOGASTRODUODENOSCOPY);  Surgeon: Antwon Gardner MD;  Location: [facility];  Service: Endoscopy;  Laterality: N/A;    ESOPHAGOGASTRODUODENOSCOPY N/A 01/24/2024    Procedure: EGD (ESOPHAGOGASTRODUODENOSCOPY);  Surgeon: Antwon Gardner MD;  Location: [facility];  Service: Endoscopy;  Laterality: N/A;    ESOPHAGOGASTRODUODENOSCOPY  03/06/2024    ESOPHAGOGASTRODUODENOSCOPY N/A 3/6/2024    Procedure: EGD (ESOPHAGOGASTRODUODENOSCOPY);  Surgeon: Antwon Gardner MD;  Location: [facility];  Service: Endoscopy;  Laterality: N/A;    HYSTERECTOMY      INTRALUMINAL GASTROINTESTINAL TRACT IMAGING VIA CAPSULE N/A 01/19/2022    Procedure: IMAGING PROCEDURE, GI TRACT, INTRALUMINAL, VIA CAPSULE;  Surgeon: Antwon Gardner MD;  Location: [facility];  Service: Endoscopy;  Laterality: N/A;    OOPHORECTOMY      PANCREAS SURGERY      TONSILLECTOMY      TRANSFORAMINAL EPIDURAL INJECTION OF STEROID Right 10/14/2021    Procedure: Injection,steroid,epidural,transforaminal approach---RIGHT L5 AND S1;  Surgeon: Cheng Tariq Jr., MD;  Location: [facility];  Service: Pain Management;  Laterality: Right;    TRANSFORAMINAL EPIDURAL INJECTION OF STEROID Right 01/13/2023    Procedure: INJECTION, STEROID,  EPIDURAL, TRANSFORAMINAL APPROACH, RIGHT L4/L5 & L5-S1 CONTRAST DIRECT REF;  Surgeon: Coni Franco MD;  [facility]: [facility];  Service: Pain Management;  Laterality: Right;       Review of patient's allergies indicates:   Allergen Reactions    Codeine Itching    Iodine and iodide containing products Itching       Current Facility-Administered Medications on File Prior to Encounter   Medication    0.9%  NaCl infusion    0.9%  NaCl infusion    0.9%  NaCl infusion    lactated ringers bolus 1,000 mL    LIDOcaine (PF) 10 mg/ml (1%) injection 10 mg     Current Outpatient Medications on File Prior to Encounter   Medication Sig    albuterol (PROAIR HFA) 90 mcg/actuation inhaler Inhale 1-2 puffs into the lungs every 6 (six) hours as needed for Wheezing or Shortness of Breath. Rescue (Patient not taking: Reported on 3/24/2025)    alpha lipoic acid 600 mg Cap Take 600 mg by mouth once daily. (Patient not taking: Reported on 3/24/2025)    cyclobenzaprine (FLEXERIL) 10 MG tablet Take 1 tablet by mouth three times daily as needed    DULoxetine (CYMBALTA) 30 MG capsule Take 30 mg by mouth. Takes sometimes (Patient not taking: Reported on 3/24/2025)    gabapentin (NEURONTIN) 600 MG tablet Take 1 tablet (600 mg total) by mouth 3 (three) times daily.    ketorolac 0.5% (ACULAR) 0.5 % Drop Place 1 drop into the left eye 3 (three) times daily. (Patient not taking: Reported on 3/24/2025)    meloxicam (MOBIC) 15 MG tablet Take 1 tablet by mouth once daily (Patient not taking: Reported on 3/24/2025)    ofloxacin (OCUFLOX) 0.3 % ophthalmic solution Place 1 drop into the left eye 3 (three) times daily. (Patient not taking: Reported on 3/24/2025)    pantoprazole (PROTONIX) 40 MG tablet Take 1 tablet (40 mg total) by mouth 2 (two) times daily.    prednisoLONE acetate (PRED FORTE) 1 % DrpS Place 1 drop into the left eye 3 (three) times daily. (Patient not taking: Reported on 3/24/2025)    pregabalin (LYRICA) 150 MG capsule Take 1  capsule (150 mg total) by mouth 2 (two) times daily. (Patient not taking: Reported on 3/24/2025)     Family History       Problem Relation (Age of Onset)    Arthritis Brother    Heart disease Mother, Father    Hyperlipidemia Mother    Hypertension Father    Lupus Sister, Sister    Mental illness Son    No Known Problems Daughter          Tobacco Use    Smoking status: Never     Passive exposure: Never    Smokeless tobacco: Never   Substance and Sexual Activity    Alcohol use: No     Comment: 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  However, no consumption in 20 years.     Drug use: No    Sexual activity: Not Currently     ROS  Objective:     Vital Signs (Most Recent):  Temp: 97.6 °F (36.4 °C) (05/01/25 1228)  Pulse: 86 (05/01/25 1228)  Resp: 20 (05/01/25 1554)  BP: 105/63 (05/01/25 1228)  SpO2: 97 % (05/01/25 1228) Vital Signs (24h Range):  Temp:  [97.6 °F (36.4 °C)-98.6 °F (37 °C)] 97.6 °F (36.4 °C)  Pulse:  [] 86  Resp:  [15-20] 20  SpO2:  [94 %-99 %] 97 %  BP: ()/(63-84) 105/63     Weight: 122 kg (268 lb 15.4 oz)  Body mass index is 52.53 kg/m².    SpO2: 97 %       No intake or output data in the 24 hours ending 05/01/25 1603    Lines/Drains/Airways       Peripheral Intravenous Line  Duration                  Peripheral IV - Single Lumen 05/01/25 1019 20 G Left;Posterior Hand <1 day                     Physical Exam  Constitutional:       Appearance: She is obese. She is ill-appearing.   HENT:      Head: Normocephalic and atraumatic.   Cardiovascular:      Rate and Rhythm: Normal rate and regular rhythm.      Pulses: Normal pulses.      Heart sounds: Normal heart sounds. No murmur heard.  Pulmonary:      Effort: Pulmonary effort is normal.      Breath sounds: Wheezing present.      Comments: Patient with repeated bouts of barking coughs when told to make a deep inspiratory effort   Abdominal:      Palpations: Abdomen is soft.      Tenderness: There is abdominal tenderness.   Musculoskeletal:         General: Tenderness  (TTP located in epigastric, costochondral, and back) present.   Neurological:      General: No focal deficit present.      Mental Status: She is alert and oriented to person, place, and time.          Significant Labs: All pertinent lab results from the last 24 hours have been reviewed.

## 2025-05-01 NOTE — PROGRESS NOTES
"ED Observation Unit  Progress Note      HPI   "68 y/o F PMH CAD, HTN, multiple abdominal surgeries who presents to the ED for evaluation of multiple complaints.  Patient notes epigastric abdominal with fatigue and nausea recently. She was sent from GI clinic because "her breathing was off."  She also notes some chronic issues with numbness around her left face, none currently.  Denies any CP, syncope, trauma, headache, or neck pain."     EDOU:  I was able to clarify some details about light patient presented to the ED. she was out of breath when she presented to the GI clinic.  Patient had walked from the parking garage to the clinic.  She states that she has been feeling more short of breath with activity for about a week.  Additionally, she has had a dry cough for about 2 weeks.  She was diagnosed with bronchitis.  She clarifies that her pain is in the lower chest wall, slightly in the epigastric area, but also along the sides of her abdomen and across her back.  She sometimes has mid left chest pain as well, but this is also worse with coughing.  She has had some nausea, but no vomiting.  No leg swelling.   Her troponins were noted to be slightly elevated although flat.  Chest x-ray without consolidation, volume overload or other acute finding.    Interval History   Assumed care of this patient at 7:00 a.m. on 05/01/2025.  No acute events overnight, vital signs stable, afebrile.  Patient did test positive for COVID-19.  Patient is saturating 95% on room air, and has been able to ambulate back and forth to the bathroom without difficulty.  At baseline, she occasionally uses a cane to ambulate.  She states her chest pain has improved, but is definitely worse with coughing.  She is requesting cough medication.  She denies any abdominal pain.  Discussed that with her COVID-19 positive status, and her likely noncardiac chest pain, we will obtain an echocardiogram to evaluate for regional wall motion abnormalities as " her troponins were slightly elevated.    Patient is a hard stick; labs are pending.    PMHx   Past Medical History:   Diagnosis Date    Acute right-sided back pain 06/16/2023    Anemia due to chronic blood loss 03/06/2020    Anemia of chronic disorder 08/24/2020    Anxiety     Cirrhosis     Coronary artery disease     pt states she does not have    Diverticulosis     Gastric ulcer     old    GERD (gastroesophageal reflux disease)     Hepatic steatosis 09/12/2016    Hyperbilirubinemia     Hypertension     pt states she no longer has    Hypothyroidism     Iron deficiency anemia due to chronic blood loss 08/24/2020    Microcytic anemia 08/24/2020    Obesity     Peptic ulcer disease 09/12/2016    UPJ (ureteropelvic junction) obstruction     Vertigo     Vertigo       Past Surgical History:   Procedure Laterality Date    APPENDECTOMY      CATARACT EXTRACTION W/  INTRAOCULAR LENS IMPLANT Right 12/9/2024    Procedure: EXTRACTION, CATARACT, WITH IOL INSERTION;  Surgeon: Elidia Figueroa MD;  Location: [facility] OR;  Service: Ophthalmology;  Laterality: Right;    CATARACT EXTRACTION W/  INTRAOCULAR LENS IMPLANT Left 1/8/2025    Procedure: EXTRACTION, CATARACT, WITH IOL INSERTION;  Surgeon: Elidia Figueroa MD;  Location: [facility] OR;  Service: Ophthalmology;  Laterality: Left;    CHOLECYSTECTOMY      COLONOSCOPY N/A 07/06/2020    Procedure: COLONOSCOPY;  Surgeon: Leander Cornejo MD;  Location: [facility];  Service: Colon and Rectal;  Laterality: N/A;    COLONOSCOPY  05/01/2023    COLONOSCOPY N/A 05/01/2023    Procedure: COLONOSCOPY;  Surgeon: Antwon Gardner MD;  Location: [facility];  Service: Endoscopy;  Laterality: N/A;    COLONOSCOPY  12/11/2023    COLONOSCOPY N/A 12/11/2023    Procedure: COLONOSCOPY;  Surgeon: Antwon Gardner MD;  Location: [facility];  Service: Endoscopy;  Laterality: N/A;    EGD, WITH BANDING OF VARICES  12/11/2023    2 bands    ESOPHAGOGASTRODUODENOSCOPY N/A 06/12/2019    Procedure:  ESOPHAGOGASTRODUODENOSCOPY (EGD);  Surgeon: Arben Brown MD;  Location: [facility];  Service: Endoscopy;  Laterality: N/A;    ESOPHAGOGASTRODUODENOSCOPY N/A 03/09/2020    Procedure: EGD (ESOPHAGOGASTRODUODENOSCOPY);  Surgeon: Andrea Salomon MD;  Location: [facility];  Service: Endoscopy;  Laterality: N/A;    ESOPHAGOGASTRODUODENOSCOPY N/A 09/21/2020    Procedure: EGD (ESOPHAGOGASTRODUODENOSCOPY);  Surgeon: Antwon Gardner MD;  Location: [facility];  Service: Endoscopy;  Laterality: N/A;    ESOPHAGOGASTRODUODENOSCOPY  05/01/2023    ESOPHAGOGASTRODUODENOSCOPY N/A 05/01/2023    Procedure: EGD (ESOPHAGOGASTRODUODENOSCOPY);  Surgeon: Antwon Gardner MD;  Location: [facility];  Service: Endoscopy;  Laterality: N/A;    ESOPHAGOGASTRODUODENOSCOPY N/A 12/11/2023    Procedure: EGD (ESOPHAGOGASTRODUODENOSCOPY);  Surgeon: Antwon Gardner MD;  Location: [facility];  Service: Endoscopy;  Laterality: N/A;    ESOPHAGOGASTRODUODENOSCOPY N/A 01/24/2024    Procedure: EGD (ESOPHAGOGASTRODUODENOSCOPY);  Surgeon: Antwon Gardner MD;  Location: [facility];  Service: Endoscopy;  Laterality: N/A;    ESOPHAGOGASTRODUODENOSCOPY  03/06/2024    ESOPHAGOGASTRODUODENOSCOPY N/A 3/6/2024    Procedure: EGD (ESOPHAGOGASTRODUODENOSCOPY);  Surgeon: Antwon Gardner MD;  Location: [facility];  Service: Endoscopy;  Laterality: N/A;    HYSTERECTOMY      INTRALUMINAL GASTROINTESTINAL TRACT IMAGING VIA CAPSULE N/A 01/19/2022    Procedure: IMAGING PROCEDURE, GI TRACT, INTRALUMINAL, VIA CAPSULE;  Surgeon: Antwon Gardner MD;  Location: [facility];  Service: Endoscopy;  Laterality: N/A;    OOPHORECTOMY      PANCREAS SURGERY      TONSILLECTOMY      TRANSFORAMINAL EPIDURAL INJECTION OF STEROID Right 10/14/2021    Procedure: Injection,steroid,epidural,transforaminal approach---RIGHT L5 AND S1;  Surgeon: Cheng Tariq Jr., MD;  Location: [facility] PA[facility];  Service: Pain Management;  Laterality: Right;     TRANSFORAMINAL EPIDURAL INJECTION OF STEROID Right 01/13/2023    Procedure: INJECTION, STEROID, EPIDURAL, TRANSFORAMINAL APPROACH, RIGHT L4/L5 & L5-S1 CONTRAST DIRECT REF;  Surgeon: Coni Franco MD;  Location: [facility];  Service: Pain Management;  Laterality: Right;        Family Hx   Family History   Problem Relation Name Age of Onset    Hyperlipidemia Mother      Heart disease Mother      Hypertension Father      Heart disease Father      Lupus Sister x4     Lupus Sister x1     Arthritis Brother x5     No Known Problems Daughter x1     Mental illness Son x2         PTSD x 1 son in         Social Hx   Social History     Socioeconomic History    Marital status:    Tobacco Use    Smoking status: Never     Passive exposure: Never    Smokeless tobacco: Never   Substance and Sexual Activity    Alcohol use: No     Comment: 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  However, no consumption in 20 years.     Drug use: No    Sexual activity: Not Currently     Social Drivers of Health     Financial Resource Strain: Medium Risk (6/28/2021)    Overall Financial Resource Strain (CARDIA)     Difficulty of Paying Living Expenses: Somewhat hard   Food Insecurity: No Food Insecurity (6/28/2021)    Hunger Vital Sign     Worried About Running Out of Food in the Last Year: Never true     Ran Out of Food in the Last Year: Never true   Transportation Needs: No Transportation Needs (6/28/2021)    PRAPARE - Transportation     Lack of Transportation (Medical): No     Lack of Transportation (Non-Medical): No   Physical Activity: Inactive (6/28/2021)    Exercise Vital Sign     Days of Exercise per Week: 0 days     Minutes of Exercise per Session: 0 min   Stress: Stress Concern Present (6/28/2021)    [employer] [place] [employer] - Occupational Stress Questionnaire     Feeling of Stress : To some extent   Housing Stability: Low Risk  (6/28/2021)    Housing Stability Vital Sign     Unable to Pay for Housing in the Last Year: No      Number of Places Lived in the Last Year: 1     Unstable Housing in the Last Year: No        Vital Signs   Vitals:    04/30/25 2216 04/30/25 2220 04/30/25 2324 05/01/25 0505   BP:   115/67    BP Location:       Patient Position:       Pulse: 95 95 69    Resp: 20 18  18   Temp:   98.6 °F (37 °C)    TempSrc:   Oral    SpO2: 95% 96% 95%    Weight:       Height:            Review of Systems  Review of Systems   Constitutional:  Positive for malaise/fatigue.   Respiratory:  Positive for cough and shortness of breath.    Cardiovascular:  Positive for chest pain (Worse with coughing).   All other systems reviewed and are negative.      Brief Physical Exam/Reassessment   Physical Exam  Constitutional:       General: She is not in acute distress.     Appearance: She is obese. She is not ill-appearing.   HENT:      Head: Normocephalic and atraumatic.      Mouth/Throat:      Mouth: Mucous membranes are moist.   Cardiovascular:      Rate and Rhythm: Normal rate and regular rhythm.   Pulmonary:      Effort: Pulmonary effort is normal.      Breath sounds: Normal breath sounds.   Abdominal:      General: Abdomen is flat.      Palpations: Abdomen is soft.   Musculoskeletal:      Right lower leg: No edema.      Left lower leg: No edema.   Skin:     General: Skin is warm and dry.   Neurological:      General: No focal deficit present.      Mental Status: She is alert. Mental status is at baseline.         Labs/Imaging   Labs Reviewed   COMPREHENSIVE METABOLIC PANEL - Abnormal       Result Value    Sodium 142      Potassium 3.7      Chloride 111 (*)     CO2 25      Glucose 92      BUN 11      Creatinine 0.7      Calcium 8.7      Protein Total 7.1      Albumin 2.8 (*)     Bilirubin Total 0.3            AST 22      ALT 11      Anion Gap 6 (*)     eGFR >60     TROPONIN I HIGH SENSITIVITY - Abnormal    Troponin High Sensitive 19 (*)    URINALYSIS, REFLEX TO URINE CULTURE - Abnormal    Color, UA Yellow      Appearance, UA Clear       pH, UA 7.0      Spec Grav UA >=1.030 (*)     Protein, UA Negative      Glucose, UA Negative      Ketones, UA Negative      Bilirubin, UA Negative      Blood, UA Negative      Nitrites, UA Negative      Urobilinogen, UA Negative      Leukocyte Esterase, UA Negative     CBC WITH DIFFERENTIAL - Abnormal    WBC 6.83      RBC 4.35      HGB 11.1 (*)     HCT 36.4 (*)     MCV 84      MCH 25.5 (*)     MCHC 30.5 (*)     RDW 16.6 (*)     Platelet Count 160      MPV 10.2      Nucleated RBC 0      Neut % 61.1      Lymph % 26.9      Mono % 9.5      Eos % 1.3      Basophil % 0.6      Imm Grans % 0.6 (*)     Neut # 4.17      Lymph # 1.84      Mono # 0.65      Eos # 0.09      Baso # 0.04      Imm Grans # 0.04     TROPONIN I HIGH SENSITIVITY - Abnormal    Troponin High Sensitive 18 (*)    SARS-COV-2 RNA AMPLIFICATION, QUAL - Abnormal    SARS COV-2 Molecular Positive (*)    TROPONIN I HIGH SENSITIVITY - Abnormal    Troponin High Sensitive 23 (*)    HEPATITIS C ANTIBODY - Normal    Hep C Ab Interp Non-Reactive     HIV 1 / 2 ANTIBODY - Normal    HIV 1/2 Ag/Ab Non-Reactive     B-TYPE NATRIURETIC PEPTIDE - Normal    BNP 36     LIPASE - Normal    Lipase Level 10     CBC W/ AUTO DIFFERENTIAL    Narrative:     The following orders were created for panel order CBC auto differential.  Procedure                               Abnormality         Status                     ---------                               -----------         ------                     CBC with Differential[[id number]]       Abnormal            Final result                 Please view results for these tests on the individual orders.   GREY TOP URINE HOLD    Extra Tube Hold for add-ons.     TROPONIN I HIGH SENSITIVITY      Imaging Results              CT Head Without Contrast (Final result)  Result time 04/30/25 19:53:23      Final result by Chato Tyler MD (04/30/25 19:53:23)                   Impression:      Allowing for some artifacts, there are pre-existing  findings but no scan evidence of acute intracranial abnormality at this time.    If there remains strong clinical suspicion for acute intracranial abnormality, MRI and/or short-term follow-up head CT would be recommended.      Electronically signed by: Chato Tyler  Date:    04/30/2025  Time:    19:53               Narrative:    EXAMINATION:  CT HEAD WITHOUT CONTRAST    CLINICAL HISTORY:  Transient ischemic attack (TIA);    TECHNIQUE:  Low dose axial images were obtained through the head.  Coronal and sagittal reformations were also performed. Contrast was not administered.    COMPARISON:  Head CT 03/27/2022.    FINDINGS:  Artifacts related to beam hardening and/or motion degrade portions of the scan.    Allowing for the artifacts, there is no scan evidence of depressed calvarial fracture, acute intracranial hemorrhage, discrete acute large vascular territory brain infarct, discrete intracranial mass or mass effect, pathologic extra-axial fluid collection, pneumocephalus, acute hydrocephalus, brain edema, or other acute intracranial abnormality.    There are some intracranial atherosclerotic calcifications.  There are minimal low-attenuation changes in portions of the cerebral white matter, likely related to chronic small vessel disease.    There is some mild pre-existing cerebral parenchymal volume loss, mainly involving the temporal and frontal lobes, similar to the comparison study.  Also similar to the comparison study, there is a proportionate degree of presumed ex vacuo prominence of the ventricles, cisterns, sulci, and fissures.    Opacification of a left ethmoid air cell.  New since the comparison scan.  Orbits and skull base demonstrate no adverse interval changes.  There is absence of the native ocular lenses, likely postsurgical.    Partially visualized moderate-to-large torus palatinus.     images: Corpulent habitus                                       CT Abdomen Pelvis With IV Contrast NO Oral  Contrast (Final result)  Result time 04/30/25 17:48:06      Final result by Maxim Aj MD (04/30/25 17:48:06)                   Impression:      1. No acute process or CT findings to explain patient's symptoms of epigastric pain.  2. Hepatic cirrhosis with splenomegaly.  3. Colonic diverticulosis without acute diverticulitis.  4. Cholecystectomy and hysterectomy.  5. Other incidental/nonemergent findings in the body of the report.      Electronically signed by: Maxim Aj MD  Date:    04/30/2025  Time:    17:48               Narrative:    EXAMINATION:  CT ABDOMEN PELVIS WITH IV CONTRAST    CLINICAL HISTORY:  Epigastric pain;    TECHNIQUE:  Low dose axial images, sagittal and coronal reformations were obtained from the lung bases to the pubic symphysis following the IV administration of 100 mL of Omnipaque 350 .  Oral contrast was not given.    COMPARISON:  CT abdomen most recent 9/8/24, abdominal ultrasound 11/21/2023    FINDINGS:  Base of the heart is within normal limits.  Imaged lung bases show minimal dependent atelectasis without consolidation or pleural effusion.    Cholecystectomy.  No significant biliary ductal dilatation.  Liver is normal in size with subtle nodular contour suggesting sequela of underlying cirrhosis, similar to prior.  No discrete hepatic lesion seen.  Portal vasculature appears patent.    Pancreas mildly atrophic.  No discrete pancreatic mass or adjacent inflammatory change.  Spleen is enlarged similar to prior.  Few scattered subcentimeter hypoattenuating splenic parenchymal foci which are too small to characterize but grossly stable.  Stomach, duodenum and bilateral adrenal glands are within normal limits.    Bilateral kidneys are normal in size and location with relatively symmetric normal enhancement.  Suspected minimal cortical scarring bilaterally.  No hydronephrosis or significant perinephric stranding.  Ureters are normal in course and caliber.  Urinary bladder is well  distended without wall thickening or adjacent inflammatory change.  Uterus surgically absent.  No adnexal mass or significant volume free pelvic fluid.  Pelvic phleboliths noted.    Several scattered colonic diverticula without evidence of acute diverticulitis.  Appendix not localized; however, no pericecal inflammatory change.  Terminal ileum is within normal limits.  No evidence of bowel obstruction or acute bowel inflammation.  No bowel pneumatosis or portal venous gas.    Grossly stable few prominent jeancarlos hepatis lymph nodes, nonspecific.  No ascites, free air or additional lymphadenopathy by CT criteria elsewhere in the abdomen or pelvis.    Minimal scattered calcific atherosclerosis of the abdominal aorta.  No aortic aneurysm or dissection.    Extraperitoneal soft tissues and osseous structures appear stable without acute findings.                                       X-Ray Chest AP Portable (Final result)  Result time 04/30/25 15:02:22      Final result by Maxim Aj MD (04/30/25 15:02:22)                   Impression:      No radiographic evidence of pneumonia or other source of cough/shortness of breath, noting that early/mild viral pneumonia or nonspecific bronchitis may be radiographically occult.      Electronically signed by: Maxim Aj MD  Date:    04/30/2025  Time:    15:02               Narrative:    EXAMINATION:  XR CHEST AP PORTABLE    CLINICAL HISTORY:  Shortness of breath    TECHNIQUE:  Single frontal view of the chest was performed.    COMPARISON:  Chest radiograph 12/05/2024, CT abdomen and pelvis 09/08/2024, chest CT 03/27/2022    FINDINGS:  No detrimental change.The lungs are well expanded and clear, with normal appearance of pulmonary vasculature and no pleural effusion or pneumothorax.    The cardiac silhouette is normal in size. The hilar and mediastinal contours are midline and within normal limits for age noting similar calcification at the aortic knob.    No acute osseous process  seen.  PA and lateral views can be obtained.                                       I reviewed all labs, imaging, EKGs.     Plan   COVID-19  Shortness of breath  Atypical chest pain    - symptoms explained by diagnosis of COVID-19.  Vital signs have been stable, afebrile.  See if saturating greater than 95% on room air.  -troponin of 19-18 to 23.  Her chest pain appears atypical, and is worse with coughing and inspiration.  Echocardiogram ordered for regional wall motion abnormalities.  Stress test canceled.  -patient with history of cirrhosis, appears well compensated.  Tylenol 650 mg Q 8 hours, lidocaine patch, and Robaxin ordered.  -duo neb x1, scheduled antitussive  -regular diet    I have discussed this case with CHANTAL Huerta.     CHANTAL Cunningham JUDIE

## 2025-05-01 NOTE — ED NOTES
Assumed care of pt.     Pt AAOx4, resting comfortably in bed, NAD, respirations E/UL, updated on POC, wheels locked and in low position, call bell with in reach, Comfort positioning and restroom needs were addressed. Necessary items were placed with in reach and was advised when a reassessment would take place.     Pt denies any new complaints at this time. Pt is aware of plan of care which is waiting for CT results.

## 2025-05-01 NOTE — HPI
"68 y/o female with past medical history of vertigo, alcohol abuse in remission, morbid obesity, hypothyroidism, GERD, hepatic steatosis, PUD/ulceration of small intestine, cirrhosis, UPJ obstruction, microcytic anemia/GILDARDO/AICD, thrombocytopenia, hypertension, carotid disease but no hemodynamically significant stenosis who is presenting due to reported SOB. She was on the way to the GI clinic when she noticed that her breathing worsened on exertion. She reports an associated epigastric lower chest pain that seems to extend to the sides of her abdomen and back. Patient reports that her symptoms started about 2 weeks ago when she started coughing "a whole bunch". Patient reports that her cough progressively worsened and was associated with chest pain everytime she had a coughing episode. She reports that the pain is not positional ion nature. It is reproducible when she touches it. And prior to her illness she reports she did not have MAURER, or tightness in the chest. Patient reports that she did not take any medications to help the pain.     In the ED, patient was HDS and afebrile. She tested positive for Covid-19. Patient troponin HS was flat was noted to be abnormal at 20 > 18. EKG changes showed chronic changes no new acute changes. And inflammatory markers were elevated ESR and Crp. Cardiology was consulted in the setting of concern for myocarditis due to elevated troponin and reported chest pain in setting of viral illness.   "

## 2025-05-01 NOTE — ED NOTES
Assumed care of the patient. Report received from CIARA Phelps.  side rails up X2, bed low and locked, and call light is placed within reach. 1 family/visitors at bedside at this time. Pt denies any complaints or needs. Patient updated on POC.  White board updated.        Lo JAYA Annor, a 69 y.o. female presents to the ED w/ complaint of upper abd pain, covid +, SOB and cough.  C/o back pain at this time.

## 2025-05-01 NOTE — H&P (VIEW-ONLY)
"[facility]  Cardiology  Consult Note    Patient Name: Lo Escalera  MRN: [id number]  Admission Date: 4/30/2025  Hospital Length of Stay: 0 days  Code Status: Full Code   Attending Provider: No att. providers found   Consulting Provider: Griselda Bermudez DO  Primary Care Physician: Jose Hernández MD  Principal Problem:Shortness of breath    Patient information was obtained from patient, past medical records, and ER records.     Inpatient consult to Cardiology  Consult performed by: Griselda Bermudez DO  Consult ordered by: Ivelises Kay PA-C        Subjective:         HPI:   70 y/o female with past medical history of vertigo, alcohol abuse in remission, morbid obesity, hypothyroidism, GERD, hepatic steatosis, PUD/ulceration of small intestine, cirrhosis, UPJ obstruction, microcytic anemia/GILDARDO/AICD, thrombocytopenia, hypertension, carotid disease but no hemodynamically significant stenosis who is presenting due to reported SOB. She was on the way to the GI clinic when she noticed that her breathing worsened on exertion. She reports an associated epigastric lower chest pain that seems to extend to the sides of her abdomen and back. Patient reports that her symptoms started about 2 weeks ago when she started coughing "a whole bunch". Patient reports that her cough progressively worsened and was associated with chest pain everytime she had a coughing episode. She reports that the pain is not positional ion nature. It is reproducible when she touches it. And prior to her illness she reports she did not have MAURER, or tightness in the chest. Patient reports that she did not take any medications to help the pain.     In the ED, patient was HDS and afebrile. She tested positive for Covid-19. Patient troponin HS was flat was noted to be abnormal at 20 > 18. EKG changes showed chronic changes no new acute changes. And inflammatory markers were elevated ESR and Crp. Cardiology was consulted in the setting of " concern for myocarditis due to elevated troponin and reported chest pain in setting of viral illness.     Past Medical History:   Diagnosis Date    Acute right-sided back pain 06/16/2023    Anemia due to chronic blood loss 03/06/2020    Anemia of chronic disorder 08/24/2020    Anxiety     Cirrhosis     Coronary artery disease     pt states she does not have    Diverticulosis     Gastric ulcer     old    GERD (gastroesophageal reflux disease)     Hepatic steatosis 09/12/2016    Hyperbilirubinemia     Hypertension     pt states she no longer has    Hypothyroidism     Iron deficiency anemia due to chronic blood loss 08/24/2020    Microcytic anemia 08/24/2020    Obesity     Peptic ulcer disease 09/12/2016    UPJ (ureteropelvic junction) obstruction     Vertigo     Vertigo        Past Surgical History:   Procedure Laterality Date    APPENDECTOMY      CATARACT EXTRACTION W/  INTRAOCULAR LENS IMPLANT Right 12/9/2024    Procedure: EXTRACTION, CATARACT, WITH IOL INSERTION;  Surgeon: Elidia Figueroa MD;  Location: [facility] OR;  Service: Ophthalmology;  Laterality: Right;    CATARACT EXTRACTION W/  INTRAOCULAR LENS IMPLANT Left 1/8/2025    Procedure: EXTRACTION, CATARACT, WITH IOL INSERTION;  Surgeon: Elidia Figueroa MD;  Location: [facility] OR;  Service: Ophthalmology;  Laterality: Left;    CHOLECYSTECTOMY      COLONOSCOPY N/A 07/06/2020    Procedure: COLONOSCOPY;  Surgeon: Leander Cornejo MD;  Location: [facility];  Service: Colon and Rectal;  Laterality: N/A;    COLONOSCOPY  05/01/2023    COLONOSCOPY N/A 05/01/2023    Procedure: COLONOSCOPY;  Surgeon: Antwon Gardner MD;  Location: [facility];  Service: Endoscopy;  Laterality: N/A;    COLONOSCOPY  12/11/2023    COLONOSCOPY N/A 12/11/2023    Procedure: COLONOSCOPY;  Surgeon: Antwon Gardner MD;  Location: [facility];  Service: Endoscopy;  Laterality: N/A;    EGD, WITH BANDING OF VARICES  12/11/2023    2 bands    ESOPHAGOGASTRODUODENOSCOPY N/A 06/12/2019     Procedure: ESOPHAGOGASTRODUODENOSCOPY (EGD);  Surgeon: Arben Brown MD;  Location: [facility];  Service: Endoscopy;  Laterality: N/A;    ESOPHAGOGASTRODUODENOSCOPY N/A 03/09/2020    Procedure: EGD (ESOPHAGOGASTRODUODENOSCOPY);  Surgeon: Andrea Salomon MD;  Location: [facility];  Service: Endoscopy;  Laterality: N/A;    ESOPHAGOGASTRODUODENOSCOPY N/A 09/21/2020    Procedure: EGD (ESOPHAGOGASTRODUODENOSCOPY);  Surgeon: Antwon Gardner MD;  Location: [facility];  Service: Endoscopy;  Laterality: N/A;    ESOPHAGOGASTRODUODENOSCOPY  05/01/2023    ESOPHAGOGASTRODUODENOSCOPY N/A 05/01/2023    Procedure: EGD (ESOPHAGOGASTRODUODENOSCOPY);  Surgeon: Antwon Gardner MD;  Location: [facility];  Service: Endoscopy;  Laterality: N/A;    ESOPHAGOGASTRODUODENOSCOPY N/A 12/11/2023    Procedure: EGD (ESOPHAGOGASTRODUODENOSCOPY);  Surgeon: Antwon Gardner MD;  Location: [facility];  Service: Endoscopy;  Laterality: N/A;    ESOPHAGOGASTRODUODENOSCOPY N/A 01/24/2024    Procedure: EGD (ESOPHAGOGASTRODUODENOSCOPY);  Surgeon: Antwon Gardner MD;  Location: [facility];  Service: Endoscopy;  Laterality: N/A;    ESOPHAGOGASTRODUODENOSCOPY  03/06/2024    ESOPHAGOGASTRODUODENOSCOPY N/A 3/6/2024    Procedure: EGD (ESOPHAGOGASTRODUODENOSCOPY);  Surgeon: Antwon Gardner MD;  Location: [facility];  Service: Endoscopy;  Laterality: N/A;    HYSTERECTOMY      INTRALUMINAL GASTROINTESTINAL TRACT IMAGING VIA CAPSULE N/A 01/19/2022    Procedure: IMAGING PROCEDURE, GI TRACT, INTRALUMINAL, VIA CAPSULE;  Surgeon: Antwon Gardner MD;  Location: [facility];  Service: Endoscopy;  Laterality: N/A;    OOPHORECTOMY      PANCREAS SURGERY      TONSILLECTOMY      TRANSFORAMINAL EPIDURAL INJECTION OF STEROID Right 10/14/2021    Procedure: Injection,steroid,epidural,transforaminal approach---RIGHT L5 AND S1;  Surgeon: Cheng Tariq Jr., MD;  [place] [facility] [place] [facility];  Service: Pain Management;  Laterality:  Right;    TRANSFORAMINAL EPIDURAL INJECTION OF STEROID Right 01/13/2023    Procedure: INJECTION, STEROID, EPIDURAL, TRANSFORAMINAL APPROACH, RIGHT L4/L5 & L5-S1 CONTRAST DIRECT REF;  Surgeon: Coni Franco MD;  Location: [facility];  Service: Pain Management;  Laterality: Right;       Review of patient's allergies indicates:   Allergen Reactions    Codeine Itching    Iodine and iodide containing products Itching       Current Facility-Administered Medications on File Prior to Encounter   Medication    0.9%  NaCl infusion    0.9%  NaCl infusion    0.9%  NaCl infusion    lactated ringers bolus 1,000 mL    LIDOcaine (PF) 10 mg/ml (1%) injection 10 mg     Current Outpatient Medications on File Prior to Encounter   Medication Sig    albuterol (PROAIR HFA) 90 mcg/actuation inhaler Inhale 1-2 puffs into the lungs every 6 (six) hours as needed for Wheezing or Shortness of Breath. Rescue (Patient not taking: Reported on 3/24/2025)    alpha lipoic acid 600 mg Cap Take 600 mg by mouth once daily. (Patient not taking: Reported on 3/24/2025)    cyclobenzaprine (FLEXERIL) 10 MG tablet Take 1 tablet by mouth three times daily as needed    DULoxetine (CYMBALTA) 30 MG capsule Take 30 mg by mouth. Takes sometimes (Patient not taking: Reported on 3/24/2025)    gabapentin (NEURONTIN) 600 MG tablet Take 1 tablet (600 mg total) by mouth 3 (three) times daily.    ketorolac 0.5% (ACULAR) 0.5 % Drop Place 1 drop into the left eye 3 (three) times daily. (Patient not taking: Reported on 3/24/2025)    meloxicam (MOBIC) 15 MG tablet Take 1 tablet by mouth once daily (Patient not taking: Reported on 3/24/2025)    ofloxacin (OCUFLOX) 0.3 % ophthalmic solution Place 1 drop into the left eye 3 (three) times daily. (Patient not taking: Reported on 3/24/2025)    pantoprazole (PROTONIX) 40 MG tablet Take 1 tablet (40 mg total) by mouth 2 (two) times daily.    prednisoLONE acetate (PRED FORTE) 1 % DrpS Place 1 drop into the left eye 3 (three)  times daily. (Patient not taking: Reported on 3/24/2025)    pregabalin (LYRICA) 150 MG capsule Take 1 capsule (150 mg total) by mouth 2 (two) times daily. (Patient not taking: Reported on 3/24/2025)     Family History       Problem Relation (Age of Onset)    Arthritis Brother    Heart disease Mother, Father    Hyperlipidemia Mother    Hypertension Father    Lupus Sister, Sister    Mental illness Son    No Known Problems Daughter          Tobacco Use    Smoking status: Never     Passive exposure: Never    Smokeless tobacco: Never   Substance and Sexual Activity    Alcohol use: No     Comment: 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  However, no consumption in 20 years.     Drug use: No    Sexual activity: Not Currently     ROS  Objective:     Vital Signs (Most Recent):  Temp: 97.6 °F (36.4 °C) (05/01/25 1228)  Pulse: 86 (05/01/25 1228)  Resp: 20 (05/01/25 1554)  BP: 105/63 (05/01/25 1228)  SpO2: 97 % (05/01/25 1228) Vital Signs (24h Range):  Temp:  [97.6 °F (36.4 °C)-98.6 °F (37 °C)] 97.6 °F (36.4 °C)  Pulse:  [] 86  Resp:  [15-20] 20  SpO2:  [94 %-99 %] 97 %  BP: ()/(63-84) 105/63     Weight: 122 kg (268 lb 15.4 oz)  Body mass index is 52.53 kg/m².    SpO2: 97 %       No intake or output data in the 24 hours ending 05/01/25 1603    Lines/Drains/Airways       Peripheral Intravenous Line  Duration                  Peripheral IV - Single Lumen 05/01/25 1019 20 G Left;Posterior Hand <1 day                     Physical Exam  Constitutional:       Appearance: She is obese. She is ill-appearing.   HENT:      Head: Normocephalic and atraumatic.   Cardiovascular:      Rate and Rhythm: Normal rate and regular rhythm.      Pulses: Normal pulses.      Heart sounds: Normal heart sounds. No murmur heard.  Pulmonary:      Effort: Pulmonary effort is normal.      Breath sounds: Wheezing present.      Comments: Patient with repeated bouts of barking coughs when told to make a deep inspiratory effort   Abdominal:      Palpations: Abdomen  is soft.      Tenderness: There is abdominal tenderness.   Musculoskeletal:         General: Tenderness (TTP located in epigastric, costochondral, and back) present.   Neurological:      General: No focal deficit present.      Mental Status: She is alert and oriented to person, place, and time.          Significant Labs: All pertinent lab results from the last 24 hours have been reviewed.      Assessment and Plan:     * Shortness of breath  Patient presented due to symptoms such as chest pain, epigastric, and MAURER associated with a Covid-19 infection. Symptoms have been ongoing for 2 weeks now. Patient reports no angina like symptoms prior to illness. It is not positional in nature. Patient exam notable for reproducible chest pain concerning for MSK strain in setting of cough. Echo revealed a normal EF with elevated PA pressure. Troponin mildly elevated at 20 > 18 > 19. EKG with no acute changes.     Low suspicion for myocarditis in setting of above findings of normal Echo with normal EF (elevated PA pressure can occur in setting of pulmonary illness, possible component of PHTN 2/2 to obesity), Troponin level that remained flat, and no acute EKG changes. Patient pain reproducible on exam. ESR and CRP elevated in setting of inflammatory state and is non specific     Recommendations:   - Pain control for MSK straining in setting of cough   - Supportive care   - Repeat CRP and ESR lab work in 2 weeks, at this time we have a low suspicion of myocarditis. Patient advised to avoid strenuous activities in setting of inflammatory state and confirming inflammatory labs return to normal once illness is done. Then can return to normal activities   - Refer to Cardiology to have PA pressure elevation worked up once illness resolves         VTE Risk Mitigation (From admission, onward)           Ordered     IP VTE HIGH RISK PATIENT  Once         04/30/25 7028     Place sequential compression device  Until discontinued          04/30/25 2252     Place KARISSA hose  Until discontinued         04/30/25 2252                    Thank you for your consult. I will sign off. Please contact us if you have any additional questions.    Griselda Bermudez,   Cardiology   [facility]

## 2025-05-02 ENCOUNTER — PATIENT OUTREACH (OUTPATIENT)
Dept: ADMINISTRATIVE | Facility: CLINIC | Age: 70
End: 2025-05-02
Payer: MEDICARE

## 2025-05-13 DIAGNOSIS — K20.90 ESOPHAGITIS: ICD-10-CM

## 2025-05-13 DIAGNOSIS — K21.9 GASTROESOPHAGEAL REFLUX DISEASE, UNSPECIFIED WHETHER ESOPHAGITIS PRESENT: Primary | ICD-10-CM

## 2025-05-13 DIAGNOSIS — I85.00 ESOPHAGEAL VARICES WITHOUT BLEEDING, UNSPECIFIED ESOPHAGEAL VARICES TYPE: ICD-10-CM

## 2025-05-14 ENCOUNTER — TELEPHONE (OUTPATIENT)
Dept: ENDOSCOPY | Facility: HOSPITAL | Age: 70
End: 2025-05-14

## 2025-05-14 ENCOUNTER — CLINICAL SUPPORT (OUTPATIENT)
Dept: ENDOSCOPY | Facility: HOSPITAL | Age: 70
End: 2025-05-14
Payer: MEDICARE

## 2025-05-14 DIAGNOSIS — K20.90 ESOPHAGITIS: ICD-10-CM

## 2025-05-14 DIAGNOSIS — I85.00 ESOPHAGEAL VARICES WITHOUT BLEEDING, UNSPECIFIED ESOPHAGEAL VARICES TYPE: ICD-10-CM

## 2025-05-14 DIAGNOSIS — K21.9 GASTROESOPHAGEAL REFLUX DISEASE, UNSPECIFIED WHETHER ESOPHAGITIS PRESENT: ICD-10-CM

## 2025-05-14 DIAGNOSIS — K21.9 GASTROESOPHAGEAL REFLUX DISEASE, UNSPECIFIED WHETHER ESOPHAGITIS PRESENT: Primary | ICD-10-CM

## 2025-05-14 NOTE — TELEPHONE ENCOUNTER
"----- Message -----  From: Shanthi Payne FNP-C  Sent: 4/30/2025  12:06 PM CDT  To: [facility]  Subject: EGD                                              Procedure: EGD    Diagnosis: GERD, Esophagitis, and Esophageal varices    Procedure Timing: Within 12 weeks    *If within 4 weeks selected, please benja as high priority*    *If greater than 12 weeks, please select "5-12 weeks" and delay sending until 3 months prior to requested date*    Location: Any Site    Additional Scheduling Information: No scheduling concerns    Prep Specifications:N/A    Is the patient taking a GLP-1 Agonist:no    Have you attached a patient to this message: yes            Patient is scheduled for a Upper Endoscopy (EGD) on 5/15/25 with Dr. SHANTA Ramos  Referral for procedure from PAT appointment  "

## 2025-05-14 NOTE — TELEPHONE ENCOUNTER
Contacted the patient to schedule an endoscopy procedure(s) Upper Endoscopy (EGD) . The patient did not answer the call and left a voice message requesting a call back.   New PAT appt made

## 2025-05-14 NOTE — TELEPHONE ENCOUNTER
Lab work in lab department on 2nd floor at 1100 am    EGD Procedure Prep Instructions      Date of procedure: 5/15/25 Arrive at: 11:30AM      Location of Department: [facility] [place] [address]  Take the [facility] Elevators to 2nd Floor Outpatient Surgery    How to prep:    Day Before Procedure: 5/14/25     You may have a light evening meal.   No solid food after 7:00 pm.   Continue drinking clear liquids.       Day of the Procedure:  5/15/25     You may have water/clear liquids until 4 hours before your procedure or as directed by the scheduling nurse  8:30AM. See below for list.    What You CANNOT do:   Do not drink milk or anything colored red.  Do not drink alcohol.  No gum chewing or candy morning of procedure.    Liquids That Are OK to Drink:   Water  Sports drinks (Gatorade, Power-Aid)  Coffee or tea (no cream or nondairy creamer)  Clear juices without pulp (apple, white grape)  Gelatin desserts (no fruit or toppings)  Clear soda (sprite, coke, ginger ale)  Chicken broth (until 12 midnight the night before procedure)      Comments: n/a         IMPORTANT INFORMATION TO KNOW BEFORE YOUR PROCEDURE    [facility]         If your procedure requires the administration of anesthesia, it is necessary for a responsible adult to drive you home. (Medical Transportation, Uber, Lyft, Taxi, etc. may ONLY be used if a responsible adult is present to accompany you home.  The responsible adult CAN'T be the  of the service).      person must be available to return to pick you up within 15 minutes of being notified of discharge.       Please bring a picture ID, insurance card, & copayment      Take Medications as directed below:      If you begin taking any blood thinning medications, injectable weight loss/diabetes medications (other than insulin) , Adipex (Phentermine) , please contact the endoscopy scheduling department listed below as soon as possible.    If you are  diabetic see the attached instruction sheet regarding your medication.     If you take HEART, BLOOD PRESSURE, SEIZURE, PAIN, LUNG (including inhalers/nebulizers), ANTI-REJECTION (transplant patients), or PSYCHIATRIC medications, please take at your regular times with a sip of water or as directed by the scheduling nurse.     Important contact information:    Endoscopy [phone] Hours of operation Monday-Friday 8:00-4:30pm.    Questions about insurance or financial obligations call [phone] or [phone].    If you have questions regarding the prep or need to reschedule, please call [phone]. After hours questions requiring immediate assistance, contact [facility] nurse line at [phone] or [phone].   NOTE:     On occasion, unforeseen circumstances may cause a delay in your procedure start time. We respect your time and appreciate your patience during these circumstances.      Comments: n/a

## 2025-05-14 NOTE — PLAN OF CARE
Contacted the patient to schedule an endoscopy procedure(s) Upper Endoscopy (EGD) . The patient did not answer the call and left a voice message requesting a call back.

## 2025-05-15 ENCOUNTER — HOSPITAL ENCOUNTER (OUTPATIENT)
Facility: HOSPITAL | Age: 70
Discharge: HOME | End: 2025-05-15
Attending: INTERNAL MEDICINE | Admitting: INTERNAL MEDICINE
Payer: MEDICARE

## 2025-05-15 ENCOUNTER — ANESTHESIA (OUTPATIENT)
Dept: ENDOSCOPY | Facility: HOSPITAL | Age: 70
End: 2025-05-15
Payer: MEDICARE

## 2025-05-15 ENCOUNTER — ANESTHESIA EVENT (OUTPATIENT)
Dept: ENDOSCOPY | Facility: HOSPITAL | Age: 70
End: 2025-05-15
Payer: MEDICARE

## 2025-05-15 VITALS
HEIGHT: 60 IN | BODY MASS INDEX: 52.61 KG/M2 | DIASTOLIC BLOOD PRESSURE: 60 MMHG | OXYGEN SATURATION: 96 % | HEART RATE: 97 BPM | SYSTOLIC BLOOD PRESSURE: 125 MMHG | WEIGHT: 268 LBS | TEMPERATURE: 98 F | RESPIRATION RATE: 20 BRPM

## 2025-05-15 DIAGNOSIS — K70.31 ALCOHOLIC CIRRHOSIS OF LIVER WITH ASCITES: Primary | ICD-10-CM

## 2025-05-15 DIAGNOSIS — I85.00 ESOPHAGEAL VARICES: ICD-10-CM

## 2025-05-15 PROCEDURE — 37000008 HC ANESTHESIA 1ST 15 MINUTES: Mod: HCNC | Performed by: INTERNAL MEDICINE

## 2025-05-15 PROCEDURE — 25000003 PHARM REV CODE 250: Mod: HCNC | Performed by: STUDENT IN AN ORGANIZED HEALTH CARE EDUCATION/TRAINING PROGRAM

## 2025-05-15 PROCEDURE — 63600175 PHARM REV CODE 636 W HCPCS: Mod: HCNC | Performed by: STUDENT IN AN ORGANIZED HEALTH CARE EDUCATION/TRAINING PROGRAM

## 2025-05-15 PROCEDURE — 43235 EGD DIAGNOSTIC BRUSH WASH: CPT | Mod: HCNC,,, | Performed by: INTERNAL MEDICINE

## 2025-05-15 PROCEDURE — 37000009 HC ANESTHESIA EA ADD 15 MINS: Mod: HCNC | Performed by: INTERNAL MEDICINE

## 2025-05-15 PROCEDURE — 43235 EGD DIAGNOSTIC BRUSH WASH: CPT | Mod: HCNC | Performed by: INTERNAL MEDICINE

## 2025-05-15 RX ORDER — PROPOFOL 10 MG/ML
VIAL (ML) INTRAVENOUS
Status: DISCONTINUED | OUTPATIENT
Start: 2025-05-15 | End: 2025-05-15

## 2025-05-15 RX ORDER — LIDOCAINE HYDROCHLORIDE 20 MG/ML
INJECTION INTRAVENOUS
Status: DISCONTINUED | OUTPATIENT
Start: 2025-05-15 | End: 2025-05-15

## 2025-05-15 RX ORDER — ONDANSETRON HYDROCHLORIDE 2 MG/ML
4 INJECTION, SOLUTION INTRAVENOUS ONCE AS NEEDED
Status: DISCONTINUED | OUTPATIENT
Start: 2025-05-15 | End: 2025-05-15 | Stop reason: HOSPADM

## 2025-05-15 RX ORDER — GLUCAGON 1 MG
1 KIT INJECTION
Status: DISCONTINUED | OUTPATIENT
Start: 2025-05-15 | End: 2025-05-15 | Stop reason: HOSPADM

## 2025-05-15 RX ORDER — MIDAZOLAM HYDROCHLORIDE 1 MG/ML
INJECTION INTRAMUSCULAR; INTRAVENOUS
Status: DISCONTINUED | OUTPATIENT
Start: 2025-05-15 | End: 2025-05-15

## 2025-05-15 RX ADMIN — PROPOFOL 50 MG: 10 INJECTION, EMULSION INTRAVENOUS at 12:05

## 2025-05-15 RX ADMIN — LIDOCAINE HYDROCHLORIDE 100 MG: 20 INJECTION INTRAVENOUS at 12:05

## 2025-05-15 RX ADMIN — BENZOCAINE 1 CAN: 200 SPRAY DENTAL; ORAL; PERIODONTAL at 12:05

## 2025-05-15 RX ADMIN — PROPOFOL 150 MCG/KG/MIN: 10 INJECTION, EMULSION INTRAVENOUS at 12:05

## 2025-05-15 RX ADMIN — MIDAZOLAM HYDROCHLORIDE 2 MG: 2 INJECTION, SOLUTION INTRAMUSCULAR; INTRAVENOUS at 12:05

## 2025-05-15 RX ADMIN — SODIUM CHLORIDE: 9 INJECTION, SOLUTION INTRAVENOUS at 12:05

## 2025-05-15 NOTE — ANESTHESIA PREPROCEDURE EVALUATION
[facility]  Anesthesia Pre-Operative Evaluation     Patient Name: Lo Escalera  YOB: 1955  MRN: [id number]  [facility]: [id number]       Admit Date: (Not on file)   Admit Team: Networked reference to record PCT      Date of Procedure: 5/15/2025  Anesthesia: Choice Procedure: Procedure(s) (LRB):  EGD (ESOPHAGOGASTRODUODENOSCOPY) (N/A)  Pre-Operative Diagnosis: Gastroesophageal reflux disease, unspecified whether esophagitis present [K21.9]  Esophagitis [K20.90]  Esophageal varices without bleeding, unspecified esophageal varices type [I85.00]  Proceduralist:Surgeons and Role:     * Yinka Ramos MD - Primary  Code Status: Prior   Advanced Directive: <no information>  Isolation Precautions: No active isolations  Capacity: Full capacity     SUBJECTIVE:   Lo Escalera is a 69 y.o. female who  has a past medical history of Acute right-sided back pain (06/16/2023), Anemia due to chronic blood loss (03/06/2020), Anemia of chronic disorder (08/24/2020), Anxiety, Cirrhosis, Coronary artery disease, Diverticulosis, Gastric ulcer, GERD (gastroesophageal reflux disease), Hepatic steatosis (09/12/2016), Hyperbilirubinemia, Hypertension, Hypothyroidism, Iron deficiency anemia due to chronic blood loss (08/24/2020), Microcytic anemia (08/24/2020), Obesity, Peptic ulcer disease (09/12/2016), UPJ (ureteropelvic junction) obstruction, Vertigo, and Vertigo.  No notes on file  recent ED visit for CP -  Seen by cardiology who felt that patient's chest pain was MSK in nature and related to persistent coughing over the past couple of weeks.  Her ESR and CRP were slightly elevated.  Echo revealed pulmonary hypertension, Normal EF, diastolic dysfunction present.  PE study was also obtained and revealed no evidence of VTE, pneumonia or other acute abnormality.      Hospital LOS: 0 days  ICU LOS: Patient does not have an ICU stay during this admission.    she has a current medication list which includes the following  long-term medication(s): albuterol, gabapentin, pantoprazole, and pregabalin.   Current Outpatient Medications   Medication Instructions    albuterol (PROAIR HFA) 90 mcg/actuation inhaler 1-2 puffs, Inhalation, Every 6 hours PRN, Rescue    alpha lipoic acid 600 mg, Oral, Daily    cyclobenzaprine (FLEXERIL) 10 MG tablet Take 1 tablet by mouth three times daily as needed    DULoxetine (CYMBALTA) 30 mg    gabapentin (NEURONTIN) 600 mg, Oral, 3 times daily    ketorolac 0.5% (ACULAR) 0.5 % Drop 1 drop, Left Eye, 3 times daily    meloxicam (MOBIC) 15 mg, Oral    ofloxacin (OCUFLOX) 0.3 % ophthalmic solution 1 drop, Left Eye, 3 times daily    pantoprazole (PROTONIX) 40 mg, Oral, 2 times daily    prednisoLONE acetate (PRED FORTE) 1 % DrpS 1 drop, Left Eye, 3 times daily    pregabalin (LYRICA) 150 mg, Oral, 2 times daily     ALLERGIES:     Review of patient's allergies indicates:   Allergen Reactions    Codeine Itching    Iodine and iodide containing products Itching     LDA:   AIRWAY:         * No LDAs found *      Lines/Drains/Airways       None                  Anesthesia Evaluation      Airway   Mallampati: III  TM distance: Normal  Neck ROM: Normal ROM  Dental    (+) Intact    Pulmonary    (+) shortness of breath  Cardiovascular   (+) hypertension, CAD    Neuro/Psych    (+) neuromuscular disease, psychiatric history    GI/Hepatic/Renal    (+) hiatal hernia, GERD, PUD, liver disease, chronic renal disease    Endo/Other    (+) hypothyroidism, arthritis  Abdominal                    MEDICATIONS:     Current Outpatient Medications on File Prior to Encounter   Medication Sig Dispense Refill Last Dose/Taking    albuterol (PROAIR HFA) 90 mcg/actuation inhaler Inhale 1-2 puffs into the lungs every 6 (six) hours as needed for Wheezing or Shortness of Breath. Rescue 6.7 g 0     alpha lipoic acid 600 mg Cap Take 600 mg by mouth once daily. (Patient not taking: Reported on 3/24/2025) 60 each 3     cyclobenzaprine (FLEXERIL) 10 MG  tablet Take 1 tablet by mouth three times daily as needed 60 tablet 5     DULoxetine (CYMBALTA) 30 MG capsule Take 30 mg by mouth. Takes sometimes (Patient not taking: Reported on 3/24/2025)       gabapentin (NEURONTIN) 600 MG tablet Take 1 tablet (600 mg total) by mouth 3 (three) times daily. 90 tablet 5     ketorolac 0.5% (ACULAR) 0.5 % Drop Place 1 drop into the left eye 3 (three) times daily. (Patient not taking: Reported on 3/24/2025) 5 mL 3     meloxicam (MOBIC) 15 MG tablet Take 1 tablet by mouth once daily (Patient not taking: Reported on 3/24/2025) 30 tablet 5     ofloxacin (OCUFLOX) 0.3 % ophthalmic solution Place 1 drop into the left eye 3 (three) times daily. (Patient not taking: Reported on 3/24/2025) 5 mL 3     pantoprazole (PROTONIX) 40 MG tablet Take 1 tablet (40 mg total) by mouth 2 (two) times daily. 180 tablet 1     prednisoLONE acetate (PRED FORTE) 1 % DrpS Place 1 drop into the left eye 3 (three) times daily. (Patient not taking: Reported on 3/24/2025) 5 mL 3     pregabalin (LYRICA) 150 MG capsule Take 1 capsule (150 mg total) by mouth 2 (two) times daily. (Patient not taking: Reported on 3/24/2025) 120 capsule 2       Inpatient Medications:  Antibiotics (From admission, onward)      None          VTE Risk Mitigation (From admission, onward)      None              Current Medications[1]       History:   There are no hospital problems to display for this patient.    Surgical History:    has a past surgical history that includes Hysterectomy; Appendectomy; Cholecystectomy; Tonsillectomy; Esophagogastroduodenoscopy (N/A, 06/12/2019); Pancreas surgery; Esophagogastroduodenoscopy (N/A, 03/09/2020); Colonoscopy (N/A, 07/06/2020); Esophagogastroduodenoscopy (N/A, 09/21/2020); Oophorectomy; Transforaminal epidural injection of steroid (Right, 10/14/2021); Intraluminal gastrointestinal tract imaging via capsule (N/A, 01/19/2022); Transforaminal epidural injection of steroid (Right, 01/13/2023);  "Esophagogastroduodenoscopy (05/01/2023); Colonoscopy (05/01/2023); Colonoscopy (N/A, 05/01/2023); Esophagogastroduodenoscopy (N/A, 05/01/2023); egd, with banding of varices (12/11/2023); Colonoscopy (12/11/2023); Esophagogastroduodenoscopy (N/A, 12/11/2023); Colonoscopy (N/A, 12/11/2023); Esophagogastroduodenoscopy (N/A, 01/24/2024); Esophagogastroduodenoscopy (03/06/2024); Esophagogastroduodenoscopy (N/A, 3/6/2024); Cataract extraction w/  intraocular lens implant (Right, 12/9/2024); and Cataract extraction w/  intraocular lens implant (Left, 1/8/2025).   Social History:    reports that she is not currently sexually active.  reports that she has never smoked. She has never been exposed to tobacco smoke. She has never used smokeless tobacco. She reports that she does not drink alcohol and does not use drugs.    There were no vitals filed for this visit.  Vital Signs Range (Last 24H):       There is no height or weight on file to calculate BMI.  Wt Readings from Last 4 Encounters:   05/01/25 122 kg (268 lb 15.4 oz)   04/30/25 122.8 kg (270 lb 11.6 oz)   03/24/25 121.8 kg (268 lb 8.3 oz)   03/06/25 120.5 kg (265 lb 8.7 oz)        Intake/Output - Last 3 Shifts       None          Lab Results   Component Value Date    WBC 6.83 04/30/2025    HGB 11.1 (L) 04/30/2025    HCT 36.4 (L) 04/30/2025     04/30/2025     04/30/2025    K 3.7 04/30/2025     (H) 04/30/2025    CREATININE 0.7 04/30/2025    BUN 11 04/30/2025    CO2 25 04/30/2025    GLU 92 04/30/2025    CALCIUM 8.7 04/30/2025    MG 1.7 05/12/2023    ALKPHOS 119 04/30/2025    ALT 11 04/30/2025    AST 22 04/30/2025    ALBUMIN 2.8 (L) 04/30/2025    INR 1.1 02/21/2024    APTT 27.1 11/05/2022    GLUF 124 (H) 03/27/2022    HGBA1C 5.6 03/06/2025     (H) 11/07/2020    TROPONINI 0.008 01/08/2024    BNP 36 04/30/2025    HCGQUANT <2.0 01/27/2005     No results found for this or any previous visit (from the past 12 hours).  No results for input(s): "WBC", " ""HGB", "HCT", "PLT", "NA", "K", "CREATININE", "GLU", "INR", "LACTATE", "5HIAAPLASMA", "5HIAAURINT", "5HIAA", "5BDAN55CS" in the last 168 hours.  No LMP recorded (lmp unknown). Patient has had a hysterectomy.    EKG:   Results for orders placed or performed during the hospital encounter of 04/30/25   EKG 12-lead    Collection Time: 04/30/25 12:31 PM   Result Value Ref Range    QRS Duration 76 ms    [facility] QTC Calculation 430 ms    Narrative    Test Reason : R06.02,    Vent. Rate :  93 BPM     Atrial Rate :  93 BPM     P-R Int : 128 ms          QRS Dur :  76 ms      QT Int : 346 ms       P-R-T Axes :  45  -6  10 degrees    QTcB Int : 430 ms    Normal sinus rhythm  Low septal forces  Borderline Abnormal ECG  When compared with ECG of 08-Apr-2024 10:46,  Confirmed by Kenyon Roy ([id number]) on 4/30/2025 12:39:38 PM    Referred By:            Confirmed By: Kenyon Roy     TTE:  Results for orders placed during the hospital encounter of 04/30/25    Echo    Interpretation Summary    Left Ventricle: The left ventricle is normal in size. Normal wall thickness. There is normal systolic function with a visually estimated ejection fraction of 65 - 70%. There is diastolic dysfunction.    Right Ventricle: Right ventricle was not well visualized due to poor acoustic window. The right ventricle is normal in size. Systolic function is normal.    Pulmonary Artery: There is moderate pulmonary hypertension. The estimated pulmonary artery systolic pressure is 61 mmHg.    IVC/SVC: Normal venous pressure at 3 mmHg.    MACIEL:  No results found for this or any previous visit.    Stress Test:  Results for orders placed during the hospital encounter of 03/20/23    Nuclear Stress Test    Interpretation Summary    The ECG portion of the study is abnormal but not diagnostic due to resting ST-T abnormalities.    The patient reported no chest pain during the stress test.    The nuclear portion of this study will be reported separately.    No results " found for this or any previous visit.    LHC:  No results found for this or any previous visit.    Cardiac Device Check   No results found for this or any previous visit.    No results found for this or any previous visit.        Pre-op Assessment          Review of Systems  Cardiovascular:     Hypertension   CAD                       Shortness of Breath    Coronary Artery Disease:                            Hypertension         Pulmonary:      Shortness of breath                  Renal/:  Chronic Renal Disease        Kidney Function/Disease             Hepatic/GI:   PUD, Hiatal Hernia, GERD Liver Disease,        Gerd, Peptic Ulcer Disease    Hernia, Hiatal Hernia   Liver Disease        Musculoskeletal:  Arthritis        Arthritis          Neurological:    Neuromuscular Disease,           Arthritis                         Neuromuscular Disease   Endocrine:   Hypothyroidism       Hypothyroidism          Psych:  Psychiatric History                  Physical Exam  General: Well nourished    Airway:  Mallampati: III / II  Mouth Opening: Normal  TM Distance: Normal  Tongue: Normal  Neck ROM: Normal ROM    Dental:  Intact        Anesthesia Plan  Type of Anesthesia, risks & benefits discussed:    Anesthesia Type: Gen ETT, Gen Natural Airway, MAC  Intra-op Monitoring Plan: Standard ASA Monitors  Post Op Pain Control Plan: multimodal analgesia and IV/PO Opioids PRN  Induction:  IV  Airway Plan: Direct and Video, Post-Induction  Informed Consent: Informed consent signed with the Patient and all parties understand the risks and agree with anesthesia plan.  All questions answered.   ASA Score: 3  Day of Surgery Review of History & Physical: H&P completed by Anesthesiologist.  Anesthesia Plan Notes: Chart reviewed, patient interviewed and examined.  The anesthetic plan was explained.  Risks, benefits, and alternatives were discussed. Questions were answered and the consent was signed.        ROBERTO Cardoza For  Surgery From Anesthesia Perspective.     .           [1]   No current facility-administered medications for this encounter.     Current Outpatient Medications   Medication Sig Dispense Refill    albuterol (PROAIR HFA) 90 mcg/actuation inhaler Inhale 1-2 puffs into the lungs every 6 (six) hours as needed for Wheezing or Shortness of Breath. Rescue 6.7 g 0    alpha lipoic acid 600 mg Cap Take 600 mg by mouth once daily. (Patient not taking: Reported on 3/24/2025) 60 each 3    cyclobenzaprine (FLEXERIL) 10 MG tablet Take 1 tablet by mouth three times daily as needed 60 tablet 5    DULoxetine (CYMBALTA) 30 MG capsule Take 30 mg by mouth. Takes sometimes (Patient not taking: Reported on 3/24/2025)      gabapentin (NEURONTIN) 600 MG tablet Take 1 tablet (600 mg total) by mouth 3 (three) times daily. 90 tablet 5    ketorolac 0.5% (ACULAR) 0.5 % Drop Place 1 drop into the left eye 3 (three) times daily. (Patient not taking: Reported on 3/24/2025) 5 mL 3    meloxicam (MOBIC) 15 MG tablet Take 1 tablet by mouth once daily (Patient not taking: Reported on 3/24/2025) 30 tablet 5    ofloxacin (OCUFLOX) 0.3 % ophthalmic solution Place 1 drop into the left eye 3 (three) times daily. (Patient not taking: Reported on 3/24/2025) 5 mL 3    pantoprazole (PROTONIX) 40 MG tablet Take 1 tablet (40 mg total) by mouth 2 (two) times daily. 180 tablet 1    prednisoLONE acetate (PRED FORTE) 1 % DrpS Place 1 drop into the left eye 3 (three) times daily. (Patient not taking: Reported on 3/24/2025) 5 mL 3    pregabalin (LYRICA) 150 MG capsule Take 1 capsule (150 mg total) by mouth 2 (two) times daily. (Patient not taking: Reported on 3/24/2025) 120 capsule 2     Facility-Administered Medications Ordered in Other Encounters   Medication Dose Route Frequency Provider Last Rate Last Admin    0.9%  NaCl infusion   Intravenous Continuous Antwon Gardner MD        0.9%  NaCl infusion   Intravenous Continuous Antwon Gardner MD         0.9%  NaCl infusion   Intravenous Continuous Antwon Gardner MD        lactated ringers bolus 1,000 mL  1,000 mL Intravenous Once Antwon Gardner MD        LIDOcaine (PF) 10 mg/ml (1%) injection 10 mg  1 mL Intradermal Once PRN Antwon Gardner MD

## 2025-05-15 NOTE — TRANSFER OF CARE
Anesthesia Transfer of Care Note    Patient: Lo Escalera    Procedure(s) Performed: Procedure(s) (LRB):  EGD (ESOPHAGOGASTRODUODENOSCOPY) (N/A)    Patient location: [facility]    Anesthesia Type: general    Transport from OR: Transported from OR on 6-10 L/min O2 by face mask with adequate spontaneous ventilation    Post pain: adequate analgesia    Post assessment: tolerated procedure well and no apparent anesthetic complications    Post vital signs: stable    Level of consciousness: awake, alert and oriented    Nausea/Vomiting: no nausea/vomiting    Complications: none    Transfer of care protocol was followed      Last vitals: Visit Vitals  BP 89/50 (BP Location: Left arm, Patient Position: Lying)   Pulse (!) 93   Temp 36.8 °C (98.3 °F) (Temporal)   Resp 18   Ht 5' (1.524 m)   Wt 121.6 kg (268 lb)   LMP  (LMP Unknown)   SpO2 100%   BMI 52.34 kg/m²

## 2025-05-15 NOTE — PLAN OF CARE
Discharge orders in place. Instructions reviewed with patient. PIV discontinued.  Patient waiting on family to provide transport home via car.

## 2025-05-15 NOTE — PROVATION PATIENT INSTRUCTIONS
Discharge Summary/Instructions after an Endoscopic Procedure  Patient Name: Lo Escalera  Patient MRN: [id number]  Patient YOB: 1955  Thursday, May 15, 2025  Yinka Ramos MD  Dear patient,  As a result of recent federal legislation (The Federal Cures Act), you may   receive lab or pathology results from your procedure in your [facility]   account before your physician is able to contact you. Your physician or   their representative will relay the results to you with their   recommendations at their soonest availability.  Thank you,  RESTRICTIONS:  During your procedure today, you received medications for sedation.  These   medications may affect your judgment, balance and coordination.  Therefore,   for 24 hours, you have the following restrictions:   - DO NOT drive a car, operate machinery, make legal/financial decisions,   sign important papers or drink alcohol.    ACTIVITY:  Today: no heavy lifting, straining or running due to procedural   sedation/anesthesia.  The following day: return to full activity including work.  DIET:  Eat and drink normally unless instructed otherwise.     TREATMENT FOR COMMON SIDE EFFECTS:  - Mild abdominal pain, nausea, belching, bloating or excessive gas:  rest,   eat lightly and use a heating pad.  - Sore Throat: treat with throat lozenges and/or gargle with warm salt   water.  - Because air was used during the procedure, expelling large amounts of air   from your rectum or belching is normal.  - If a bowel prep was taken, you may not have a bowel movement for 1-3 days.    This is normal.  SYMPTOMS TO WATCH FOR AND REPORT TO YOUR PHYSICIAN:  1. Abdominal pain or bloating, other than gas cramps.  2. Chest pain.  3. Back pain.  4. Signs of infection such as: chills or fever occurring within 24 hours   after the procedure.  5. Rectal bleeding, which would show as bright red, maroon, or black stools.   (A tablespoon of blood from the rectum is not serious, especially if    hemorrhoids are present.)  6. Vomiting.  7. Weakness or dizziness.  GO DIRECTLY TO THE NEAREST EMERGENCY ROOM IF YOU HAVE ANY OF THE FOLLOWING:      Difficulty breathing              Chills and/or fever over 101 F   Persistent vomiting and/or vomiting blood   Severe abdominal pain   Severe chest pain   Black, tarry stools   Bleeding- more than one tablespoon   Any other symptom or condition that you feel may need urgent attention  Your doctor recommends these additional instructions:  If any biopsies were taken, your doctors clinic will contact you in 1 to 2   weeks with any results.  - Discharge patient to home.   - Repeat upper endoscopy in 1 year for surveillance.   - The findings and recommendations were discussed with the designated   responsible adult.  For questions, problems or results please call your physician - Yinka Ramos MD at Work:  [phone].  [facility], EMERGENCY ROOM PHONE NUMBER: [phone]  IF A COMPLICATION OR EMERGENCY SITUATION ARISES AND YOU ARE UNABLE TO REACH   YOUR PHYSICIAN - GO DIRECTLY TO THE EMERGENCY ROOM.  Yinka Ramos MD  5/15/2025 12:58:23 PM  This report has been verified and signed electronically.  Dear patient,  As a result of recent federal legislation (The Federal Cures Act), you may   receive lab or pathology results from your procedure in your [facility]   account before your physician is able to contact you. Your physician or   their representative will relay the results to you with their   recommendations at their soonest availability.  Thank you,  [employer]ATION

## 2025-05-16 NOTE — ANESTHESIA POSTPROCEDURE EVALUATION
Anesthesia Post Evaluation    Patient: Lo Escalera    Procedure(s) Performed: Procedure(s) (LRB):  EGD (ESOPHAGOGASTRODUODENOSCOPY) (N/A)    Final Anesthesia Type: general      Patient location during evaluation: [facility]  Patient participation: Yes- Able to Participate  Level of consciousness: awake and alert  Post-procedure vital signs: reviewed and stable  Pain management: adequate  Airway patency: patent    PONV status at discharge: No PONV  Anesthetic complications: no      Cardiovascular status: blood pressure returned to baseline  Respiratory status: unassisted  Hydration status: euvolemic  Follow-up not needed.              Vitals Value Taken Time   /60 05/15/25 13:45   Temp 36.6 °C (97.9 °F) 05/15/25 13:00   Pulse 93 05/15/25 13:45   Resp 20 05/15/25 13:45   SpO2 99 % 05/15/25 13:45         No case tracking events are documented in the log.      Pain/Brie Score: Brie Score: 10 (5/15/2025  1:45 PM)

## 2025-05-21 ENCOUNTER — TELEPHONE (OUTPATIENT)
Dept: ENDOSCOPY | Facility: HOSPITAL | Age: 70
End: 2025-05-21

## 2025-05-21 ENCOUNTER — CLINICAL SUPPORT (OUTPATIENT)
Dept: ENDOSCOPY | Facility: HOSPITAL | Age: 70
End: 2025-05-21
Payer: MEDICARE

## 2025-05-21 DIAGNOSIS — K20.90 ESOPHAGITIS: ICD-10-CM

## 2025-05-21 DIAGNOSIS — I85.00 ESOPHAGEAL VARICES WITHOUT BLEEDING, UNSPECIFIED ESOPHAGEAL VARICES TYPE: ICD-10-CM

## 2025-05-21 DIAGNOSIS — K21.9 GASTROESOPHAGEAL REFLUX DISEASE, UNSPECIFIED WHETHER ESOPHAGITIS PRESENT: ICD-10-CM

## 2025-05-21 NOTE — TELEPHONE ENCOUNTER
Dear Referring Provider and Staff,    The Endoscopy Scheduling Department has made 2+ attempts to reach the patient for scheduling their EGD. All final attempts have been made for this referral, if the referring provider would like the patient to receive endoscopic care, please confirm with the patient whether they would like to proceed. If so, then submit a new referral and inform the Pt that the Endoscopy Scheduling department will be contacting them to schedule their procedure.      Thank you,    Endoscopy Scheduling Department       Contacted the patient to schedule an endoscopy procedure(s) Upper Endoscopy (EGD) . The patient did not answer the call and left a voice message requesting a call back.       Letter Mailed to pt also              Endoscopy Scheduling Department  [facility] [address] [place] [address]  Take the [facility] Elevators to 4th Floor Endoscopy Lab      Date: 5/21/25      Medical Record # [id number]        Dear  Lo Escalera      An order for the following procedure(s) Upper Endoscopy (EGD) was placed for you by   Shanthi Payne.    Since multiple attempts have been made to get in touch with you, this is the last notification. Please call the scheduling nurse to schedule this procedure as soon as possible.    If you have already scheduled this appointment, please disregard this letter. If you would like to cancel this request, please call the number listed below.     Sincerely,        Endoscopy Scheduling Department [phone]        Comments: Office hours are Monday through Friday 8-430p.

## 2025-05-22 NOTE — TELEPHONE ENCOUNTER
No care due was identified.  [facility] Embedded Care Due Messages. Reference number: [id number].   5/22/2025 6:27:09 PM CDT

## 2025-05-23 RX ORDER — CYCLOBENZAPRINE HCL 10 MG
10 TABLET ORAL
Qty: 60 TABLET | Refills: 5 | Status: SHIPPED | OUTPATIENT
Start: 2025-05-23

## 2025-06-02 ENCOUNTER — TELEPHONE (OUTPATIENT)
Dept: PRIMARY CARE CLINIC | Facility: CLINIC | Age: 70
End: 2025-06-02
Payer: MEDICARE

## 2025-06-09 ENCOUNTER — OFFICE VISIT (OUTPATIENT)
Dept: PRIMARY CARE CLINIC | Facility: CLINIC | Age: 70
End: 2025-06-09
Payer: MEDICARE

## 2025-06-09 VITALS
DIASTOLIC BLOOD PRESSURE: 68 MMHG | RESPIRATION RATE: 18 BRPM | TEMPERATURE: 100 F | OXYGEN SATURATION: 98 % | BODY MASS INDEX: 51.4 KG/M2 | SYSTOLIC BLOOD PRESSURE: 126 MMHG | HEIGHT: 60 IN | WEIGHT: 261.81 LBS | HEART RATE: 137 BPM

## 2025-06-09 DIAGNOSIS — F33.0 MILD EPISODE OF RECURRENT MAJOR DEPRESSIVE DISORDER: ICD-10-CM

## 2025-06-09 DIAGNOSIS — M43.16 SPONDYLOLISTHESIS OF LUMBAR REGION: ICD-10-CM

## 2025-06-09 DIAGNOSIS — M54.16 LUMBAR RADICULOPATHY: ICD-10-CM

## 2025-06-09 DIAGNOSIS — R00.0 TACHYCARDIA: ICD-10-CM

## 2025-06-09 DIAGNOSIS — F41.1 GAD (GENERALIZED ANXIETY DISORDER): ICD-10-CM

## 2025-06-09 DIAGNOSIS — I85.00 ESOPHAGEAL VARICES WITHOUT BLEEDING, UNSPECIFIED ESOPHAGEAL VARICES TYPE: ICD-10-CM

## 2025-06-09 DIAGNOSIS — M51.360 DEGENERATION OF INTERVERTEBRAL DISC OF LUMBAR REGION WITH DISCOGENIC BACK PAIN: Primary | ICD-10-CM

## 2025-06-09 DIAGNOSIS — K70.31 ALCOHOLIC CIRRHOSIS OF LIVER WITH ASCITES: ICD-10-CM

## 2025-06-09 DIAGNOSIS — I10 ESSENTIAL HYPERTENSION: ICD-10-CM

## 2025-06-09 DIAGNOSIS — E03.9 HYPOTHYROIDISM, UNSPECIFIED TYPE: ICD-10-CM

## 2025-06-09 DIAGNOSIS — Z13.820 ENCOUNTER FOR SCREENING FOR OSTEOPOROSIS: ICD-10-CM

## 2025-06-09 DIAGNOSIS — E66.01 MORBID (SEVERE) OBESITY DUE TO EXCESS CALORIES: ICD-10-CM

## 2025-06-09 DIAGNOSIS — M17.0 OSTEOARTHRITIS OF BOTH KNEES, UNSPECIFIED OSTEOARTHRITIS TYPE: ICD-10-CM

## 2025-06-09 DIAGNOSIS — M47.816 LUMBAR SPONDYLOSIS: ICD-10-CM

## 2025-06-09 DIAGNOSIS — M54.31 SCIATICA OF RIGHT SIDE: ICD-10-CM

## 2025-06-09 DIAGNOSIS — Z78.0 ASYMPTOMATIC UNCOMPLICATED MENOPAUSE: ICD-10-CM

## 2025-06-09 PROCEDURE — 99999 PR PBB SHADOW E&M-EST. PATIENT-LVL IV: CPT | Mod: PBBFAC,HCNC,, | Performed by: FAMILY MEDICINE

## 2025-06-09 RX ORDER — CYCLOBENZAPRINE HCL 10 MG
10 TABLET ORAL 3 TIMES DAILY PRN
Qty: 60 TABLET | Refills: 5 | Status: SHIPPED | OUTPATIENT
Start: 2025-06-09

## 2025-06-09 RX ORDER — MELOXICAM 7.5 MG/1
TABLET ORAL
Qty: 60 TABLET | Refills: 5 | Status: SHIPPED | OUTPATIENT
Start: 2025-06-09

## 2025-06-09 RX ORDER — METOPROLOL SUCCINATE 50 MG/1
50 TABLET, EXTENDED RELEASE ORAL DAILY
Qty: 90 TABLET | Refills: 3 | Status: SHIPPED | OUTPATIENT
Start: 2025-06-09 | End: 2026-06-09

## 2025-06-09 RX ORDER — TRAMADOL HYDROCHLORIDE 50 MG/1
50 TABLET, FILM COATED ORAL EVERY 6 HOURS PRN
Qty: 30 TABLET | Refills: 0 | Status: SHIPPED | OUTPATIENT
Start: 2025-06-09 | End: 2025-06-19

## 2025-06-09 NOTE — PROGRESS NOTES
Subjective:       Patient ID: Lo Escalera is a 69 y.o. female.    Chief Complaint: Follow-up (Discuss medical concerns. ) and Hip Pain (Right hip)      HPI:  69-year-old black female in for follow-up-=-pain and right hip--discuss medical concerns  Patient having problems with  sciatica x2 weeks--tried her regular pain medications--gabapentin 300 mg t.i.d.  Patient had EGD 05/15/2025 distal esophageal banding seen/small hiatal hernia/mi portal hypertension/pre pyloric diverticulum suggested redo EGD in 1 year  On 04/30/2025--had CT scan of the brain showing decrease size to the temporal and parietal lobe with a opacification ethmoid air cells no vascular anomalies or tumors CT scan of the abdomen showed hepatic cirrhosis with splenomegaly diverticulosis cholecystectomy hysterectomy  Patient on Protonix gabapentin 600 t.i.d. Cymbalta 30 Flexeril but minimal relief of pain  Patient's saw neurologist Dr Negron --MRI lumbar spine ordered--not done . Needs see neurosurgery for chronic pain possible epidural steroid injections  Low back pain--patient if stands initially feels better but is stands longer pain gets worse radiates down the right leg walking with a cane  No coughing anymore worried has covid still   Very poor historian   Pt sees hepatologist   Sees GI MD   Needs see neuroisurgery ==saw neurology ordered MRI LS spine not done         Office visit 03/24/2025 70 yo BF in for 3 month checkup--weight gain 15-20 lbs   Hx diverticulitis treated with antibiotics---history hiatal hernia--saw gastroenterologist Dr Gardner--history gastroscope showing GERD esophagitis gastritis-ulcers cirrhosis esophageal varices portal hypertension and hepatics steatosis  Patient could not go to son's wedding because she was having problems from alcohol--was in the hospital  Sleep   hhhhhhhapnea--does not have CPAP machine  Saw  Neurology--had dorsalgia--B1 B6 B12 folate TSH hemoglobin A1c all basically normal except  B6--2 -had protein in the immunoelectrophoresis  Weight gain 15 20 lb patient wants to try Wegovy  History of hepatics steatosis and cirrhosis of the liver with history of GERD esophagitis gastritis and ulcer.  Esophageal varices portal hypertension--had some esophageal banding  Patient needs to do lab CBCs CMP lipids T4  History bronchospasm on albuterol  GERD multiple GI issues see above patient on Protonix needs refills  Neuropathy---pain and back and both legs patient wants to be on gabapentin 600 mg t.i.d. needs refills                        ROS:    Skin: no psoriasis, eczema, skin cancer  HEENT: No headache, ocular pain, blurred vision, diplopia,  hoarseness change in voice, no thyroid disease no epistaxis  Lung: No pneumonia, asthma, Tb, wheezing, + occasional SOB, no smoking history sleep apnea supposed to get a CPAP machine  Heart: no chest pain,no  ankle edema,+ occas   palpitations, no  MI, eva murmur, no hypertension patient used to have high blood pressure but was taken off of medication blood pressure today 132/7, hyperlipidemia--no stent bypass arrhythmia history of hypertension--occasionally feels like heart skips a beat blood pressure was low so taken off blood pressure medicine   Abdomen: No nausea, vomiting, diarrhea, constipation, ulcers, hepatitis, status post cholecystectomy, melena, hematochezia, hematemesis recent esophagitis gastritis with transfusion 2 units of blood history of peptic ulcer disease history of esophageal varices--sees hepatologist --recent diverticulitis 8/17/2024 and [id number] times treated with antibiotics referred to gastroenterologist  : no UTI, renal disease, stones  GYN hysterectomy mammogram March 2021  MS: no fractures, O/A, lupus, rheumatoid, gout--mainly problems mid and lower back --but pain  both knees--right shoulder wants physical therapy    Neuro: No dizziness, LOC, seizures +vertigo---several recent episodes with falling will get MRI brain and restart  antivert--dizzy exercises -help with meclizine November 2020 patient was admitted to [facility] for loss of consciousness used to drink  36 yrs ago-pancreatitis  No diabetes, + anemia had to be transfused blood had upper GI bleed, + anxiety, + depression upset had to quit working because of vertigo was working at [employer]-doing pretty good with--loss mom dad oldest sister back to back--anemia 36.9 was 32.4 platelets 115 on iron    3 children unemployed, lives with      Objective:   Physical Exam:   General: Well nourished, well developed, no acute distress up with a cane + morbid obesity hard arise from chair ---frequently standing from sitting position complaining of back pain radiating down right leg  Skin: No lesions  HEENT: Eyes PERRLA, EOM intact, nose clear , throat nonerythematous +arcus senilis no pallor to the conjunctiva  NECK: Supple, no bruits, No JVD, no nodes  Lungs: Clear, no rales, rhonchi, wheezing  Heart: Regular rate and rhythm, no murmurs, gallops, or rubs  Abdomen: flat, bowel sounds positive, no tenderness, or organomegaly--pain mainly right upper quadrant and periumbilical area   MS:  Tenderness lumbar spine L1-S1 tear flexion 10° extension 10° lateral flexion rotation 10° straight leg lift pulling sensation back no radiculopathy able squat half-way down hard to arise  Neuro: Alert, CN intact, oriented X3 --unable do heel toe due weight and knees   Extremities: No cyanosis, clubbing, or edema negative Romberg        Assessment:       1. Degeneration of intervertebral disc of lumbar region with discogenic back pain    2. Encounter for screening for osteoporosis    3. Spondylolisthesis of lumbar region    4. Sciatica of right side    5. Osteoarthritis of both knees, unspecified osteoarthritis type    6. Morbid (severe) obesity due to excess calories    7. Mild episode of recurrent major depressive disorder    8. Lumbar radiculopathy    9. Lumbar spondylosis    10.  Hypothyroidism, unspecified type    11. JHOAN (generalized anxiety disorder)    12. Essential hypertension    13. Esophageal varices without bleeding, unspecified esophageal varices type    14. Alcoholic cirrhosis of liver with ascites    15. Tachycardia                Plan:       Degeneration of intervertebral disc of lumbar region with discogenic back pain    Encounter for screening for osteoporosis    Spondylolisthesis of lumbar region    Sciatica of right side    Osteoarthritis of both knees, unspecified osteoarthritis type    Morbid (severe) obesity due to excess calories    Mild episode of recurrent major depressive disorder    Lumbar radiculopathy    Lumbar spondylosis    Hypothyroidism, unspecified type    JHOAN (generalized anxiety disorder)    Essential hypertension    Esophageal varices without bleeding, unspecified esophageal varices type    Alcoholic cirrhosis of liver with ascites    Tachycardia                Main Reason for Visits  Lumbosacral strain with right sciatica-history DDD lumbar spine--lumbar spondylosis and radiculopathy--continue gabapentin 600 mg t.i.d.--Ultram 1 p.o. q.6h p.r.n. breakthrough pain--Flexeril 1 q.8 hours p.r.n. muscle spasm--Mobic 7.5 mg 1 p.o. b.i.d.--MRI LS spine --neurosurgery consult possible epidural steroid injection lower back he is to review the MRI  Cardiology consult--[patient with tachyc is overweight EKG appears to be normal sinus tachycardia ardia heart rate 124 will start on metoprolol 50 mg 1 p.o. q.d. to decrease heart may need to have echocardiogram 24 hour Holter possible stress test patient has had history of shortness of breaths  See gastroenterologist---needs EGD and colonoscopy---history of esophagitis/gastritis/gastric ulcers/cirrhosis/portal hypertension/esophageal varices-hepatics steatosis--history diverticuliti--Keep appointment with hepatology and gastroenterology  Patient did have some esophageal banding  Hepatologist---patient with history of  cirrhosis esophageal varices needs to be seen by hepatology--will get CBCs CMP lipids ammonia level T4  Morbid obesity patient would like to try Wegovy will discuss with hepatologist prior to treatment with this medication due to the risk of pancreatitis and GI problems and hepatology for  Patient with dorsalgia and neuropathy on gabapentin 600 t.i.d.  GERD on Protonix  Wants to see psychiatrist anxiety and depression  Morbid obesity needs to lose a lot of weight may benefit from gastric bypass   GERD--avoid alcohol smoking NSAIDs caffeine stress carbonated drinks--can raise the head of the bed on a block of wood to prevent GERD--eat small meals--lay down for 1-2 hours after eating Cont protonix --no NSAIDs due to history of having be transfused due to GI issues hx gastritis esophagitis cirrhosis esophageal varices hepatic steatosis diverticulosis portal hypertension  Anemia hematocrit was 32.9 now 36.9 but 115,000 platelets history of iron deficiency  Thrombocytopenia platelets [id number]  Lab --in 6 mo CBC CMP lipid T4 ammonia level  Ret 6 wks

## 2025-06-16 ENCOUNTER — TELEPHONE (OUTPATIENT)
Dept: ENDOSCOPY | Facility: HOSPITAL | Age: 70
End: 2025-06-16
Payer: MEDICARE

## 2025-07-01 ENCOUNTER — OFFICE VISIT (OUTPATIENT)
Dept: CARDIOLOGY | Facility: CLINIC | Age: 70
End: 2025-07-01
Payer: MEDICARE

## 2025-07-01 VITALS — HEART RATE: 80 BPM

## 2025-07-01 DIAGNOSIS — I10 ESSENTIAL HYPERTENSION: ICD-10-CM

## 2025-07-01 DIAGNOSIS — K70.31 ALCOHOLIC CIRRHOSIS OF LIVER WITH ASCITES: ICD-10-CM

## 2025-07-01 DIAGNOSIS — I27.20 PULMONARY HYPERTENSION: ICD-10-CM

## 2025-07-01 DIAGNOSIS — R00.0 TACHYCARDIA: ICD-10-CM

## 2025-07-01 DIAGNOSIS — R07.2 PRECORDIAL CHEST PAIN: Primary | ICD-10-CM

## 2025-07-01 DIAGNOSIS — M54.31 SCIATICA OF RIGHT SIDE: ICD-10-CM

## 2025-07-01 PROCEDURE — 99999 PR PBB SHADOW E&M-EST. PATIENT-LVL III: CPT | Mod: PBBFAC,,, | Performed by: INTERNAL MEDICINE

## 2025-07-01 NOTE — PROGRESS NOTES
Subjective:   07/01/2025     Patient ID:  Lo Escalera is a 69 y.o. female who presents for evaulation of Chest Pain       History of Present Illness    CHIEF COMPLAINT:  Patient presents for follow-up regarding recent hospital admission for dyspnea, COVID-19, and sciatica, as well as to discuss ongoing chest pain.    HPI:  Patient reports a recent hospital admission due to dyspnea. During a routine appointment, Dr. Hickey noticed abnormal breathing patterns. She was admitted to the ED, diagnosed with bronchitis and prescribed an inhaler.    She reports hospitalization for sciatica on the second of the current month, during which time she was also diagnosed with COVID-19. Her COVID-19 symptoms primarily involved coughing, similar to a cold.    She reports chest pain, described as very sharp and sometimes localized to her side and heel. She recounts a recent episode where pain encompassed her entire chest, lasting for 5 minutes. This pain occurred abruptly while at rest. She notes that during various activities, she occasionally experiences sudden, significant chest pain that requires her to pause and either remain still or sit down resuming activities.    She has a history of HTN, which she reports has improved. She mentions seeing Dr. Nance for this condition, who suggested her HTN may have resolved.    She also reports having a fatty liver.    She denies chest pain or tightness when walking around or doing things most of the time. She denies a history of cirrhosis. She denies current alcohol consumption.    CARDIAC HISTORY:  Chest CT 05/01/2025: No calcium in arteries, no blood clots  Mild pulmonary HTN, possibly related to cirrhosis and obesity Stress test 2023    MEDICATIONS:  Metoprolol for HTN and heart rate Patient is on an inhaler for bronchitis.    IMAGING:  A chest XR was performed on 05/01/2025, which showed no calcium in the arteries.    MEDICAL HISTORY:  Patient has a history of bronchitis, COVID-19,  fatty liver, sciatica, pulmonary hypertension, and obesity.    FAMILY HISTORY:  Family history is significant for the patient's  with diabetes.    SOCIAL HISTORY:  Alcohol Use: She reports no longer drinking Marital status:       ROS:  General: -fever, -chills, -fatigue, -weight gain, -weight loss  Eyes: -vision changes, -redness, -discharge  ENT: -ear pain, -nasal congestion, -sore throat  Cardiovascular: +chest pain, -palpitations, -lower extremity edema, +chest pain with exertion  Respiratory: +cough, -shortness of breath, +difficulty breathing  Gastrointestinal: -abdominal pain, -nausea, -vomiting, -diarrhea, -constipation, -blood in stool  Genitourinary: -dysuria, -hematuria, -frequency  Musculoskeletal: -joint pain, -muscle pain  Skin: -rash, -lesion  Neurological: -headache, -dizziness, -numbness, -tingling  Psychiatric: -anxiety, -depression, -sleep difficulty          Problem List[1]     Review of patient's allergies indicates:   Allergen Reactions    Codeine Itching    Iodine and iodide containing products Itching       Current Medications[2]     Objective:   Physical Exam    General: No acute distress. Well-developed. Well-nourished.  Eyes: EOMI. Sclerae anicteric.  HENT: Normocephalic. Atraumatic. Nares patent. Moist oral mucosa.  Cardiovascular: Regular rate. Regular rhythm. No murmurs. No rubs. No gallops. Normal S1, S2.  Respiratory: Normal respiratory effort. Clear to auscultation bilaterally. No rales. No rhonchi. No wheezing.  Musculoskeletal: No  obvious deformity.  Extremities: No lower extremity edema.  Neurological: Alert & oriented x3. No slurred speech. Normal gait.  Psychiatric: Normal mood. Normal affect. Good insight. Good judgment.  Skin: Warm. Dry. No rash.          Vitals:    07/01/25 0917   BP: (P) 108/68   Pulse: 80     Wt Readings from Last 3 Encounters:   07/01/25 (P) 120.9 kg (266 lb 8.6 oz)   06/09/25 118.8 kg (261 lb 12.7 oz)   06/02/25 122.5 kg (270 lb)     Temp  Readings from Last 3 Encounters:   06/09/25 99.7 °F (37.6 °C) (Oral)   06/02/25 99 °F (37.2 °C)   05/15/25 98 °F (36.7 °C) (Temporal)     BP Readings from Last 3 Encounters:   07/01/25 (P) 108/68   06/09/25 126/68   06/02/25 (!) 140/99     Pulse Readings from Last 3 Encounters:   07/01/25 80   06/09/25 (!) 137   06/02/25 98               Lab Results   Component Value Date    CHOL 148 03/24/2025    CHOL 157 08/14/2024    CHOL 133 06/27/2024     Lab Results   Component Value Date    HDL 47 03/24/2025    HDL 50 08/14/2024    HDL 51 06/27/2024     Lab Results   Component Value Date    LDLCALC 79.0 03/24/2025    LDLCALC 89.8 08/14/2024    LDLCALC 66.2 06/27/2024     Lab Results   Component Value Date    ALT 11 04/30/2025    AST 22 04/30/2025    AST 34 03/24/2025    AST 24 09/08/2024     Lab Results   Component Value Date    CREATININE 0.7 04/30/2025    BUN 11 04/30/2025     04/30/2025    K 3.7 04/30/2025    CO2 25 04/30/2025    CO2 25 03/24/2025    CO2 26 09/08/2024     Lab Results   Component Value Date    HGB 11.5 (L) 05/15/2025    HCT 39.4 05/15/2025    HCT 36.4 (L) 04/30/2025    HCT 35.2 (L) 03/24/2025       Assessment and Plan:   Assessment & Plan    Z68.42 BMI 45.0-49.9, adult  K70.31 Alcoholic cirrhosis of liver with ascites  I27.20 Pulmonary hypertension  M54.31 Sciatica of right side  I10 Essential hypertension  R00.0 Tachycardia  R07.2 Precordial chest pain    IMPRESSION:  - Chest pain appears to be musculoskeletal rather than cardiac.  - Pulmonary HTN potentially related to cirrhosis.  - Continued metoprolol at current dose for managing heart rate and blood pressure.  - Discussed potential relationship between cirrhosis, obesity, and pulmonary HTN.    BMI 45.0-49.9, ADULT:  - Advised losing weight to help manage pulmonary HTN.    PULMONARY HYPERTENSION:  - Advised losing weight to help manage pulmonary HTN.    ESSENTIAL HYPERTENSION:  - Continued metoprolol at current dose for managing heart rate and  blood pressure.    TACHYCARDIA:  - Continued metoprolol at current dose for managing heart rate and blood pressure.    PRECORDIAL CHEST PAIN:  - Ordered stress test (laying down) to further evaluate cardiac function, despite low suspicion of cardiac etiology for chest pain.  - Follow up after completing ordered stress test.        Patient recently found to be tachycardic into have pulmonary hypertension.  She has multiple risk factors for pulmonary hypertension including obesity and cirrhosis.  Her chest pain sounds atypical for myocardial ischemia, will get a PET stress test rule out coronary artery disease and ischemia.  Otherwise, patient is treated with metoprolol, blood pressure appears to be well controlled, heart rate is improved.    For treatment of pulmonary hypertension, would concentrate on weight loss and follow-up with hepatology.      No follow-ups on file.        Future Appointments   Date Time Provider Department Center   7/7/2025  1:40 PM Astrid Negron MD [facility]   7/11/2025  1:00 PM [facility]ARDIAC, PET IMAGING [facility]   7/30/2025  2:00 PM Jose Hernández MD [facility]       This note was generated with the assistance of ambient listening technology. Verbal consent was obtained by the patient and accompanying visitor(s) for the recording of patient appointment to facilitate this note. I attest to having reviewed and edited the generated note for accuracy, though some syntax or spelling errors may persist. Please contact the author of this note for any clarification.                      [1]   Patient Active Problem List  Diagnosis    Essential hypertension    Hepatic steatosis    Tachycardia    BMI 45.0-49.9, adult    Diverticulitis    Thrombocytopenia    History of Hypothyroidism    JHOAN (generalized anxiety disorder)    Esophagitis with gastritis    Alcohol abuse, in remission    Diverticulosis    Portal hypertension    Anemia    Anemia of chronic  disorder    Post-traumatic osteoarthritis of both knees    Lumbar radiculopathy    Lumbar spondylosis    DDD (degenerative disc disease), lumbar    Shortness of breath    Carotid atherosclerosis, bilateral    Iron deficiency anemia    Spondylolisthesis of lumbar region    Falls    Osteoarthritis of knees, bilateral    Right shoulder pain    Aortic atherosclerosis    Hx of diverticulitis of colon    Alcoholic cirrhosis of liver with ascites    Chest pain, atypical    Morbid (severe) obesity due to excess calories    Esophageal varices without bleeding    Sciatica of right side    Acute right-sided back pain    Diverticula of colon    Angiectasia    Hiatal hernia    Lower abdominal pain    Abnormal x-ray of shoulder    Mild episode of recurrent major depressive disorder    Abdominal pain, right upper quadrant    Abdominal pain, periumbilical    Hypersomnolence    Pulmonary hypertension   [2]   Current Outpatient Medications:     albuterol (PROAIR HFA) 90 mcg/actuation inhaler, Inhale 1-2 puffs into the lungs every 6 (six) hours as needed for Wheezing or Shortness of Breath. Rescue, Disp: 6.7 g, Rfl: 0    cyclobenzaprine (FLEXERIL) 10 MG tablet, Take 1 tablet (10 mg total) by mouth 3 (three) times daily as needed for Muscle spasms., Disp: 60 tablet, Rfl: 5    gabapentin (NEURONTIN) 600 MG tablet, Take 1 tablet (600 mg total) by mouth 3 (three) times daily., Disp: 90 tablet, Rfl: 5    meloxicam (MOBIC) 7.5 MG tablet, One p.o. b.i.d. for back pain no other NSAIDs can take with Tylenol, Disp: 60 tablet, Rfl: 5    metoprolol succinate (TOPROL-XL) 50 MG 24 hr tablet, Take 1 tablet (50 mg total) by mouth once daily., Disp: 90 tablet, Rfl: 3    pantoprazole (PROTONIX) 40 MG tablet, Take 1 tablet (40 mg total) by mouth 2 (two) times daily., Disp: 180 tablet, Rfl: 1  No current facility-administered medications for this visit.    Facility-Administered Medications Ordered in Other Visits:     0.9%  NaCl infusion, , Intravenous,  Continuous, Antwon Gardner MD    0.9%  NaCl infusion, , Intravenous, Continuous, Antwon Gardner MD    0.9%  NaCl infusion, , Intravenous, Continuous, Antwon Gardner MD    lactated ringers bolus 1,000 mL, 1,000 mL, Intravenous, Once, Antwon Gardner MD    LIDOcaine (PF) 10 mg/ml (1%) injection 10 mg, 1 mL, Intradermal, Once PRN, Antwno Gardner MD

## 2025-07-07 ENCOUNTER — TELEPHONE (OUTPATIENT)
Dept: NEUROLOGY | Facility: CLINIC | Age: 70
End: 2025-07-07

## 2025-07-07 DIAGNOSIS — M54.9 DORSALGIA, UNSPECIFIED: Primary | ICD-10-CM

## 2025-07-11 ENCOUNTER — TELEPHONE (OUTPATIENT)
Dept: CARDIOLOGY | Facility: CLINIC | Age: 70
End: 2025-07-11
Payer: MEDICARE

## 2025-07-11 ENCOUNTER — HOSPITAL ENCOUNTER (OUTPATIENT)
Dept: CARDIOLOGY | Facility: HOSPITAL | Age: 70
Discharge: HOME | End: 2025-07-11
Attending: INTERNAL MEDICINE
Payer: MEDICARE

## 2025-07-11 VITALS
HEIGHT: 61 IN | DIASTOLIC BLOOD PRESSURE: 90 MMHG | SYSTOLIC BLOOD PRESSURE: 140 MMHG | HEART RATE: 120 BPM | BODY MASS INDEX: 50.22 KG/M2 | WEIGHT: 266 LBS

## 2025-07-11 DIAGNOSIS — R07.2 PRECORDIAL CHEST PAIN: ICD-10-CM

## 2025-07-11 LAB
CFR FLOW - ANTERIOR: 1.75
CFR FLOW - INFERIOR: 1.9
CFR FLOW - LATERAL: 1.66
CFR FLOW - MAX: 2.23
CFR FLOW - MIN: 1.37
CFR FLOW - SEPTAL: 2.03
CFR FLOW - WHOLE HEART: 1.84
CV PHARM DOSE: 0.4 MG
CV STRESS BASE HR: 123 BPM
DIASTOLIC BLOOD PRESSURE: 71 MMHG
EJECTION FRACTION- HIGH: 65 %
END DIASTOLIC INDEX-HIGH: 153 ML/M2
END DIASTOLIC INDEX-LOW: 93 ML/M2
END SYSTOLIC INDEX-HIGH: 71 ML/M2
END SYSTOLIC INDEX-LOW: 31 ML/M2
NUC REST DIASTOLIC VOLUME INDEX: 48
NUC REST EJECTION FRACTION: 69
NUC REST SYSTOLIC VOLUME INDEX: 15
NUC STRESS DIASTOLIC VOLUME INDEX: 51
NUC STRESS EJECTION FRACTION: 73 %
NUC STRESS SYSTOLIC VOLUME INDEX: 14
OHS CV CPX 1 MINUTE RECOVERY HEART RATE: 123 BPM
OHS CV CPX 85 PERCENT MAX PREDICTED HEART RATE MALE: 129
OHS CV CPX MAX PREDICTED HEART RATE: 152
OHS CV CPX PATIENT IS FEMALE: 1
OHS CV CPX PATIENT IS MALE: 0
OHS CV CPX PEAK DIASTOLIC BLOOD PRESSURE: 45 MMHG
OHS CV CPX PEAK HEAR RATE: 118 BPM
OHS CV CPX PEAK RATE PRESSURE PRODUCT: 8968
OHS CV CPX PEAK SYSTOLIC BLOOD PRESSURE: 76 MMHG
OHS CV CPX PERCENT MAX PREDICTED HEART RATE ACHIEVED: 81
OHS CV CPX RATE PRESSURE PRODUCT PRESENTING: NORMAL
OHS CV INITIAL DOSE: 37 MCG/KG/MIN
OHS CV MODERATELY REDUCED FLOW CAPACITY: 0 %
OHS CV NO ISCHEMIA MILDLY REDUCED FLOW CAPACTY: 17 %
OHS CV NO ISCHEMIA MINIMALLY REDUCED FLOW CAPACITY: 43 %
OHS CV NORMAL FLOW CAPACITY COMPARABLE TO HEALTHY YOUNG VOLUNTEERS: 40 %
OHS CV PEAK DOSE: 36.9 MCG/KG/MIN
OHS CV PET ID: 9297
OHS CV SEVERELY REDUCED FLOW CAPACITY: 0 %
OHS CV TOTAL EXAM DLP: 558.31 MGY-CM
REST FLOW - ANTERIOR: 1.35 CC/MIN/G
REST FLOW - INFERIOR: 1.08 CC/MIN/G
REST FLOW - LATERAL: 1.47 CC/MIN/G
REST FLOW - MAX: 1.98 CC/MIN/G
REST FLOW - MIN: 0.56 CC/MIN/G
REST FLOW - SEPTAL: 0.94 CC/MIN/G
REST FLOW - WHOLE HEART: 1.21 CC/MIN/G
RETIRED EF AND QEF - SEE NOTES: 53 %
STRESS FLOW - ANTERIOR: 2.31 CC/MIN/G
STRESS FLOW - INFERIOR: 2.03 CC/MIN/G
STRESS FLOW - LATERAL: 2.42 CC/MIN/G
STRESS FLOW - MAX: 2.77 CC/MIN/G
STRESS FLOW - MIN: 0.93 CC/MIN/G
STRESS FLOW - SEPTAL: 1.91 CC/MIN/G
STRESS FLOW - WHOLE HEART: 2.17 CC/MIN/G
SYSTOLIC BLOOD PRESSURE: 110 MMHG

## 2025-07-11 PROCEDURE — A9555 RB82 RUBIDIUM: HCPCS | Mod: HCNC | Performed by: INTERNAL MEDICINE

## 2025-07-11 PROCEDURE — 78431 MYOCRD IMG PET RST&STRS CT: CPT | Mod: 26,HCNC,, | Performed by: INTERNAL MEDICINE

## 2025-07-11 PROCEDURE — 78434 AQMBF PET REST & RX STRESS: CPT | Mod: 26,HCNC,, | Performed by: INTERNAL MEDICINE

## 2025-07-11 PROCEDURE — 63600175 PHARM REV CODE 636 W HCPCS: Mod: HCNC | Performed by: INTERNAL MEDICINE

## 2025-07-11 PROCEDURE — 93018 CV STRESS TEST I&R ONLY: CPT | Mod: HCNC,,, | Performed by: INTERNAL MEDICINE

## 2025-07-11 PROCEDURE — 93016 CV STRESS TEST SUPVJ ONLY: CPT | Mod: HCNC,,, | Performed by: INTERNAL MEDICINE

## 2025-07-11 PROCEDURE — 78434 AQMBF PET REST & RX STRESS: CPT | Mod: HCNC

## 2025-07-11 RX ORDER — REGADENOSON 0.08 MG/ML
0.4 INJECTION, SOLUTION INTRAVENOUS
Status: COMPLETED | OUTPATIENT
Start: 2025-07-11 | End: 2025-07-11

## 2025-07-11 RX ORDER — AMINOPHYLLINE 25 MG/ML
75 INJECTION, SOLUTION INTRAVENOUS ONCE
Status: COMPLETED | OUTPATIENT
Start: 2025-07-11 | End: 2025-07-11

## 2025-07-11 RX ADMIN — RUBIDIUM CHLORIDE RB-82 36.9 MILLICURIE: 150 INJECTION, SOLUTION INTRAVENOUS at 01:07

## 2025-07-11 RX ADMIN — RUBIDIUM CHLORIDE RB-82 37 MILLICURIE: 150 INJECTION, SOLUTION INTRAVENOUS at 01:07

## 2025-07-11 RX ADMIN — AMINOPHYLLINE 75 MG: 25 INJECTION, SOLUTION INTRAVENOUS at 01:07

## 2025-07-11 RX ADMIN — REGADENOSON 0.4 MG: 0.08 INJECTION, SOLUTION INTRAVENOUS at 01:07

## 2025-07-11 NOTE — TELEPHONE ENCOUNTER
Called pt , Pt did not answer . Left a voicemail letting her know Her PET stress test does not show evidence for significant blockages. Good news.

## 2025-07-16 ENCOUNTER — PROCEDURE VISIT (OUTPATIENT)
Dept: NEUROLOGY | Facility: CLINIC | Age: 70
End: 2025-07-16
Payer: MEDICARE

## 2025-07-16 DIAGNOSIS — G60.9 NEUROPATHY, IDIOPATHIC: ICD-10-CM

## 2025-07-16 DIAGNOSIS — M54.9 DORSALGIA, UNSPECIFIED: ICD-10-CM

## 2025-07-16 PROCEDURE — 95910 NRV CNDJ TEST 7-8 STUDIES: CPT | Mod: HCNC,S$GLB,, | Performed by: PHYSICAL MEDICINE & REHABILITATION

## 2025-07-16 PROCEDURE — 95886 MUSC TEST DONE W/N TEST COMP: CPT | Mod: HCNC,S$GLB,, | Performed by: PHYSICAL MEDICINE & REHABILITATION

## 2025-07-16 NOTE — PROCEDURES
Test Date:  2025    Patient: lo escalera : 1955 Physician: Benito Thomas D.O.   ID#: [id number] Sex: Female Ref. Phys: Astrid Negron MD     HPI: Lo Escalera is a 69 y.o.female who presents for NCS/EMG to evaluate for LS radiculopathy and polyneuropathy.      PROCEDURE:  Prior to the procedure, the procedure was discussed in detail with the patient.  All questions were answered, and verbal consent was obtained.  For nerve conduction studies, a combination of surface electrodes, bar electrodes, and/or ring electrodes were used as needed.  For needle EMG, each site was cleaned and prepped in usual fashion with an alcohol pad.  A monopolar needle (28G) was used.  There was no significant bleeding, and bandages were applied as needed.  The procedure was tolerated without adverse effect.  The patient was instructed on post-procedure care including ice if needed for 10-15 minutes up to 4 times/day for any sore muscles.  I discussed with the patient that the data would be reviewed and a report sent to the referring provider, where any follow up questions regarding next steps should be directed.        NCV & EMG Findings:  All nerve conduction studies (as indicated in the following tables) were within normal limits.  All examined muscles (as indicated in the following table) showed no evidence of electrical instability.      IMPRESSIONS:  This electrophysiologic study was technically limited by: morbid obesity and body habitus (leg size).  This made NCS in particular very challenging, though the responses below were reproducible.    This is a normal electrophysiologic study of the bilateral lower extremities.  There is no definitive evidence of a lumbosacral radiculopathy on today's electrophysiologic study.  Please note that there are a few caveats to consider with lumbosacral radiculopathy as it relates to NCS/EMG testing.  NCS is often normal in radiculopathy, so we rely on the needle EMG for  diagnosis.  If prior posterior lumbar surgery has been done, false positives are known to occur when evaluating paraspinal musculature.  The needle EMG will also be normal in purely demyelinating radiculopathy lesions, in predominant sensory nerve root/dorsal root lesions, and in some cases hyperacute lesions.  In these cases, the EMG/NCS will be normal, and can miss radiculopathy.  Sensitivity for needle EMG is estimated to be between 49%-86% with lumbosacral radiculopathy, so NCS/EMG shouldn't be used to definitively rule out radiculopathy, especially if there is a reasonable clinical suspicion.          ___________________________  Benito Thomas D.O.        NCS+  Motor Nerve Results      Latency Amplitude F-Lat Segment Distance CV Comment   Site (ms) Norm (mV) Norm (ms)  (cm) (m/s) Norm    Left Fibular (EDB)   Ankle 2.7  < 6.5 3.7  > 1.10         Bel Fib Head 10.3 - 3.2 -  Bel Fib Head-Ankle 38 50  > 39    Pop Fossa 12.1 - 3.0 -  Pop Fossa-Bel Fib Head 10 56  > 42    Right Fibular (EDB)   Ankle 2.5  < 6.5 4.2  > 1.10         Bel Fib Head 9.6 - 3.5 -  Bel Fib Head-Ankle 36 51  > 39    Pop Fossa 11.5 - 3.3 -  Pop Fossa-Bel Fib Head 10 51  > 42    Left Tibial (AHB)   Ankle 4.1  < 6.1 3.5  > 1.10         Right Tibial (AHB)   Ankle 3.4  < 6.1 4.4  > 1.10           Sensory Sites      Latency (O) Latency (P) Amp (P-T) Segment Distance Velocity Comment   Site (ms) Norm (ms) Norm (µV) Norm  (cm) (m/s)    Left Sural (Rec:Lat Mall)   Calf 1.18  < 3.6 1.75  < 4.5 18  > 4 Calf-Lat Mall 14 119    Left Superficial Fibular (Rec:Ankle)   14 cm 0.98 - 1.70  < 4.2 28  > 5 14 cm-Ankle 14 144    Right Superficial Fibular (Rec:Ankle)   14 cm 1.38 - 2.1  < 4.2 37  > 5 14 cm-Ankle 14 102      EMG+     Side Muscle Nerve Root Ins Act Fibs Psw Amp Dur Poly Recrt Int Pat Comment   Right Vastus Med Femoral L2-L4 Nml Nml Nml Nml Nml 0 Nml Nml    Right Tib Anterior Fibular,  Deep Fibula... L4-L5 Nml Nml Nml Nml Nml 0 Nml Nml    Right Fib  Longus Fibular,  Superficial... L5-S1 Nml Nml Nml Nml Nml 0 Nml Nml    Right Gastroc Tibial S1-S2 Nml Nml Nml Nml Nml 0 Nml Nml    Right Dorsal Interossei ped I Lateral Plantar S2-S3 Nml Nml Nml Nml Nml 0 Nml Nml    Left Vastus Med Femoral L2-L4 Nml Nml Nml Nml Nml 0 Nml Nml    Left Tib Anterior Fibular,  Deep Fibula... L4-L5 Nml Nml Nml Nml Nml 0 Nml Nml    Left Fib Longus Fibular,  Superficial... L5-S1 Nml Nml Nml Nml Nml 0 Nml Nml    Left Gastroc Tibial S1-S2 Nml Nml Nml Nml Nml 0 Nml Nml    Left Dorsal Interossei ped I Lateral Plantar S2-S3 Nml Nml Nml Nml Nml 0 Nml Nml            Waveforms:    Motor              Sensory

## 2025-07-30 ENCOUNTER — PATIENT MESSAGE (OUTPATIENT)
Dept: PRIMARY CARE CLINIC | Facility: CLINIC | Age: 70
End: 2025-07-30

## 2025-07-30 ENCOUNTER — TELEPHONE (OUTPATIENT)
Dept: PRIMARY CARE CLINIC | Facility: CLINIC | Age: 70
End: 2025-07-30
Payer: MEDICARE

## 2025-07-30 NOTE — TELEPHONE ENCOUNTER
Copied from CRM #[id number]. Topic: General Inquiry - Patient Advice  >> Jul 30, 2025  4:00 PM Anselmo wrote:  Type:  Needs Medical Advice    Who Called: Patient  Symptoms (please be specific): Patient says she forgot to make the appointment today and would like to reschedule for tomorrow. She said it is urgent she try to get in tomorrow as she had tests ran that she needed to speak to [facility] about. She apologized for missing the appointment and would like to see if she can make one for tomorrow, instead. Please call patient at number provided to discuss options.    How long has patient had these symptoms:    Pharmacy name and phone #:    Would the patient rather a call back or a response via [facility]? Call  Best Call Back Number: [phone] (M)  Additional Information:

## 2025-08-04 ENCOUNTER — OFFICE VISIT (OUTPATIENT)
Dept: PRIMARY CARE CLINIC | Facility: CLINIC | Age: 70
End: 2025-08-04
Payer: MEDICARE

## 2025-08-04 VITALS
HEIGHT: 61 IN | HEART RATE: 108 BPM | BODY MASS INDEX: 50.63 KG/M2 | DIASTOLIC BLOOD PRESSURE: 82 MMHG | WEIGHT: 268.19 LBS | SYSTOLIC BLOOD PRESSURE: 130 MMHG | TEMPERATURE: 98 F | RESPIRATION RATE: 16 BRPM | OXYGEN SATURATION: 96 %

## 2025-08-04 DIAGNOSIS — Z13.6 ENCOUNTER FOR LIPID SCREENING FOR CARDIOVASCULAR DISEASE: ICD-10-CM

## 2025-08-04 DIAGNOSIS — M17.0 OSTEOARTHRITIS OF BOTH KNEES, UNSPECIFIED OSTEOARTHRITIS TYPE: ICD-10-CM

## 2025-08-04 DIAGNOSIS — Z13.220 ENCOUNTER FOR LIPID SCREENING FOR CARDIOVASCULAR DISEASE: ICD-10-CM

## 2025-08-04 DIAGNOSIS — F33.0 MILD EPISODE OF RECURRENT MAJOR DEPRESSIVE DISORDER: ICD-10-CM

## 2025-08-04 DIAGNOSIS — D64.9 ANEMIA, UNSPECIFIED TYPE: ICD-10-CM

## 2025-08-04 DIAGNOSIS — I99.8 ANGIECTASIA: ICD-10-CM

## 2025-08-04 DIAGNOSIS — I85.00 ESOPHAGEAL VARICES WITHOUT BLEEDING, UNSPECIFIED ESOPHAGEAL VARICES TYPE: ICD-10-CM

## 2025-08-04 DIAGNOSIS — R10.13 ABDOMINAL PAIN, EPIGASTRIC: ICD-10-CM

## 2025-08-04 DIAGNOSIS — L29.9 PRURITUS, UNSPECIFIED: ICD-10-CM

## 2025-08-04 DIAGNOSIS — R20.0 NUMBNESS OF FACE: ICD-10-CM

## 2025-08-04 DIAGNOSIS — Z79.899 ENCOUNTER FOR LONG-TERM CURRENT USE OF MEDICATION: ICD-10-CM

## 2025-08-04 DIAGNOSIS — K70.31 ALCOHOLIC CIRRHOSIS OF LIVER WITH ASCITES: ICD-10-CM

## 2025-08-04 DIAGNOSIS — M51.360 DEGENERATION OF INTERVERTEBRAL DISC OF LUMBAR REGION WITH DISCOGENIC BACK PAIN: Primary | ICD-10-CM

## 2025-08-04 DIAGNOSIS — K44.9 HIATAL HERNIA: ICD-10-CM

## 2025-08-04 PROCEDURE — 99214 OFFICE O/P EST MOD 30 MIN: CPT | Mod: HCNC,S$GLB,, | Performed by: FAMILY MEDICINE

## 2025-08-04 PROCEDURE — 1125F AMNT PAIN NOTED PAIN PRSNT: CPT | Mod: CPTII,HCNC,S$GLB, | Performed by: FAMILY MEDICINE

## 2025-08-04 PROCEDURE — 3079F DIAST BP 80-89 MM HG: CPT | Mod: CPTII,HCNC,S$GLB, | Performed by: FAMILY MEDICINE

## 2025-08-04 PROCEDURE — 3008F BODY MASS INDEX DOCD: CPT | Mod: CPTII,HCNC,S$GLB, | Performed by: FAMILY MEDICINE

## 2025-08-04 PROCEDURE — 3288F FALL RISK ASSESSMENT DOCD: CPT | Mod: CPTII,HCNC,S$GLB, | Performed by: FAMILY MEDICINE

## 2025-08-04 PROCEDURE — 3044F HG A1C LEVEL LT 7.0%: CPT | Mod: CPTII,HCNC,S$GLB, | Performed by: FAMILY MEDICINE

## 2025-08-04 PROCEDURE — 1159F MED LIST DOCD IN RCRD: CPT | Mod: CPTII,HCNC,S$GLB, | Performed by: FAMILY MEDICINE

## 2025-08-04 PROCEDURE — 99999 PR PBB SHADOW E&M-EST. PATIENT-LVL V: CPT | Mod: PBBFAC,HCNC,, | Performed by: FAMILY MEDICINE

## 2025-08-04 PROCEDURE — 3075F SYST BP GE 130 - 139MM HG: CPT | Mod: CPTII,HCNC,S$GLB, | Performed by: FAMILY MEDICINE

## 2025-08-04 PROCEDURE — 1101F PT FALLS ASSESS-DOCD LE1/YR: CPT | Mod: CPTII,HCNC,S$GLB, | Performed by: FAMILY MEDICINE

## 2025-08-04 NOTE — PROGRESS NOTES
Subjective:       Patient ID: Lo Escalera is a 70 y.o. female.    Chief Complaint: Follow-up, Back Pain, and Chest Pain      HPI:  71 yo BM --patient with chronic back pain---had MRI of the lumbar spine showing spondylosis L3-4 with severe central canal narrowing and mild bilateral neuronal foraminal encroachment--patient is supposed to see Neuro surgery--needs epidural steroid injection L3-4 and possibly L4-5  Patient with some numbness of the face had CT scan of the brain no significant findings found needs to see Neurology for numbness to the face ---makes pt nervous   Chest pain patient states has chest pain by pointing to the upper epigastric area and it radiates around to her back--hx GERD on protonix 07/01/2025 patient had cardiac PET stress test that was normal--lower sternal or upper esophageal pain is probably due to GI source will refer to GI mg          Office  visit 06/09/2025  69-year-old black female in for follow-up-=-pain and right hip--discuss medical concerns  Patient having problems with  sciatica x2 weeks--tried her regular pain medications--gabapentin 300 mg t.i.d.  Patient had EGD 05/15/2025 distal esophageal banding seen/small hiatal hernia/mi portal hypertension/pre pyloric diverticulum suggested redo EGD in 1 year  On 04/30/2025--had CT scan of the brain showing decrease size to the temporal and parietal lobe with a opacification ethmoid air cells no vascular anomalies or tumors CT scan of the abdomen showed hepatic cirrhosis with splenomegaly diverticulosis cholecystectomy hysterectomy  Patient on Protonix gabapentin 600 t.i.d. Cymbalta 30 Flexeril but minimal relief of pain  Patient's saw neurologist Dr Negron --MRI lumbar spine ordered--not done . Needs see neurosurgery for chronic pain possible epidural steroid injections  Low back pain--patient if stands initially feels better but is stands longer pain gets worse radiates down the right leg walking with a cane  No coughing anymore  worried has covid still   Very poor historian   Pt sees hepatologist   Sees GI MD   Needs see neuroisurgery ==saw neurology ordered MRI LS spine not done         Office visit 03/24/2025 70 yo BF in for 3 month checkup--weight gain 15-20 lbs   Hx diverticulitis treated with antibiotics---history hiatal hernia--saw gastroenterologist Dr Gardner--history gastroscope showing GERD esophagitis gastritis-ulcers cirrhosis esophageal varices portal hypertension and hepatics steatosis  Patient could not go to son's wedding because she was having problems from alcohol--was in the hospital  Sleep   [facility]--does not have CPAP machine  Saw  Neurology--had dorsalgia--B1 B6 B12 folate TSH hemoglobin A1c all basically normal except B6--2 -had protein in the immunoelectrophoresis  Weight gain 15 20 lb patient wants to try Wegovy  History of hepatics steatosis and cirrhosis of the liver with history of GERD esophagitis gastritis and ulcer.  Esophageal varices portal hypertension--had some esophageal banding  Patient needs to do lab CBCs CMP lipids T4  History bronchospasm on albuterol  GERD multiple GI issues see above patient on Protonix needs refills  Neuropathy---pain and back and both legs patient wants to be on gabapentin 600 mg t.i.d. needs refills                        ROS:  No significant pain except for history of present illness  Skin: no psoriasis, eczema, skin cancer  HEENT: No headache, ocular pain, blurred vision, diplopia,  hoarseness change in voice, no thyroid disease no epistaxis  Lung: No pneumonia, asthma, Tb, wheezing, + occasional SOB, no smoking history sleep apnea supposed to get a CPAP machine  Heart: no chest pain,no  ankle edema,+ occas   palpitations, no  MI, eva murmur, no hypertension patient used to have high blood pressure but was taken off of medication blood pressure today 132/7, hyperlipidemia--no stent bypass arrhythmia history of hypertension--occasionally feels like heart  skips a beat blood pressure was low so taken off blood pressure medicine   Abdomen: No nausea, vomiting, diarrhea, constipation, ulcers, hepatitis, status post cholecystectomy, melena, hematochezia, hematemesis recent esophagitis gastritis with transfusion 2 units of blood history of peptic ulcer disease history of esophageal varices--sees hepatologist --recent diverticulitis 8/17/2024 and 2-6-1430oedm times treated with antibiotics referred to gastroenterologist  : no UTI, renal disease, stones  GYN hysterectomy mammogram March 2021  MS: no fractures, O/A, lupus, rheumatoid, gout--mainly problems mid and lower back --but pain  both knees--right shoulder wants physical therapy    Neuro: No dizziness, LOC, seizures +vertigo---several recent episodes with falling will get MRI brain and restart antivert--dizzy exercises -help with meclizine November 2020 patient was [facility] to [facility] for loss of consciousness used to drink  36 yrs ago-pancreatitis  No diabetes, + anemia had to be transfused blood had upper GI bleed, + anxiety, + depression upset had to quit working because of vertigo was working at [employer]-doing pretty good with--loss mom dad oldest sister back to back--anemia 36.9 was 32.4 platelets 115 on iron    3 children unemployed, lives with      Objective:   Physical Exam:   General: Well nourished, well developed, no acute distress up with a cane + morbid obesity hard arise from chair ---frequently standing from sitting position complaining of back pain radiating down right leg  Skin: No lesions  HEENT: Eyes PERRLA, EOM intact, nose clear , throat nonerythematous +arcus senilis no pallor to the conjunctiva  NECK: Supple, no bruits, No JVD, no nodes  Lungs: Clear, no rales, rhonchi, wheezing  Heart: Regular rate and rhythm, no murmurs, gallops, or rubs  Abdomen: flat, bowel sounds positive, no tenderness, or organomegaly--pain mainly right upper quadrant and periumbilical area    MS:  Tenderness lumbar spine L1-S1 tear flexion 10° extension 10° lateral flexion rotation 10° straight leg lift pulling sensation back no radiculopathy able squat senior care down hard to arise  Neuro: Alert, CN intact, oriented X3 --unable do heel toe due weight and knees   Extremities: No cyanosis, clubbing, or edema negative Romberg        Assessment:       1. Degeneration of intervertebral disc of lumbar region with discogenic back pain    2. Numbness of face    3. Abdominal pain, epigastric    4. Hiatal hernia    5. Mild episode of recurrent major depressive disorder    6. Esophageal varices without bleeding, unspecified esophageal varices type    7. Angiectasia    8. Alcoholic cirrhosis of liver with ascites    9. Osteoarthritis of both knees, unspecified osteoarthritis type    10. Anemia, unspecified type    11. Encounter for lipid screening for cardiovascular disease    12. Encounter for long-term current use of medication    13. Pruritus, unspecified                  Plan:       Degeneration of intervertebral disc of lumbar region with discogenic back pain  -     Ambulatory referral/consult to Neurosurgery; Future; Expected date: 08/11/2025    Numbness of face  -     Ambulatory referral/consult to Neurology; Future; Expected date: 08/11/2025    Abdominal pain, epigastric  -     Ambulatory referral/consult to Gastroenterology; Future; Expected date: 08/11/2025    Hiatal hernia    Mild episode of recurrent major depressive disorder    Esophageal varices without bleeding, unspecified esophageal varices type    Angiectasia    Alcoholic cirrhosis of liver with ascites  -     Lipid Panel; Future; Expected date: 02/04/2026  -     Hemoglobin A1C; Future; Expected date: 02/04/2026  -     TSH; Future; Expected date: 02/04/2026  -     T4, Free; Future; Expected date: 02/04/2026    Osteoarthritis of both knees, unspecified osteoarthritis type    Anemia, unspecified type  -     CBC Auto Differential; Future; Expected  date: 02/04/2026  -     Comprehensive Metabolic Panel; Future; Expected date: 02/04/2026  -     Lipid Panel; Future; Expected date: 02/04/2026    Encounter for lipid screening for cardiovascular disease  -     Lipid Panel; Future; Expected date: 02/04/2026    Encounter for long-term current use of medication  -     Hemoglobin A1C; Future; Expected date: 02/04/2026    Pruritus, unspecified  -     TSH; Future; Expected date: 02/04/2026  -     T4, Free; Future; Expected date: 02/04/2026                  Main Reason for Visits  Lumbosacral strain with right sciatica had MRI of the back showing spondylosis L3-4 with central canal stenosis which was severe and some bilateral foraminal stenosis see neurosurgery for possible epidural steroid injection  Numbness to the face patient to see Neurology  Upper epigastric or lower sternal pain---patient had a cardiac stress test that was negative shows not a cardiac issue needs EGD--has history of cirrhosis with esophageal varices needs EGD  Patient did have some esophageal banding  Hepatologist---patient with history of cirrhosis esophageal varices needs to be seen by hepatology--will get CBCs CMP lipids ammonia level T4  Morbid obesity patient would like to try Wegovy will discuss with hepatologist prior to treatment with this medication due to the risk of pancreatitis and GI problems and hepatology for  Patient with dorsalgia and neuropathy on gabapentin 600 t.i.d.  GERD on Protonix  Wants to see psychiatrist anxiety and depression  Morbid obesity needs to lose a lot of weight may benefit from gastric bypass   GERD--avoid alcohol smoking NSAIDs caffeine stress carbonated drinks--can raise the head of the bed on a block of wood to prevent GERD--eat small meals--lay down for 1-2 hours after eating Cont protonix --no NSAIDs due to history of having be transfused due to GI issues hx gastritis esophagitis cirrhosis esophageal varices hepatic steatosis diverticulosis portal  hypertension  Anemia hematocrit was 32.9 now 36.9 but 115,000 platelets history of iron deficiency  Thrombocytopenia platelets [id number]  Lab --in 6 mo CBC CMP lipid 6 mo  Ret 6 mo

## 2025-08-05 ENCOUNTER — TELEPHONE (OUTPATIENT)
Dept: NEUROLOGY | Facility: CLINIC | Age: 70
End: 2025-08-05
Payer: MEDICARE

## 2025-08-13 ENCOUNTER — TELEPHONE (OUTPATIENT)
Dept: NEUROLOGY | Facility: CLINIC | Age: 70
End: 2025-08-13
Payer: MEDICARE

## 2025-08-18 DIAGNOSIS — M54.31 SCIATICA OF RIGHT SIDE: ICD-10-CM

## 2025-08-18 RX ORDER — TRAMADOL HYDROCHLORIDE 50 MG/1
50 TABLET, FILM COATED ORAL
Qty: 30 TABLET | Refills: 0 | Status: SHIPPED | OUTPATIENT
Start: 2025-08-18

## 2025-08-25 ENCOUNTER — TELEPHONE (OUTPATIENT)
Dept: PRIMARY CARE CLINIC | Facility: CLINIC | Age: 70
End: 2025-08-25
Payer: MEDICARE

## 2025-08-25 ENCOUNTER — TELEPHONE (OUTPATIENT)
Dept: NEUROLOGY | Facility: CLINIC | Age: 70
End: 2025-08-25
Payer: MEDICARE

## (undated) DEVICE — DRAPE OPHTHALMIC 48X62 FEN

## (undated) DEVICE — SOL BETADINE 5%

## (undated) DEVICE — Device

## (undated) DEVICE — DRESSING TRANS 2X2 TEGADERM

## (undated) DEVICE — SYR LUER LOCK 1CC

## (undated) DEVICE — GLOVE BIOGEL ECLIPSE SZ 6.5

## (undated) DEVICE — DUOVISC